# Patient Record
Sex: FEMALE | Race: WHITE | HISPANIC OR LATINO | Employment: OTHER | URBAN - METROPOLITAN AREA
[De-identification: names, ages, dates, MRNs, and addresses within clinical notes are randomized per-mention and may not be internally consistent; named-entity substitution may affect disease eponyms.]

---

## 2017-01-11 ENCOUNTER — DOCUMENTATION ONLY (OUTPATIENT)
Dept: ONCOLOGY | Facility: CLINIC | Age: 67
End: 2017-01-11

## 2017-01-11 NOTE — PROGRESS NOTES
January 11, 2017    Meme did not appear for her Cancer Risk Management Program genetic counseling appointment at the [facility] for her results. I will have the schedulers contact her to reschedule the appointment.    Millie Mendez MS, [facility] [place]  Certified Genetic Counselor  P. [phone]  F. [phone]

## 2017-01-25 ENCOUNTER — OFFICE VISIT (OUTPATIENT)
Dept: ONCOLOGY | Facility: CLINIC | Age: 67
End: 2017-01-25
Attending: GENETIC COUNSELOR, MS
Payer: COMMERCIAL

## 2017-01-25 DIAGNOSIS — Z80.41 FAMILY HISTORY OF MALIGNANT NEOPLASM OF OVARY: ICD-10-CM

## 2017-01-25 DIAGNOSIS — Z80.3 FAMILY HISTORY OF MALIGNANT NEOPLASM OF BREAST: Primary | ICD-10-CM

## 2017-01-25 DIAGNOSIS — Z84.81 FAMILY HISTORY OF BRCA1 GENE POSITIVE: ICD-10-CM

## 2017-01-25 PROCEDURE — 96040 ZZH GENETIC COUNSELING, EACH 30 MINUTES: CPT | Mod: ZF | Performed by: GENETIC COUNSELOR, MS

## 2017-01-25 NOTE — Clinical Note
"1/25/2017       RE: Meme Hameed  [address]  [facility] [address]     Dear Colleague,    Thank you for referring your patient, eMme Hameed, to the [facility]. Please see a copy of my visit note below.    1/25/2017    Referring Provider: Kaelyn Dunlap MD     Presenting Information:  Meme returned to the Cancer Risk Management Program at the [facility] to discuss her genetic testing results. Her blood was drawn on 11/25/2016.  GynPlus testing  was ordered from [email or web address]. This testing was done because of her family history of breast and ovarian cancer.    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  No other mutations were found in the RAD51C gene.      Meme also tested negative for mutations in the following genes by sequencing and deletion/duplication analysis: BRCA1, BRCA2, BRIP1, EPCAM, MLH1, MSH2, MSH6, PALB2, PMS2, PTEN, RAD51D, and TP53.  Meme cannot pass on a mutation in any of these genes to her children based on this test result.  Mutations in these genes do not skip generations.     Of note, Meme's paternal cousin had a reported BRCA1 mutation. Meme has been unable to obtain this test report. 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  Meme did not have any deletions of the BRCA1 gene found on this test.  She will try to get me her cousin's test report.     Interpretation:  We discussed several different interpretations of this inconclusive test result.  It is not clear if this variant in the RAD51C gene is associated with increased cancer risk.  1. This variant may be a benign change that does not increase cancer risk.  2. This variant may be a harmful mutation that " causes increased risk for ovarian and breast cancer.    Harmful mutations in RAD51C have been shown to increase the risk of ovarian cancer as well as female breast cancer.  The risk for ovarian cancer for women who have a RAD51C mutation is estimated to be about 5-9%.  The risk for breast cancer is believed to be increased for women who have a RAD51C mutation, however these risk numbers are not well defined.  Cancer risks in men are not currently available.  The National Comprehensive Cancer Network (NCCN) recommends consideration of prophylactic salpingo-oophorectomy (surgical removal of the ovaries and fallopian tubes) for women with a pathogenic variant in RAD51C. However, this surgery is not currently reccommended for Meme, given that the clinical significance of this variant is unknown.     The lab is working to determine if this variant is harmful or benign, and they will contact me if it is reclassified.  If this variant is determined to be a benign change, there may be a different gene or combination of genes and environment that are associated with the cancers in Meme and/or her relatives.  It is important to consider that her mother may have had a mutation in one of the genes tested and she did not inherit it.      Inheritance:   Meme has a 50% chance to pass this variant to each of her children.   Likewise, each of her siblings also has a 50% risk of having the same variant.  Because it is unclear what, if any, risk is associated with this variant, clinical genetic testing is not recommended for relatives.    Screening:  Based on this inconclusive test result, it is important for Meme and her relatives to refer back to the family history for appropriate cancer screening.      Based on her personal and family history, Meme has a 10.5-35.4% lifetime risk of developing breast cancer based on the JOSE A and Philip models.  As such, Meme meets current National Comprehensive Cancer Network (NCCN)  guidelines for high risk breast screening.  This includes annual breast MRI in addition to annual mammogram.  In addition, Meme should be receiving clinical breast exams by her physician. We discussed that her other close female relatives may also qualify for high risk screening, based on family history, and they should speak to their physicians or a genetic counselor about this.    Other population cancer screening, such as recommendations by the American Cancer Society, are appropriate for Meme and her family.  These screening recommendations may change if there are changes to Meme's personal and/or family history.  Final screening recommendations should be made by each individual's managing physician.    Additional Testing:  It is possible Meme does carry a gene or combination of genes and environment that increase her risk for breast cancer. We again reviewed the option of larger gene panels covering more moderate risk genes associated with breast cancer, namely the CASSIE and CHEK2 genes. We discussed that such testing is available, but it is not clear if insurance would cover additional testing.  We discussed that her breast screening would not change if she tested positive for mutations in these genes, but that it could potentially provide information to her daughters, if she did have a mutation. Meme felt comfortable declining further genetic testing at this time.    Although Meme's genetic testing result was negative, other relatives may still carry a gene mutation associated with cancer. Genetic counseling is recommended for her siblings (due to maternal and paternal family history) and maternal relatives (due to maternal family history) to discuss genetic testing options.  Paternal relatives are also encouraged to seek genetic counseling for the known BRCA1 mutation.    Summary:  We do not have an explanation for her maternal family history of cancer.  Because of that, it is important that she  continue with cancer screening based on her personal and family history as discussed above.  Genetic testing is rapidly advancing, and new cancer susceptibility genes will most likely be identified in the future.  Therefore, I encouraged Meme to contact me annually or if there are changes in her personal or family history.  This may change how we assess her cancer risk, screening, and the testing we would offer.    Plan:  1. I provided Meme with a copy of her test results today.    2. She plans to follow-up with her physicians. I will place a referral for high-risk breast screening.  3. She should contact me annually, or sooner if family history changes, as well as to check in on the status of this VUS.  4. I will contact Meme if the lab informs me that this VUS has been reclassified.  This may change screening and testing recommendations for relatives.    If Meme has any further questions, I encouraged her to contact me at [phone].    Time spent face to face: 25 minutes.    Millie Mendez MS, [facility] [place]  Certified Genetic Counselor  P. [phone]  F. [phone]

## 2017-01-25 NOTE — Clinical Note
Cancer Risk Management  Program Locations    [facility]  [facility]  [facility]  Mailing Address  Cancer Risk Management Program  [facility]  [address]  [place] [address]    New patient appointments  [phone]  January 31, 2017    Meme Hameed  [address]  [facility] [address]      Dear Meme,    It was a pleasure meeting with you at the [facility] again last week on 1/25/17.  Here is a copy of the progress note from your recent genetic counseling visit to the Cancer Risk Management Program.  If you have any additional questions, please feel free to call.    Referring Provider: Kaelyn Dunlap MD     Presenting Information:  Meme returned to the Cancer Risk Management Program at the [facility] to discuss her genetic testing results. Her blood was drawn on 11/25/2016.  GynPlus testing  was ordered from [facility]. This testing was done because of her family history of breast and ovarian cancer.    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  No other mutations were found in the RAD51C gene.      Meme also tested negative for mutations in the following genes by sequencing and deletion/duplication analysis: BRCA1, BRCA2, BRIP1, EPCAM, MLH1, MSH2, MSH6, PALB2, PMS2, PTEN, RAD51D, and TP53.  Meme cannot pass on a mutation in any of these genes to her children based on this test result.  Mutations in these genes do not skip generations.     Of note, Meme's paternal cousin had a reported BRCA1 mutation. Meme has been unable to obtain this test report. 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  Meme did not have any deletions of the BRCA1 gene found on this test.  She will try to get me her cousin's test report.     Interpretation:  We discussed several different interpretations of this inconclusive test result.  It is not clear if this variant in the RAD51C gene is associated with increased cancer risk.  1. This variant may be a benign change that does not increase cancer risk.  2. This variant may be a harmful mutation that causes increased risk for ovarian and breast cancer.    Harmful mutations in RAD51C have been shown to increase the risk of ovarian cancer as well as female breast cancer.  The risk for ovarian cancer for women who have a RAD51C mutation is estimated to be about 5-9%.  The risk for breast cancer is believed to be increased for women who have a RAD51C mutation, however these risk numbers are not well defined.  Cancer risks in men are not currently available.  The National Comprehensive Cancer Network (NCCN) recommends consideration of prophylactic salpingo-oophorectomy (surgical removal of the ovaries and fallopian tubes) for women with a pathogenic variant in RAD51C. However, this surgery is not currently reccommended for Meme, given that the clinical significance of this variant is unknown.     The lab is working to determine if this variant is harmful or benign, and they will contact me if it is reclassified.  If this variant is determined to be a benign change, there may be a different gene or combination of genes and environment that are associated with the cancers in Meme and/or her relatives.  It is important to consider that her mother may have had a mutation in one of the genes tested and she did not inherit it.      Inheritance:   Meme has a 50% chance to pass this variant to each of her children.   Likewise, each of her siblings also has a 50% risk of having the same variant.  " Because it is unclear what, if any, risk is associated with this variant, clinical genetic testing is not recommended for relatives.    Screening:  Based on this inconclusive test result, it is important for Meme and her relatives to refer back to the family history for appropriate cancer screening.      Based on her personal and family history, Meme has a 10.5-35.4% lifetime risk of developing breast cancer based on the JOSE A and Philip models.  As such, Meme meets current National Comprehensive Cancer Network (NCCN) guidelines for high risk breast screening.  This includes annual breast MRI in addition to annual mammogram.  In addition, Meme should be receiving clinical breast exams by her physician. We discussed that her other close female relatives may also qualify for high risk screening, based on family history, and they should speak to their physicians or a genetic counselor about this.    Other population cancer screening, such as recommendations by the American Cancer Society, are appropriate for Meme and her family.  These screening recommendations may change if there are changes to Meme's personal and/or family history.  Final screening recommendations should be made by each individual's managing physician.    Additional Testing:  It is possible Meme does carry a gene or combination of genes and environment that increase her risk for breast cancer. We again reviewed the option of larger gene panels covering more moderate risk genes associated with breast cancer, namely the CASSIE and CHEK2 genes. We discussed that such testing is available, but it is not clear if insurance would cover additional testing.  We discussed that her breast screening would not change if she tested positive for mutations in these genes, but that it could potentially provide information to her daughters, if she did have a mutation. Meme felt comfortable declining further genetic testing at this time.    Although  Meme's genetic testing result was negative, other relatives may still carry a gene mutation associated with cancer. Genetic counseling is recommended for her siblings (due to maternal and paternal family history) and maternal relatives (due to maternal family history) to discuss genetic testing options.  Paternal relatives are also encouraged to seek genetic counseling for the known BRCA1 mutation.    Summary:  We do not have an explanation for her maternal family history of cancer.  Because of that, it is important that she continue with cancer screening based on her personal and family history as discussed above.  Genetic testing is rapidly advancing, and new cancer susceptibility genes will most likely be identified in the future.  Therefore, I encouraged Meme to contact me annually or if there are changes in her personal or family history.  This may change how we assess her cancer risk, screening, and the testing we would offer.    Plan:  1. I provided Meme with a copy of her test results today.    2. She plans to follow-up with her physicians. I will place a referral for high-risk breast screening.  3. She should contact me annually, or sooner if family history changes, as well as to check in on the status of this VUS.  4. I will contact Meme if the lab informs me that this VUS has been reclassified.  This may change screening and testing recommendations for relatives.    If Meme has any further questions, I encouraged her to contact me at [phone].    Time spent face to face: 25 minutes.    Millie Mendez MS, [facility] [place]  Certified Genetic Counselor  P. [phone]  F. [phone]

## 2017-01-26 NOTE — PROGRESS NOTES
"1/25/2017    Referring Provider: Kaelyn Dunlap MD     Presenting Information:  Meme returned to the Cancer Risk Management Program at the [facility] to discuss her genetic testing results. Her blood was drawn on 11/25/2016.  GynPlus testing  was ordered from [facility]. This testing was done because of her family history of breast and ovarian cancer.    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  No other mutations were found in the RAD51C gene.      Meme also tested negative for mutations in the following genes by sequencing and deletion/duplication analysis: BRCA1, BRCA2, BRIP1, EPCAM, MLH1, MSH2, MSH6, PALB2, PMS2, PTEN, RAD51D, and TP53.  Meme cannot pass on a mutation in any of these genes to her children based on this test result.  Mutations in these genes do not skip generations.     Of note, Meme's paternal cousin had a reported BRCA1 mutation. Meme has been unable to obtain this test report. 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  Meme did not have any deletions of the BRCA1 gene found on this test.  She will try to get me her cousin's test report.     Interpretation:  We discussed several different interpretations of this inconclusive test result.  It is not clear if this variant in the RAD51C gene is associated with increased cancer risk.  1. This variant may be a benign change that does not increase cancer risk.  2. This variant may be a harmful mutation that causes increased risk for ovarian and breast cancer.    Harmful mutations in RAD51C have been shown to increase the risk of ovarian cancer as well as female breast cancer.  The risk for ovarian cancer for women who have a RAD51C mutation is " estimated to be about 5-9%.  The risk for breast cancer is believed to be increased for women who have a RAD51C mutation, however these risk numbers are not well defined.  Cancer risks in men are not currently available.  The National Comprehensive Cancer Network (NCCN) recommends consideration of prophylactic salpingo-oophorectomy (surgical removal of the ovaries and fallopian tubes) for women with a pathogenic variant in RAD51C. However, this surgery is not currently reccommended for Meme, given that the clinical significance of this variant is unknown.     The lab is working to determine if this variant is harmful or benign, and they will contact me if it is reclassified.  If this variant is determined to be a benign change, there may be a different gene or combination of genes and environment that are associated with the cancers in Meme and/or her relatives.  It is important to consider that her mother may have had a mutation in one of the genes tested and she did not inherit it.      Inheritance:   Meme has a 50% chance to pass this variant to each of her children.   Likewise, each of her siblings also has a 50% risk of having the same variant.  Because it is unclear what, if any, risk is associated with this variant, clinical genetic testing is not recommended for relatives.    Screening:  Based on this inconclusive test result, it is important for Meme and her relatives to refer back to the family history for appropriate cancer screening.      Based on her personal and family history, Meme has a 10.5-35.4% lifetime risk of developing breast cancer based on the JOSE A and Philip models.  As such, Meme meets current National Comprehensive Cancer Network (NCCN) guidelines for high risk breast screening.  This includes annual breast MRI in addition to annual mammogram.  In addition, Meme should be receiving clinical breast exams by her physician. We discussed that her other close female relatives may  also qualify for high risk screening, based on family history, and they should speak to their physicians or a genetic counselor about this.    Other population cancer screening, such as recommendations by the American Cancer Society, are appropriate for Meme and her family.  These screening recommendations may change if there are changes to Meme's personal and/or family history.  Final screening recommendations should be made by each individual's managing physician.    Additional Testing:  It is possible Meme does carry a gene or combination of genes and environment that increase her risk for breast cancer. We again reviewed the option of larger gene panels covering more moderate risk genes associated with breast cancer, namely the CASSIE and CHEK2 genes. We discussed that such testing is available, but it is not clear if insurance would cover additional testing.  We discussed that her breast screening would not change if she tested positive for mutations in these genes, but that it could potentially provide information to her daughters, if she did have a mutation. Meme felt comfortable declining further genetic testing at this time.    Although Meme's genetic testing result was negative, other relatives may still carry a gene mutation associated with cancer. Genetic counseling is recommended for her siblings (due to maternal and paternal family history) and maternal relatives (due to maternal family history) to discuss genetic testing options.  Paternal relatives are also encouraged to seek genetic counseling for the known BRCA1 mutation.    Summary:  We do not have an explanation for her maternal family history of cancer.  Because of that, it is important that she continue with cancer screening based on her personal and family history as discussed above.  Genetic testing is rapidly advancing, and new cancer susceptibility genes will most likely be identified in the future.  Therefore, I encouraged Meme to  contact me annually or if there are changes in her personal or family history.  This may change how we assess her cancer risk, screening, and the testing we would offer.    Plan:  1. I provided Meme with a copy of her test results today.    2. She plans to follow-up with her physicians. I will place a referral for high-risk breast screening.  3. She should contact me annually, or sooner if family history changes, as well as to check in on the status of this VUS.  4. I will contact Meme if the lab informs me that this VUS has been reclassified.  This may change screening and testing recommendations for relatives.    If Meme has any further questions, I encouraged her to contact me at [phone].    Time spent face to face: 25 minutes.    Millie Mendez MS, [facility] [place]  Certified Genetic Counselor  P. [phone]  F. [phone]

## 2017-02-16 DIAGNOSIS — Z80.3 FAMILY HISTORY OF MALIGNANT NEOPLASM OF BREAST: ICD-10-CM

## 2017-02-16 DIAGNOSIS — Z12.39 BREAST CANCER SCREENING, HIGH RISK PATIENT: Primary | ICD-10-CM

## 2017-02-16 PROBLEM — Z91.89 AT HIGH RISK FOR BREAST CANCER: Status: ACTIVE | Noted: 2017-02-16

## 2017-02-17 ENCOUNTER — TELEPHONE (OUTPATIENT)
Dept: ONCOLOGY | Facility: CLINIC | Age: 67
End: 2017-02-17

## 2017-02-17 ENCOUNTER — ONCOLOGY VISIT (OUTPATIENT)
Dept: ONCOLOGY | Facility: CLINIC | Age: 67
End: 2017-02-17
Attending: CLINICAL NURSE SPECIALIST
Payer: COMMERCIAL

## 2017-02-17 VITALS
SYSTOLIC BLOOD PRESSURE: 110 MMHG | OXYGEN SATURATION: 100 % | HEART RATE: 97 BPM | WEIGHT: 169.9 LBS | HEIGHT: 62 IN | DIASTOLIC BLOOD PRESSURE: 65 MMHG | TEMPERATURE: 97 F | RESPIRATION RATE: 12 BRPM | BODY MASS INDEX: 31.27 KG/M2

## 2017-02-17 DIAGNOSIS — Z80.3 FAMILY HISTORY OF MALIGNANT NEOPLASM OF BREAST: ICD-10-CM

## 2017-02-17 DIAGNOSIS — E66.09 OBESITY DUE TO EXCESS CALORIES, UNSPECIFIED OBESITY SEVERITY: ICD-10-CM

## 2017-02-17 DIAGNOSIS — Z91.89 AT HIGH RISK FOR BREAST CANCER: Primary | ICD-10-CM

## 2017-02-17 DIAGNOSIS — Z12.31 ENCOUNTER FOR SCREENING MAMMOGRAM FOR HIGH-RISK PATIENT: ICD-10-CM

## 2017-02-17 PROCEDURE — 99212 OFFICE O/P EST SF 10 MIN: CPT | Mod: ZF

## 2017-02-17 PROCEDURE — 99215 OFFICE O/P EST HI 40 MIN: CPT | Mod: ZP | Performed by: CLINICAL NURSE SPECIALIST

## 2017-02-17 RX ORDER — ONDANSETRON 4 MG/1
4 TABLET, ORALLY DISINTEGRATING ORAL
COMMUNITY
Start: 2012-07-26 | End: 2017-02-17

## 2017-02-17 RX ORDER — LEVETIRACETAM 750 MG/1
TABLET ORAL
COMMUNITY
Start: 2016-12-05 | End: 2020-09-16

## 2017-02-17 RX ORDER — INHALER, ASSIST DEVICES
SPACER (EA) MISCELLANEOUS
COMMUNITY
Start: 2012-07-26 | End: 2017-02-17

## 2017-02-17 RX ORDER — ALBUTEROL SULFATE 90 UG/1
2 AEROSOL, METERED RESPIRATORY (INHALATION)
COMMUNITY
Start: 2012-07-26 | End: 2017-03-20

## 2017-02-17 ASSESSMENT — PAIN SCALES - GENERAL: PAINLEVEL: NO PAIN (0)

## 2017-02-17 NOTE — PROGRESS NOTES
Oncology Risk Management Consultation:  Date on this visit: 2/17/2017    Meme Hameed  is referred by Millie Mendez,Certified Genetic Counselor,  for an oncology risk management consultation. She requires evaluation for her risk of cancer secondary to having a family history of breast cancer in her mother, sister, paternal aunt and 3 paternal cousins (see below for history). She is considered to at high risk for breast cancer and has a 35.4% lifetime risk for breast cancer by the Philip model.  Her 5 year breast cancer risk is 2.4% by the Melly model and 3.7% by the JOSE A model.     Primary Physician: Kaelyn Dunlap MD    History Of Present Illness:  Ms. Hameed is a very pleasant, healthy 67 year old female who presents with family history of breast cancer.    Pertinent history:  Menarche: [age] years.  Age at first child: 20  Age at menopause: 51  Hx of breastfeeding first child x6 weeks.  No history of OCPs.  No history of HRT.  Uterus removed in 1995 d/t cervical dysplasia.  Ovaries intact.   Hx of 1 negative breast biopsies (2010, see below)  Genetic testing negative for 12 genes on GynPlus Panel through [facility], 11/25/2016. Negative for mutations in BRCA1, BRCA2, BRIP1, EPCAM, MLH1, MSH2, MSH6, PALB2, PMS2, PTEN, RAD51D and TP53. 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    Pertinent screening history:  8/4/2010 - L Stereotactic core needle biopsy w/L clip placement - fibroadenoma and non proliferative fibrocystic changes.  7/14/2014 - Screening mammogram, BiRads1.  11/9/2016 - Diagnostic tomosynthesis mammogram and R ultrasound for R clear nipple discharge x2 months, BiRads1.    At this visit,  she reports she practices breast self exams and denies fatigue, pain, nipple discharge, nipple changes, asymmetry, and changes in shape, color and skin of her breasts.    Past Medical/Surgical History:  Past Medical History   Diagnosis Date     Adenomatous colon polyp 2011     tubular adenoma  "    At high risk for breast cancer 02/16/2017     35.4% lifetime risk by Philip model     Epilepsy (H)      Fracture of metatarsal bone of left foot 7/14     Fracture, radius 1990     and ulnar styloid fracture     GIB (gastrointestinal bleeding) 4/1/2011     Kidney stones 1970, 1975     during pregnancy     Myxolipoma      finger, removed 2011     Osteoporosis      Seizures (H)      Past Surgical History   Procedure Laterality Date     Colonoscopy  3/31/2011     Procedure:COLONOSCOPY; Surgeon:PACO DIETRICH; Location: GI     Colonoscopy  3/31/2011     Procedure:COMBINED COLONOSCOPY, REMOVE TUMOR/POLYP/LESION BY SNARE; Surgeon:PACO DIETRICH; Location: GI     Esophagoscopy, gastroscopy, duodenoscopy (egd), combined  3/31/2011     Procedure:COMBINED ESOPHAGOSCOPY, GASTROSCOPY, DUODENOSCOPY (EGD); Surgeon:PACO DIETRICH; Location: GI     Orthopedic surgery       L wrist     Hysterectomy  1994 or 1995     Partial hysterectomy 1995, urethral tube \"widened\" 1994     Hc breath hydrogen test  6/2/2011     Procedure:HYDROGEN BREATH TEST; Surgeon:MANDIE BRADY; Location: GI       Allergies:  Allergies as of 02/17/2017     (No Known Allergies)       Current Medications:  Current Outpatient Prescriptions   Medication Sig Dispense Refill     Multiple Vitamins-Minerals (CENTRUM SILVER) per tablet Take 1 tablet by mouth daily       cholecalciferol (VITAMIN D) 1000 UNIT tablet Take 1 tablet (1,000 Units) by mouth daily 90 tablet 3     CALCIUM 500 +D 500-400 MG-UNIT TABS Take 1 tablet by mouth 2 times daily 180 tablet 3     phenytoin (DILANTIN) 100 MG ER capsule Take  by mouth 3 times daily.       phenobarbital (LUMINAL) 30 MG tablet Take 32.4 mg by mouth 2 times daily           Family History:  Family History   Problem Relation Age of Onset     Breast Cancer Mother 30     lumpectomy and chemo; also \"mass in ovary\"     OSTEOPOROSIS Sister      GASTROINTESTINAL DISEASE Son      intestinal transplant     Breast Cancer " Sister 55     Leukemia Sister 52     DIABETES Maternal Aunt      multiple mat aunts     Bone Cancer Niece 19     Colon Polyps Father      Colon Polyps Brother      Ovarian Cancer Maternal Aunt 50      of ovarian cancer in 50s or 60s, unknown     Prostate Cancer Cousin 65     Breast Cancer Paternal Aunt 36      of breast cancer recurrence in 60s     Breast Cancer Cousin 30     paternal cousin, BRCA1+ by report     Breast Cancer Cousin 40     paternal cousin, BRCA1+ by report     Breast Cancer Cousin 35     paternal cousin, BRCA1+ by report     Genetic Disorder Cousin      paternal male cousin, BRCA1+ by report     Ovarian Cancer Cousin      paternal cousin, BRCA1+ by report       Social History:  Social History     Social History     Marital status:      Spouse name: N/A     Number of children: N/A     Years of education: N/A     Occupational History     Not on file.     Social History Main Topics     Smoking status: Former Smoker     Smokeless tobacco: Not on file     Alcohol use Yes      Comment: Rarely     Drug use: No     Sexual activity: Not on file     Other Topics Concern     Not on file     Social History Narrative       Physical Exam:  There were no vitals taken for this visit.    GENERAL APPEARANCE: healthy, alert and no distress     NECK: no adenopathy, no asymmetry or masses     LYMPHATICS: No cervical, supraclavicular, axillary or inguinal lymphadenopathy     RESP: lungs clear to auscultation - no rales, rhonchi or wheezes     BREAST: A multi positional, bilateral breast exam was performed.  Very symmetrical pendulous breasts. Right breast: no palpable dominant masses, thickened ridge of probable fibrocystic changes in inferior 2 quadrants. No nipple discharge, no skin changes.  Right axilla: no palpable adenopathy. Left breast: no palpable dominant masses, thickened ridge of probable fibrocystic changes in inferior 2 quadrants.no nipple discharge, no skin changes. Left axilla: no palpable  adenopathy.  CARDIOVASCULAR: regular rate and rhythm, normal S1 S2, no S3 or S4 and no murmur.        SKIN: no suspicious lesions or rashes on upper torso.    Laboratory/Imaging Studies  Results for orders placed or performed in visit on 12/01/16   Dexa vertebral fracture analysis    Narrative    ATTENTION:  Easy to read DXA/VFA reports (formatted and presented as   tables)   can be found in EPIC under Chart review >  Imaging tab >  DXA    [facility]   [address] [place] [id number]  Phone: [phone]   Fax: [phone]    Vertebral Fracture Assessment (VFA) Report:      DEIDRE MURPHY 2MARKOWITZ:    Your patient, BAYRON KUNZ ([id number] ), completed a VFA exam ([id number]   ) on a Jobulous on   . The following is a summary of   the results.   _________________________________________________________________________    Patient biographical information and history:    Current measured height: 62.0 in.  Current measured weight: 166.0 lbs.    Vertebral Fracture Assessment (VFA) was performed in the lateral decubitus   position on this 65.8 year old  Female, whose history as reported   by the patient is noted for  postmenopausal status      and the following   current treatments:   calcium, vitamin D,    In addition, the patient   reported having been previously treated with:   hormone replacement,   corticosteroids,    The patient meets the following indications for a VFA:      (2007 ISCD Position Statements at www.iscd.org)  _________________________________________________________________________    Technical quality:    Satisfactory.  VFA software and contrast adjustment allow for better   visualization of the thoracic vertebral bodies.  VFA scan was   interpretable from T4 - L4.  _________________________________________________________________________    Densitometry results:    Comparison:  None provided.   "  _________________________________________________________________________    Conclusions:    VFA scan was interpretable from T4 - L4.  Using the semi-quantitative   analysis of Genant (see reference #1),   no fractures were identified.  Six point morphometry confirmed the   presence and severity of these deformities.         _________________________________________________________________________    Feel free to contact DXA services if you have any questions or comments.    Thank you for the opportunity to be of service to you and your patient.      _________________________________________________________________________    References:    1.  NOF Physician's Guideline Website address:  www.nof.org    2.  American College of Rheumatology Recommendations for the Prevention   and Treatment of Glucocorticoid-induced Osteoporosis:  2001 update in   \"Arthritis and Rheumatism\"  July 2001 edition (vol 44, issue 7) pp 1497 -   1500.    3.  ISCD position statements:  www.iscd.org  (includes the report of the   2005 VFA Position Development Conference)    According to the ISCD position statements, lateral spine is not to be used   for diagnosis, but may have a role in monitoring.    According to the ISCD position statements, the diagnosis of osteoporosis   in pre-menopausal women and in men younger than age 50 should not be made   on the basis of densitometric criteria alone.  In addition Z-scores rather   than T-scores should be used.    4.  Implementation of suggestions is at the discretion of the ordering   provider.  Clinical correlation is recommended.    5.  WHO categories:    normal = T-score of - 1.0 or more positive, low bone density/ osteopenia =   T-score of - 1.0 to - 2.5, osteoporosis = T-score of -2.5 or more negative      Template revised 7.5.2016         ASSESSMENT  We discussed signs and symptoms to be watchful for and she practiced palpating with the clinic's breast simulation model. We reviewed her plan " (see below) and her prior imaging. She verbalized understanding of signs and symptoms to address with in between visits including lumps, thickening, swelling, tenderness, nipple discharge or changes in skin of breasts.    We also discussed following  a healthy lifestyle plan recommended by both NCCN and the American Cancer Society that can reduce the risk of breast cancer. She agreed to the following recommendations:  1 Limit alcohol consumption to less than 1 drink per day (1 drink=5 oz.wine, 12 oz. Beer or 1.5 oz. 80-proof liquor). She rarely drinks, only around the holidays.  2. Exercise per American Cancer Society guidelines of at least 150 minutes of moderate-intensity activity or 75 minutes of vigorous activity each week. (Or a combination of both.) Exercise should be spread  out over the week. She is exercising 5 days per week on the recumbent bicycle, keeping a food diary and eating about 1500 calories per day. She has tried water grace and water aerobics in the past. She has issues with weight bearing exercise because she has broken her foot in the past and has a history of osteoporosis.   3. Maintain a healthy weight with a Body Mass Index between 19-24.9. Her BMI is 30. I am referring to Katelin Orellana RD, at the [facility] for assessment and plan to help her lose weight.  4. Do not use tobacco products and limit exposure to passive smoke. She stopped smoking 10 years ago.    At this point, the plan will be to continue to reevaluate her risk routinely and reassess at her next visit. She may be a candidate for raloxifene in the future, as she has osteoporosis as well.  I will contact her PCP regarding whether she is considering osteoporosis treatment for her.    INDIVIDUALIZED CANCER RISK MANAGEMENT PLAN:  Individualized Surveillance Plan for women  With 20% or greater lifetime risk of breast cancer   Per NCCN Guidelines Version 1.2016   Recommended screening Test or procedure Last done  Next Scheduled    Clinical encounter Ongoing risk assessment, risk reduction counseling and clinical breast exam, every 6-12 months. 2/17/2017 August 2017   However, some family histories with breast cancers at a very young age, may warrant screening starting earlier.    *May begin at age 40 if breast cancers in the family occur at later ages.    Annual mammogram beginning 10 years younger than the earliest breast cancer in the family but not prior to age 30.    Recommend annual breast MRI to begin 10 years younger than the earliest breast cancer in the family but not prior to age 25.    Breast MRIs are preferably done on day 7-15 of the menstrual cycle in premenopausal women. 11/9/2016 - Diagnostic tomosynthesis mammogram and R Ultrasound for R breast clear nipple discharge, both BiRads1, negative.    scattered fibroglandular densities NEXT: Breast MRI ordered soon.    NEXT exam:   August with tomosynthesis mammogram after visit.   Breast screening for patients at high risk due to thoracic radiation between the ages of 10-30     Begin 8-10 years post radiation treatment or age 25.   Annual mammogram   and  Annual breast MRI, preferably on day 7-15 of menstrual cycle for premenopausal women.    Annual clinical breast exams with ongoing risk assessment and risk reduction counseling until age 25, then every 6-12 months.     NA     NA       I will be happy to work with her to manage her cancer risk, will follow up on her screenings and see her in the Cancer Risk Management clinic at the [facility] in six months.    I spent 40 minutes with the patient with greater that 50% of it in counseling and coordinating care as documented above.    Yelena Ruffin, GWYN-CNS, OCN, ANG-BC  Clinical Nurse Specialist  Cancer Risk Management Program  [address]  [place] [address]    phone:  [phone]  Pager: [phone]  fax: [phone]    Cc: Kaelyn  Gale Dunlap MD

## 2017-02-17 NOTE — PATIENT INSTRUCTIONS
Individualized Surveillance Plan for women  With 20% or greater lifetime risk of breast cancer   Per NCCN Guidelines Version 1.2016   Recommended screening Test or procedure Last done Next Scheduled    Clinical encounter Ongoing risk assessment, risk reduction counseling and clinical breast exam, every 6-12 months. 2/17/2017 August 2017   However, some family histories with breast cancers at a very young age, may warrant screening starting earlier.    *May begin at age 40 if breast cancers in the family occur at later ages.    Annual mammogram beginning 10 years younger than the earliest breast cancer in the family but not prior to age 30.    Recommend annual breast MRI to begin 10 years younger than the earliest breast cancer in the family but not prior to age 25.    Breast MRIs are preferably done on day 7-15 of the menstrual cycle in premenopausal women. 11/9/2016 - Diagnostic tomosynthesis mammogra and R Ultrasound for R breast clear nipple discharge, both BiRads1, negative.    scattered fibroglandular densities NEXT: Breast MRI ordered soon.    NEXT exam:   August with tomosynthesis mammogram after visit.   Breast screening for patients at high risk due to thoracic radiation between the ages of 10-30     Begin 8-10 years post radiation treatment or age 25.   Annual mammogram   and  Annual breast MRI, preferably on day 7-15 of menstrual cycle for premenopausal women.    Annual clinical breast exams with ongoing risk assessment and risk reduction counseling until age 25, then every 6-12 months.     NA     NA       Magnetic Resonance Imaging (MRI) of the Breast  Magnetic resonance imaging (MRI) of the breast is an imaging test that uses strong magnets and radio waves to form pictures of the inside of the breast. It also creates images of the tissues that surround the breast. Breast MRI is used to check for problems, such as a leaking breast implant or a suspicious lump or mass. It can also be used to help  determine if breast cancer is present and aid in diagnosis and management. Most MRI tests take 30 to 60 minutes. Depending on the type of MRI you are having, the test may take longer. Give yourself extra time to check in.      During a breast MRI, you lie face down on a platform that slides into a tubelike machine called a scanner.   Before your test    You may need to stop eating or drinking before the test. Each health care facility has its own guidelines on this. It also depends on the type of exam you are having. Ask your health care provider if you should stop eating or drinking before the test.    Ask your provider if you should stop taking any medicine before the test.    Follow your normal daily routine unless your provider tells you otherwise.    You ll be asked to remove your watch, hearing aids, credit cards, pens, eyeglasses, and other metal objects.    You may be asked to remove your makeup. Makeup may contain some metal.  MRI uses strong magnets. Metal is affected by magnets and can distort the image. The magnet used in MRI can cause metal objects in your body to move. If you have a metal implant, you may not be able to have an MRI unless the implant is certified as MRI safe. People with these implants should not have an MRI:    Ear (cochlear) implants    Certain clips used for brain aneurysms    Certain metal coils put in blood vessels    Most defibrillators    Most pacemakers  The radiologist or technologist may ask you if you:    Have had stereotactic breast biopsy    Have a pacemaker    Have an artificial body part (prosthesis)    Have metal rods, screws, plates, or splinters in your body    Wear a medicated adhesive patch    Have tattoos or body piercings. Some tattoo inks contain metal.    Have implanted nerve stimulators or drug-infusion ports    Work with metal    Have braces. You must remove any dental work.    Have a bullet or other metal in your body  Tell the radiologist or technologist if  you:    Are allergic to X-ray dye (contrast medium), iodine, shellfish, or any medicines    Are pregnant or think you may be pregnant    Are afraid of small, enclosed spaces (claustrophobic)    Have any serious health problems. This includes kidney disease or a liver transplant. You may not be able to have the contrast material used for MRI.    Have had previous surgeries    Are breastfeeding   During your test    You may be asked to wear a hospital gown.    You may be given earplugs to wear if you desire.    You may be injected with contrast. This is a special dye that makes the MRI image sharp that may be needed depending on the reason for your exam.    You ll lie on a platform that slides into a tubelike machine called a scanner. You ll be on your stomach with your breasts placed through openings in the platform.    Remain as still as you can while the camera takes the pictures. This will ensure the best images.  After your test    You can get back to normal activities right away.    If you were given contrast, it will pass naturally through your body within a day.    Drink lots of water so that the dye passes quickly out of your body.  Getting your results  Your health care provider will discuss the test results with you during a follow-up appointment or over the phone.    1073-5378 [employer] [address] [place] [address], [place] [address]. All rights reserved. This information is not intended as a substitute for professional medical care. Always follow your healthcare professional's instructions.

## 2017-02-17 NOTE — Clinical Note
Dr. Dunlap, I am referring Meme to Katelin Orellana RD, to help her lose weight as we focus on reducing her risk for cancer.  She is negative for genetic mutations for 12 breast cancer genes (1 variant of unknown signifcance in RAD51C but not actionable). I am going to consider raloxifene as a chemopreventative agent for her, which could also help her osteoporosis. Will  at next visit. Are you considering any medication for osteoporosis? Thanks. Yelena Ruffin, GWYN-CNS, OCN, ANG-BC Clinical Nurse Specialist Cancer Risk Management Program [address] [place] [address]  phone:  [phone] Pager: [phone] fax: [phone]

## 2017-02-17 NOTE — NURSING NOTE
"Meme Hameed is a 67 year old female who presents for:  Chief Complaint   Patient presents with     Oncology Clinic Visit     ump return        Initial Vitals:  /65 (BP Location: Right arm, Patient Position: Fowlers, Cuff Size: Adult Regular)  Pulse 97  Temp 97  F (36.1  C) (Oral)  Resp 12  Ht 1.586 m (5' 2.44\")  Wt 77.1 kg (169 lb 14.4 oz)  SpO2 100%  BMI 30.64 kg/m2 Estimated body mass index is 30.64 kg/(m^2) as calculated from the following:    Height as of this encounter: 1.586 m (5' 2.44\").    Weight as of this encounter: 77.1 kg (169 lb 14.4 oz).. Body surface area is 1.84 meters squared. BP completed using cuff size: regular  No Pain (0) No LMP recorded. Patient has had a hysterectomy. Allergies and medications reviewed.     Medications: Medication refills not needed today.  Pharmacy name entered into [employer]:    [facility] - [place], [place] - [address] [place] [address]/[facility]Y [id number] [place], [place] - [address] [facility]    Comments: pt denies pain    7 minutes for nursing intake (face to face time)   Elva oRsenberg CMA        "

## 2017-02-17 NOTE — LETTER
Cancer Risk Management  Program Locations    [facility]  [facility]  [facility]  [facility]  Mailing Address  Cancer Risk Management Program  [facility]  [address] [facility] [address]  [place] [address]    New patient appointments  [phone]  February 17, 2017    Meme Hameed  [address]  [facility] [address]      Dear Meme,    It was a pleasure meeting with you today.  Below is a copy of my note from our visit, outlining your surveillance plan.      I look forward to seeing you in the future to coordinate your care and reduce your health risks. Please feel free to contact me if you have any questions or concerns.      Oncology Risk Management Consultation:  Date on this visit: 2/17/2017    Meme Hameed  is referred by Millie Mendez,Certified Genetic Counselor,  for an oncology risk management consultation. She requires evaluation for her risk of cancer secondary to having a family history of breast cancer in her mother, sister, paternal aunt and 3 paternal cousins (see below for history). She is considered to at high risk for breast cancer and has a 35.4% lifetime risk for breast cancer by the Philip model.  Her 5 year breast cancer risk is 2.4% by the Melly model and 3.7% by the JOSE A model.     Primary Physician: Kaelyn Dunlap MD    History Of Present Illness:  Ms. Hameed is a very pleasant, healthy 67 year old female who presents with family history of breast cancer.    Pertinent history:  Menarche: [age] years.  Age at first child: 20  Age at menopause: 51  Hx of breastfeeding first child x6 weeks.  No history of OCPs.  No history of HRT.  Uterus removed in 1995 d/t cervical dysplasia.  Ovaries intact.   Hx of 1 negative breast biopsies (2010, see below)  Genetic testing negative for 12 genes on GynPlus Panel through [facility] [facility] ([facility]  "Genetics, 11/25/2016. Negative for mutations in BRCA1, BRCA2, BRIP1, EPCAM, MLH1, MSH2, MSH6, PALB2, PMS2, PTEN, RAD51D and TP53. 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    Pertinent screening history:  8/4/2010 - L Stereotactic core needle biopsy w/L clip placement - fibroadenoma and non proliferative fibrocystic changes.  7/14/2014 - Screening mammogram, BiRads1.  11/9/2016 - Diagnostic tomosynthesis mammogram and R ultrasound for R clear nipple discharge x2 months, BiRads1.    At this visit,  she reports she practices breast self exams and denies fatigue, pain, nipple discharge, nipple changes, asymmetry, and changes in shape, color and skin of her breasts.    Past Medical/Surgical History:  Past Medical History   Diagnosis Date     Adenomatous colon polyp 2011     tubular adenoma     At high risk for breast cancer 02/16/2017     35.4% lifetime risk by Philip model     Epilepsy (H)      Fracture of metatarsal bone of left foot 7/14     Fracture, radius 1990     and ulnar styloid fracture     GIB (gastrointestinal bleeding) 4/1/2011     Kidney stones 1970, 1975     during pregnancy     Myxolipoma      finger, removed 2011     Osteoporosis      Seizures (H)      Past Surgical History   Procedure Laterality Date     Colonoscopy  3/31/2011     Procedure:COLONOSCOPY; Surgeon:PACO DIETRICH; Location:[facility]     Colonoscopy  3/31/2011     Procedure:COMBINED COLONOSCOPY, REMOVE TUMOR/POLYP/LESION BY SNARE; Surgeon:PACO DIETRICH; Location: GI     Esophagoscopy, gastroscopy, duodenoscopy (egd), combined  3/31/2011     Procedure:COMBINED ESOPHAGOSCOPY, GASTROSCOPY, DUODENOSCOPY (EGD); Surgeon:PACO DIETRICH; Location: GI     Orthopedic surgery       L wrist     Hysterectomy  1994 or 1995     Partial hysterectomy 1995, urethral tube \"widened\" 1994     Hc breath hydrogen test  6/2/2011     Procedure:HYDROGEN BREATH TEST; Surgeon:MANDIE BRADY; Location: GI       Allergies:  Allergies as of " "2017     (No Known Allergies)       Current Medications:  Current Outpatient Prescriptions   Medication Sig Dispense Refill     Multiple Vitamins-Minerals (CENTRUM SILVER) per tablet Take 1 tablet by mouth daily       cholecalciferol (VITAMIN D) 1000 UNIT tablet Take 1 tablet (1,000 Units) by mouth daily 90 tablet 3     CALCIUM 500 +D 500-400 MG-UNIT TABS Take 1 tablet by mouth 2 times daily 180 tablet 3     phenytoin (DILANTIN) 100 MG ER capsule Take  by mouth 3 times daily.       phenobarbital (LUMINAL) 30 MG tablet Take 32.4 mg by mouth 2 times daily           Family History:  Family History   Problem Relation Age of Onset     Breast Cancer Mother 30     lumpectomy and chemo; also \"mass in ovary\"     OSTEOPOROSIS Sister      GASTROINTESTINAL DISEASE Son      intestinal transplant     Breast Cancer Sister 55     Leukemia Sister 52     DIABETES Maternal Aunt      multiple mat aunts     Bone Cancer Niece 19     Colon Polyps Father      Colon Polyps Brother      Ovarian Cancer Maternal Aunt 50      of ovarian cancer in 50s or 60s, unknown     Prostate Cancer Cousin 65     Breast Cancer Paternal Aunt 36      of breast cancer recurrence in 60s     Breast Cancer Cousin 30     paternal cousin, BRCA1+ by report     Breast Cancer Cousin 40     paternal cousin, BRCA1+ by report     Breast Cancer Cousin 35     paternal cousin, BRCA1+ by report     Genetic Disorder Cousin      paternal male cousin, BRCA1+ by report     Ovarian Cancer Cousin      paternal cousin, BRCA1+ by report       Social History:  Social History     Social History     Marital status:      Spouse name: N/A     Number of children: N/A     Years of education: N/A     Occupational History     Not on file.     Social History Main Topics     Smoking status: Former Smoker     Smokeless tobacco: Not on file     Alcohol use Yes      Comment: Rarely     Drug use: No     Sexual activity: Not on file     Other Topics Concern     Not on file "     Social History Narrative       Physical Exam:  There were no vitals taken for this visit.    GENERAL APPEARANCE: healthy, alert and no distress     NECK: no adenopathy, no asymmetry or masses     LYMPHATICS: No cervical, supraclavicular, axillary or inguinal lymphadenopathy     RESP: lungs clear to auscultation - no rales, rhonchi or wheezes     BREAST: A multi positional, bilateral breast exam was performed.  Very symmetrical pendulous breasts. Right breast: no palpable dominant masses, thickened ridge of probable fibrocystic changes in inferior 2 quadrants. No nipple discharge, no skin changes.  Right axilla: no palpable adenopathy. Left breast: no palpable dominant masses, thickened ridge of probable fibrocystic changes in inferior 2 quadrants.no nipple discharge, no skin changes. Left axilla: no palpable adenopathy.  CARDIOVASCULAR: regular rate and rhythm, normal S1 S2, no S3 or S4 and no murmur.        SKIN: no suspicious lesions or rashes on upper torso.    Laboratory/Imaging Studies  Results for orders placed or performed in visit on 12/01/16   Dexa vertebral fracture analysis    Narrative    ATTENTION:  Easy to read DXA/VFA reports (formatted and presented as   tables)   can be found in EPIC under Chart review >  Imaging tab >  DXA    [facility]   [address] [place] [address]  Phone: [phone]   Fax: [phone]    Vertebral Fracture Assessment (VFA) Report:      DEIDRE MURPHY 2MARKOWITZ:    Your patient, BAYRON KUNZ ([id number] ), completed a VFA exam ([id number]   ) on a Mist[employer]io on   . The following is a summary of   the results.   _________________________________________________________________________    Patient biographical information and history:    Current measured height: 62.0 in.  Current measured weight: 166.0 lbs.    Vertebral Fracture Assessment (VFA) was performed in the lateral decubitus  "  position on this 65.8 year old  Female, whose history as reported   by the patient is noted for  postmenopausal status      and the following   current treatments:   calcium, vitamin D,    In addition, the patient   reported having been previously treated with:   hormone replacement,   corticosteroids,    The patient meets the following indications for a VFA:      (2007 ISCD Position Statements at www.iscd.org)  _________________________________________________________________________    Technical quality:    Satisfactory.  VFA software and contrast adjustment allow for better   visualization of the thoracic vertebral bodies.  VFA scan was   interpretable from T4 - L4.  _________________________________________________________________________    Densitometry results:    Comparison:  None provided.    _________________________________________________________________________    Conclusions:    VFA scan was interpretable from T4 - L4.  Using the semi-quantitative   analysis of Genant (see reference #1),   no fractures were identified.  Six point morphometry confirmed the   presence and severity of these deformities.         _________________________________________________________________________    Feel free to contact DXA services if you have any questions or comments.    Thank you for the opportunity to be of service to you and your patient.      _________________________________________________________________________    References:    1.  NO Physician's Guideline Website address:  www.nof.org    2.  American College of Rheumatology Recommendations for the Prevention   and Treatment of Glucocorticoid-induced Osteoporosis:  2001 update in   \"Arthritis and Rheumatism\"  July 2001 edition (vol 44, issue 7) pp 1490 -   150.    3.  ISCD position statements:  www.iscd.org  (includes the report of the   2005 VFA Position Development Conference)    According to the ISCD position statements, lateral spine is not " to be used   for diagnosis, but may have a role in monitoring.    According to the ISCD position statements, the diagnosis of osteoporosis   in pre-menopausal women and in men younger than age 50 should not be made   on the basis of densitometric criteria alone.  In addition Z-scores rather   than T-scores should be used.    4.  Implementation of suggestions is at the discretion of the ordering   provider.  Clinical correlation is recommended.    5.  WHO categories:    normal = T-score of - 1.0 or more positive, low bone density/ osteopenia =   T-score of - 1.0 to - 2.5, osteoporosis = T-score of -2.5 or more negative      Template revised 7.5.2016         ASSESSMENT  We discussed signs and symptoms to be watchful for and she practiced palpating with the clinic's breast simulation model. We reviewed her plan (see below) and her prior imaging. She verbalized understanding of signs and symptoms to address with in between visits including lumps, thickening, swelling, tenderness, nipple discharge or changes in skin of breasts.    We also discussed following  a healthy lifestyle plan recommended by both NCCN and the American Cancer Society that can reduce the risk of breast cancer. She agreed to the following recommendations:  1 Limit alcohol consumption to less than 1 drink per day (1 drink=5 oz.wine, 12 oz. Beer or 1.5 oz. 80-proof liquor). She rarely drinks, only around the holidays.  2. Exercise per American Cancer Society guidelines of at least 150 minutes of moderate-intensity activity or 75 minutes of vigorous activity each week. (Or a combination of both.) Exercise should be spread  out over the week. She is exercising 5 days per week on the recumbent bicycle, keeping a food diary and eating about 1500 calories per day. She has tried water grace and water aerobics in the past. She has issues with weight bearing exercise because she has broken her foot in the past and has a history of osteoporosis.   3. Maintain a  healthy weight with a Body Mass Index between 19-24.9. Her BMI is 30. I am referring to Katelin Orellana RD, at the [facility] for assessment and plan to help her lose weight.  4. Do not use tobacco products and limit exposure to passive smoke. She stopped smoking 10 years ago.    At this point, the plan will be to continue to reevaluate her risk routinely and reassess at her next visit. She may be a candidate for raloxifene in the future, as she has osteoporosis as well.  I will contact her PCP regarding whether she is considering osteoporosis treatment for her.    INDIVIDUALIZED CANCER RISK MANAGEMENT PLAN:  Individualized Surveillance Plan for women  With 20% or greater lifetime risk of breast cancer   Per NCCN Guidelines Version 1.2016   Recommended screening Test or procedure Last done Next Scheduled    Clinical encounter Ongoing risk assessment, risk reduction counseling and clinical breast exam, every 6-12 months. 2/17/2017 August 2017   However, some family histories with breast cancers at a very young age, may warrant screening starting earlier.    *May begin at age 40 if breast cancers in the family occur at later ages.    Annual mammogram beginning 10 years younger than the earliest breast cancer in the family but not prior to age 30.    Recommend annual breast MRI to begin 10 years younger than the earliest breast cancer in the family but not prior to age 25.    Breast MRIs are preferably done on day 7-15 of the menstrual cycle in premenopausal women. 11/9/2016 - Diagnostic tomosynthesis mammogram and R Ultrasound for R breast clear nipple discharge, both BiRads1, negative.    scattered fibroglandular densities NEXT: Breast MRI ordered soon.    NEXT exam:   August with tomosynthesis mammogram after visit.   Breast screening for patients at high risk due to thoracic radiation between the ages of 10-30     Begin 8-10 years post radiation treatment or age 25.   Annual mammogram    and  Annual breast MRI, preferably on day 7-15 of menstrual cycle for premenopausal women.    Annual clinical breast exams with ongoing risk assessment and risk reduction counseling until age 25, then every 6-12 months.     NA     NA       I will be happy to work with her to manage her cancer risk, will follow up on her screenings and see her in the Cancer Risk Management clinic at the [facility] in six months.    I spent 40 minutes with the patient with greater that 50% of it in counseling and coordinating care as documented above.    Yelena Ruffin, GWYN-CNS, OCN, ANG-BC  Clinical Nurse Specialist  Cancer Risk Management Program  [address]  [place] [address]    phone:  [phone]  Pager: [phone]  fax: [phone]    Cc: Kaelyn Dunlap MD

## 2017-02-17 NOTE — MR AVS SNAPSHOT
After Visit Summary   2/17/2017    Meme Hameed    MRN: [id number]           Patient Information     Date Of Birth          1950        Visit Information        Provider Department      2/17/2017 10:00 AM Yelena Ruffin APRN [facility]        Today's Diagnoses     At high risk for breast cancer    -  1    Encounter for screening mammogram for high-risk patient        Family history of malignant neoplasm of breast        Obesity due to excess calories, unspecified obesity severity          Care Instructions    Individualized Surveillance Plan for women  With 20% or greater lifetime risk of breast cancer   Per NCCN Guidelines Version 1.2016   Recommended screening Test or procedure Last done Next Scheduled    Clinical encounter Ongoing risk assessment, risk reduction counseling and clinical breast exam, every 6-12 months. 2/17/2017 August 2017   However, some family histories with breast cancers at a very young age, may warrant screening starting earlier.    *May begin at age 40 if breast cancers in the family occur at later ages.    Annual mammogram beginning 10 years younger than the earliest breast cancer in the family but not prior to age 30.    Recommend annual breast MRI to begin 10 years younger than the earliest breast cancer in the family but not prior to age 25.    Breast MRIs are preferably done on day 7-15 of the menstrual cycle in premenopausal women. 11/9/2016 - Diagnostic tomosynthesis mammogra and R Ultrasound for R breast clear nipple discharge, both BiRads1, negative.    scattered fibroglandular densities NEXT: Breast MRI ordered soon.    NEXT exam:   August with tomosynthesis mammogram after visit.   Breast screening for patients at high risk due to thoracic radiation between the ages of 10-30     Begin 8-10 years post radiation treatment or age 25.   Annual mammogram   and  Annual breast MRI, preferably on day 7-15 of menstrual cycle for  premenopausal women.    Annual clinical breast exams with ongoing risk assessment and risk reduction counseling until age 25, then every 6-12 months.     NA     NA       Magnetic Resonance Imaging (MRI) of the Breast  Magnetic resonance imaging (MRI) of the breast is an imaging test that uses strong magnets and radio waves to form pictures of the inside of the breast. It also creates images of the tissues that surround the breast. Breast MRI is used to check for problems, such as a leaking breast implant or a suspicious lump or mass. It can also be used to help determine if breast cancer is present and aid in diagnosis and management. Most MRI tests take 30 to 60 minutes. Depending on the type of MRI you are having, the test may take longer. Give yourself extra time to check in.      During a breast MRI, you lie face down on a platform that slides into a tubelike machine called a scanner.   Before your test    You may need to stop eating or drinking before the test. Each health care facility has its own guidelines on this. It also depends on the type of exam you are having. Ask your health care provider if you should stop eating or drinking before the test.    Ask your provider if you should stop taking any medicine before the test.    Follow your normal daily routine unless your provider tells you otherwise.    You ll be asked to remove your watch, hearing aids, credit cards, pens, eyeglasses, and other metal objects.    You may be asked to remove your makeup. Makeup may contain some metal.  MRI uses strong magnets. Metal is affected by magnets and can distort the image. The magnet used in MRI can cause metal objects in your body to move. If you have a metal implant, you may not be able to have an MRI unless the implant is certified as MRI safe. People with these implants should not have an MRI:    Ear (cochlear) implants    Certain clips used for brain aneurysms    Certain metal coils put in blood vessels    Most  defibrillators    Most pacemakers  The radiologist or technologist may ask you if you:    Have had stereotactic breast biopsy    Have a pacemaker    Have an artificial body part (prosthesis)    Have metal rods, screws, plates, or splinters in your body    Wear a medicated adhesive patch    Have tattoos or body piercings. Some tattoo inks contain metal.    Have implanted nerve stimulators or drug-infusion ports    Work with metal    Have braces. You must remove any dental work.    Have a bullet or other metal in your body  Tell the radiologist or technologist if you:    Are allergic to X-ray dye (contrast medium), iodine, shellfish, or any medicines    Are pregnant or think you may be pregnant    Are afraid of small, enclosed spaces (claustrophobic)    Have any serious health problems. This includes kidney disease or a liver transplant. You may not be able to have the contrast material used for MRI.    Have had previous surgeries    Are breastfeeding   During your test    You may be asked to wear a hospital gown.    You may be given earplugs to wear if you desire.    You may be injected with contrast. This is a special dye that makes the MRI image sharp that may be needed depending on the reason for your exam.    You ll lie on a platform that slides into a tubelike machine called a scanner. You ll be on your stomach with your breasts placed through openings in the platform.    Remain as still as you can while the camera takes the pictures. This will ensure the best images.  After your test    You can get back to normal activities right away.    If you were given contrast, it will pass naturally through your body within a day.    Drink lots of water so that the dye passes quickly out of your body.  Getting your results  Your health care provider will discuss the test results with you during a follow-up appointment or over the phone.    [id number] [employer]. [address] [place] [address], [place] [address]. All  rights reserved. This information is not intended as a substitute for professional medical care. Always follow your healthcare professional's instructions.              Follow-ups after your visit        Additional Services     NUTRITION REFERRAL       Please set her up with Katelin Orellana RD at [facility]                  Follow-up notes from your care team     Return in about 6 months (around 8/17/2017) for Physical Exam.      Future tests that were ordered for you today     Open Future Orders        Priority Expected Expires Ordered    MR Breast Bilateral w/o & w Contrast Routine  2/17/2018 2/16/2017    MA Screen Bilateral w/Paul Routine 8/1/2017 2/17/2018 2/16/2017            Who to contact     If you have questions or need follow up information about today's clinic visit or your schedule please contact [facility] directly at [phone].  Normal or non-critical lab and imaging results will be communicated to you by [employer], letter or phone within 4 business days after the clinic has received the results. If you do not hear from us within 7 days, please contact the clinic through [employer] or phone. If you have a critical or abnormal lab result, we will notify you by phone as soon as possible.  Submit refill requests through [email or web address] or call your pharmacy and they will forward the refill request to us. Please allow 3 business days for your refill to be completed.          Additional Information About Your Visit        MyChart Information     [email or web address] gives you secure access to your electronic health record. If you see a primary care provider, you can also send messages to your care team and make appointments. If you have questions, please call your primary care clinic.  If you do not have a primary care provider, please call [phone] and they will assist you.        Care EveryWhere ID     This is your Care EveryWhere ID. This could be used by other organizations to access  "your [place] medical records  [id number]        Your Vitals Were     Pulse Temperature Respirations Height Pulse Oximetry BMI (Body Mass Index)    97 97  F (36.1  C) (Oral) 12 1.586 m (5' 2.44\") 100% 30.64 kg/m2       Blood Pressure from Last 3 Encounters:   02/17/17 110/65   08/06/15 105/69   05/19/15 128/87    Weight from Last 3 Encounters:   02/17/17 77.1 kg (169 lb 14.4 oz)   05/19/15 75.6 kg (166 lb 9.6 oz)   10/31/14 75.8 kg (167 lb 3.2 oz)              We Performed the Following     NUTRITION REFERRAL        Primary Care Provider Office Phone # Fax #    Kaelyn Dunlap [phone]       [address]  [facility] [address]        Thank you!     Thank you for choosing [facility]  for your care. Our goal is always to provide you with excellent care. Hearing back from our patients is one way we can continue to improve our services. Please take a few minutes to complete the written survey that you may receive in the mail after your visit with us. Thank you!             Your Updated Medication List - Protect others around you: Learn how to safely use, store and throw away your medicines at www.disposemymeds.org.          This list is accurate as of: 2/17/17 11:30 AM.  Always use your most recent med list.                   Brand Name Dispense Instructions for use    albuterol 108 (90 BASE) MCG/ACT Inhaler   Generic drug:  albuterol      Inhale 2 puffs into the lungs Reported on 2/17/2017       calcium 500 +D 500-400 MG-UNIT Tabs   Generic drug:  Calcium Carb-Cholecalciferol     180 tablet    Take 1 tablet by mouth 2 times daily       CENTRUM SILVER per tablet      Take 1 tablet by mouth daily       cholecalciferol 1000 UNIT tablet    vitamin D    90 tablet    Take 1 tablet (1,000 Units) by mouth daily       levETIRAcetam 750 MG tablet    KEPPRA     TAKE 2 TABLETS BY MOUTH TWICE DAILY       VITAMIN B 12 PO      Take 100 mcg by mouth         "

## 2017-02-27 ENCOUNTER — HOSPITAL ENCOUNTER (OUTPATIENT)
Dept: NUTRITION | Facility: CLINIC | Age: 67
Discharge: HOME | End: 2017-02-27
Attending: CLINICAL NURSE SPECIALIST | Admitting: CLINICAL NURSE SPECIALIST
Payer: COMMERCIAL

## 2017-02-27 PROCEDURE — 97802 MEDICAL NUTRITION INDIV IN: CPT | Performed by: DIETITIAN, REGISTERED

## 2017-02-27 NOTE — PROGRESS NOTES
"OUTPATIENT NUTRITION ASSESSMENT  REASON FOR ASSESSMENT  Meme Hameed referred by Dr. Yelena Ruffin for MNT related to wt loss.      ASSESSMENT   Nutrition History:  - Diet history reported by pt. Pt following a regular diet and reports limiting calorie intake to 1,500 per day. Pt was able to provide diet recall for the week via phone jannie, showed calorie intake varied from 800 kcal to 1,700 kcal per day. Pt reports lactose intolerance and limits foods such as milk and ice cream. Other foods pt reports to cause nausea are doughnuts, pasta, bread and sugary foods.  Pt reports little to no wt loss over the last 3 years after attempts to change diet and lifestyle.     Diet Recall:  Breakfast: coffee, banana, water and milk  Lunch: 1:00-2:00pm ham sandwich  Dinner: 6:00-7:00pm ham, bread, marinated vegetables  Snack: Snack: after dinner sherbet, other snacks peanuts and soup  Beverages: coffee with milk, water, pop with sugar and super greens   Dining out: 1 time /wk  ([employer]- salad or walleye fingers; 1/2 of lunch or 1/2 of sandwich, Breakfast senior meal- 1 egg, fruit sausage and sometimes 1 pancake      Other foods pt consumes: chicken, fish/salmon (3 times/wk), eggs (1-2 times/wk), cheese (1-2oz/ 2 times a wk), super greens (1-2 times/day), grapes. licorice    Exercise: Patient exercises 5 times per week (biking, spinning and/or walking).    PHYSICAL FINDINGS  Observed:  No nutrition-related physical findings observed  Obtained from Chart/Interdisciplinary Team:  No nutrition-related physical findings observed    LABS  Labs reviewed    MEDICATIONS  Medications reviewed    ANTHROPOMETRICS   Height: 5'2\"  Weight: 77.1 kg (169 lbs)  BMI (kg/m2): 30.64 kg/m2  Weight Status:  Obesity Grade I BMI 30-34.9  %IBW: 150%  Weight History: Patient states her usual body weight for the past 5 years has been approximately 160-165 lbs.  Wt Readings from Last 3 Encounters:   02/17/17 77.1 kg (169 lb 14.4 oz)   05/19/15 75.6 kg (166 " lb 9.6 oz)   10/31/14 75.8 kg (167 lb 3.2 oz)     ASSESSED NUTRITION NEEDS  Estimated Energy Needs: 6539-8763 kcals/day (14-16 Kcal/Kg)  Justification:  Weight loss  Estimated Protein Needs: 50-60 grams protein/day (1-1.2 g pro/Kg)  Justification:  Maintenance   Estimated Fluid Needs: 7831-2133 mL/day (25-30 mL/kg)    ASSESSED MALNUTRITION STATUS  % Weight Loss:  None noted  % Intake:  No decreased intake noted  Subcutaneous Fat Loss:  None observed  Loss of Muscle Mass:  None observed  Fluid Retention:  None noted    Malnutrition Diagnosis:  Patient does not meet two of the above criteria necessary for diagnosing malnutrition    DIAGNOSIS   Nutrition Diagnosis:  Obese, class I related excessive energy intake and self-monitoring deficit as evidenced by BMI of 30.6 kg/m2    INTERVENTIONS   Nutrition Prescription:  Recommend 1200 kcal diet for wt loss and blood glucose control to include 2 carbohydrate units (30 grams carbohydrate), 1 oz protein and 1 fat at breakfast; 23 carbohydrate units (30-45 grams carbohydrate), 2-3 oz protein and 1 fat at lunch; 2-3 carbohydrate units (30-45 grams carbohydrate), 3 oz protein and 2 fat at dinner; 1 carbohydrate unit (15 grams carbohydrate)    IMPLEMENTATION   Assessed learning needs and learning preference.   Diet Education:  Provided education on carbohydrate counting and wt loss diet  Nutrition Education (Content):   a)  Discussed label reading, carbohydrate counting, importance of protein at every meal and spacing of carbohydrate throughout the day to help maintain blood glucose levels.  If pt unable to lose wt, discussed MedGem testing for metabolic rate. Encouraged pt to make gradual changes for long term wt loss success. Supported pt in her struggle with wt loss.    b)  Provided Academy of Nutrition and Dietetics Count Your Carbs: Getting Started   Nutrition Education (Application):   a)  Discussed current eating plans and recommended alternative food choices for breakfast  by including protein source    b)  Patient verbalizes understanding of diet by repeating carbohydrate serving size of 15g, recalling label reading for serving sizes.  Anticipate fair-good compliance     GOALS (patient determined)  Start measuring portion sizes    FOLLOW UP/MONITORING   Progress towards goals will be monitored and evaluated per protocol and Practice Guidelines  Pt to call if desires for further appointment   RD provided contact information     Time Spent with Patient  55 minutes    Sheila Alanis, RD, LD  [facility] Outpatient Dietitian  [phone] (office phone)

## 2017-03-20 ENCOUNTER — TELEPHONE (OUTPATIENT)
Dept: INTERNAL MEDICINE | Facility: CLINIC | Age: 67
End: 2017-03-20

## 2017-03-20 ENCOUNTER — OFFICE VISIT (OUTPATIENT)
Dept: INTERNAL MEDICINE | Facility: CLINIC | Age: 67
End: 2017-03-20

## 2017-03-20 VITALS
BODY MASS INDEX: 30.11 KG/M2 | SYSTOLIC BLOOD PRESSURE: 111 MMHG | TEMPERATURE: 97.9 F | RESPIRATION RATE: 20 BRPM | HEART RATE: 101 BPM | WEIGHT: 167 LBS | DIASTOLIC BLOOD PRESSURE: 78 MMHG

## 2017-03-20 DIAGNOSIS — J32.2 ETHMOID SINUSITIS, UNSPECIFIED CHRONICITY: Primary | ICD-10-CM

## 2017-03-20 RX ORDER — AMOXICILLIN AND CLAVULANATE POTASSIUM 500; 125 MG/1; MG/1
1 TABLET, FILM COATED ORAL 3 TIMES DAILY
Qty: 30 TABLET | Refills: 0 | Status: SHIPPED | OUTPATIENT
Start: 2017-03-20 | End: 2017-08-18

## 2017-03-20 ASSESSMENT — PAIN SCALES - GENERAL: PAINLEVEL: NO PAIN (0)

## 2017-03-20 NOTE — TELEPHONE ENCOUNTER
Patient reporting that last night she coughed up a clump of blood. She has had sinus issues for the past couple of weeks, but it has been improving, and a cough hasn't been part of her symptoms. Since coughing up the blood, she has continued to note blood-tinged sputum. Recommended appointment in [facility] to evaluate. Scheduled her to see Dr. Leatha Dewitt today at 1:40.

## 2017-03-20 NOTE — NURSING NOTE
"Chief Complaint   Patient presents with     Sinus Problem     Has been not feeling well for about 6 weeks, headache, sinus congestion, last night coughed up \" a blood clot\"   Jessica Peterson LPN 1:54 PM on 3/20/2017  "

## 2017-03-20 NOTE — MR AVS SNAPSHOT
After Visit Summary   3/20/2017    Meme Hameed    MRN: [id number]           Patient Information     Date Of Birth          1950        Visit Information        Provider Department      3/20/2017 1:40 PM Leatha Dewitt MD [facility]        Today's Diagnoses     Ethmoid sinusitis, unspecified chronicity    -  1      Care Instructions    Primary Care Center Medication Refill Request Information:  * Please contact your pharmacy regarding ANY request for medication refills.  ** [facility] Prescription Fax = [phone]  * Please allow 3 business days for routine medication refills.  * Please allow 5 business days for controlled substance medication refills.     Primary Care Center Test Result notification information:  *You will be notified with in 7-10 days of your appointment day regarding the results of your test.  If you are on MyChart you will be notified as soon as the provider has reviewed the results and signed off on them.        Follow-ups after your visit        Your next 10 appointments already scheduled     Aug 18, 2017 10:00 AM CDT   (Arrive by 9:45 AM)   Return Visit with GWYN De La Garza [facility] ([facility] [place])    [address] [place] [id number] [phone]            Aug 18, 2017 11:30 AM CDT   (Arrive by 11:15 AM)   MA SCREENING BILATERAL W/ PATRICK with [id number]   [facility] ([facility] [place])    [address] [place] [address] [phone]           Do not use any powder, lotion or deodorant under your arms or on your breast. If you do, we will ask you to remove it before your exam.  Wear comfortable, two-piece clothing.  If you have any allergies, tell your care team.  Bring any previous mammograms from other facilities or have them mailed to the breast center.              Who to contact     Please call  your clinic at [phone] to:    Ask questions about your health    Make or cancel appointments    Discuss your medicines    Learn about your test results    Speak to your doctor   If you have compliments or concerns about an experience at your clinic, or if you wish to file a complaint, please contact [facility] Patient Relations at [phone] or email us at [email or web address]         Additional Information About Your Visit        [employer] Information     [employer] gives you secure access to your electronic health record. If you see a primary care provider, you can also send messages to your care team and make appointments. If you have questions, please call your primary care clinic.  If you do not have a primary care provider, please call [phone] and they will assist you.      Point is an electronic gateway that provides easy, online access to your medical records. With Point, you can request a clinic appointment, read your test results, renew a prescription or communicate with your care team.     To access your existing account, please contact your [facility] or call [phone] for assistance.        Care EveryWhere ID     This is your Care EveryWhere ID. This could be used by other organizations to access your [place] medical records  [id number]        Your Vitals Were     Pulse Temperature Respirations BMI (Body Mass Index)          101 97.9  F (36.6  C) (Oral) 20 30.11 kg/m2         Blood Pressure from Last 3 Encounters:   03/20/17 111/78   02/17/17 110/65   08/06/15 105/69    Weight from Last 3 Encounters:   03/20/17 75.8 kg (167 lb)   02/17/17 77.1 kg (169 lb 14.4 oz)   05/19/15 75.6 kg (166 lb 9.6 oz)              Today, you had the following     No orders found for display         Today's Medication Changes          These changes are accurate as of: 3/20/17  3:39 PM.  If you have any questions, ask your nurse or doctor.                Start taking these medicines.        Dose/Directions    amoxicillin-clavulanate 500-125 MG per tablet   Commonly known as:  AUGMENTIN   Used for:  Ethmoid sinusitis, unspecified chronicity   Started by:  Leatha Dewitt MD        Dose:  1 tablet   Take 1 tablet by mouth 3 times daily   Quantity:  30 tablet   Refills:  0            Where to get your medicines      These medications were sent to [facility] [address] [place] [address] [address] AT [address] [place] [address] & [facility]  [address] [place] [address]    Hours:  24-hours Phone:  [phone]     amoxicillin-clavulanate 500-125 MG per tablet                Primary Care Provider Office Phone # Fax #    Kaelynmarquise Dunlap [phone]       [address] [place] [address]        Thank you!     Thank you for choosing [facility]  for your care. Our goal is always to provide you with excellent care. Hearing back from our patients is one way we can continue to improve our services. Please take a few minutes to complete the written survey that you may receive in the mail after your visit with us. Thank you!             Your Updated Medication List - Protect others around you: Learn how to safely use, store and throw away your medicines at www.disposemymeds.org.          This list is accurate as of: 3/20/17  3:39 PM.  Always use your most recent med list.                   Brand Name Dispense Instructions for use    amoxicillin-clavulanate 500-125 MG per tablet    AUGMENTIN    30 tablet    Take 1 tablet by mouth 3 times daily       calcium 500 +D 500-400 MG-UNIT Tabs   Generic drug:  Calcium Carb-Cholecalciferol     180 tablet    Take 1 tablet by mouth 2 times daily       CENTRUM SILVER per tablet      Take 1 tablet by mouth daily       cholecalciferol 1000 UNIT tablet    vitamin D    90 tablet    Take 1 tablet (1,000 Units) by mouth daily       levETIRAcetam 750 MG tablet     KEPPRA     TAKE 2 TABLETS BY MOUTH TWICE DAILY       VITAMIN B 12 PO      Take 100 mcg by mouth

## 2017-03-20 NOTE — PROGRESS NOTES
Cc:  Hemoptysis  HPI:  This healthy 67-year-old woman presents with a 6 week history of intense congestion in her nose and sinuses. It began with a fever and sinus infection where she was blowing out green nasal discharge. Then it became plugged and she has had a sense of facial tightness and fullness. She's been using saline spray Nasonex (which caused irritation) but not a 90 pots. She's been using Mucinex and that helps a little bit. She's not had further fevers chills or night sweats.  She had a nosebleed 2 weeks ago. This morning she coughed up a small blood clot.  She has had no pleurisy or exertional dyspnea.   She has no cough, wheeze or sore throat.  She is not short of breath.  She has history of allergies and does sneeze a lot but she's not been wheezing.   She does not smoke cigarettes. She has never had hemoptysis.    She is physically active:   Walks 3 miles per day.recumbent cycle for  8-9 miles.    Past Medical History   Diagnosis Date     Adenomatous colon polyp 2011     tubular adenoma     At high risk for breast cancer 02/16/2017     35.4% lifetime risk by Philip model     Epilepsy (H)      Fracture of metatarsal bone of left foot 7/14     Fracture, radius 1990     and ulnar styloid fracture     GIB (gastrointestinal bleeding) 4/1/2011     Kidney stones 1970, 1975     during pregnancy     Myxolipoma      finger, removed 2011     Osteoporosis      Seizures (H)      Past Surgical History   Procedure Laterality Date     Colonoscopy  3/31/2011     Procedure:COLONOSCOPY; Surgeon:PACO DIETRICH; Location: [facility]     Colonoscopy  3/31/2011     Procedure:COMBINED COLONOSCOPY, REMOVE TUMOR/POLYP/LESION BY SNARE; Surgeon:PACO DIETRICH; Location: GI     Esophagoscopy, gastroscopy, duodenoscopy (egd), combined  3/31/2011     Procedure:COMBINED ESOPHAGOSCOPY, GASTROSCOPY, DUODENOSCOPY (EGD); Surgeon:PACO DIETRICH; Location: GI     Orthopedic surgery       L wrist     Hysterectomy  1994 or 1995     Partial  "hysterectomy , urethral tube \"widened\"      Hc breath hydrogen test  2011     Procedure:HYDROGEN BREATH TEST; Surgeon:MANDIE BRADY; [facility]      ROS: 10 point ROS neg other than the symptoms noted above in the HPI.    Family History   Problem Relation Age of Onset     Breast Cancer Mother 30     lumpectomy and chemo; also \"mass in ovary\"     OSTEOPOROSIS Sister      GASTROINTESTINAL DISEASE Son      intestinal transplant     Breast Cancer Sister 55     Leukemia Sister 52     DIABETES Maternal Aunt      multiple mat aunts     Bone Cancer Niece 19     Colon Polyps Father      Colon Polyps Brother      Ovarian Cancer Maternal Aunt 50      of ovarian cancer in 50s or 60s, unknown     Prostate Cancer Cousin 65     Breast Cancer Paternal Aunt 36      of breast cancer recurrence in 60s     Breast Cancer Cousin 30     paternal cousin, BRCA1+ by report     Breast Cancer Cousin 40     paternal cousin, BRCA1+ by report     Breast Cancer Cousin 35     paternal cousin, BRCA1+ by report     Genetic Disorder Cousin      paternal male cousin, BRCA1+ by report     Ovarian Cancer Cousin      paternal cousin, BRCA1+ by report     Social History     Social History     Marital status:      Spouse name: N/A     Number of children: N/A     Years of education: N/A     Occupational History     Not on file.     Social History Main Topics     Smoking status: Former Smoker     Smokeless tobacco: Not on file     Alcohol use Yes      Comment: Rarely     Drug use: No     Sexual activity: Not on file     Other Topics Concern     Not on file     Social History Narrative     /78 (BP Location: Right arm, Patient Position: Chair, Cuff Size: Adult Regular)  Pulse 101  Temp 97.9  F (36.6  C) (Oral)  Resp 20  Wt 75.8 kg (167 lb)  BMI 30.11 kg/m2  Exam:  Constitutional: healthy, alert and no distress  Head: Normocephalic. No masses, lesions, tenderness or abnormalities  Neck: Neck supple. No adenopathy. " Thyroid symmetric, normal size,, Carotids without bruits.  ENT: Nasal mucosa is not thickened or red but there is irritation on the right medial nasal septum with an mucosal ulcer with bright red blood present on it. There is no sinus tenderness at the frontal or maxillary sinuses but there is right sphenoid sinus tenderness.  There are no neck nodes.    Respiratory: negative, Percussion normal. Good diaphragmatic excursion. Lungs clear     ASSESSMENT  6 week history of sinus congestion beginning with fevers chills and green sputum production and now a pattern consistent more with nasal plugging, possible sphenoid sinus plugging, and a single episode of hemoptysis this morning.  The most likely etiology is drainage from the bleeding nasal mucosa which I can see. I talk with her about my concern about a deeper source in the lungs of that hemoptysis and we agreed to start by treating it is almost certainly an alcohol seen I sinusitis. If she has recurrent hemoptysis with this symptoms do not improve after antibiotic treatment we will do CT scanning of the sinuses and lungs.

## 2017-03-20 NOTE — PATIENT INSTRUCTIONS
[facility] Medication Refill Request Information:  * Please contact your pharmacy regarding ANY request for medication refills.  ** [facility] Prescription Fax = [phone]  * Please allow 3 business days for routine medication refills.  * Please allow 5 business days for controlled substance medication refills.     [facility] Test Result notification information:  *You will be notified with in 7-10 days of your appointment day regarding the results of your test.  If you are on MyChart you will be notified as soon as the provider has reviewed the results and signed off on them.

## 2017-08-18 ENCOUNTER — TELEPHONE (OUTPATIENT)
Dept: GASTROENTEROLOGY | Facility: OUTPATIENT CENTER | Age: 67
End: 2017-08-18

## 2017-08-18 ENCOUNTER — ONCOLOGY VISIT (OUTPATIENT)
Dept: ONCOLOGY | Facility: CLINIC | Age: 67
End: 2017-08-18
Attending: INTERNAL MEDICINE
Payer: COMMERCIAL

## 2017-08-18 VITALS
WEIGHT: 167.8 LBS | SYSTOLIC BLOOD PRESSURE: 112 MMHG | HEART RATE: 83 BPM | RESPIRATION RATE: 18 BRPM | TEMPERATURE: 97 F | BODY MASS INDEX: 30.88 KG/M2 | DIASTOLIC BLOOD PRESSURE: 75 MMHG | HEIGHT: 62 IN | OXYGEN SATURATION: 97 %

## 2017-08-18 DIAGNOSIS — Z12.11 ENCOUNTER FOR SCREENING COLONOSCOPY: Primary | ICD-10-CM

## 2017-08-18 DIAGNOSIS — Z91.89 AT HIGH RISK FOR BREAST CANCER: Primary | ICD-10-CM

## 2017-08-18 DIAGNOSIS — M81.8 OTHER OSTEOPOROSIS WITHOUT CURRENT PATHOLOGICAL FRACTURE: ICD-10-CM

## 2017-08-18 PROCEDURE — 99213 OFFICE O/P EST LOW 20 MIN: CPT | Mod: ZF

## 2017-08-18 PROCEDURE — 99213 OFFICE O/P EST LOW 20 MIN: CPT | Mod: ZP | Performed by: CLINICAL NURSE SPECIALIST

## 2017-08-18 RX ORDER — RALOXIFENE HYDROCHLORIDE 60 MG/1
60 TABLET, FILM COATED ORAL DAILY
Qty: 90 TABLET | Refills: 3 | Status: SHIPPED | OUTPATIENT
Start: 2017-08-18 | End: 2018-08-17

## 2017-08-18 ASSESSMENT — PAIN SCALES - GENERAL: PAINLEVEL: NO PAIN (0)

## 2017-08-18 NOTE — TELEPHONE ENCOUNTER
Patient taking any blood thinners ? no    Heart disease ? denies    Lung disease ? denies      Sleep apnea ? denies    Diabetic ? denies    Kidney disease ? denies    Dialysis ? n/a    Electronic implanted medical devices ? denies    Are you taking any narcotic pain medication ? no  What is your daily dosage ?    PTSD ? n/a    Prep instructions reviewed with patient ? Instructions,  policy, MAC sedation plan reviewed. Advised patient to have someone stay with her post exam. Instructions e-mailed, [email or web address]    [facility] : [facility]    Indication for procedure :  Associated Diagnoses      Special screening for malignant neoplasms, colon [Z12.11            Referring provider :       Authorizing Provider Encounter Provider     Kaelyn Dunlap MD Markowitz, Rebecca Lynn, MD

## 2017-08-18 NOTE — PROGRESS NOTES
Oncology Risk Management Consultation:  Date on this visit: 2017    Meme Hameed  returns to the clinic today for a six month follow up.  She requires heightened screening and surveillance for her higher risk of breast cancer, a family history of breast cancer in her mother, sister, paternal aunt and 3 paternal cousins (see below for history).     She is considered to at high risk for breast cancer and has a 35.4% lifetime risk for breast cancer by the Philip model.  Her 5 year breast cancer risk is 2.4% by the Melly model and 3.7% by the JOSE A model.     Primary Physician: Kaelyn Dunlap MD    History Of Present Illness:  Ms. Hameed is a very pleasant  67 year old female who presents with family history of breast and ovarian cancer.     Genetic testin2016 - NEGATIVE  for known pathogenic mutations in 12 genes on GynPlus Panel through [facility]. Negative for mutations in BRCA1, BRCA2, BRIP1, EPCAM, MLH1, MSH2, MSH6, PALB2, PMS2, PTEN, RAD51D and TP53.     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    Pertinent history:  Menarche: [age] years.  Age at first child: 20  Age at menopause: 51  Hx of breastfeeding first child x6 weeks.  No history of OCPs.  No history of HRT.  Uterus removed in  d/t cervical dysplasia.  Ovaries intact.   Hx of 1 negative breast biopsies (, see below)    Pertinent screening history:  2010 - L Stereotactic core needle biopsy w/L clip placement - fibroadenoma and non proliferative fibrocystic changes.  2014 - Screening mammogram, BiRads1.  2016 - Diagnostic tomosynthesis mammogram and R ultrasound for R clear nipple discharge x2 months, BiRads1.    At this visit, she denies asymmetry, lumps, masses, thickening, pain, nipple discharge and skin changes.  She denies bloating, early satiety, abdominal pain, fatigue and constipation.    Past Medical/Surgical History:  Past Medical History:   Diagnosis Date     Adenomatous  "colon polyp 2011    tubular adenoma     At high risk for breast cancer 02/16/2017    35.4% lifetime risk by Philip model     Epilepsy (H)      Fracture of metatarsal bone of left foot 7/14     Fracture, radius 1990    and ulnar styloid fracture     GIB (gastrointestinal bleeding) 4/1/2011     Kidney stones 1970, 1975    during pregnancy     Myxolipoma     finger, removed 2011     Osteoporosis      Seizures (H)      Past Surgical History:   Procedure Laterality Date     COLONOSCOPY  3/31/2011    Procedure:COLONOSCOPY; Surgeon:PACO DIETRICH; [facility]     COLONOSCOPY  3/31/2011    Procedure:COMBINED COLONOSCOPY, REMOVE TUMOR/POLYP/LESION BY SNARE; Surgeon:PACO DIETRICH; [facility]     ESOPHAGOSCOPY, GASTROSCOPY, DUODENOSCOPY (EGD), COMBINED  3/31/2011    Procedure:COMBINED ESOPHAGOSCOPY, GASTROSCOPY, DUODENOSCOPY (EGD); Surgeon:PACO DIETRICH; [facility]     HC BREATH HYDROGEN TEST  6/2/2011    Procedure:HYDROGEN BREATH TEST; Surgeon:MANDIE BRADY; [facility]     HYSTERECTOMY  1994 or 1995    Partial hysterectomy 1995, urethral tube \"widened\" 1994     ORTHOPEDIC SURGERY      L wrist       Allergies:  Allergies as of 08/18/2017     (No Known Allergies)       Current Medications:  Current Outpatient Prescriptions   Medication Sig Dispense Refill     amoxicillin-clavulanate (AUGMENTIN) 500-125 MG per tablet Take 1 tablet by mouth 3 times daily 30 tablet 0     levETIRAcetam (KEPPRA) 750 MG tablet TAKE 2 TABLETS BY MOUTH TWICE DAILY       Cyanocobalamin (VITAMIN B 12 PO) Take 100 mcg by mouth       Multiple Vitamins-Minerals (CENTRUM SILVER) per tablet Take 1 tablet by mouth daily       cholecalciferol (VITAMIN D) 1000 UNIT tablet Take 1 tablet (1,000 Units) by mouth daily 90 tablet 3     CALCIUM 500 +D 500-400 MG-UNIT TABS Take 1 tablet by mouth 2 times daily 180 tablet 3        Family History:  Family History   Problem Relation Age of Onset     Breast Cancer Mother 30     lumpectomy and chemo; also " "\"mass in ovary\"     OSTEOPOROSIS Sister      GASTROINTESTINAL DISEASE Son      intestinal transplant     Breast Cancer Sister 55     Leukemia Sister 52     DIABETES Maternal Aunt      multiple mat aunts     Bone Cancer Niece 19     Colon Polyps Father      Colon Polyps Brother      Ovarian Cancer Maternal Aunt 50      of ovarian cancer in 50s or 60s, unknown     Prostate Cancer Cousin 65     Breast Cancer Paternal Aunt 36      of breast cancer recurrence in 60s     Breast Cancer Cousin 30     paternal cousin, BRCA1+ by report     Breast Cancer Cousin 40     paternal cousin, BRCA1+ by report     Breast Cancer Cousin 35     paternal cousin, BRCA1+ by report     Genetic Disorder Cousin      paternal male cousin, BRCA1+ by report     Ovarian Cancer Cousin      paternal cousin, BRCA1+ by report       Social History:  Social History     Social History     Marital status:      Spouse name: N/A     Number of children: N/A     Years of education: N/A     Occupational History     Not on file.     Social History Main Topics     Smoking status: Former Smoker     Smokeless tobacco: Not on file     Alcohol use Yes      Comment: Rarely     Drug use: No     Sexual activity: Not on file     Other Topics Concern     Not on file     Social History Narrative       Physical Exam:  There were no vitals taken for this visit.       GENERAL APPEARANCE: healthy, alert and no apparent distress.    NECK: no adenopathy, no asymmetry or masses     LYMPHATICS: No cervical, supraclavicular, axillary or inguinal lymphadenopathy     RESP: lungs clear to auscultation - no rales, rhonchi or wheezes     CARDIOVASCULAR: regular rate and rhythm, normal S1 S2, no S3 or S4 and no murmur.   BREASTS: Examined in a sitting and lying position. Symmetrical without visible distortion, swelling or rashes. No nipple inversion, nipple discharge, breast dimpling or puckering. Breast tissue is heterogenous dense to palpation. Bilaterally fibrocystic " tissue throughout, including prominent ropy, fibroglandular tissue.  Axillary area without masses or lymphadenopathy.       SKIN: no suspicious lesions or rashes on upper torso and extremities.    Laboratory/Imaging Studies  Results for orders placed or performed in visit on 12/01/16   Dexa vertebral fracture analysis    Narrative    ATTENTION:  Easy to read DXA/VFA reports (formatted and presented as   tables)   can be found in EPIC under Chart review >  Imaging tab >  DXA    [facility]   [address] [place] [address]  Phone: [phone]   Fax: [phone]    Vertebral Fracture Assessment (VFA) Report:      DEIDRE MURPHY 2MARKOWITZ:    Your patient, BAYRON KUNZ ([id number] ), completed a VFA exam ([id number]   ) on a Trovix on   . The following is a summary of   the results.   _________________________________________________________________________    Patient biographical information and history:    Current measured height: 62.0 in.  Current measured weight: 166.0 lbs.    Vertebral Fracture Assessment (VFA) was performed in the lateral decubitus   position on this 66.8 year old  Female, whose history as reported   by the patient is noted for  postmenopausal status      and the following   current treatments:   calcium, vitamin D,    In addition, the patient   reported having been previously treated with:   hormone replacement,   corticosteroids,    The patient meets the following indications for a VFA:      (2007 ISCD Position Statements at www.iscd.org)  _________________________________________________________________________    Technical quality:    Satisfactory.  VFA software and contrast adjustment allow for better   visualization of the thoracic vertebral bodies.  VFA scan was   interpretable from T4 - L4.  _________________________________________________________________________    Densitometry  "results:    Comparison:  None provided.    _________________________________________________________________________    Conclusions:    VFA scan was interpretable from T4 - L4.  Using the semi-quantitative   analysis of Genant (see reference #1),   no fractures were identified.  Six point morphometry confirmed the   presence and severity of these deformities.         _________________________________________________________________________    Feel free to contact DXA services if you have any questions or comments.    Thank you for the opportunity to be of service to you and your patient.      _________________________________________________________________________    References:    1.  NOF Physician's Guideline Website address:  www.nof.org    2.  American College of Rheumatology Recommendations for the Prevention   and Treatment of Glucocorticoid-induced Osteoporosis:  2001 update in   \"Arthritis and Rheumatism\"  July 2001 edition (vol 44, issue 7) pp 1497 -   1502.    3.  ISCD position statements:  www.iscd.org  (includes the report of the   2005 VFA Position Development Conference)    According to the ISCD position statements, lateral spine is not to be used   for diagnosis, but may have a role in monitoring.    According to the ISCD position statements, the diagnosis of osteoporosis   in pre-menopausal women and in men younger than age 50 should not be made   on the basis of densitometric criteria alone.  In addition Z-scores rather   than T-scores should be used.    4.  Implementation of suggestions is at the discretion of the ordering   provider.  Clinical correlation is recommended.    5.  WHO categories:    normal = T-score of - 1.0 or more positive, low bone density/ osteopenia =   T-score of - 1.0 to - 2.5, osteoporosis = T-score of -2.5 or more negative      Template revised 7.5.2016         ASSESSMENT  We discussed her last screening tests and reviewed her interval history. She is concerned about her " level of risk for breast cancer and wants to have high risk screening.   We also discussed that she is going through a divorce right now and her insurance is going to change.  She is not sure that she can afford a breast MRI or what type of insurance she will have in the future so the following plan may be modified.      We discussed that Dr. Dunlap and I consulted and agreed that we would recommend raloxifene for her in order to treat her osteoporosis and have the side benefit of reducing her cancer risk (by up to 35-38% in the STAR trial.)  She is amenable to this and I have sent a prescription to her pharmacy.      INDIVIDUALIZED CANCER RISK MANAGEMENT PLAN:  Individualized Surveillance Plan for women  With 20% or greater lifetime risk of breast cancer   Per NCCN Guidelines Version 2.2016   Recommended screening Test or procedure Last done Next Scheduled    Clinical encounter Ongoing risk assessment, risk reduction counseling and clinical breast exam, every 6-12 months.   8/18/2017 February 2018   However, some family histories with breast cancers at a very young age, may warrant screening starting earlier.    *May begin at age 40 if breast cancers in the family occur at later ages.    Annual mammogram beginning 10 years younger than the earliest breast cancer in the family but not prior to age 30.    Recommend annual breast MRI to begin 10 years younger than the earliest breast cancer in the family but not prior to age 25.    Breast MRIs are preferably done on day 7-15 of the menstrual cycle in premenopausal women. 11/9/2016 - Diagnostic tomosynthesis mammogram R ultrasound for clear nipple discharge - BiRads2    No breast MRI to date Next: Tomosynthesis mammogram 11:30 8/18/2017    Next exam: Feb. 2018 with breast MRI afterward   Breast screening for patients at high risk due to thoracic radiation between the ages of 10-30     Begin 8-10 years post radiation treatment or age 25.   Annual mammogram    and  Annual breast MRI, preferably on day 7-15 of menstrual cycle for premenopausal women.    Annual clinical breast exams with ongoing risk assessment and risk reduction counseling until age 25, then every 6-12 months.     DIOGO LIND         We also reviewed the current healthy lifestyle recommendations by  NCCN and the American Cancer Society that can reduce the risk of breast cancer including  the following recommendations:    1 Limit alcohol consumption to less than 1 drink per day (1 drink=5 oz.wine, 12 oz. Beer or 1.5 oz. 80-proof liquor).  2. Exercise per American Cancer Society guidelines of at least 150 minutes of moderate-intensity activity or 75 minutes of vigorous activity each week. (Or a combination of both.) Exercise should be spread  out over the week.  3. Maintain a healthy weight with a Body Mass Index between 19-24.9.  4. Do not use tobacco products and limit exposure to passive smoke.    I will follow up on her screenings and see her in the Cancer Risk Management clinic in six months.    I spent 20 minutes with the patient with greater that 50% of it in counseling and coordinating care as documented above.    Yelena Ruffin, GWYN-CNS, OCN, ANG-BC  Clinical Nurse Specialist  Cancer Risk Management Program  [address]  [place] [address]    phone:  [phone]  Pager: [phone]  fax: [phone]

## 2017-08-18 NOTE — NURSING NOTE
"Oncology Rooming Note    August 18, 2017 9:40 AM   Meme Hameed is a 67 year old female who presents for:    Chief Complaint   Patient presents with     Oncology Clinic Visit     Return visit related to 6 month follow up     Initial Vitals: /75 (BP Location: Left arm, Patient Position: Sitting, Cuff Size: Adult Regular)  Pulse 83  Temp 97  F (36.1  C)  Resp 18  Ht 1.586 m (5' 2.44\")  Wt 76.1 kg (167 lb 12.8 oz)  SpO2 97%  BMI 30.26 kg/m2 Estimated body mass index is 30.26 kg/(m^2) as calculated from the following:    Height as of this encounter: 1.586 m (5' 2.44\").    Weight as of this encounter: 76.1 kg (167 lb 12.8 oz). Body surface area is 1.83 meters squared.  No Pain (0) Comment: Data Unavailable   No LMP recorded. Patient has had a hysterectomy.  Allergies reviewed: Yes  Medications reviewed: Yes    Medications: Medication refills not needed today.  Pharmacy name entered into [employer]:    [facility] - [place], [place] - [address] [facility] [id number] [place], [place] - [address] [facility] [address] [place], [place] - [address] AT [address] [place] [address]    Clinical concerns: No new concerns. Provider was NOT notified.    10 minutes for nursing intake (face to face time)     Devorah Hurley LPN            "

## 2017-08-18 NOTE — MR AVS SNAPSHOT
After Visit Summary   8/18/2017    Meme Hameed    MRN: [id number]           Patient Information     Date Of Birth          1950        Visit Information        Provider Department      8/18/2017 10:00 AM Yelena Ruffin APRN [facility]        Today's Diagnoses     At high risk for breast cancer    -  1    Other osteoporosis without current pathological fracture          Care Instructions    Individualized Surveillance Plan for women  With 20% or greater lifetime risk of breast cancer   Per NCCN Guidelines Version 2.2016   Recommended screening Test or procedure Last done Next Scheduled    Clinical encounter Ongoing risk assessment, risk reduction counseling and clinical breast exam, every 6-12 months.   8/18/2017 February 2018   However, some family histories with breast cancers at a very young age, may warrant screening starting earlier.    *May begin at age 40 if breast cancers in the family occur at later ages.    Annual mammogram beginning 10 years younger than the earliest breast cancer in the family but not prior to age 30.    Recommend annual breast MRI to begin 10 years younger than the earliest breast cancer in the family but not prior to age 25.    Breast MRIs are preferably done on day 7-15 of the menstrual cycle in premenopausal women. 11/9/2016 - Diagnostic tomosynthesis mammogram R ultrasound for clear nipple discharge - BiRads2    No breast MRI to date Next: Tomosynthesis mammogram 11:30 8/18/2017    Next exam: Feb. 2018 with breast MRI afterward   Breast screening for patients at high risk due to thoracic radiation between the ages of 10-30     Begin 8-10 years post radiation treatment or age 25.   Annual mammogram   and  Annual breast MRI, preferably on day 7-15 of menstrual cycle for premenopausal women.    Annual clinical breast exams with ongoing risk assessment and risk reduction counseling until age 25, then every 6-12 months.     NA  "    NA               Follow-ups after your visit        Follow-up notes from your care team     Return in about 6 months (around 2/18/2018) for Physical Exam.      Your next 10 appointments already scheduled     Aug 18, 2017 11:30 AM CDT   (Arrive by 11:15 AM)   MA SCREENING BILATERAL W/ PATRICK with [email or web address]   [facility] ([facility])    [address]  2nd [facility] [id number] [phone]           Do not use any powder, lotion or deodorant under your arms or on your breast. If you do, we will ask you to remove it before your exam.  Wear comfortable, two-piece clothing.  If you have any allergies, tell your care team.  Bring any previous mammograms from other facilities or have them mailed to the breast center.  Three-dimensional (3D) mammograms are available at [place] locations in [facility], and [place]. [facility] locations include [place] and [facility] & [facility] Center in [place].   Benefits of 3D mammograms include: - Improved rate of cancer detection - Decreases your chance of having to go back for more tests, which means fewer: - \"False-positive\" results (This means that there is an abnormal area but it isn't cancer.) - Invasive testing procedures, such as a biopsy or surgery - Can provide clearer images of the breast if you have dense breast tissue. 3D mammography is an optional exam that anyone can have with a 2D mammogram. It doesn't replace or take the place of a 2D mammogram. 2D mammograms remain an effective screening test for all women.  Not all insurance companies cover the cost of a 3D mammogram. Check with your insurance.            Aug 21, 2017  2:45 PM CDT   Colonoscopy with Altaf Chaparro MD   [facility] ([facility])    [address]  [facility] [address]   [phone]            Feb 16, 2018 10:00 AM CST   (Arrive by 9:45 AM) " "  Return Visit with GWYN De La Garza   [facility] ([facility] [place])    [address]  2nd Floor  [facility] [address] [phone]              Who to contact     If you have questions or need follow up information about today's clinic visit or your schedule please contact [facility] directly at [phone].  Normal or non-critical lab and imaging results will be communicated to you by [employer], letter or phone within 4 business days after the clinic has received the results. If you do not hear from us within 7 days, please contact the clinic through [employer] or phone. If you have a critical or abnormal lab result, we will notify you by phone as soon as possible.  Submit refill requests through Gluster or call your pharmacy and they will forward the refill request to us. Please allow 3 business days for your refill to be completed.          Additional Information About Your Visit        [email or web address] Information     Gluster gives you secure access to your electronic health record. If you see a primary care provider, you can also send messages to your care team and make appointments. If you have questions, please call your primary care clinic.  If you do not have a primary care provider, please call [phone] and they will assist you.        Care EveryWhere ID     This is your Care EveryWhere ID. This could be used by other organizations to access your [place] medical records  [id number]        Your Vitals Were     Pulse Temperature Respirations Height Pulse Oximetry BMI (Body Mass Index)    83 97  F (36.1  C) 18 1.586 m (5' 2.44\") 97% 30.26 kg/m2       Blood Pressure from Last 3 Encounters:   08/18/17 112/75   03/20/17 111/78   02/17/17 110/65    Weight from Last 3 Encounters:   08/18/17 76.1 kg (167 lb 12.8 oz)   03/20/17 75.8 kg (167 lb)   02/17/17 77.1 kg (169 lb 14.4 oz)              Today, you had the following     No " orders found for display         Today's Medication Changes          These changes are accurate as of: 8/18/17 10:28 AM.  If you have any questions, ask your nurse or doctor.               Start taking these medicines.        Dose/Directions    raloxifene 60 MG tablet   Commonly known as:  Evista   Used for:  At high risk for breast cancer, Other osteoporosis without current pathological fracture   Started by:  Yelena Ruffin APRN CNS        Dose:  60 mg   Take 1 tablet (60 mg) by mouth daily   Quantity:  90 tablet   Refills:  3            Where to get your medicines      These medications were sent to [facility] [id number] [place] [address] [address] AT [address] [place] [address] & [facility]  [address] [place] [address] [employer] [facility] [address]    Hours:  24-hours Phone:  [phone]     raloxifene 60 MG tablet                Primary Care Provider Office Phone # Fax #    Kaelyn Gale Dunlap [phone]       [address] [place] [address] [address]  [facility] [address]        Equal Access to Services     [facility]: Hadii aad ku hadasho Soomaali, waaxda luqadaha, qaybta kaalmada adeegyada, waxay idiin hayjaimen eryn lopez . So [facility] [phone].    ATENCIÓN: Si habla español, tiene a perez disposición servicios gratuitos de asistencia lingüística. [facility] [phone].    We comply with applicable federal civil rights laws and [place] laws. We do not discriminate on the basis of race, color, national origin, age, disability sex, sexual orientation or gender identity.            Thank you!     Thank you for choosing [facility]  for your care. Our goal is always to provide you with excellent care. Hearing back from our patients is one way we can continue to improve our services. Please take a few minutes to complete the written survey that you may receive in the mail after your visit with us. Thank you!             Your Updated Medication List - Protect others around you: Learn how to  safely use, store and throw away your medicines at www.disposemymeds.org.          This list is accurate as of: 8/18/17 10:28 AM.  Always use your most recent med list.                   Brand Name Dispense Instructions for use Diagnosis    calcium 500 +D 500-400 MG-UNIT Tabs   Generic drug:  Calcium Carb-Cholecalciferol     180 tablet    Take 1 tablet by mouth 2 times daily        CENTRUM SILVER per tablet      Take 1 tablet by mouth daily    Abnormal results of liver function studies, Osteoporosis, Fatigue, Post-menopausal, Obesity, Family history of diabetes mellitus       cholecalciferol 1000 UNIT tablet    vitamin D    90 tablet    Take 1 tablet (1,000 Units) by mouth daily        levETIRAcetam 750 MG tablet    KEPPRA     TAKE 2 TABLETS BY MOUTH TWICE DAILY        raloxifene 60 MG tablet    Evista    90 tablet    Take 1 tablet (60 mg) by mouth daily    At high risk for breast cancer, Other osteoporosis without current pathological fracture       VITAMIN B 12 PO      Take 100 mcg by mouth

## 2017-08-18 NOTE — LETTER
Cancer Risk Management  Program Locations    [facility]  [facility]  [facility]  [facility]  Mailing Address  Cancer Risk Management Program  [facility]  [address]  [facility] [address]  [place] [address]    New patient appointments  [phone]  2017    Meme Hameed  [address]  [facility] [address]      Dear Meme,    It was a pleasure seeing you again today.  Below is a copy of my note from our visit, outlining your surveillance plan.      Please let me know if you have any issues with the raloxifene but I think you are going to be fine with it.   Please feel free to contact me if you have any questions or concerns.      Oncology Risk Management Consultation:  Date on this visit: 2017    Meme Hameed  returns to the clinic today for a six month follow up.  She requires heightened screening and surveillance for her higher risk of breast cancer, a family history of breast cancer in her mother, sister, paternal aunt and 3 paternal cousins (see below for history).     She is considered to at high risk for breast cancer and has a 35.4% lifetime risk for breast cancer by the Philip model.  Her 5 year breast cancer risk is 2.4% by the Melly model and 3.7% by the JOSE A model.     Primary Physician: Kaelyn Dunlap MD    History Of Present Illness:  Ms. Hameed is a very pleasant  67 year old female who presents with family history of breast and ovarian cancer.     Genetic testin2016 - NEGATIVE  for known pathogenic mutations in 12 genes on GynPlus Panel through [employer]. Negative for mutations in BRCA1, BRCA2, BRIP1, EPCAM, MLH1, MSH2, MSH6, PALB2, PMS2, PTEN, RAD51D and TP53.     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    Pertinent history:  Menarche: [age] years.  Age at first child:  "20  Age at menopause: 51  Hx of breastfeeding first child x6 weeks.  No history of OCPs.  No history of HRT.  Uterus removed in 1995 d/t cervical dysplasia.  Ovaries intact.   Hx of 1 negative breast biopsies (2010, see below)    Pertinent screening history:  8/4/2010 - L Stereotactic core needle biopsy w/L clip placement - fibroadenoma and non proliferative fibrocystic changes.  7/14/2014 - Screening mammogram, BiRads1.  11/9/2016 - Diagnostic tomosynthesis mammogram and R ultrasound for R clear nipple discharge x2 months, BiRads1.    At this visit, she denies asymmetry, lumps, masses, thickening, pain, nipple discharge and skin changes.  She denies bloating, early satiety, abdominal pain, fatigue and constipation.    Past Medical/Surgical History:  Past Medical History:   Diagnosis Date     Adenomatous colon polyp 2011    tubular adenoma     At high risk for breast cancer 02/16/2017    35.4% lifetime risk by Philip model     Epilepsy (H)      Fracture of metatarsal bone of left foot 7/14     Fracture, radius 1990    and ulnar styloid fracture     GIB (gastrointestinal bleeding) 4/1/2011     Kidney stones 1970, 1975    during pregnancy     Myxolipoma     finger, removed 2011     Osteoporosis      Seizures (H)      Past Surgical History:   Procedure Laterality Date     COLONOSCOPY  3/31/2011    Procedure:COLONOSCOPY; Surgeon:PACO DIETRICH; Location: GI     COLONOSCOPY  3/31/2011    Procedure:COMBINED COLONOSCOPY, REMOVE TUMOR/POLYP/LESION BY SNARE; Surgeon:PACO DIETRICH; Location: GI     ESOPHAGOSCOPY, GASTROSCOPY, DUODENOSCOPY (EGD), COMBINED  3/31/2011    Procedure:COMBINED ESOPHAGOSCOPY, GASTROSCOPY, DUODENOSCOPY (EGD); Surgeon:PACO DIETRICH; Location: GI     HC BREATH HYDROGEN TEST  6/2/2011    Procedure:HYDROGEN BREATH TEST; Surgeon:MANDIE BRADY; Location: GI     HYSTERECTOMY  1994 or 1995    Partial hysterectomy 1995, urethral tube \"widened\" 1994     ORTHOPEDIC SURGERY      L wrist " "      Allergies:  Allergies as of 2017     (No Known Allergies)       Current Medications:  Current Outpatient Prescriptions   Medication Sig Dispense Refill     amoxicillin-clavulanate (AUGMENTIN) 500-125 MG per tablet Take 1 tablet by mouth 3 times daily 30 tablet 0     levETIRAcetam (KEPPRA) 750 MG tablet TAKE 2 TABLETS BY MOUTH TWICE DAILY       Cyanocobalamin (VITAMIN B 12 PO) Take 100 mcg by mouth       Multiple Vitamins-Minerals (CENTRUM SILVER) per tablet Take 1 tablet by mouth daily       cholecalciferol (VITAMIN D) 1000 UNIT tablet Take 1 tablet (1,000 Units) by mouth daily 90 tablet 3     CALCIUM 500 +D 500-400 MG-UNIT TABS Take 1 tablet by mouth 2 times daily 180 tablet 3        Family History:  Family History   Problem Relation Age of Onset     Breast Cancer Mother 30     lumpectomy and chemo; also \"mass in ovary\"     OSTEOPOROSIS Sister      GASTROINTESTINAL DISEASE Son      intestinal transplant     Breast Cancer Sister 55     Leukemia Sister 52     DIABETES Maternal Aunt      multiple mat aunts     Bone Cancer Niece 19     Colon Polyps Father      Colon Polyps Brother      Ovarian Cancer Maternal Aunt 50      of ovarian cancer in 50s or 60s, unknown     Prostate Cancer Cousin 65     Breast Cancer Paternal Aunt 36      of breast cancer recurrence in 60s     Breast Cancer Cousin 30     paternal cousin, BRCA1+ by report     Breast Cancer Cousin 40     paternal cousin, BRCA1+ by report     Breast Cancer Cousin 35     paternal cousin, BRCA1+ by report     Genetic Disorder Cousin      paternal male cousin, BRCA1+ by report     Ovarian Cancer Cousin      paternal cousin, BRCA1+ by report       Social History:  Social History     Social History     Marital status:      Spouse name: N/A     Number of children: N/A     Years of education: N/A     Occupational History     Not on file.     Social History Main Topics     Smoking status: Former Smoker     Smokeless tobacco: Not on file     " Alcohol use Yes      Comment: Rarely     Drug use: No     Sexual activity: Not on file     Other Topics Concern     Not on file     Social History Narrative       Physical Exam:  There were no vitals taken for this visit.       GENERAL APPEARANCE: healthy, alert and no apparent distress.    NECK: no adenopathy, no asymmetry or masses     LYMPHATICS: No cervical, supraclavicular, axillary or inguinal lymphadenopathy     RESP: lungs clear to auscultation - no rales, rhonchi or wheezes     CARDIOVASCULAR: regular rate and rhythm, normal S1 S2, no S3 or S4 and no murmur.   BREASTS: Examined in a sitting and lying position. Symmetrical without visible distortion, swelling or rashes. No nipple inversion, nipple discharge, breast dimpling or puckering. Breast tissue is heterogenous dense to palpation. Bilaterally fibrocystic tissue throughout, including prominent ropy, fibroglandular tissue.  Axillary area without masses or lymphadenopathy.       SKIN: no suspicious lesions or rashes on upper torso and extremities.    Laboratory/Imaging Studies  Results for orders placed or performed in visit on 12/01/16   Dexa vertebral fracture analysis    Narrative    ATTENTION:  Easy to read DXA/VFA reports (formatted and presented as   tables)   can be found in EPIC under Chart review >  Imaging tab >  DXA    [facility]   [address] [place] [address]  Phone: [phone]   Fax: [phone]    Vertebral Fracture Assessment (VFA) Report:      DEIDRE MURPHY 2MARKOWITZ:    Your patient, BAYRON KUNZ ([id number] ), completed a VFA exam ([id number]   ) on a [place] on   . The following is a summary of   the results.   _________________________________________________________________________    Patient biographical information and history:    Current measured height: 62.0 in.  Current measured weight: 166.0 lbs.    Vertebral Fracture Assessment (VFA) was  "performed in the lateral decubitus   position on this 65.8 year old  Female, whose history as reported   by the patient is noted for  postmenopausal status      and the following   current treatments:   calcium, vitamin D,    In addition, the patient   reported having been previously treated with:   hormone replacement,   corticosteroids,    The patient meets the following indications for a VFA:      (2007 ISCD Position Statements at www.iscd.org)  _________________________________________________________________________    Technical quality:    Satisfactory.  VFA software and contrast adjustment allow for better   visualization of the thoracic vertebral bodies.  VFA scan was   interpretable from T4 - L4.  _________________________________________________________________________    Densitometry results:    Comparison:  None provided.    _________________________________________________________________________    Conclusions:    VFA scan was interpretable from T4 - L4.  Using the semi-quantitative   analysis of Genant (see reference #1),   no fractures were identified.  Six point morphometry confirmed the   presence and severity of these deformities.         _________________________________________________________________________    Feel free to contact DXA services if you have any questions or comments.    Thank you for the opportunity to be of service to you and your patient.      _________________________________________________________________________    References:    1.  HARSHAL Physician's Guideline Website address:  www.nof.org    2.  American College of Rheumatology Recommendations for the Prevention   and Treatment of Glucocorticoid-induced Osteoporosis:  2001 update in   \"Arthritis and Rheumatism\"  July 2001 edition (vol 44, issue 7) pp 149   1500.    3.  ISCD position statements:  www.iscd.org  (includes the report of the   2005 VFA Position Development Conference)    According to the ISCD " position statements, lateral spine is not to be used   for diagnosis, but may have a role in monitoring.    According to the ISCD position statements, the diagnosis of osteoporosis   in pre-menopausal women and in men younger than age 50 should not be made   on the basis of densitometric criteria alone.  In addition Z-scores rather   than T-scores should be used.    4.  Implementation of suggestions is at the discretion of the ordering   provider.  Clinical correlation is recommended.    5.  WHO categories:    normal = T-score of - 1.0 or more positive, low bone density/ osteopenia =   T-score of - 1.0 to - 2.5, osteoporosis = T-score of -2.5 or more negative      Template revised 7.5.2016         ASSESSMENT  We discussed her last screening tests and reviewed her interval history. She is concerned about her level of risk for breast cancer and wants to have high risk screening.   We also discussed that she is going through a divorce right now and her insurance is going to change.  She is not sure that she can afford a breast MRI or what type of insurance she will have in the future so the following plan may be modified.      We discussed that Dr. Dunlap and I consulted and agreed that we would recommend raloxifene for her in order to treat her osteoporosis and have the side benefit of reducing her cancer risk (by up to 35-38% in the STAR trial.)  She is amenable to this and I have sent a prescription to her pharmacy.      INDIVIDUALIZED CANCER RISK MANAGEMENT PLAN:  Individualized Surveillance Plan for women  With 20% or greater lifetime risk of breast cancer   Per NCCN Guidelines Version 2.2016   Recommended screening Test or procedure Last done Next Scheduled    Clinical encounter Ongoing risk assessment, risk reduction counseling and clinical breast exam, every 6-12 months.   8/18/2017 February 2018   However, some family histories with breast cancers at a very young age, may warrant screening starting  earlier.    *May begin at age 40 if breast cancers in the family occur at later ages.    Annual mammogram beginning 10 years younger than the earliest breast cancer in the family but not prior to age 30.    Recommend annual breast MRI to begin 10 years younger than the earliest breast cancer in the family but not prior to age 25.    Breast MRIs are preferably done on day 7-15 of the menstrual cycle in premenopausal women. 11/9/2016 - Diagnostic tomosynthesis mammogram R ultrasound for clear nipple discharge - BiRads2    No breast MRI to date Next: Tomosynthesis mammogram 11:30 8/18/2017    Next exam: Feb. 2018 with breast MRI afterward   Breast screening for patients at high risk due to thoracic radiation between the ages of 10-30     Begin 8-10 years post radiation treatment or age 25.   Annual mammogram   and  Annual breast MRI, preferably on day 7-15 of menstrual cycle for premenopausal women.    Annual clinical breast exams with ongoing risk assessment and risk reduction counseling until age 25, then every 6-12 months.     NA     NA         We also reviewed the current healthy lifestyle recommendations by  NCCN and the American Cancer Society that can reduce the risk of breast cancer including  the following recommendations:    1 Limit alcohol consumption to less than 1 drink per day (1 drink=5 oz.wine, 12 oz. Beer or 1.5 oz. 80-proof liquor).  2. Exercise per American Cancer Society guidelines of at least 150 minutes of moderate-intensity activity or 75 minutes of vigorous activity each week. (Or a combination of both.) Exercise should be spread  out over the week.  3. Maintain a healthy weight with a Body Mass Index between 19-24.9.  4. Do not use tobacco products and limit exposure to passive smoke.    I will follow up on her screenings and see her in the Cancer Risk Management clinic in six months.    I spent 20 minutes with the patient with greater that 50% of it in counseling and coordinating care as  documented above.    Yelena Ruffin, GWYN-CNS, OCN, ANG-BC  Clinical Nurse Specialist  Cancer Risk Management Program  [address]  [place] [address]    phone:  [phone]  Pager: [phone]  fax: [phone]

## 2017-08-21 ENCOUNTER — DOCUMENTATION ONLY (OUTPATIENT)
Dept: GASTROENTEROLOGY | Facility: OUTPATIENT CENTER | Age: 67
End: 2017-08-21

## 2017-08-21 ENCOUNTER — TELEPHONE (OUTPATIENT)
Dept: ONCOLOGY | Facility: CLINIC | Age: 67
End: 2017-08-21

## 2017-08-21 ENCOUNTER — TRANSFERRED RECORDS (OUTPATIENT)
Dept: HEALTH INFORMATION MANAGEMENT | Facility: CLINIC | Age: 67
End: 2017-08-21

## 2017-08-21 NOTE — TELEPHONE ENCOUNTER
"Social Work received referral from oncology distress screening to connect with patient regarding concerns about resources. Per chart review, patient does not have confirmed cancer diagnosis.  SW spoke with patient over the phone to provide support. Patient reported she is primarily concerned about her need for new insurance coverage.  Patient indicated she is currently covered under [employer] through her  but is getting . She indicated knowing she \"needs Medicare\" but stated confusion about the different plans as well as supplemental insurance coverage.  Per patient's request, SW emailed patient contact information and education about Senior Linkage Line to assist patient in explaining different Medicare plans and getting signed up through Social Security Administration. SW had also recommended patient talk to an  about supplemental plans. No other needs expressed at this time. SW provided contact information to patient for any other questions, needs and concerns.    Soo Yeon Han, [facility]  [phone]    "

## 2017-08-23 LAB — COPATH REPORT: NORMAL

## 2018-02-12 ENCOUNTER — OFFICE VISIT (OUTPATIENT)
Dept: INTERNAL MEDICINE | Facility: CLINIC | Age: 68
End: 2018-02-12
Payer: COMMERCIAL

## 2018-02-12 VITALS
SYSTOLIC BLOOD PRESSURE: 101 MMHG | HEIGHT: 62 IN | OXYGEN SATURATION: 97 % | RESPIRATION RATE: 20 BRPM | BODY MASS INDEX: 32.07 KG/M2 | DIASTOLIC BLOOD PRESSURE: 70 MMHG | WEIGHT: 174.3 LBS | HEART RATE: 85 BPM

## 2018-02-12 DIAGNOSIS — Z00.00 HEALTH CARE MAINTENANCE: ICD-10-CM

## 2018-02-12 DIAGNOSIS — R73.03 PREDIABETES: ICD-10-CM

## 2018-02-12 DIAGNOSIS — Z23 NEED FOR PROPHYLACTIC VACCINATION WITH TETANUS-DIPHTHERIA (TD): ICD-10-CM

## 2018-02-12 DIAGNOSIS — Z91.81 AT RISK FOR FALLING: ICD-10-CM

## 2018-02-12 DIAGNOSIS — Z12.31 ENCOUNTER FOR SCREENING MAMMOGRAM FOR BREAST CANCER: Primary | ICD-10-CM

## 2018-02-12 LAB
ALBUMIN SERPL-MCNC: 3.5 G/DL (ref 3.4–5)
ALP SERPL-CCNC: 98 U/L (ref 40–150)
ALT SERPL W P-5'-P-CCNC: 21 U/L (ref 0–50)
AST SERPL W P-5'-P-CCNC: 19 U/L (ref 0–45)
BILIRUB DIRECT SERPL-MCNC: 0.1 MG/DL (ref 0–0.2)
BILIRUB SERPL-MCNC: 0.3 MG/DL (ref 0.2–1.3)
HBA1C MFR BLD: 6 % (ref 4.3–6)
PROT SERPL-MCNC: 8.2 G/DL (ref 6.8–8.8)
TSH SERPL DL<=0.005 MIU/L-ACNC: 0.83 MU/L (ref 0.4–4)

## 2018-02-12 ASSESSMENT — ENCOUNTER SYMPTOMS
PALPITATIONS: 1
STIFFNESS: 1
MYALGIAS: 1

## 2018-02-12 ASSESSMENT — ACTIVITIES OF DAILY LIVING (ADL)
IN_THE_PAST_7_DAYS,_DID_YOU_NEED_HELP_FROM_OTHERS_TO_PERFORM_EVERYDAY_ACTIVITIES_SUCH_AS_EATING,_GETTING_DRESSED,_GROOMING,_BATHING,_WALKING,_OR_USING_THE_TOILET: N
IN_THE_PAST_7_DAYS,_DID_YOU_NEED_HELP_FROM_OTHERS_TO_TAKE_CARE_OF_THINGS_SUCH_AS_LAUNDRY_AND_HOUSEKEEPING,_BANKING,_SHOPPING,_USING_THE_TELEPHONE,_FOOD_PREPARATION,_TRANSPORTATION,_OR_TAKING_YOUR_OWN_MEDICATIONS?: N

## 2018-02-12 ASSESSMENT — PAIN SCALES - GENERAL: PAINLEVEL: NO PAIN (0)

## 2018-02-12 NOTE — PROGRESS NOTES
[facility]  Primary Care Center   Leatha Dewitt MD  02/12/2018      Chief Complaint:   Physical (yearly physical)       History of Present Illness:   Meme Hameed is a 68 year old female with osteopetrosis and a high risk of breast cancer who presents for a physical. She reports having weight gain despite going to the gym. She walks on a treadmill and swims. She has gained 5 or 6 pounds. She used to keep a food diary, and reports only eating one meal a day, as well as a newfound craving for sweets. She reports shortness of breath when doing household activities, but it passes quickly. She     The patient note foot pain, due to multiple breaks.    She also has a rash on her lateral chest near her braline  She also expresses concern over a mole on her back.   She gets short of breath she moves around doing      Other concerns discussed:  ***     Review of Systems:   Pertinent items are noted in HPI.  All other systems are negative.  Answers for HPI/ROS submitted by the patient on 2/12/2018   General Symptoms: No  Skin Symptoms: Yes  HENT Symptoms: No  EYE SYMPTOMS: Yes  HEART SYMPTOMS: Yes  LUNG SYMPTOMS: No  INTESTINAL SYMPTOMS: No  URINARY SYMPTOMS: No  GYNECOLOGIC SYMPTOMS: No  BREAST SYMPTOMS: No  SKELETAL SYMPTOMS: Yes  BLOOD SYMPTOMS: No  NERVOUS SYSTEM SYMPTOMS: No  MENTAL HEALTH SYMPTOMS: No  Rashes: Yes  Dry eyes: Yes  Fast or irregular heartbeat: Yes  Muscle aches: Yes  Joint stiffness: Yes      Active Medications:     Current Outpatient Prescriptions:      raloxifene (EVISTA) 60 MG tablet, Take 1 tablet (60 mg) by mouth daily, Disp: 90 tablet, Rfl: 3     levETIRAcetam (KEPPRA) 750 MG tablet, TAKE 2 TABLETS BY MOUTH TWICE DAILY, Disp: , Rfl:      Cyanocobalamin (VITAMIN B 12 PO), Take 100 mcg by mouth, Disp: , Rfl:      Multiple Vitamins-Minerals (CENTRUM SILVER) per tablet, Take 1 tablet by mouth daily, Disp: , Rfl:      cholecalciferol (VITAMIN D) 1000 UNIT tablet, Take 1 tablet (1,000 Units) by  "mouth daily, Disp: 90 tablet, Rfl: 3     CALCIUM 500 +D 500-400 MG-UNIT TABS, Take 1 tablet by mouth 2 times daily, Disp: 180 tablet, Rfl: 3      Allergies:   Review of patient's allergies indicates no known allergies.      Past Medical History:  Tubular adenoma, colon   Osteoporosis   History of epilepsy   History of kidney stones   High risk for breast cancer, 35.4% lifetime risk by Philip model (last calculated in 17)     Past Surgical History:  Hysterectomy   Orthopedic surgery on left wrist, unspecified     Family History:   Significant family history of breast cancer in her mother (mets to ovaries), sister, paternal aunt ( after recurrence in 60s), 4 paternal cousins, including one male, all with BRCA1+ gene.  Sister also with significant history of leukemia and osteoporosis. Brother with colon polyps.   Son with gastrointestinal disease requiring intestinal transplant.   Extended family also significant for diabetes mellitus, bone cancer, ovarian cancer - onset/death in 50s, prostate cancer.      Social History:      Former smoker.   Rare alcohol use.  Starting new job in retail tomorrow      Physical Exam:   /70 (BP Location: Right arm, Patient Position: Sitting, Cuff Size: Adult Regular)  Pulse 85  Resp 20  Ht 1.575 m (5' 2\")  SpO2 97%   ***      Assessment and Plan:  At risk for falling  ***    Need for prophylactic vaccination with tetanus-diphtheria (TD)  ***  - TDAP ( BOOSTRIX AGES 10-64)      referral to dietician  Follow-up: Data Unavailable         Scribe Disclosure:   We, Maryann Perales and Miko Morlaes, are serving as scribes to document services personally performed by Leatha Dewitt MD at this visit, based upon the provider's statements to me. All documentation has been reviewed by the aforementioned provider prior to being entered into the official medical record.     Portions of this medical record were completed by a scribe. UPON MY REVIEW AND AUTHENTICATION " BY ELECTRONIC SIGNATURE, this confirms (a) I performed the applicable clinical services, and (b) the record is accurate.

## 2018-02-12 NOTE — NURSING NOTE
Chief Complaint   Patient presents with     Physical     yearly physical   Jessica Peterson LPN 10:44 AM on 2/12/2018.    Rooming Note  Health Maintenance   Health Maintenance Due   Topic Date Due     TETANUS IMMUNIZATION (SYSTEM ASSIGNED)  01/18/1969     ADVANCE DIRECTIVE PLANNING Q5 YRS  01/18/2006     FALL RISK ASSESSMENT  01/18/2016     PNEUMOCOCCAL (2 of 2 - PPSV23) 10/13/2017    All health maintenance items discussed and pended.  Jessica Peterson LPN 10:47 AM on 2/12/2018

## 2018-02-12 NOTE — MR AVS SNAPSHOT
After Visit Summary   2/12/2018    Meme Hameed    MRN: [id number]           Patient Information     Date Of Birth          1950        Visit Information        Provider Department      2/12/2018 10:40 AM Leatha Dewitt MD [facility]        Today's Diagnoses     Encounter for screening mammogram for breast cancer    -  1    At risk for falling        Need for prophylactic vaccination with tetanus-diphtheria (TD)        Health care maintenance        Prediabetes          Care Instructions    Primary Care Center: [phone]     Primary Care Center Medication Refill Request Information:  * Please contact your pharmacy regarding ANY request for medication refills.  ** [facility] Prescription Fax = [phone]  * Please allow 3 business days for routine medication refills.  * Please allow 5 business days for controlled substance medication refills.     Primary Care Center Test Result notification information:  *You will be notified with in 7-10 days of your appointment day regarding the results of your test.  If you are on MyChart you will be notified as soon as the provider has reviewed the results and signed off on them.    Meme was seen today for physical.    Diagnoses and all orders for this visit:    Encounter for screening mammogram for breast cancer  -     Mammogram, routine screening; Future    At risk for falling    Need for prophylactic vaccination with tetanus-diphtheria (TD)  -     Pneumococcal vaccine 23 valent PPSV23  (Pneumovax) [40785]  -     TDAP ( BOOSTRIX AGES 10-64)    Health care maintenance  -     TSH with free T4 reflex; Future  -     Hepatic panel; Future    Prediabetes  -     Hemoglobin A1c; Future  -     WEIGHT MANAGEMENT/ UMP LIFESTYLE PROGRAM REFERRAL      Use 1% Hydrocortisone cream for the skin rash  Get some METATARSAL PADS (shoe inserts) for your sore feet.          Follow-ups after your visit        Additional Services     WEIGHT MANAGEMENT/ UMP  LIFESTYLE PROGRAM REFERRAL       To schedule an appointment, please call the [facility] at  [phone] or [facility] at [phone].                  Your next 10 appointments already scheduled     Feb 12, 2018 12:15 PM CST   LAB with  LAB   [facility] ([facility])    [address]  1st Floor  [facility] [id number] [phone]           Please do not eat 10-12 hours before your appointment if you are coming in fasting for labs on lipids, cholesterol, or glucose (sugar). This does not apply to pregnant women. Water, hot tea and black coffee (with nothing added) are okay. Do not drink other fluids, diet soda or chew gum.            Feb 16, 2018 10:00 AM CST   (Arrive by 9:45 AM)   Return Visit with GWYN De La Garza [facility] ([facility])    [address] [place] [address]  [address]  [facility] [id number] [phone]            Mar 09, 2018 10:30 AM CST   (Arrive by 10:15 AM)   MA SCREENING DIGITAL BILATERAL with [id number]   [facility] ([facility])    [address]  2nd Floor  [facility] [id number] [phone]           Do not use any powder, lotion or deodorant under your arms or on your breast. If you do, we will ask you to remove it before your exam.  Wear comfortable, two-piece clothing.  If you have any allergies, tell your care team.  Bring any previous mammograms from other facilities or have them mailed to the breast center. Three-dimensional (3D) mammograms are available at [place] locations in [facility], [facility], [facility], [place], [place], and [place]. [facility] locations include [place] and [facility] in [place]. Benefits of 3D mammograms include: - Improved rate of cancer detection - Decreases your chance of having to go back for more tests, which means fewer: -  "\"False-positive\" results (This means that there is an abnormal area but it isn't cancer.) - Invasive testing procedures, such as a biopsy or surgery - Can provide clearer images of the breast if you have dense breast tissue. 3D mammography is an optional exam that anyone can have with a 2D mammogram. It doesn't replace or take the place of a 2D mammogram. 2D mammograms remain an effective screening test for all women.  Not all insurance companies cover the cost of a 3D mammogram. Check with your insurance.              Future tests that were ordered for you today     Open Future Orders        Priority Expected Expires Ordered    TSH with free T4 reflex Routine 2/12/2018 2/26/2018 2/12/2018    Hepatic panel Routine 2/12/2018 2/26/2018 2/12/2018    Hemoglobin A1c Routine 2/12/2018 2/26/2018 2/12/2018    Mammogram, routine screening Routine  2/12/2019 2/12/2018            Who to contact     Please call your clinic at [phone] to:    Ask questions about your health    Make or cancel appointments    Discuss your medicines    Learn about your test results    Speak to your doctor            Additional Information About Your Visit        Freeze Tag Information     Freeze Tag gives you secure access to your electronic health record. If you see a primary care provider, you can also send messages to your care team and make appointments. If you have questions, please call your primary care clinic.  If you do not have a primary care provider, please call [phone] and they will assist you.      Freeze Tag is an electronic gateway that provides easy, online access to your medical records. With Freeze Tag, you can request a clinic appointment, read your test results, renew a prescription or communicate with your care team.     To access your existing account, please contact your [facility] or call [phone] for assistance.        Care EveryWhere ID     This is your Care EveryWhere ID. This could be " "used by other organizations to access your [facility] medical records  [id number]        Your Vitals Were     Pulse Respirations Height Pulse Oximetry          85 20 1.575 m (5' 2\") 97%         Blood Pressure from Last 3 Encounters:   02/12/18 101/70   08/18/17 112/75   03/20/17 111/78    Weight from Last 3 Encounters:   08/18/17 76.1 kg (167 lb 12.8 oz)   03/20/17 75.8 kg (167 lb)   02/17/17 77.1 kg (169 lb 14.4 oz)              We Performed the Following     Pneumococcal vaccine 23 valent PPSV23  (Pneumovax) [71954]     TDAP ( BOOSTRIX AGES 10-64)     WEIGHT MANAGEMENT/ UMP LIFESTYLE PROGRAM REFERRAL        Primary Care Provider Office Phone # Fax #    Kaelyn Gale Dunlap [phone]       [address] [place] [address]  [facility] [address]        Equal Access to Services     GLENN LUCAS : Hadii aad ku hadasho Soomaali, waaxda luqadaha, qaybta kaalmada adeegyada, waxay mannyin haymamadou lopez . So [facility] [phone].    ATENCIÓN: Si habla español, tiene a perez disposición servicios gratuitos de asistencia lingüística. Llame al [phone].    We comply with applicable federal civil rights laws and [place] laws. We do not discriminate on the basis of race, color, national origin, age, disability, sex, sexual orientation, or gender identity.            Thank you!     Thank you for choosing [facility]  for your care. Our goal is always to provide you with excellent care. Hearing back from our patients is one way we can continue to improve our services. Please take a few minutes to complete the written survey that you may receive in the mail after your visit with us. Thank you!             Your Updated Medication List - Protect others around you: Learn how to safely use, store and throw away your medicines at www.disposemymeds.org.          This list is accurate as of 2/12/18 12:04 PM.  Always use your most recent med list.                   Brand Name Dispense " Instructions for use Diagnosis    calcium 500 +D 500-400 MG-UNIT Tabs   Generic drug:  Calcium Carb-Cholecalciferol     180 tablet    Take 1 tablet by mouth 2 times daily        CENTRUM SILVER per tablet      Take 1 tablet by mouth daily    Abnormal results of liver function studies, Osteoporosis, Fatigue, Post-menopausal, Obesity, Family history of diabetes mellitus       cholecalciferol 1000 UNIT tablet    vitamin D3    90 tablet    Take 1 tablet (1,000 Units) by mouth daily        levETIRAcetam 750 MG tablet    KEPPRA     TAKE 2 TABLETS BY MOUTH TWICE DAILY        raloxifene 60 MG tablet    Evista    90 tablet    Take 1 tablet (60 mg) by mouth daily    At high risk for breast cancer, Other osteoporosis without current pathological fracture       VITAMIN B 12 PO      Take 100 mcg by mouth

## 2018-02-12 NOTE — NURSING NOTE
TDAP and Pneumovax 23 shots given without problems, patient tolerated procedures well.Jessica Peterson LPN 12:09 PM on 2/12/2018

## 2018-02-12 NOTE — PATIENT INSTRUCTIONS
[facility]: [phone]     [facility] Medication Refill Request Information:  * Please contact your pharmacy regarding ANY request for medication refills.  ** [facility] Prescription Fax = [phone]  * Please allow 3 business days for routine medication refills.  * Please allow 5 business days for controlled substance medication refills.     Primary Care Center Test Result notification information:  *You will be notified with in 7-10 days of your appointment day regarding the results of your test.  If you are on MyChart you will be notified as soon as the provider has reviewed the results and signed off on them.    Meme was seen today for physical.    Diagnoses and all orders for this visit:    Encounter for screening mammogram for breast cancer  -     Mammogram, routine screening; Future    At risk for falling    Need for prophylactic vaccination with tetanus-diphtheria (TD)  -     Pneumococcal vaccine 23 valent PPSV23  (Pneumovax) [06961]  -     TDAP ( BOOSTRIX AGES 10-64)    Health care maintenance  -     TSH with free T4 reflex; Future  -     Hepatic panel; Future    Prediabetes  -     Hemoglobin A1c; Future  -     WEIGHT MANAGEMENT/ UMP LIFESTYLE PROGRAM REFERRAL      Use 1% Hydrocortisone cream for the skin rash  Get some METATARSAL PADS (shoe inserts) for your sore feet.

## 2018-02-12 NOTE — PROGRESS NOTES
[facility]  Primary Care Center   Leatha Dewitt MD  02/12/2018      Chief Complaint:   Physical (yearly physical)       History of Present Illness:   Meme Hameed is a 68 year old female with osteoporosis and a high risk of breast cancer who presents for a physical. As she is new to me we reviewed her past medical history. She has a history of seizures  She reports having weight gain despite going to the gym. She walks on a treadmill and swims. She has gained 5 or 6 pounds in 4 months. She used to keep a food diary, and reports only eating one meal a day, as well as a newfound craving for sweets. She reports shortness of breath when doing household activities, but it passes quickly. The patient is prediabetic and her Hb-A1c has not been checked recently.    The patient note foot pain, due to having multiple fractures in both feet.  Worsened by walking on the treadmill.      She also has a rash on her lateral chest near her bra-line she also expresses concern over a mole on her back that itches.      Health Care Maintenance/Other:  1. No seizures in last year  2. Dryness/irritation in eye  3. Mammogram due  4. Headache across forehead     Review of Systems:   Pertinent items are noted in HPI.  All other systems are negative.  Answers for HPI/ROS submitted by the patient on 2/12/2018   General Symptoms: No  Skin Symptoms: Yes  HENT Symptoms: No  EYE SYMPTOMS: Yes  HEART SYMPTOMS: Yes  LUNG SYMPTOMS: No  INTESTINAL SYMPTOMS: No  URINARY SYMPTOMS: No  GYNECOLOGIC SYMPTOMS: No  BREAST SYMPTOMS: No  SKELETAL SYMPTOMS: Yes  BLOOD SYMPTOMS: No  NERVOUS SYSTEM SYMPTOMS: No  MENTAL HEALTH SYMPTOMS: No  Rashes: Yes  Dry eyes: Yes  Fast or irregular heartbeat: Yes  Muscle aches: Yes  Joint stiffness: Yes      Active Medications:     Current Outpatient Prescriptions:      raloxifene (EVISTA) 60 MG tablet, Take 1 tablet (60 mg) by mouth daily, Disp: 90 tablet, Rfl: 3     levETIRAcetam (KEPPRA) 750 MG tablet, TAKE 2 TABLETS  "BY MOUTH TWICE DAILY, Disp: , Rfl:      Cyanocobalamin (VITAMIN B 12 PO), Take 100 mcg by mouth, Disp: , Rfl:      Multiple Vitamins-Minerals (CENTRUM SILVER) per tablet, Take 1 tablet by mouth daily, Disp: , Rfl:      cholecalciferol (VITAMIN D) 1000 UNIT tablet, Take 1 tablet (1,000 Units) by mouth daily, Disp: 90 tablet, Rfl: 3     CALCIUM 500 +D 500-400 MG-UNIT TABS, Take 1 tablet by mouth 2 times daily, Disp: 180 tablet, Rfl: 3      Allergies:   Review of patient's allergies indicates no known allergies.      Past Medical History:  Tubular adenoma, colon   Osteoporosis   History of epilepsy   History of kidney stones   High risk for breast cancer, 35.4% lifetime risk by Philip model (last calculated in 17)     Past Surgical History:  Hysterectomy   Orthopedic surgery on left wrist, unspecified     Family History:   Significant family history of breast cancer in her mother (mets to ovaries), sister, paternal aunt ( after recurrence in 60s), 4 paternal cousins, including one male, all with BRCA1+ gene.  Sister also with significant history of leukemia and osteoporosis. Brother with colon polyps.   Son with gastrointestinal disease requiring intestinal transplant.   Extended family also significant for diabetes mellitus, bone cancer, ovarian cancer - onset/death in 50s, prostate cancer.      Social History:      Former smoker.   Rare alcohol use.  Starting new job in retail tomorrow      Physical Exam:   /70 (BP Location: Right arm, Patient Position: Sitting, Cuff Size: Adult Regular)  Pulse 85  Resp 20  Ht 1.575 m (5' 2\")  SpO2 97%   Constitutional: Healthy, alert, no distress and cooperative  Head: Normocephalic. No masses, lesions, tenderness or abnormalities  Eyes: PERRL, no conjunctival injection  ENT: Bilateral TM normal without fluid or infection, throat normal without erythema or exudate, no cervical lymphadenopathy  Cardiovascular: JVP 7 cm. RRR, S1,S2 no murmurs, clicks, rubs, " or gallops. No pretibial edema.  Respiratory: Clear to auscultation and percussion bilaterally. No wheezes or crackles.  Gastrointestinal: BS NA. Soft, nontender, no hepatosplenomegaly or mass.  Musculoskeletal:  Ankle laxity of talofibular ligaments bilaterally; mild tenderness at 4th MTP joints on plantar aspect bilaterally.    Extremities: Normal- no gross deformities noted, gait normal and normal muscle tone  Skin: Left mid scapula with 2 mm x 3 mm dry, tan-brown, ovular macule consistent with a seborrheic keratosis. Left inframammary breast with dry, red macule with linear scratch marks. No scaling.   Neurologic: Gait normal. Reflexes normal and symmetric. Sensation grossly WNL.  Psychiatric: Mentation appears normal and affect normal  Hematologic/Lymphatic/Immunologic: Normal cervical, anterior, posterior, submandibular, submental, and supraclavicular  lymph nodes     Assessment and Plan:    1.  Prediabetes.  Plan to check A1c today last glucose was in 2016.   2.  Weight gain.  She would like some structured help with this. I've emphasized to her the whole important it is to continue keeping a food diary, to go the gym on a regular basis which she is doing, and to look at the content of her foods. She will do that and she would like to have a referral to the weight loss program as well.  3.  Health maintenance:    Need for prophylactic vaccination with tetanus-diphtheria (TD)    - Pneumococcal vaccine 23 valent PPSV23  (Pneumovax) [06397]  - TDAP ( BOOSTRIX AGES 10-64)    Encounter for screening mammogram for breast cancer  High risk for breast cancer, last screening was in 2016.  - Mammogram, routine screening     4.  Rash under left breast: This appears to be localized irritation.  Use hydrocotisone to treat rash on side.    5.  Bilateral foot pain. She has metatarsalgia and a history of bilateral ankle sprains.  I recommended metatarsal pads and range of motion exercises for her ankles.    6.  Increased  risk for breast cancer. She is seeing her oncologist tomorrow in follow-up. I scheduled a mammogram and believe that she should have annual mammography. This is only not done because she lost her insurance.    Follow-up: Data Unavailable       Meme was seen today for physical.    Diagnoses and all orders for this visit:    Encounter for screening mammogram for breast cancer  -     Mammogram, routine screening; Future    At risk for falling    Need for prophylactic vaccination with tetanus-diphtheria (TD)  -     Pneumococcal vaccine 23 valent PPSV23  (Pneumovax) [42127]  -     TDAP ( BOOSTRIX AGES 10-64)    Health care maintenance  -     TSH with free T4 reflex; Future  -     Hepatic panel; Future    Prediabetes  -     Hemoglobin A1c; Future  -     WEIGHT MANAGEMENT/ UMP LIFESTYLE PROGRAM REFERRAL        Scribe Disclosure:   We, Maryann Perales and Miko Morales, are serving as scribes to document services personally performed by Leatha Dewitt MD at this visit, based upon the provider's statements to me. All documentation has been reviewed by the aforementioned provider prior to being entered into the official medical record.     Portions of this medical record were completed by a scribe. UPON MY REVIEW AND AUTHENTICATION BY ELECTRONIC SIGNATURE, this confirms (a) I performed the applicable clinical services, and (b) the record is accurate.       Leatha Dewitt MD

## 2018-03-09 ENCOUNTER — RADIANT APPOINTMENT (OUTPATIENT)
Dept: MAMMOGRAPHY | Facility: CLINIC | Age: 68
End: 2018-03-09
Attending: INTERNAL MEDICINE
Payer: COMMERCIAL

## 2018-03-09 DIAGNOSIS — Z12.31 ENCOUNTER FOR SCREENING MAMMOGRAM FOR BREAST CANCER: ICD-10-CM

## 2018-08-03 ENCOUNTER — TELEPHONE (OUTPATIENT)
Dept: ONCOLOGY | Facility: CLINIC | Age: 68
End: 2018-08-03

## 2018-08-03 DIAGNOSIS — M81.8 OTHER OSTEOPOROSIS WITHOUT CURRENT PATHOLOGICAL FRACTURE: ICD-10-CM

## 2018-08-03 DIAGNOSIS — Z91.89 AT HIGH RISK FOR BREAST CANCER: ICD-10-CM

## 2018-08-03 RX ORDER — RALOXIFENE HYDROCHLORIDE 60 MG/1
60 TABLET, FILM COATED ORAL DAILY
Qty: 90 TABLET | Refills: 3 | OUTPATIENT
Start: 2018-08-03

## 2018-08-03 NOTE — TELEPHONE ENCOUNTER
Please call pt and let her know that I can't refill her medication unless I see her, as I need to see her at least once per year.

## 2018-08-17 ENCOUNTER — ONCOLOGY VISIT (OUTPATIENT)
Dept: ONCOLOGY | Facility: CLINIC | Age: 68
End: 2018-08-17
Attending: CLINICAL NURSE SPECIALIST
Payer: COMMERCIAL

## 2018-08-17 VITALS
SYSTOLIC BLOOD PRESSURE: 112 MMHG | TEMPERATURE: 98.2 F | DIASTOLIC BLOOD PRESSURE: 71 MMHG | HEART RATE: 93 BPM | RESPIRATION RATE: 16 BRPM | WEIGHT: 177.03 LBS | OXYGEN SATURATION: 95 % | BODY MASS INDEX: 32.38 KG/M2

## 2018-08-17 DIAGNOSIS — M81.8 OTHER OSTEOPOROSIS WITHOUT CURRENT PATHOLOGICAL FRACTURE: ICD-10-CM

## 2018-08-17 DIAGNOSIS — Z91.89 AT HIGH RISK FOR BREAST CANCER: ICD-10-CM

## 2018-08-17 PROCEDURE — 99213 OFFICE O/P EST LOW 20 MIN: CPT | Mod: ZP | Performed by: CLINICAL NURSE SPECIALIST

## 2018-08-17 PROCEDURE — G0463 HOSPITAL OUTPT CLINIC VISIT: HCPCS | Mod: ZF

## 2018-08-17 RX ORDER — RALOXIFENE HYDROCHLORIDE 60 MG/1
60 TABLET, FILM COATED ORAL DAILY
Qty: 90 TABLET | Refills: 3 | Status: SHIPPED | OUTPATIENT
Start: 2018-08-17 | End: 2019-04-30

## 2018-08-17 ASSESSMENT — PAIN SCALES - GENERAL: PAINLEVEL: NO PAIN (0)

## 2018-08-17 NOTE — Clinical Note
Dr. Dewitt, Saw Meme in clinic today - she is at high risk for breast cancer but does not have insurance coverage for breast MRI, so will decline high risk screening with me.  Plans to have annual screening with you. I did renew her raloxifene today. She declines any side effects or symptoms from this and wants to continue. Per NCCN risk reduction guidelines she can continue this for a total of 5 years (through August 2022).  Would you be amenable to refilling it for her in the future. She's got enough for one year.  Could possibly help her osteoporosis too. Thanks. Yelena Ruffin, APRN-CNS, OCN, AGN-BC Clinical Nurse Specialist Cancer Risk Management Program [address] [place] [address]  phone:  [phone] Pager: [phone] fax: [phone]

## 2018-08-17 NOTE — PROGRESS NOTES
Oncology Risk Management Consultation:  Date on this visit: 8/17/2018    Meme Butts  returns to the clinic today for a six month follow up.  She requires evaluation for her risk of cancer secondary to having a family history of breast cancer in her mother, sister, paternal aunt and 3 paternal cousins (see below for history).     She is considered to at high risk for breast cancer and has a 35.4% lifetime risk for breast cancer by the Philip model.  Her 5 year breast cancer risk is 2.4% by the Melly model and 3.7% by the JOSE A model.     Also present was Dr. Millie Saravia, medical resident.    Primary Physician: Leatha Dewitt MD    History Of Present Illness:  Ms. Butts is a very pleasant, healthy 68 year old female who presents with family history of breast and ovarian cancer.     Pertinent history:  Menarche: [age] years.  Age at first child: 20  Age at menopause: 51  Hx of breastfeeding first child x6 weeks.  No history of OCPs.  No history of HRT.  Uterus removed in 1995 d/t cervical dysplasia.  Ovaries intact.   Hx of 1 negative breast biopsies (2010, see below)  On raloxifene therapy for breast cancer risk reduction since August 2017. Should continue through August 2022).     Genetic Testing:  Genetic testing negative for 12 genes on [facility] Plus Panel through [facility], 11/25/2016. Negative for mutations in BRCA1, BRCA2, BRIP1, EPCAM, MLH1, MSH2, MSH6, PALB2, PMS2, PTEN, RAD51D and TP53. 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    Pertinent screening history:  8/4/2010 - L Stereotactic core needle biopsy w/L clip placement - fibroadenoma and non proliferative fibrocystic changes.  7/14/2014 - Screening mammogram, BiRads1.  11/9/2016 - Diagnostic tomosynthesis mammogram and R ultrasound for R clear nipple discharge x2 months, BiRads1.  3/9/2018 - Screening mammogram, BiRads2.    At this visit, she denies asymmetry, lumps, masses, thickening, pain, nipple discharge and skin changes in  "her breasts. She denies hot flashes, insomnia, joint pain, abdominal pain and swelling.    Past Medical/Surgical History:  Past Medical History:   Diagnosis Date     Adenomatous colon polyp 2011    tubular adenoma     At high risk for breast cancer 02/16/2017    35.4% lifetime risk by Philip model     Epilepsy (H)      Fracture of metatarsal bone of left foot 7/14     Fracture, radius 1990    and ulnar styloid fracture     GIB (gastrointestinal bleeding) 4/1/2011     Kidney stones 1970, 1975    during pregnancy     Myxolipoma     finger, removed 2011     Osteoporosis      Seizures (H)      Past Surgical History:   Procedure Laterality Date     COLONOSCOPY  3/31/2011    Procedure:COLONOSCOPY; Surgeon:PACO DIETRICH; [facility]     COLONOSCOPY  3/31/2011    Procedure:COMBINED COLONOSCOPY, REMOVE TUMOR/POLYP/LESION BY SNARE; Surgeon:PACO DIETRICH; Location[facility]     ESOPHAGOSCOPY, GASTROSCOPY, DUODENOSCOPY (EGD), COMBINED  3/31/2011    Procedure:COMBINED ESOPHAGOSCOPY, GASTROSCOPY, DUODENOSCOPY (EGD); Surgeon:PACO DIETRICH; Location[facility]     HC BREATH HYDROGEN TEST  6/2/2011    Procedure:HYDROGEN BREATH TEST; Surgeon:MANDIE BRDAY; Location:[facility]     HYSTERECTOMY  1994 or 1995    Partial hysterectomy 1995, urethral tube \"widened\" 1994     ORTHOPEDIC SURGERY      L wrist       Allergies:  Allergies as of 08/17/2018     (No Known Allergies)       Current Medications:  Current Outpatient Prescriptions   Medication Sig Dispense Refill     CALCIUM 500 +D 500-400 MG-UNIT TABS Take 1 tablet by mouth 2 times daily 180 tablet 3     cholecalciferol (VITAMIN D) 1000 UNIT tablet Take 1 tablet (1,000 Units) by mouth daily 90 tablet 3     Cyanocobalamin (VITAMIN B 12 PO) Take 100 mcg by mouth       levETIRAcetam (KEPPRA) 750 MG tablet TAKE 2 TABLETS BY MOUTH TWICE DAILY       Multiple Vitamins-Minerals (CENTRUM SILVER) per tablet Take 1 tablet by mouth daily       raloxifene (EVISTA) 60 MG tablet Take 1 tablet (60 mg) by " "mouth daily 90 tablet 3        Family History:  Family History   Problem Relation Age of Onset     Breast Cancer Mother 30     lumpectomy and chemo; also \"mass in ovary\"     Osteoperosis Sister      GASTROINTESTINAL DISEASE Son      intestinal transplant     Breast Cancer Sister 55     Leukemia Sister 52     Diabetes Maternal Aunt      multiple mat aunts     Bone Cancer Niece 19     Colon Polyps Father      Colon Polyps Brother      Ovarian Cancer Maternal Aunt 50      of ovarian cancer in 50s or 60s, unknown     Prostate Cancer Cousin 65     Breast Cancer Paternal Aunt 36      of breast cancer recurrence in 60s     Breast Cancer Cousin 30     paternal cousin, BRCA1+ by report     Breast Cancer Cousin 40     paternal cousin, BRCA1+ by report     Breast Cancer Cousin 35     paternal cousin, BRCA1+ by report     Genetic Disorder Cousin      paternal male cousin, BRCA1+ by report     Ovarian Cancer Cousin      paternal cousin, BRCA1+ by report       Social History:  Social History     Social History     Marital status:      Spouse name: Quentin     Number of children: 3     Years of education: N/A     Occupational History      Retired     Social History Main Topics     Smoking status: Former Smoker     Smokeless tobacco: Never Used     Alcohol use Yes      Comment: Rarely     Drug use: No     Sexual activity: Not on file     Other Topics Concern     Not on file     Social History Narrative       Physical Exam:  There were no vitals taken for this visit.  Physical exam deferred.    Laboratory/Imaging Studies  Results for orders placed or performed in visit on 18   Mammogram, routine screening    Narrative    SCREENING MAMMOGRAM, BILATERAL, DIGITAL, with CAD    HISTORY/FAMILY HISTORY: Annual screening mammogram. History of benign  breast biopsy on the left. Two first degree relatives with history of  breast cancer, youngest age at diagnosis of 50 years old. First and  second-degree relatives with " "history of ovarian cancer, both diagnosed  at less than 50 years of age.    COMPARISON: Diagnostic mammogram 11/9/2016. Screening mammogram  7/14/2014.    BREAST DENSITY: Scattered fibroglandular densities.    There has been no significant change. Scattered benign-appearing  calcifications in both breasts. Left breast biopsy marker. Bilateral  axillary lymph nodes with normal morphology.      Impression    IMPRESSION: BI-RADS CATEGORY: 2 - Benign Finding(s).    RECOMMENDED FOLLOW-UP: Annual Mammography    Results to be sent to patient.    Based on the patient history questionnaire completed prior to the  mammogram, the  NCI risk calculator and the NCCN indications for genetic screening,  the patient may be at an increased risk for breast cancer and/or  have an indication for genetic screening.  She has been sent a letter  informing her of this and a phone number to schedule an appointment in  the [facility] if she so desires.    I have personally reviewed the examination and initial interpretation  and I agree with the findings.    LACEY MONROE MD       ASSESSMENT  We discussed her interval history; we deferred her physical exam as she had a breast exam earlier this year with Dr. Dewitt. She denies side effects and symptoms from raloxifene.     I have renewed her raloxifene prescription today; this is being prescribed to help reduce her risk for breast cancer, per NCCN risk reduction guidelines. (Meme meets the 1.7% threshold for a 5 year risk of developing breast cancer, by the Melly model.) Raloxifene may also help her history of osteoporosis.      Meme is not planning to have a breast MRI, as she is now on Medicare and it will not cover it; she plans to proceed with an annual mammogram.  As her breast density is \"scattered fibroglandular\" she is not considered to have a high density level.      She plans to follow with Dr. Dewitt for her annual mammograms. I will let Dr. Dewitt know about the " raloxifene. It will need to be renewed next August 2019 and should be continued for a total of 5 years, through August 2022.    Meme also reported exercise induced shortness of breath and leg swelling at her visit today; she plans to follow up with Dr. Dewitt for these symptoms.    I spent 20 minutes with the patient with greater that 50% of it in counseling and coordinating care as documented above.    Yelena Ruffin, APRN-CNS, OCN, AGN-BC  Clinical Nurse Specialist  Cancer Risk Management Program  [address]  [place] [address]    phone:  [phone]  Pager: [phone]  fax: [phone]    Ml: Leatha Dewtit MD

## 2018-08-17 NOTE — NURSING NOTE
"Oncology Rooming Note    August 17, 2018 10:24 AM   Meme Butts is a 68 year old female who presents for:    Chief Complaint   Patient presents with     Oncology Clinic Visit     return for H Risk BC, Refill      Initial Vitals: /71  Pulse 93  Temp 98.2  F (36.8  C) (Oral)  Resp 16  Wt 80.3 kg (177 lb 0.5 oz)  SpO2 95%  BMI 32.38 kg/m2 Estimated body mass index is 32.38 kg/(m^2) as calculated from the following:    Height as of 2/12/18: 1.575 m (5' 2\").    Weight as of this encounter: 80.3 kg (177 lb 0.5 oz). Body surface area is 1.87 meters squared.  No Pain (0) Comment: Data Unavailable   No LMP recorded. Patient has had a hysterectomy.  Allergies reviewed: Yes  Medications reviewed: Yes    Medications: MEDICATION REFILLS NEEDED TODAY. Provider was notified.  Pharmacy name entered into [facility]:    [facility] - [place], [place] - [address] [facility]/PHARMACY #[id number] [place], [place] - [address] [facility] [address] [place], [place] - [address]    Clinical concerns: Refills Tori Ruffin  was notified.    9  minutes for nursing intake (face to face time)     Marlena Geronimo MA              "

## 2018-08-17 NOTE — MR AVS SNAPSHOT
After Visit Summary   8/17/2018    Meme Butts    MRN: [id number]           Patient Information     Date Of Birth          1950        Visit Information        Provider Department      8/17/2018 10:30 AM Yelena Ruffin APRN [facility]        Today's Diagnoses     At high risk for breast cancer        Other osteoporosis without current pathological fracture           Follow-ups after your visit        Who to contact     If you have questions or need follow up information about today's clinic visit or your schedule please contact [facility] directly at [phone].  Normal or non-critical lab and imaging results will be communicated to you by MyHealthTeamshart, letter or phone within 4 business days after the clinic has received the results. If you do not hear from us within 7 days, please contact the clinic through DJZt or phone. If you have a critical or abnormal lab result, we will notify you by phone as soon as possible.  Submit refill requests through Webcom or call your pharmacy and they will forward the refill request to us. Please allow 3 business days for your refill to be completed.          Additional Information About Your Visit        MyChart Information     Webcom gives you secure access to your electronic health record. If you see a primary care provider, you can also send messages to your care team and make appointments. If you have questions, please call your primary care clinic.  If you do not have a primary care provider, please call [phone] and they will assist you.        Care EveryWhere ID     This is your Care EveryWhere ID. This could be used by other organizations to access your [facility] medical records  [id number]        Your Vitals Were     Pulse Temperature Respirations Pulse Oximetry BMI (Body Mass Index)       93 98.2  F (36.8  C) (Oral) 16 95% 32.38 kg/m2        Blood Pressure from Last 3 Encounters:    08/17/18 112/71   02/12/18 101/70   08/18/17 112/75    Weight from Last 3 Encounters:   08/17/18 80.3 kg (177 lb 0.5 oz)   02/12/18 79.1 kg (174 lb 4.8 oz)   08/18/17 76.1 kg (167 lb 12.8 oz)              Today, you had the following     No orders found for display         Where to get your medicines      These medications were sent to [facility]/pharmacy #[id number] - [place], [place] - [address] [address] [facility]  [address] [facility] [address]     Phone:  [phone]     raloxifene 60 MG tablet          Primary Care Provider Office Phone # Fax #    Leatha Dewitt -[phone]       [address] [address]        Equal Access to Services     [facility] CenterVANESSA : So [place], waaxdebra kumar, qaybta kaalmadebra patino, kamari lopez . So [facility] [phone].    ATENCIÓN: Si habla español, tiene a perez disposición servicios gratuitos de asistencia lingüística. [facility] [phone].    We comply with applicable federal civil rights laws and [place] laws. We do not discriminate on the basis of race, color, national origin, age, disability, sex, sexual orientation, or gender identity.            Thank you!     Thank you for choosing [facility]  for your care. Our goal is always to provide you with excellent care. Hearing back from our patients is one way we can continue to improve our services. Please take a few minutes to complete the written survey that you may receive in the mail after your visit with us. Thank you!             Your Updated Medication List - Protect others around you: Learn how to safely use, store and throw away your medicines at www.disposemymeds.org.          This list is accurate as of 8/17/18 11:29 AM.  Always use your most recent med list.                   Brand Name Dispense Instructions for use Diagnosis    calcium 500 +D 500-400 MG-UNIT Tabs   Generic drug:  Calcium Carb-Cholecalciferol     180 tablet    Take 1 tablet  by mouth 2 times daily        CENTRUM SILVER per tablet      Take 1 tablet by mouth daily    Abnormal results of liver function studies, Osteoporosis, Fatigue, Post-menopausal, Obesity, Family history of diabetes mellitus       cholecalciferol 1000 UNIT tablet    vitamin D3    90 tablet    Take 1 tablet (1,000 Units) by mouth daily        levETIRAcetam 750 MG tablet    KEPPRA     TAKE 2 TABLETS BY MOUTH TWICE DAILY        raloxifene 60 MG tablet    Evista    90 tablet    Take 1 tablet (60 mg) by mouth daily    At high risk for breast cancer, Other osteoporosis without current pathological fracture       VITAMIN B 12 PO      Take 100 mcg by mouth

## 2018-08-17 NOTE — LETTER
Cancer Risk Management  Program Locations    [facility]  [facility]  [facility]  [facility]  Mailing Address  Cancer Risk Management Program  [facility]  [address]  [place] [address]    New patient appointments  [phone]  August 17, 2018    Meme Butts  [address]  [facility] [address]      Dear Meme,    It was a pleasure meeting with you today.  Below is a copy of my note from our visit, outlining your surveillance plan.      I look forward to seeing you in the future to coordinate your care and reduce your health risks. Please feel free to contact me if you have any questions or concerns.      Oncology Risk Management Consultation:  Date on this visit: 8/17/2018    Meme Butts  returns to the clinic today for a six month follow up.  She requires evaluation for her risk of cancer secondary to having a family history of breast cancer in her mother, sister, paternal aunt and 3 paternal cousins (see below for history).     She is considered to at high risk for breast cancer and has a 35.4% lifetime risk for breast cancer by the Philip model.  Her 5 year breast cancer risk is 2.4% by the Melly model and 3.7% by the JOSE A model.     Also present was Dr. Millie Saravia, medical resident.    Primary Physician: Leatha Dewitt MD    History Of Present Illness:  Ms. Butts is a very pleasant, healthy 68 year old female who presents with family history of breast and ovarian cancer.     Pertinent history:  Menarche: [age] years.  Age at first child: 20  Age at menopause: 51  Hx of breastfeeding first child x6 weeks.  No history of OCPs.  No history of HRT.  Uterus removed in 1995 d/t cervical dysplasia.  Ovaries intact.   Hx of 1 negative breast biopsies (2010, see below)  On raloxifene therapy for breast cancer risk  reduction since August 2017. Should continue through August 2022).     Genetic Testing:  Genetic testing negative for 12 genes on Gyn Plus Panel through [facility], 11/25/2016. Negative for mutations in BRCA1, BRCA2, BRIP1, EPCAM, MLH1, MSH2, MSH6, PALB2, PMS2, PTEN, RAD51D and TP53. 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    Pertinent screening history:  8/4/2010 - L Stereotactic core needle biopsy w/L clip placement - fibroadenoma and non proliferative fibrocystic changes.  7/14/2014 - Screening mammogram, BiRads1.  11/9/2016 - Diagnostic tomosynthesis mammogram and R ultrasound for R clear nipple discharge x2 months, BiRads1.  3/9/2018 - Screening mammogram, BiRads2.    At this visit, she denies asymmetry, lumps, masses, thickening, pain, nipple discharge and skin changes in her breasts. She denies hot flashes, insomnia, joint pain, abdominal pain and swelling.    Past Medical/Surgical History:  Past Medical History:   Diagnosis Date     Adenomatous colon polyp 2011    tubular adenoma     At high risk for breast cancer 02/16/2017    35.4% lifetime risk by Philip model     Epilepsy (H)      Fracture of metatarsal bone of left foot 7/14     Fracture, radius 1990    and ulnar styloid fracture     GIB (gastrointestinal bleeding) 4/1/2011     Kidney stones 1970, 1975    during pregnancy     Myxolipoma     finger, removed 2011     Osteoporosis      Seizures (H)      Past Surgical History:   Procedure Laterality Date     COLONOSCOPY  3/31/2011    Procedure:COLONOSCOPY; Surgeon:PACO DIETRICH; Location:[facility]     COLONOSCOPY  3/31/2011    Procedure:COMBINED COLONOSCOPY, REMOVE TUMOR/POLYP/LESION BY SNARE; Surgeon:PACO DIETRICH; Location: [facility]     ESOPHAGOSCOPY, GASTROSCOPY, DUODENOSCOPY (EGD), COMBINED  3/31/2011    Procedure:COMBINED ESOPHAGOSCOPY, GASTROSCOPY, DUODENOSCOPY (EGD); Surgeon:PACO DIETRICH; Location: GI     HC BREATH HYDROGEN TEST  6/2/2011    Procedure:HYDROGEN BREATH TEST;  "Surgeon:MANDIE BRADY; Location: GI     HYSTERECTOMY   or     Partial hysterectomy , urethral tube \"widened\"      ORTHOPEDIC SURGERY      L wrist       Allergies:  Allergies as of 2018     (No Known Allergies)       Current Medications:  Current Outpatient Prescriptions   Medication Sig Dispense Refill     CALCIUM 500 +D 500-400 MG-UNIT TABS Take 1 tablet by mouth 2 times daily 180 tablet 3     cholecalciferol (VITAMIN D) 1000 UNIT tablet Take 1 tablet (1,000 Units) by mouth daily 90 tablet 3     Cyanocobalamin (VITAMIN B 12 PO) Take 100 mcg by mouth       levETIRAcetam (KEPPRA) 750 MG tablet TAKE 2 TABLETS BY MOUTH TWICE DAILY       Multiple Vitamins-Minerals (CENTRUM SILVER) per tablet Take 1 tablet by mouth daily       raloxifene (EVISTA) 60 MG tablet Take 1 tablet (60 mg) by mouth daily 90 tablet 3        Family History:  Family History   Problem Relation Age of Onset     Breast Cancer Mother 30     lumpectomy and chemo; also \"mass in ovary\"     Osteoperosis Sister      GASTROINTESTINAL DISEASE Son      intestinal transplant     Breast Cancer Sister 55     Leukemia Sister 52     Diabetes Maternal Aunt      multiple mat aunts     Bone Cancer Niece 19     Colon Polyps Father      Colon Polyps Brother      Ovarian Cancer Maternal Aunt 50      of ovarian cancer in 50s or 60s, unknown     Prostate Cancer Cousin 65     Breast Cancer Paternal Aunt 36      of breast cancer recurrence in 60s     Breast Cancer Cousin 30     paternal cousin, BRCA1+ by report     Breast Cancer Cousin 40     paternal cousin, BRCA1+ by report     Breast Cancer Cousin 35     paternal cousin, BRCA1+ by report     Genetic Disorder Cousin      paternal male cousin, BRCA1+ by report     Ovarian Cancer Cousin      paternal cousin, BRCA1+ by report       Social History:  Social History     Social History     Marital status:      Spouse name: Quentin     Number of children: 3     Years of education: N/A "     Occupational History      Retired     Social History Main Topics     Smoking status: Former Smoker     Smokeless tobacco: Never Used     Alcohol use Yes      Comment: Rarely     Drug use: No     Sexual activity: Not on file     Other Topics Concern     Not on file     Social History Narrative       Physical Exam:  There were no vitals taken for this visit.  Physical exam deferred.    Laboratory/Imaging Studies  Results for orders placed or performed in visit on 03/09/18   Mammogram, routine screening    Narrative    SCREENING MAMMOGRAM, BILATERAL, DIGITAL, with CAD    HISTORY/FAMILY HISTORY: Annual screening mammogram. History of benign  breast biopsy on the left. Two first degree relatives with history of  breast cancer, youngest age at diagnosis of 50 years old. First and  second-degree relatives with history of ovarian cancer, both diagnosed  at less than 50 years of age.    COMPARISON: Diagnostic mammogram 11/9/2016. Screening mammogram  7/14/2014.    BREAST DENSITY: Scattered fibroglandular densities.    There has been no significant change. Scattered benign-appearing  calcifications in both breasts. Left breast biopsy marker. Bilateral  axillary lymph nodes with normal morphology.      Impression    IMPRESSION: BI-RADS CATEGORY: 2 - Benign Finding(s).    RECOMMENDED FOLLOW-UP: Annual Mammography    Results to be sent to patient.    Based on the patient history questionnaire completed prior to the  mammogram, the  NCI risk calculator and the NCCN indications for genetic screening,  the patient may be at an increased risk for breast cancer and/or  have an indication for genetic screening.  She has been sent a letter  informing her of this and a phone number to schedule an appointment in  the [facility] if she so desires.    I have personally reviewed the examination and initial interpretation  and I agree with the findings.    LACEY MONROE MD       ASSESSMENT  We discussed her interval  "history; we deferred her physical exam as she had a breast exam earlier this year with Dr. Dewitt. She denies side effects and symptoms from raloxifene.     I have renewed her raloxifene prescription today; this is being prescribed to help reduce her risk for breast cancer, per NCCN risk reduction guidelines. (Meme meets the 1.7% threshold for a 5 year risk of developing breast cancer, by the Melly model.) Raloxifene may also help her history of osteoporosis.      Meme is not planning to have a breast MRI, as she is now on Medicare and it will not cover it; she plans to proceed with an annual mammogram.  As her breast density is \"scattered fibroglandular\" she is not considered to have a high density level.      She plans to follow with Dr. Dewitt for her annual mammograms. I will let Dr. Dewitt know about the raloxifene. It will need to be renewed next August 2019 and should be continued for a total of 5 years, through August 2022.    Meme also reported exercise induced shortness of breath and leg swelling at her visit today; she plans to follow up with Dr. Dewitt for these symptoms.    I spent 20 minutes with the patient with greater that 50% of it in counseling and coordinating care as documented above.    Yelena Ruffin, GWYN-CNS, OCN, AGN-BC  Clinical Nurse Specialist  Cancer Risk Management Program  [address]  [place] [address]    phone:  [phone]  Pager: [phone]  fax: [phone]    Ml: Leatha Dewitt MD                                              "

## 2019-04-03 ENCOUNTER — TELEPHONE (OUTPATIENT)
Dept: INTERNAL MEDICINE | Facility: CLINIC | Age: 69
End: 2019-04-03

## 2019-04-03 DIAGNOSIS — N64.59 ABNORMAL BREAST TISSUE: Primary | ICD-10-CM

## 2019-04-03 NOTE — TELEPHONE ENCOUNTER
[facility] Call Center    Phone Message    May a detailed message be left on voicemail: yes    Reason for Call: Symptoms or Concerns     Current symptom or concern: Meme called into schedule her annual mammogram.  She indicated that there has been on change on her right breast.  There is a spot that she describes as a mole, she is not sure what it is, but indicates that the skin is thicker.  Please call to discuss if she continue with Routine Mammo scheduling or Diagnositic.    Symptoms have been present for: unknown    Has patient previously been seen for this? No    By : na    Date: na    Are there any new or worsening symptoms? No      Action Taken: Message routed to:  [facility] ([facility] marry primary

## 2019-04-04 DIAGNOSIS — Z12.31 ENCOUNTER FOR SCREENING MAMMOGRAM FOR BREAST CANCER: ICD-10-CM

## 2019-04-04 DIAGNOSIS — N64.59 ABNORMAL BREAST TISSUE: ICD-10-CM

## 2019-04-04 DIAGNOSIS — Z91.89 AT HIGH RISK FOR BREAST CANCER: Primary | ICD-10-CM

## 2019-04-04 NOTE — TELEPHONE ENCOUNTER
Spoke to patient who states that it is more of a mole that is appearing, no pain, discharge or tenderness. Patient states that she has an appointment with PCP on 4/15/19 and will discuss it at that appointment. Leelee Velazquez LPN 4/4/2019 11:32 AM

## 2019-04-12 NOTE — PROGRESS NOTES
"[facility]  Primary Care Center   Leatha Dewitt MD  04/15/2019      Chief Complaint:   Physical; Pain; Breast Problem; and Referral       History of Present Illness:   Meme Butts is a 69 year old female with a history of epilepsy, seizures and osteoporosis who presents for physical and breast pain evaluation.     Breast spot: On 04/03, she called to schedule her annual mammogram as she was having changes to her right breast. She described a thicker spot of skin on her right breast that was possibly a mole, but she was unsure.     Back seborrheic ketatosis: She reports that she has a couple moles on her back.      Right ankle pain: For years, she has experienced right ankle pain across her tendon when she steps down. She tried to elevate her ankle, however, the pain increased.     Upper left abdomen pain: She had a history of chronic constipation that would cause her soreness and to have hard stool every 3-4 days. However, In March, her soreness went away and it felt like \"something moved through her digestive tract\". Then she began to experience intermittent, urgent, frequent stool about every other day. Someday's, this will last all day and on others it will be less frequent. However, she believes that her frequency increases after consuming more food or liquid. She describes her stool as beginning formed/soft and transitioning into mushy and watery. She reports that her diarrhea is associated with upper left abdominal pain and lower abdominal cramps. Her last bowel movement was this morning around 3 a.m. Notably, she reports that she had a colonoscopy completed in 2017 which was normal.   In 2011, she was treated for Ischemia colitis. She denies that she has noticed any blood in the stool.     Shortness of breath: 10-15 years ago she had a stress test performed at [facility] because she thought she was having a heart attack. She was told that everything was normal and it was most likely due to stress. " Lately, she has noticed a pain in the left upper abdomen. In association with her upper abdominal pain, she has noticed shortness of breath when she climbs stairs. However, she does not have shortness of breath when she is walking normally and reports that she can walk 5 miles without issues. She reports that she has not experienced chest pain. Notably, her brother had a myocardial infarction and was diagnosed with early stage CAD. She would like to have another stress test performed.     Low bone density: She had a Dexa scan performed on 11/08/2016 which showed that she had developed osteoporosis. She was prescribed Raloxifene 60 mg and has been taking it for 2 years.     Mammogram (females age 40 - 75): yearly or every 2 years? - Recommended  Colon Cancer screening (age 50 - 75): yearly FIT or colonoscopy every 5 - 10 years - Up to Date 08/21/2017  Dexa (females age ? 65, males age ? 70): every 2 years - Recommended  Hepatitis C (adults born 1945 - 1965): once per lifetime - Up to Date 10/25/2016  Immunizations (Tetanus every 10 years, Influenza yearly, Pneumonia PPV-23 and PCV-13 age ? 65 or sooner if risk factors) - Up to Date   She had the flu vaccination this year.   Immunization History   Administered Date(s) Administered     Influenza (High Dose) 3 valent vaccine 10/13/2016     Pneumo Conj 13-V (2010&after) 10/13/2016     Pneumococcal 23 valent 02/12/2018     TDAP Vaccine (Boostrix) 02/12/2018      The following health maintenance issues were also reviewed and addressed as needed:  Blood pressure (>18 years annually)  Depression - PHQ-2 Score:   PHQ-2 ( 1999 Pfizer) 4/15/2019 2/17/2017   Q1: Little interest or pleasure in doing things 0 0   Q2: Feeling down, depressed or hopeless 0 0   PHQ-2 Score 0 0    0/6  Lifestyle habits (including exercise, diet, weight)  Health risk behaviors (sexual history, alcohol, tobacco, drug use, partner violence)  Routine eye, dental, hearing, and skin exams  Advanced  directives     Review of Systems:   Pertinent items are noted in HPI, remainder of complete ROS is negative.      Active Medications:      CALCIUM 500 +D 500-400 MG-UNIT TABS, Take 1 tablet by mouth 2 times daily, Disp: 180 tablet, Rfl: 3     cholecalciferol (VITAMIN D) 1000 UNIT tablet, Take 1 tablet (1,000 Units) by mouth daily, Disp: 90 tablet, Rfl: 3     Cyanocobalamin (VITAMIN B 12 PO), Take 100 mcg by mouth, Disp: , Rfl:      levETIRAcetam (KEPPRA) 750 MG tablet, TAKE 2 TABLETS BY MOUTH TWICE DAILY, Disp: , Rfl:      Multiple Vitamins-Minerals (CENTRUM SILVER) per tablet, Take 1 tablet by mouth daily, Disp: , Rfl:      raloxifene (EVISTA) 60 MG tablet, Take 1 tablet (60 mg) by mouth daily, Disp: 90 tablet, Rfl: 3      Allergies:   Patient has no known allergies.      Past Medical History:  Epilepsy   Seizures   Osteoporosis   Myxolipoma   Ischemic colitis   GI bleed   Kidney stones   Achilles bursitis   Plantar fascial fibromatosis   Left metatarsal fracture   Ulnar styloid fracture   Colon polyp      Past Surgical History:  Hydrogen breath test   Partial hysterectomy   Left wrist surgery     Family History:   Mother: breast cancer   Father: colon polyps  Brother: colon polyps, myocardial infarction, early CAD  Sister: osteoporosis, breast cancer, leukemia   Son: intestinal transplant   Maternal aunt: diabetes, ovarian cancer   Paternal aunt: breast cancer   Niece: bone cancer   Cousin: breast cancer, prostate cancer, ovarian cancer        Social History:   The patient was alone  Smoking Status: former; 1 pack per day for 15 years   Smokeless Tobacco: never    Alcohol Use: yes       Physical Exam:   /71 (BP Location: Right arm, Patient Position: Sitting, Cuff Size: Adult Regular)   Pulse 110   Resp 20   Wt 77.7 kg (171 lb 3.2 oz)   SpO2 98%   BMI 31.31 kg/m     Constitutional: Healthy, alert, no distress and cooperative  Head: Normocephalic. No masses, lesions, tenderness or abnormalities  Eyes:  PERRL, no conjunctival injection  ENT: Bilateral TM normal without fluid or infection, throat normal without erythema or exudate, no cervical lymphadenopathy  Cardiovascular: JVP 8 cm. RRR, S1,S2 no murmurs, clicks, rubs, or gallops. No pretibial edema.  No carotid bruits.  Respiratory: Clear to auscultation and percussion bilaterally.   Gastrointestinal: BS NA. Soft, nontender, no hepatosplenomegaly or mass. Liver is normal size at the midclavicular line. There is no CVA or flank tenderness bilaterally.  Foot: tender when moving side to side. No presence of strain or sprain. No edema, erythema, or joint line fullness.   Skin: Left upper back there is a 3 by 4 mm seborrheic keratosis with 2 additional SKs located directly below. Medial right breast there is a 2 by 4 mm oval tan maculopapular lesion with regular borders and coloration.  The left breast there is hyperpigmentation but no erythema, rash or warmth.  Psychiatric: Mentation appears normal and affect normal  Hematologic/Lymphatic/Immunologic: Normal cervical, anterior, posterior, submandibular lymph nodes    Assessment and Plan:  Health care maintenance  She is due for a mammogram. A referral was written last appointment. She was unsure the reason her mammogram was not able to be completed. We discussed that I would look into her mammogram further to make sure that she has it completed soon.   - CBC with platelets differential  - Lipid panel reflex to direct LDL Fasting    Prediabetes  Her last hemoglobin A1c on 02/12/2018 was 6%.   - Hemoglobin A1c    Abdominal pain, left upper quadrant  In March, her bowel movements changed from constipation every 3-4 days to intermittent formed stool followed by liquid every other day without any bleeding.  Initially stool change was associated with upper left quadrant abdominal pain and lower abdominal cramps. In 2011, she was treated for ischemia colitis.  Exam today is unremarkable.  Check that test and labs  -  Hepatic panel  - Lipase  - UA with Micro reflex to Culture    Screening for colon cancer  Her last colonoscopy in 2017 was normal. However, as she is beginning to have changes in bowel movement, we discussed that she should have another one completed.   - Fecal cancer screen FIT    Age-related osteoporosis   Her last Dexa scan was performed in 11/2018 which showed osteoporosis. She was prescribed 60 mg Raloxifene and takes Calcium daily. She is due for a repeat Dexa scan. Additionally, she will have labs today to check her calcium and vitamin D levels.   - Dexa hip/pelvis/spine*  - Vitamin D Deficiency    Pain in joint, ankle and foot, right  For years, she has experienced right ankle pain across her tendon upon pressure. No strain or sprain were seen with exam. We discussed that it was most likely tendonitis. She will visit podiatry for further evaluation.    - PODIATRY/FOOT & ANKLE SURGERY REFERRAL    Multiple seborrheic keratosis  She had some concerns of some raised bumps on her back. Upon examination, they were determined to be benign. We treated with liquid Nitrogen X 3 freeze thaw cycles today.     Breast nevus  Today, she was concerned about an oval tan spot that was located on her breast. Upon examination, the spot was determined to be benign.    At high risk for breast cancer  Will go ahead with mammogram and US    Follow-up: No follow-ups on file.         Scribe Disclosure:  IAixa, am serving as a scribe to document services personally performed by Leatha Dewitt MD at this visit, based upon the provider's statements to me. All documentation has been reviewed by the aforementioned provider prior to being entered into the official medical record.    Scribe Preparation Attestation:  Aixa MCCARTHY, served as a scribe preparing the chart for the clinic encounter through chart review for the provider team.      Portions of this medical record were completed by a scribe. UPON MY  REVIEW AND AUTHENTICATION BY ELECTRONIC SIGNATURE, this confirms (a) I performed the applicable clinical services, and (b) the record is accurate.     Leatah Dewitt

## 2019-04-15 ENCOUNTER — OFFICE VISIT (OUTPATIENT)
Dept: INTERNAL MEDICINE | Facility: CLINIC | Age: 69
End: 2019-04-15
Payer: COMMERCIAL

## 2019-04-15 VITALS
HEART RATE: 110 BPM | RESPIRATION RATE: 20 BRPM | WEIGHT: 171.2 LBS | DIASTOLIC BLOOD PRESSURE: 71 MMHG | OXYGEN SATURATION: 98 % | BODY MASS INDEX: 31.31 KG/M2 | SYSTOLIC BLOOD PRESSURE: 108 MMHG

## 2019-04-15 DIAGNOSIS — Z00.00 HEALTH CARE MAINTENANCE: ICD-10-CM

## 2019-04-15 DIAGNOSIS — E55.9 VITAMIN D DEFICIENCY: ICD-10-CM

## 2019-04-15 DIAGNOSIS — Z00.00 HEALTH CARE MAINTENANCE: Primary | ICD-10-CM

## 2019-04-15 DIAGNOSIS — M81.0 AGE-RELATED OSTEOPOROSIS WITHOUT CURRENT PATHOLOGICAL FRACTURE: ICD-10-CM

## 2019-04-15 DIAGNOSIS — R10.12 ABDOMINAL PAIN, LEFT UPPER QUADRANT: ICD-10-CM

## 2019-04-15 DIAGNOSIS — Z12.11 SCREENING FOR COLON CANCER: ICD-10-CM

## 2019-04-15 DIAGNOSIS — M25.571 PAIN IN JOINT, ANKLE AND FOOT, RIGHT: ICD-10-CM

## 2019-04-15 DIAGNOSIS — R73.03 PREDIABETES: ICD-10-CM

## 2019-04-15 LAB
ALBUMIN SERPL-MCNC: 3.4 G/DL (ref 3.4–5)
ALBUMIN UR-MCNC: NEGATIVE MG/DL
ALP SERPL-CCNC: 92 U/L (ref 40–150)
ALT SERPL W P-5'-P-CCNC: 22 U/L (ref 0–50)
ANION GAP SERPL CALCULATED.3IONS-SCNC: 7 MMOL/L (ref 3–14)
APPEARANCE UR: NORMAL
AST SERPL W P-5'-P-CCNC: 20 U/L (ref 0–45)
BASOPHILS # BLD AUTO: 0 10E9/L (ref 0–0.2)
BASOPHILS NFR BLD AUTO: 0.6 %
BILIRUB DIRECT SERPL-MCNC: <0.1 MG/DL (ref 0–0.2)
BILIRUB SERPL-MCNC: 0.3 MG/DL (ref 0.2–1.3)
BILIRUB UR QL STRIP: NEGATIVE
BUN SERPL-MCNC: 14 MG/DL (ref 7–30)
CALCIUM SERPL-MCNC: 9.5 MG/DL (ref 8.5–10.1)
CHLORIDE SERPL-SCNC: 106 MMOL/L (ref 94–109)
CHOLEST SERPL-MCNC: 219 MG/DL
CO2 SERPL-SCNC: 24 MMOL/L (ref 20–32)
COLOR UR AUTO: YELLOW
CREAT SERPL-MCNC: 0.68 MG/DL (ref 0.52–1.04)
DEPRECATED CALCIDIOL+CALCIFEROL SERPL-MC: 34 UG/L (ref 20–75)
DIFFERENTIAL METHOD BLD: NORMAL
EOSINOPHIL # BLD AUTO: 0.1 10E9/L (ref 0–0.7)
EOSINOPHIL NFR BLD AUTO: 1.3 %
ERYTHROCYTE [DISTWIDTH] IN BLOOD BY AUTOMATED COUNT: 12.1 % (ref 10–15)
GFR SERPL CREATININE-BSD FRML MDRD: 90 ML/MIN/{1.73_M2}
GLUCOSE SERPL-MCNC: 118 MG/DL (ref 70–99)
GLUCOSE UR STRIP-MCNC: NEGATIVE MG/DL
HBA1C MFR BLD: 6.6 % (ref 0–5.6)
HCT VFR BLD AUTO: 39.9 % (ref 35–47)
HDLC SERPL-MCNC: 55 MG/DL
HGB BLD-MCNC: 13.1 G/DL (ref 11.7–15.7)
HGB UR QL STRIP: NEGATIVE
IMM GRANULOCYTES # BLD: 0 10E9/L (ref 0–0.4)
IMM GRANULOCYTES NFR BLD: 0.4 %
KETONES UR STRIP-MCNC: NEGATIVE MG/DL
LDLC SERPL CALC-MCNC: 104 MG/DL
LEUKOCYTE ESTERASE UR QL STRIP: NEGATIVE
LIPASE SERPL-CCNC: 127 U/L (ref 73–393)
LYMPHOCYTES # BLD AUTO: 2.2 10E9/L (ref 0.8–5.3)
LYMPHOCYTES NFR BLD AUTO: 30.9 %
MCH RBC QN AUTO: 30.4 PG (ref 26.5–33)
MCHC RBC AUTO-ENTMCNC: 32.8 G/DL (ref 31.5–36.5)
MCV RBC AUTO: 93 FL (ref 78–100)
MONOCYTES # BLD AUTO: 0.5 10E9/L (ref 0–1.3)
MONOCYTES NFR BLD AUTO: 7 %
NEUTROPHILS # BLD AUTO: 4.2 10E9/L (ref 1.6–8.3)
NEUTROPHILS NFR BLD AUTO: 59.8 %
NITRATE UR QL: NEGATIVE
NONHDLC SERPL-MCNC: 164 MG/DL
NRBC # BLD AUTO: 0 10*3/UL
NRBC BLD AUTO-RTO: 0 /100
PH UR STRIP: 5 PH (ref 5–7)
PLATELET # BLD AUTO: 257 10E9/L (ref 150–450)
POTASSIUM SERPL-SCNC: 3.6 MMOL/L (ref 3.4–5.3)
PROT SERPL-MCNC: 7.8 G/DL (ref 6.8–8.8)
RBC # BLD AUTO: 4.31 10E12/L (ref 3.8–5.2)
RBC #/AREA URNS AUTO: 2 /HPF (ref 0–2)
SODIUM SERPL-SCNC: 137 MMOL/L (ref 133–144)
SOURCE: NORMAL
SP GR UR STRIP: 1.02 (ref 1–1.03)
SQUAMOUS #/AREA URNS AUTO: 1 /HPF (ref 0–1)
TRIGL SERPL-MCNC: 300 MG/DL
UROBILINOGEN UR STRIP-MCNC: 0 MG/DL (ref 0–2)
WBC # BLD AUTO: 7 10E9/L (ref 4–11)
WBC #/AREA URNS AUTO: 1 /HPF (ref 0–5)

## 2019-04-15 ASSESSMENT — PAIN SCALES - GENERAL: PAINLEVEL: NO PAIN (0)

## 2019-04-15 NOTE — NURSING NOTE
Chief Complaint   Patient presents with     Physical     annual physical     Pain     pain in area of right ankle     Breast Problem     has area of  outer aspect of right breast     Referral     would like a referral for stress test, bone density test   Jessica Peterson LPN 11:40 AM on 4/15/2019

## 2019-04-15 NOTE — TELEPHONE ENCOUNTER
RECORDS RECEIVED FROM: Pain in joint, ankle and foot, right, was previously seen @ [facility]/ no surgery or imaging to right ankle - scheduled per pt   DATE RECEIVED: 4/18/19   NOTES STATUS DETAILS   OFFICE NOTE from referring provider Internal 4/15/19 Dr. Dewitt   OFFICE NOTE from other specialist In process [facility]   DISCHARGE SUMMARY from hospital N/A    DISCHARGE REPORT from the ER N/A    OPERATIVE REPORT Received    MEDICATION LIST Internal    IMPLANT RECORD/STICKER N/A    LABS     CBC/DIFF N/A    CULTURES Received    INJECTIONS DONE IN RADIOLOGY N/A    MRI N/A    CT SCAN N/A    XRAYS (IMAGES & REPORTS) N/A    TUMOR     PATHOLOGY  Slides & report N/A      Records requested from [facility] 4/15/19 @230p

## 2019-04-15 NOTE — PATIENT INSTRUCTIONS
Primary Care Center Phone Number: [phone]   Primary Care Center Medication Refill Request Information:  * Please contact your pharmacy regarding ANY request for medication refills.  ** [facility] Prescription Fax = [phone]  * Please allow 3 business days for routine medication refills.  * Please allow 5 business days for controlled substance medication refills.     Primary [facility] Test Result notification information:  *You will be notified with in 7-10 days of your appointment day regarding the results of your test.  If you are on MyChart you will be notified as soon as the provider has reviewed the results and signed off on them.

## 2019-04-17 ENCOUNTER — ANCILLARY PROCEDURE (OUTPATIENT)
Dept: MAMMOGRAPHY | Facility: CLINIC | Age: 69
End: 2019-04-17
Payer: COMMERCIAL

## 2019-04-17 ENCOUNTER — ANCILLARY PROCEDURE (OUTPATIENT)
Dept: BONE DENSITY | Facility: CLINIC | Age: 69
End: 2019-04-17
Attending: INTERNAL MEDICINE
Payer: COMMERCIAL

## 2019-04-17 DIAGNOSIS — N64.59 ABNORMAL BREAST TISSUE: ICD-10-CM

## 2019-04-17 DIAGNOSIS — M81.0 AGE-RELATED OSTEOPOROSIS WITHOUT CURRENT PATHOLOGICAL FRACTURE: ICD-10-CM

## 2019-04-18 ENCOUNTER — OFFICE VISIT (OUTPATIENT)
Dept: ORTHOPEDICS | Facility: CLINIC | Age: 69
End: 2019-04-18
Payer: COMMERCIAL

## 2019-04-18 ENCOUNTER — ANCILLARY PROCEDURE (OUTPATIENT)
Dept: GENERAL RADIOLOGY | Facility: CLINIC | Age: 69
End: 2019-04-18
Attending: NURSE PRACTITIONER
Payer: COMMERCIAL

## 2019-04-18 ENCOUNTER — PRE VISIT (OUTPATIENT)
Dept: ORTHOPEDICS | Facility: CLINIC | Age: 69
End: 2019-04-18

## 2019-04-18 ENCOUNTER — ANCILLARY PROCEDURE (OUTPATIENT)
Dept: MRI IMAGING | Facility: CLINIC | Age: 69
End: 2019-04-18
Attending: NURSE PRACTITIONER
Payer: COMMERCIAL

## 2019-04-18 VITALS — HEIGHT: 62 IN | BODY MASS INDEX: 31.52 KG/M2 | WEIGHT: 171.3 LBS

## 2019-04-18 DIAGNOSIS — M25.571 CHRONIC PAIN OF RIGHT ANKLE: Primary | ICD-10-CM

## 2019-04-18 DIAGNOSIS — G89.29 CHRONIC PAIN OF RIGHT ANKLE: Primary | ICD-10-CM

## 2019-04-18 DIAGNOSIS — M25.571 CHRONIC PAIN OF RIGHT ANKLE: ICD-10-CM

## 2019-04-18 DIAGNOSIS — M25.571 ACUTE RIGHT ANKLE PAIN: Primary | ICD-10-CM

## 2019-04-18 DIAGNOSIS — M25.571 ACUTE RIGHT ANKLE PAIN: ICD-10-CM

## 2019-04-18 DIAGNOSIS — G89.29 CHRONIC PAIN OF RIGHT ANKLE: ICD-10-CM

## 2019-04-18 ASSESSMENT — MIFFLIN-ST. JEOR: SCORE: 1260.25

## 2019-04-18 NOTE — LETTER
"4/18/2019       RE: Meme Butts  [address]  [facility] [address]     Dear Colleague,    Thank you for referring your patient, Meme Butts, to the [facility]. Please see a copy of my visit note below.    CC : B/L ankle pain R>L    HPI:  Meme is seen today as a new patient with bilateral ankle pain right greater than left. She states she she had a \"twisting episode\" of her right ankle in 2015 and was treated at [facility] with \"microfracture in her ankle\". She was treated in a CAM boot for 6-8 weeks then did formal physical therapy. She states she had \"complete relief of her ankle\" for years after. Unfortunately, she has noticed over the last few months \"worsening stiff and painful ankles\". She localizes her ankle pain as \"deep and radiates on the inside\". She states her first few steps are \"always the worst\" and she feels like throughout the day it \"loosens up\" but never goes away. She states \"intense achiness\" at night to the point that she has to put \"her feet up\". She takes ibuprophen \"that really helps take the edge off\".  Her left ankle has similar symptoms just \"not as intense\". Rates her pain on her right ankle \"6 or 7\". She does state that at times \"it feels like her right ankle is going to give out\".     PMH: Reviewed in patient chart today 4/19/2019    ROS: Reviewed from patient tablet today 4/19/2019 with all systems negative except for those listed below.    PE:  Pleasant and cooperative female alert and oriented x3. Arises from chair unguarded and walks with a near normal gait pattern with minimal valgus malalignment from posterior exam.     Her right ankle has normal range of motion in dorsiflexion and plantarflexion. She has tenderness to palpate the medial/anterior gutter with tenderness to medial deltoid ligament. Negative anterior drawer with strength 5/5 to DF/PF. Inversion/eversion is 4-/5. Nontender to palpate " metatarsals and no midfoot pain with palpation. Pain is elicited with movement of hindfoot. Mild joint effusion today. Skin intact. + CMS. She is nontender to palpate plantar fascia. Nontender to insertion site of Achilles. No pain with 1st MTP motion and nontender to metatarsals.     Xrays taken show what appears to be good preservation of the tibiotalar joint space to both ankles without malalignment or acute fractures. Slight irregularity noted in the subtalar joint right ankle but no obvious OCD or lesions noted.    Dx:  1. Bilateral ankle pain/instability right worse than left    Plan:  1. We will obtain an MRI to evaluate and rule out an osteochondral defect to talus of her right ankle. She will do a follow up visit wehre we can discuss the MRI together to determine next treatment plan.      Total time spent was 30 minutes with greater than 50% spent in face to face consultation and collaboration of care.    Again, thank you for allowing me to participate in the care of your patient.      Sincerely,    GWYN Taveras CNP

## 2019-04-18 NOTE — PROGRESS NOTES
"CC : B/L ankle pain R>L    HPI:  Meme is seen today as a new patient with bilateral ankle pain right greater than left. She states she she had a \"twisting episode\" of her right ankle in 2015 and was treated at [facility] with \"microfracture in her ankle\". She was treated in a CAM boot for 6-8 weeks then did formal physical therapy. She states she had \"complete relief of her ankle\" for years after. Unfortunately, she has noticed over the last few months \"worsening stiff and painful ankles\". She localizes her ankle pain as \"deep and radiates on the inside\". She states her first few steps are \"always the worst\" and she feels like throughout the day it \"loosens up\" but never goes away. She states \"intense achiness\" at night to the point that she has to put \"her feet up\". She takes ibuprophen \"that really helps take the edge off\".  Her left ankle has similar symptoms just \"not as intense\". Rates her pain on her right ankle \"6 or 7\". She does state that at times \"it feels like her right ankle is going to give out\".     PMH: Reviewed in patient chart today 4/19/2019    ROS: Reviewed from patient tablet today 4/19/2019 with all systems negative except for those listed below.    PE:  Pleasant and cooperative female alert and oriented x3. Arises from chair unguarded and walks with a near normal gait pattern with minimal valgus malalignment from posterior exam.     Her right ankle has normal range of motion in dorsiflexion and plantarflexion. She has tenderness to palpate the medial/anterior gutter with tenderness to medial deltoid ligament. Negative anterior drawer with strength 5/5 to DF/PF. Inversion/eversion is 4-/5. Nontender to palpate metatarsals and no midfoot pain with palpation. Pain is elicited with movement of hindfoot. Mild joint effusion today. Skin intact. + CMS. She is nontender to palpate plantar fascia. Nontender to insertion site of Achilles. No pain with 1st MTP motion and nontender to metatarsals.     Xrays " taken show what appears to be good preservation of the tibiotalar joint space to both ankles without malalignment or acute fractures. Slight irregularity noted in the subtalar joint right ankle but no obvious OCD or lesions noted.    Dx:  1. Bilateral ankle pain/instability right worse than left    Plan:  1. We will obtain an MRI to evaluate and rule out an osteochondral defect to talus of her right ankle. She will do a follow up visit wehre we can discuss the MRI together to determine next treatment plan.      Total time spent was 30 minutes with greater than 50% spent in face to face consultation and collaboration of care.

## 2019-04-18 NOTE — NURSING NOTE
"Reason For Visit:   Chief Complaint   Patient presents with     Consult     Bilateral ankle pain.  Right is worse.  Broke Left in 2014 and Right in 2015       Ht 1.575 m (5' 2\")   Wt 77.7 kg (171 lb 4.8 oz)   BMI 31.33 kg/m      Pain Assessment  Patient Currently in Pain: Denies  Pain with putting on shoes and walking    Broke both ankles in the past.  Left 2014 and Right 2015.    Warehouse  worker.     Adriana Ivy, ATC    "

## 2019-04-23 ENCOUNTER — ALLIED HEALTH/NURSE VISIT (OUTPATIENT)
Dept: EDUCATION SERVICES | Facility: CLINIC | Age: 69
End: 2019-04-23
Payer: COMMERCIAL

## 2019-04-23 DIAGNOSIS — E11.9 TYPE 2 DIABETES MELLITUS (H): Primary | ICD-10-CM

## 2019-04-23 NOTE — PATIENT INSTRUCTIONS
1. Return on 5/3 at 8:30am to meet with Brooke Florian RN CDE. She will get you set up with a glucose meter.    2. Aim for activity (walking or the gym) five days per week for thirty minutes. This will help you to burn more calories during the day and can help with glucose management.     3. Restart tracking your diet and activity on My Fitness Pal (other options - Lose It or Noom). We'll look over that next time.     Estimated nutritional needs:  Calories for weight loss of 1-2 lbs biweekly - 1500kcal  Carbohydrate - 45 grams per meal and 15-30 grams at snacks (this is flexible so we can discuss again at follow up)  Use the plate method to plan meals    Calorie Zac (free jannie for iphone) - you can use that to look up restaurant nutrition facts and foods when you don't have labels. The other apps above would also work for that.

## 2019-04-24 NOTE — PROGRESS NOTES
"Diabetes Self-Management Education & Support    Diabetes Education Self Management & Training    SUBJECTIVE/OBJECTIVE:  Presents for: Initial Assessment for new diagnosis  Accompanied by: Self  Diabetes education in the past 24mo: No  Focus of Visit: Healthy Eating, Diabetes Pathophysiology, Being Active  Diabetes type: Type 2  Other concerns:: None  Cultural Influences/Ethnic Background:  American    Diabetes Symptoms & Complications  Blurred vision: No  Fatigue: No  Neuropathy: No  Foot ulcerations: No  Polydipsia: Yes  Polyphagia: No  Polyuria: No  Visual change: No  Weakness: No  Weight loss: No  Slow healing wounds: No  Recent Infection(s): No  Autonomic neuropathy: No  CVA: No  Heart disease: No  Nephropathy: No  Peripheral neuropathy: No  Peripheral Vascular Disease: No  Retinopathy: No  Sexual dysfunction: No    Patient Problem List and Family Medical History reviewed for relevant medical history, current medical status, and diabetes risk factors.    Vitals:  There were no vitals taken for this visit.  Estimated body mass index is 31.33 kg/m  as calculated from the following:    Height as of 4/18/19: 1.575 m (5' 2\").    Weight as of 4/18/19: 77.7 kg (171 lb 4.8 oz).   Last 3 BP:   BP Readings from Last 3 Encounters:   04/15/19 108/71   08/17/18 112/71   02/12/18 101/70       History   Smoking Status     Former Smoker     Packs/day: 1.00     Years: 15.00     Types: Cigarettes     Start date: 8/17/1994     Quit date: 8/17/2009   Smokeless Tobacco     Never Used       Labs:  Lab Results   Component Value Date    A1C 6.6 04/15/2019     Lab Results   Component Value Date     04/15/2019     Lab Results   Component Value Date     04/15/2019     HDL Cholesterol   Date Value Ref Range Status   04/15/2019 55 >49 mg/dL Final   ]  GFR Estimate   Date Value Ref Range Status   04/15/2019 90 >60 mL/min/[1.73_m2] Final     Comment:     Non  GFR Calc  Starting 12/18/2018, serum creatinine based " estimated GFR (eGFR) will be   calculated using the Chronic Kidney Disease Epidemiology Collaboration   (CKD-EPI) equation.       GFR Estimate If Black   Date Value Ref Range Status   04/15/2019 >90 >60 mL/min/[1.73_m2] Final     Comment:      GFR Calc  Starting 12/18/2018, serum creatinine based estimated GFR (eGFR) will be   calculated using the Chronic Kidney Disease Epidemiology Collaboration   (CKD-EPI) equation.       Lab Results   Component Value Date    CR 0.68 04/15/2019     No results found for: MICROALBUMIN    Healthy Eating  Healthy Eating Assessed Today: Yes  Cultural/Buddhist diet restrictions?: No  Patient on a regular basis: Eats 3 meals a day, Snacks frequently throughout the day (grazing)   Meal planning: Avoiding sweets, Heart healthy, Low salt, Smaller portions, Plate planning method  Meals include: Breakfast, Lunch, Dinner, Snacks  Breakfast: Coffee with 1 slice toast with little butter or 1/2 croisant and fruit (1/2 cup mixed berries)  Lunch: home-made stuffed mushrooms and cucumber, tomato, onion, vinegar, salt/pepper salad  Dinner: home-made parmesan chicken with salad and 1/2 sweet potato or roasted chicken with brocolli and a potato  Snacks: Latte, pastry, one apple per day, ounce of chocolate  Other: eats out once per month, 2 glasses of wine per month  Beverages: Water, Coffee  Enjoys cooking  Lives with daughter and 26 year old granddaughter     Being Active  Being Active Assessed Today: Yes  Exercise:: Currently not exercising  Had previously been exercising more from sept-nov and jan-march but not recently  Was walking on treadmill or outside near [facility]  Considering going to the gym and using bike, pool    Monitoring  Monitoring Assessed Today: No  Did patient bring glucose meter to appointment? : No  Not yet testing. Will meet with RN RONDAE on 5/3 to learn how to test. We did not have time to do that today.     Taking Medications    Current  Treatments: Diet    Problem Solving  Problem Solving Assessed Today: No    Reducing Risks  Reducing Risks Assessed Today: No    Healthy Coping  Healthy Coping Assessed Today: Yes  Emotional response to diabetes: Ready to learn, Concern for health and well-being, Acceptance  Informal Support system:: Children, Family, Friends  Stage of change: ACTION (Actively working towards change)  Patient Activation Measure Survey Score:  No flowsheet data found.    ASSESSMENT:  Patient with newly diagnosed type 2 diabetes, needs full education. Patient has started to make lifestyle changes. She wants to lose weight and manage glucose. She wants to know how many calories and carbs she should be eating.     Patient's most recent   Lab Results   Component Value Date    A1C 6.6 04/15/2019    is meeting goal of <7.0    INTERVENTION:   Diabetes knowledge and skills assessment:     Patient needs further education on the following diabetes management concepts: Healthy Eating, Being Active, Monitoring, Problem Solving, Reducing Risks and Healthy Coping    Based on learning assessment above, most appropriate setting for further diabetes education would be: Individual setting.    Education provided today on:  AADE Self-Care Behaviors:  Diabetes Pathophysiology  Healthy Eating: consistency in amount, composition, and timing of food intake, weight reduction, heart healthy diet, portion control, plate planning method and label reading  Being Active: relationship to blood glucose and describe appropriate activity program  Monitoring: purpose and individual blood glucose targets  Reducing Risks: A1C - goals, relating to blood glucose levels, how often to check  Healthy Coping: benefits of making appropriate lifestyle changes    Opportunities for ongoing education and support in diabetes-self management were discussed.    Pt verbalized understanding of concepts discussed and recommendations provided today.       Education Materials  Provided:  Obdulia Taking Charge of Your Diabetes Book and Carbohydrate Counting    PLAN:  See Patient Instructions for co-developed, patient-stated behavior change goals.  AVS printed and provided to patient today. See Follow-Up section for recommended follow-up.    Time Spent: 60 minutes  Encounter Type: Individual    Any diabetes medication dose changes were made via the CDE Protocol and Collaborative Practice Agreement with the patient's referring provider. A copy of this encounter was shared with the provider.

## 2019-04-25 ENCOUNTER — OFFICE VISIT (OUTPATIENT)
Dept: ORTHOPEDICS | Facility: CLINIC | Age: 69
End: 2019-04-25
Payer: COMMERCIAL

## 2019-04-25 DIAGNOSIS — M19.071 ARTHRITIS OF ANKLE OR FOOT, DEGENERATIVE, RIGHT: Primary | ICD-10-CM

## 2019-04-25 NOTE — PROGRESS NOTES
"CC : right ankle    HPI:  Meme is seen in follow up from our initial clinic visit on 4/18/2019 to review MRI scan of her right ankle. She states no changes in her history or symptoms of her right ankle. She has been outside walking and \"working in the yard\" with good inserts and appropriate shoes. She states her first weight bearing in the morning is \"usually the worst pain and then \"she seems to walk it off\". She localizes the pain as \"deep in the front\". She denies plantar fascia pain. Denies swelling and instability.    PMH: Reviewed today 4/25/2019    ROS: Reviewed from patient tablet today 4/25/2019 with all systems negative except for those listed below.    PE: Unchanged from 4/18/2019 as below    Pleasant and cooperative female alert and oriented x3. Arises from chair unguarded and walks with a near normal gait pattern with minimal valgus malalignment from posterior exam.      Her right ankle has normal range of motion in dorsiflexion and plantarflexion. She has tenderness to palpate the medial/anterior gutter with tenderness to medial deltoid ligament. Negative anterior drawer with strength 5/5 to DF/PF. Inversion/eversion is 4-/5. Nontender to palpate metatarsals and no midfoot pain with palpation. Pain is elicited with movement of hindfoot. Mild joint effusion today. Skin intact. + CMS. She is nontender to palpate plantar fascia. Nontender to insertion site of Achilles. No pain with 1st MTP motion and nontender to metatarsals.     Xrays reviewed from 4/18/2019 as radiologist read below:    1. No acute osseous abnormality.  2. No radiographic evidence of inflammatory arthritis or substantial  Osteoarthrosis.    MRI: reviewed from 4/18/2019 with radiologist below:    1. Relatively corresponding to marker, degenerative changes at  naviculo-medial cuneiform joint with subchondral cysts.  2. Chronic tear of anterior talofibular and calcaneofibular ligaments.  3. Small medial talar dome osteochondral " "lesion.  4. Subchondral cystic change/edema at the third metatarsal base,  likely reflecting underlying degenerative change.    Dx:  1. 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 Small medial talar dome OCD lesion    Plan:  1. I discussed with her the symptoms and MRI findings of her right ankle today. We will schedule a right ankle cortisone injection under fluoroscopy to the naviculo-medial cuneiform joint and she will keep a pain diary for 6 hours after. If her symptoms are not relieved during the marcaine phase after the injection, she will follow up with Dr. Wright to address the OCD lesion.  2. I prescribed voltaren gel that she can put on her ankle in the morning and up to QID as needed for ankle pain.  3. I offered PT to which she declined stating \"she has done that in the past without improvement\".  4. She will follow up with me prn.      Total time spent was 20 minutes with greater than 50% spent in face to face consultation and collaboration of care.  "

## 2019-04-25 NOTE — LETTER
"4/25/2019       RE: Meme Butts  [address]  [facility] [address]     Dear Colleague,    Thank you for referring your patient, Meem Butts, to the [facility] at [facility]. Please see a copy of my visit note below.    CC : right ankle    HPI:  Meme is seen in follow up from our initial clinic visit on 4/18/2019 to review MRI scan of her right ankle. She states no changes in her history or symptoms of her right ankle. She has been outside walking and \"working in the yard\" with good inserts and appropriate shoes. She states her first weight bearing in the morning is \"usually the worst pain and then \"she seems to walk it off\". She localizes the pain as \"deep in the front\". She denies plantar fascia pain. Denies swelling and instability.    PMH: Reviewed today 4/25/2019    ROS: Reviewed from patient tablet today 4/25/2019 with all systems negative except for those listed below.    PE: Unchanged from 4/18/2019 as below    Pleasant and cooperative female alert and oriented x3. Arises from chair unguarded and walks with a near normal gait pattern with minimal valgus malalignment from posterior exam.      Her right ankle has normal range of motion in dorsiflexion and plantarflexion. She has tenderness to palpate the medial/anterior gutter with tenderness to medial deltoid ligament. Negative anterior drawer with strength 5/5 to DF/PF. Inversion/eversion is 4-/5. Nontender to palpate metatarsals and no midfoot pain with palpation. Pain is elicited with movement of hindfoot. Mild joint effusion today. Skin intact. + CMS. She is nontender to palpate plantar fascia. Nontender to insertion site of Achilles. No pain with 1st MTP motion and nontender to metatarsals.     Xrays reviewed from 4/18/2019 as radiologist read below:    1. No acute osseous abnormality.  2. No radiographic evidence of inflammatory arthritis or substantial  Osteoarthrosis.    MRI: " "reviewed from 4/18/2019 with radiologist below:    1. Relatively corresponding to marker, degenerative changes at  naviculo-medial cuneiform joint with subchondral cysts.  2. Chronic tear of anterior talofibular and calcaneofibular ligaments.  3. Small medial talar dome osteochondral lesion.  4. Subchondral cystic change/edema at the third metatarsal base,  likely reflecting underlying degenerative change.    Dx:  1. 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 Small medial talar dome OCD lesion    Plan:  1. I discussed with her the symptoms and MRI findings of her right ankle today. We will schedule a right ankle cortisone injection under fluoroscopy to the naviculo-medial cuneiform joint and she will keep a pain diary for 6 hours after. If her symptoms are not relieved during the marcaine phase after the injection, she will follow up with Dr. Wright to address the OCD lesion.  2. I prescribed voltaren gel that she can put on her ankle in the morning and up to QID as needed for ankle pain.  3. I offered PT to which she declined stating \"she has done that in the past without improvement\".  4. She will follow up with me prn.    Total time spent was 20 minutes with greater than 50% spent in face to face consultation and collaboration of care.    Again, thank you for allowing me to participate in the care of your patient.      Sincerely,    Syl Inman, APRN CNP      "

## 2019-04-30 ENCOUNTER — TELEPHONE (OUTPATIENT)
Dept: INTERNAL MEDICINE | Facility: CLINIC | Age: 69
End: 2019-04-30

## 2019-04-30 ENCOUNTER — HOSPITAL ENCOUNTER (OUTPATIENT)
Dept: GENERAL RADIOLOGY | Facility: CLINIC | Age: 69
Discharge: HOME | End: 2019-04-30
Attending: NURSE PRACTITIONER | Admitting: NURSE PRACTITIONER
Payer: COMMERCIAL

## 2019-04-30 VITALS — HEART RATE: 93 BPM | SYSTOLIC BLOOD PRESSURE: 119 MMHG | DIASTOLIC BLOOD PRESSURE: 77 MMHG | OXYGEN SATURATION: 97 %

## 2019-04-30 DIAGNOSIS — M81.0 AGE-RELATED OSTEOPOROSIS WITHOUT CURRENT PATHOLOGICAL FRACTURE: Primary | ICD-10-CM

## 2019-04-30 DIAGNOSIS — M19.071 ARTHRITIS OF ANKLE OR FOOT, DEGENERATIVE, RIGHT: ICD-10-CM

## 2019-04-30 PROCEDURE — 25000125 ZZHC RX 250: Performed by: PHYSICIAN ASSISTANT

## 2019-04-30 PROCEDURE — 25500064 ZZH RX 255 OP 636: Performed by: PHYSICIAN ASSISTANT

## 2019-04-30 PROCEDURE — 25000128 H RX IP 250 OP 636: Performed by: PHYSICIAN ASSISTANT

## 2019-04-30 PROCEDURE — 20605 DRAIN/INJ JOINT/BURSA W/O US: CPT | Mod: RT

## 2019-04-30 RX ORDER — ALENDRONATE SODIUM 70 MG/1
70 TABLET ORAL
Qty: 12 TABLET | Refills: 3 | Status: SHIPPED | OUTPATIENT
Start: 2019-04-30 | End: 2020-04-29

## 2019-04-30 RX ORDER — DEXTROSE MONOHYDRATE 25 G/50ML
25-50 INJECTION, SOLUTION INTRAVENOUS
Status: CANCELLED | OUTPATIENT
Start: 2019-04-30

## 2019-04-30 RX ORDER — ETHYL CHLORIDE 100 %
1 AEROSOL, SPRAY (ML) TOPICAL ONCE
Status: COMPLETED | OUTPATIENT
Start: 2019-04-30 | End: 2019-04-30

## 2019-04-30 RX ORDER — NICOTINE POLACRILEX 4 MG
15-30 LOZENGE BUCCAL
Status: CANCELLED | OUTPATIENT
Start: 2019-04-30

## 2019-04-30 RX ORDER — TRIAMCINOLONE ACETONIDE 40 MG/ML
40 INJECTION, SUSPENSION INTRA-ARTICULAR; INTRAMUSCULAR ONCE
Status: COMPLETED | OUTPATIENT
Start: 2019-04-30 | End: 2019-04-30

## 2019-04-30 RX ORDER — BUPIVACAINE HYDROCHLORIDE 5 MG/ML
1 INJECTION, SOLUTION PERINEURAL ONCE
Status: DISCONTINUED | OUTPATIENT
Start: 2019-04-30 | End: 2019-04-30 | Stop reason: CLARIF

## 2019-04-30 RX ORDER — IOPAMIDOL 408 MG/ML
10 INJECTION, SOLUTION INTRATHECAL ONCE
Status: COMPLETED | OUTPATIENT
Start: 2019-04-30 | End: 2019-04-30

## 2019-04-30 RX ADMIN — IOPAMIDOL 0.5 ML: 408 INJECTION, SOLUTION INTRATHECAL at 15:41

## 2019-04-30 RX ADMIN — LIDOCAINE HYDROCHLORIDE 2 ML: 10 INJECTION, SOLUTION EPIDURAL; INFILTRATION; INTRACAUDAL; PERINEURAL at 15:44

## 2019-04-30 RX ADMIN — TRIAMCINOLONE ACETONIDE 40 MG: 40 INJECTION, SUSPENSION INTRA-ARTICULAR; INTRAMUSCULAR at 15:44

## 2019-04-30 RX ADMIN — Medication 1 APPLICATION.: at 15:39

## 2019-04-30 NOTE — IP AVS SNAPSHOT
[facility]  [address] [address]  Phone:  [phone]                                    After Visit Summary   4/30/2019    Meme Butts    MRN: [id number]           After Visit Summary Signature Page    I have received my discharge instructions, and my questions have been answered. I have discussed any challenges I see with this plan with the nurse or doctor.    ..........................................................................................................................................  Patient/Patient Representative Signature      ..........................................................................................................................................  Patient Representative Print Name and Relationship to Patient    ..................................................               ................................................  Date                                   Time    ..........................................................................................................................................  Reviewed by Signature/Title    ...................................................              ..............................................  Date                                               Time          22EPIC Rev 08/18

## 2019-04-30 NOTE — PROGRESS NOTES
RADIOLOGY PROCEDURE NOTE  Patient name: Meme Butts  MRN: [id number]  : 1950    Pre-procedure diagnosis: right foot pain  Post-procedure diagnosis: Same    Procedure Date/Time: 2019  3:48 PM  Procedure: right foot navicular-medial cuneiform steroid injection  Estimated blood loss: None  Specimen(s) collected with description: none  The patient tolerated the procedure well with no immediate complications.  Significant findings:none    See imaging dictation for procedural details.    Provider name: July Jamison  Assistant(s):None

## 2019-04-30 NOTE — TELEPHONE ENCOUNTER
DEXA scan shows definite osteoporosis with a T score of -3.2.  She has been on raloxifene for 3 years.    Recommend DC loxapine and start alendronate 70 mg p.o. weekly.  Recheck DEXA scan in 1 year.  Side effects reviewed.

## 2019-04-30 NOTE — DISCHARGE INSTRUCTIONS
Orthopedic Discharge Instructions:   After Your Injection or Aspiration  ________________________________________    Patient Name:  Meme Butts  Today's Date:  April 30, 2019  The provider who performed your injection was Sebastian Casas PA-C/ July Jamison PA-C in the RADIOLOGY Department  Care of needle site    If you have new numbness down your leg, this may last up to 6 hours, but it should go away. You may need help with walking until your leg feels normal.     Over the next 24 to 48 hours, pain at the needle site may increase before it gets better.     For the next 48 hours, use ice packs for 15 minutes, three to four times a day for pain.    If you have a bandage, you may remove it the next morning.     No tub baths, hot tubs or swimming for 48 hours. You may shower the next day.   Activity    Do not drive until morning.     Limit your activity based on your pain level. Follow your doctor s orders for activity.     You may eat a normal diet.     If you had sedation,   - You may feel sleepy, forgetful or unsteady.   - Do not drink alcohol for 24 hours.  Medicines    If you take aspirin or platelet inhibitors, you can restart them tomorrow.     Restart all other medicines today at your regular dose, including Coumadin (warfarin).    If you are restarting Coumadin, talk to your doctor about having your INR checked.   If you had a steroid shot     The medicine should help reduce swelling and pain. This may take from 7 to 10 days.     Side effects from the shot will be mild and go away in 2 to 3 days. Common side effects may include:  -  Insomnia (trouble sleeping).  -  Heartburn.  -  Flushed face.  -  Water retention (bloating or fluid build-up).  -  Increased appetite (feeling more hungry than usual).  -  Increased blood sugar.  If you have diabetes, watch your blood sugar closely. If needed, call your doctor to help you control your blood sugar.  Some patients will get lasting relief from a single shot.  Others may require up to three shots to get results. If you have more than one steroid shot, they should be given two weeks apart.  Some patients do not have relief of symptoms.    Follow-up:   NO FOLLOW-UP NEEDED     Call your doctor or go to the Emergency Room if you have severe pain, fever or problems with bowel or bladder control.

## 2019-04-30 NOTE — IP AVS SNAPSHOT
MRN:[id number]                      After Visit Summary   4/30/2019    Meme Butts    MRN: [id number]           Visit Information        Provider Department      4/30/2019  3:00 PM [facility] RAD; [facility] Radiology           Review of your medicines      UNREVIEWED medicines. Ask your doctor about these medicines       Dose / Directions   alendronate 70 MG tablet  Commonly known as:  FOSAMAX  Used for:  Age-related osteoporosis without current pathological fracture      Dose:  70 mg  Take 1 tablet (70 mg) by mouth every 7 days  Quantity:  12 tablet  Refills:  3     calcium 500 +D 500-400 MG-UNIT Tabs  Generic drug:  Calcium Carb-Cholecalciferol      Dose:  1 tablet  Take 1 tablet by mouth 2 times daily  Quantity:  180 tablet  Refills:  3     diclofenac 1 % topical gel  Commonly known as:  VOLTAREN  Used for:  Arthritis of ankle or foot, degenerative, right      Dose:  4 g  Place 4 g onto the skin 4 times daily  Quantity:  1 Tube  Refills:  3     levETIRAcetam 750 MG tablet  Commonly known as:  KEPPRA      TAKE 2 TABLETS BY MOUTH TWICE DAILY  Refills:  0     multivitamin tablet  Used for:  increased alkaline phosphatase (bone specific), Osteoporosis, Fatigue, Post-menopausal, Obesity, Family history of diabetes mellitus      Dose:  1 tablet  Take 1 tablet by mouth daily  Refills:  0     VITAMIN B 12 PO      Dose:  100 mcg  Take 100 mcg by mouth  Refills:  0     vitamin D3 1000 units (25 mcg) tablet  Commonly known as:  CHOLECALCIFEROL      Dose:  1000 Units  Take 1 tablet (1,000 Units) by mouth daily  Quantity:  90 tablet  Refills:  3              Protect others around you: Learn how to safely use, store and throw away your medicines at www.disposemymeds.org.       Follow-ups after your visit       Your next 10 appointments already scheduled    May 03, 2019  8:30 AM CDT  (Arrive by 8:15 AM)  Diabetes Education with Brooke Florian RN  [facility]  [place]) [address] [place] [address]  [address] [facility] [id number] [phone]      Jul 15, 2019  8:30 AM CDT  LAB with  LAB  [facility] ([facility]) [address] [place] [id number] [phone]   Please do not eat 10-12 hours before your appointment if you are coming in fasting for labs on lipids, cholesterol, or glucose (sugar). Does not apply to pregnant women. Water, tea and black coffee (with nothing added) is okay. Do not drink other fluids, diet soda or gum. If you have concerns about taking your medications, please send a message by clicking on Secure Messaging, Message Your Care Team.     Jul 15, 2019  9:25 AM CDT  (Arrive by 9:10 AM)  Return Visit with Leatha JENSEN MD  [facility] ([facility]) [address] [place] [address]  [address] [facility] [id number] [phone]   If you are coming in for evaluation of pain: Please note that you may not be prescribed a controlled substance such as pain medication on your first visit.  Your provider will ask for previous medical records, and determine if medications are appropriate.        Care Instructions       Further instructions from your care team         Orthopedic Discharge Instructions:   After Your Injection or Aspiration  ________________________________________    Patient Name:  Meme Butts  Today's Date:  April 30, 2019  The provider who performed your injection was Sebastian Casas PA-C/ July Jamison PA-C in the RADIOLOGY Department  Care of needle site    If you have new numbness down your leg, this may last up to 6 hours, but it should go away. You may need help with walking until your leg feels normal.     Over the next 24 to 48 hours, pain at the needle site may increase before it gets better.     For the next 48 hours, use ice packs for 15 minutes, three to four times a day for pain.    If you have a bandage, you may remove it the next morning.      No tub baths, hot tubs or swimming for 48 hours. You may shower the next day.   Activity    Do not drive until morning.     Limit your activity based on your pain level. Follow your doctor s orders for activity.     You may eat a normal diet.     If you had sedation,   - You may feel sleepy, forgetful or unsteady.   - Do not drink alcohol for 24 hours.  Medicines    If you take aspirin or platelet inhibitors, you can restart them tomorrow.     Restart all other medicines today at your regular dose, including Coumadin (warfarin).    If you are restarting Coumadin, talk to your doctor about having your INR checked.   If you had a steroid shot     The medicine should help reduce swelling and pain. This may take from 7 to 10 days.     Side effects from the shot will be mild and go away in 2 to 3 days. Common side effects may include:  -  Insomnia (trouble sleeping).  -  Heartburn.  -  Flushed face.  -  Water retention (bloating or fluid build-up).  -  Increased appetite (feeling more hungry than usual).  -  Increased blood sugar.  If you have diabetes, watch your blood sugar closely. If needed, call your doctor to help you control your blood sugar.  Some patients will get lasting relief from a single shot. Others may require up to three shots to get results. If you have more than one steroid shot, they should be given two weeks apart.  Some patients do not have relief of symptoms.    Follow-up:   NO FOLLOW-UP NEEDED     Call your doctor or go to the Emergency Room if you have severe pain, fever or problems with bowel or bladder control.     Additional Information About Your Visit       [employer] Information    [employer] gives you secure access to your electronic health record. If you see a primary care provider, you can also send messages to your care team and make appointments. If you have questions, please call your primary care clinic.  If you do not have a primary care provider, please call [phone] and they  will assist you.       Care EveryWhere ID    This is your Care EveryWhere ID. This could be used by other organizations to access your [facility] medical records  [id number]        Primary Care Provider Office Phone # Fax #    Leatha JENSEN [phone]      Equal Access to Services    GLENN RODRIGUEZVANESSA : Hadii aad ku hadasho Soomaali, waaxda luqadaha, qaybta kaalmada adeketurahyada, kamari geller cliffordphoenix covarrubiasmaycollorraine smith. So [facility] [phone].    ATENCIÓN: Si habla español, tiene a perez disposición servicios gratuitos de asistencia lingüística. [facility] [phone].    We comply with applicable federal civil rights laws and [place] laws. We do not discriminate on the basis of race, color, national origin, age, disability, sex, sexual orientation, or gender identity.           Thank you!    Thank you for choosing [facility] for your care. Our goal is always to provide you with excellent care. Hearing back from our patients is one way we can continue to improve our services. Please take a few minutes to complete the written survey that you may receive in the mail after you visit with us. Thank you!            Medication List      ASK your doctor about these medications          Morning Afternoon Evening Bedtime As Needed    alendronate 70 MG tablet  Also known as:  FOSAMAX  INSTRUCTIONS:  Take 1 tablet (70 mg) by mouth every 7 days                     calcium 500 +D 500-400 MG-UNIT Tabs  INSTRUCTIONS:  Take 1 tablet by mouth 2 times daily  Generic drug:  Calcium Carb-Cholecalciferol                     diclofenac 1 % topical gel  Also known as:  VOLTAREN  INSTRUCTIONS:  Place 4 g onto the skin 4 times daily                     levETIRAcetam 750 MG tablet  Also known as:  KEPPRA  INSTRUCTIONS:  TAKE 2 TABLETS BY MOUTH TWICE DAILY                     multivitamin tablet  INSTRUCTIONS:  Take 1 tablet by mouth daily                     VITAMIN B 12 PO  INSTRUCTIONS:  Take 100 mcg by mouth                      vitamin D3 1000 units (25 mcg) tablet  Also known as:  CHOLECALCIFEROL  INSTRUCTIONS:  Take 1 tablet (1,000 Units) by mouth daily

## 2019-05-03 ENCOUNTER — TELEPHONE (OUTPATIENT)
Dept: EDUCATION SERVICES | Facility: CLINIC | Age: 69
End: 2019-05-03

## 2019-05-03 ENCOUNTER — ALLIED HEALTH/NURSE VISIT (OUTPATIENT)
Dept: EDUCATION SERVICES | Facility: CLINIC | Age: 69
End: 2019-05-03
Payer: COMMERCIAL

## 2019-05-03 DIAGNOSIS — E11.9 TYPE 2 DIABETES MELLITUS (H): Primary | ICD-10-CM

## 2019-05-03 NOTE — TELEPHONE ENCOUNTER
[facility] Call Center    Phone Message    May a detailed message be left on voicemail: yes    Reason for Call: Other: [facility] and Medicare Part B does not cover Lancets or Test Strips for Contour Next One Glucose Monitor.  Please call Meme to discuss options.     Action Taken: Message routed to:  [facility] ([facility]): marry lomas

## 2019-05-03 NOTE — PROGRESS NOTES
"Diabetes Self-Management Education & Support    Diabetes Education Self Management & Training    SUBJECTIVE/OBJECTIVE:  Presents for: Follow-up  Accompanied by: Self  Diabetes education in the past 24mo: Yes  Focus of Visit: Diabetes Pathophysiology  Diabetes type: Type 2  Diabetes management related comments/concerns: \"I don't want to take insulin\"  Cultural Influences/Ethnic Background:  American      Diabetes Symptoms & Complications   none    Patient Problem List and Family Medical History reviewed for relevant medical history, current medical status, and diabetes risk factors.    Vitals:    Estimated body mass index is 31.33 kg/m  as calculated from the following:    Height as of 4/18/19: 1.575 m (5' 2\").    Weight as of 4/18/19: 77.7 kg (171 lb 4.8 oz).   Last 3 BP:   BP Readings from Last 3 Encounters:   04/30/19 119/77   04/15/19 108/71   08/17/18 112/71       History   Smoking Status     Former Smoker     Packs/day: 1.00     Years: 15.00     Types: Cigarettes     Start date: 8/17/1994     Quit date: 8/17/2009   Smokeless Tobacco     Never Used       Labs:  Lab Results   Component Value Date    A1C 6.6 04/15/2019     Lab Results   Component Value Date     04/15/2019     Lab Results   Component Value Date     04/15/2019     HDL Cholesterol   Date Value Ref Range Status   04/15/2019 55 >49 mg/dL Final   ]  GFR Estimate   Date Value Ref Range Status   04/15/2019 90 >60 mL/min/[1.73_m2] Final     Comment:     Non  GFR Calc  Starting 12/18/2018, serum creatinine based estimated GFR (eGFR) will be   calculated using the Chronic Kidney Disease Epidemiology Collaboration   (CKD-EPI) equation.       GFR Estimate If Black   Date Value Ref Range Status   04/15/2019 >90 >60 mL/min/[1.73_m2] Final     Comment:      GFR Calc  Starting 12/18/2018, serum creatinine based estimated GFR (eGFR) will be   calculated using the Chronic Kidney Disease Epidemiology Collaboration "   (CKD-EPI) equation.       Lab Results   Component Value Date    CR 0.68 04/15/2019     No results found for: MICROALBUMIN    Healthy Eating  Meal planning: Avoiding sweets, Smaller portions  Breakfast: Honey Bunches of Oats with ff half and half, bread and salted butter, coffee  Lunch: Turkey Bean Soup, creamed beef with crackers and mozarella cheese, smoked salmon with cream cheese and crackers  Dinner: lettuce wraps with avocado  Other: ice cream sandwich, Bubly water, banana and Bubly, dry roasted nuts  Beverages: Water, Coffee, Other, Alcohol  Has patient met with a dietitian in the past?: Yes    Being Active  Being Active Assessed Today: Yes  Exercise:: Yes  Days per week of moderate to strenuous exercise (like a brisk walk): 4  On average, minutes per day of exercise at this level: 60  How intense was your typical exercise? : Light (like stretching or slow walking)  Exercise Minutes per Week: 240  Barrier to exercise: Physical limitation    Monitoring   BG meter taught today    Taking Medications  none, is considering Metformin depending on the blood sugar data      Healthy Coping     Patient Activation Measure Survey Score:  No flowsheet data found.    ASSESSMENT:  New diagnosis type 2 diabetes, working on lifestyle changes    Patient's most recent   Lab Results   Component Value Date    A1C 6.6 04/15/2019    is meeting goal of <7.0    INTERVENTION:       Diabetes knowledge and skills assessment:     Patient is knowledgeable in diabetes management concepts related to: Healthy Eating and Being Active    Patient needs further education on the following diabetes management concepts: Monitoring, Taking Medication, Problem Solving, Reducing Risks and Healthy Coping    Based on learning assessment above, most appropriate setting for further diabetes education would be: Individual setting.    Education provided today on:  Topics that were covered in today's visit:    Basic pathophysiology of T2DM, relationship  between carbohydrate intake, release of glucose from the liver, exercise and weight management on glucose control.    Self blood glucose monitoring (SBGM) using the Contour Next meter.    Target blood glucose for pre-meal and 2 hour post-prandial glucose    Basic meal planning using the plate method, emphasizing portion control, healthy food choices and eliminating or minimizing sugared beverages.    Need for consistent physical activity, 30-45 minutes duration, preferably 4-6 days per week.     Opportunities for ongoing education and support in diabetes-self management were discussed.    Pt verbalized understanding of concepts discussed and recommendations provided today.       Education Materials Provided:  [place] Understanding Diabetes Booklet, Safe Disposal Options for Needles & Syringes and BG Log Sheet    PLAN:  See Patient Instructions for co-developed, patient-stated behavior change goals.  AVS printed and provided to patient today. See Follow-Up section for recommended follow-up.    Time Spent: 60 minutes  Encounter Type: Individual    Any diabetes medication dose changes were made via the CDE Protocol and Collaborative Practice Agreement with the patient's referring provider. A copy of this encounter was shared with the provider.

## 2019-05-03 NOTE — PATIENT INSTRUCTIONS
1.  Test your blood sugar daily.  Your choices of testing times are : Before a meal: (breakfast, lunch, dinner) or 2 hours after a meal: (breakfast, lunch, dinner) or bedtime.  Your blood sugar goals are:  Before meals:  mg/dl  2 hours after a meal: <180    2.  Keep sticking with your dietary changes.      3.  Gradually increase your activity as your foot gets better.    4.  Return 6/26 at 1:30pm with your meter    5.  CBRITE is an jannie which tracks blood sugar    Brooke [place] [address]  Phone: [phone]  [email or web address]

## 2019-05-14 DIAGNOSIS — E11.9 DIABETES MELLITUS, TYPE 2 (H): Primary | ICD-10-CM

## 2019-05-14 RX ORDER — LANCETS
EACH MISCELLANEOUS
Qty: 100 EACH | Refills: 3 | Status: SHIPPED | OUTPATIENT
Start: 2019-05-14 | End: 2023-02-13

## 2019-05-14 RX ORDER — BLOOD-GLUCOSE METER
EACH MISCELLANEOUS
Qty: 1 KIT | Refills: 1 | Status: SHIPPED | OUTPATIENT
Start: 2019-05-14 | End: 2023-02-13

## 2019-05-17 ENCOUNTER — DOCUMENTATION ONLY (OUTPATIENT)
Dept: CARE COORDINATION | Facility: CLINIC | Age: 69
End: 2019-05-17

## 2019-07-10 ENCOUNTER — PRE VISIT (OUTPATIENT)
Dept: INTERNAL MEDICINE | Facility: CLINIC | Age: 69
End: 2019-07-10

## 2019-07-10 DIAGNOSIS — E11.9 DIABETES MELLITUS, TYPE 2 (H): Primary | ICD-10-CM

## 2019-07-10 NOTE — TELEPHONE ENCOUNTER
[facility]  Dept: [phone]     Patient: Meme Butts   : 1950  MRN: [id number]  Encounter: 7/10/19       Pre Visit Assessment    Health Maintenance Due   Topic Date Due     MICROALBUMIN  1950     DIABETIC FOOT EXAM  1950     ADVANCE CARE PLANNING  1950     EYE EXAM  1950     ZOSTER IMMUNIZATION (1 of 2) 2001     MEDICARE ANNUAL WELLNESS VISIT  2019     FALL RISK ASSESSMENT  2019     Chart Reviewed for Labs needed/Health Maintenance: Yes   If labs needed, orders placed:  Yes A1c previously ordered, Microalbumin added.   Lab appointment scheduled:  Yes, previously scheduled    July Gore at 1:17 PM on 7/10/2019

## 2019-07-15 DIAGNOSIS — E11.9 DIABETES MELLITUS, TYPE 2 (H): ICD-10-CM

## 2019-07-15 DIAGNOSIS — E11.9 TYPE 2 DIABETES MELLITUS WITHOUT COMPLICATION, WITHOUT LONG-TERM CURRENT USE OF INSULIN (H): ICD-10-CM

## 2019-07-15 LAB
CREAT UR-MCNC: 74 MG/DL
HBA1C MFR BLD: 6.2 % (ref 0–5.6)
MICROALBUMIN UR-MCNC: <5 MG/L
MICROALBUMIN/CREAT UR: NORMAL MG/G CR (ref 0–25)

## 2019-07-16 ENCOUNTER — OFFICE VISIT (OUTPATIENT)
Dept: ORTHOPEDICS | Facility: CLINIC | Age: 69
End: 2019-07-16
Attending: INTERNAL MEDICINE
Payer: COMMERCIAL

## 2019-07-16 ENCOUNTER — ANCILLARY PROCEDURE (OUTPATIENT)
Dept: GENERAL RADIOLOGY | Facility: CLINIC | Age: 69
End: 2019-07-16
Attending: PODIATRIST
Payer: COMMERCIAL

## 2019-07-16 ENCOUNTER — PRE VISIT (OUTPATIENT)
Dept: ORTHOPEDICS | Facility: CLINIC | Age: 69
End: 2019-07-16

## 2019-07-16 ENCOUNTER — OFFICE VISIT (OUTPATIENT)
Dept: INTERNAL MEDICINE | Facility: CLINIC | Age: 69
End: 2019-07-16
Payer: COMMERCIAL

## 2019-07-16 VITALS
SYSTOLIC BLOOD PRESSURE: 112 MMHG | BODY MASS INDEX: 30.14 KG/M2 | HEART RATE: 91 BPM | WEIGHT: 164.8 LBS | DIASTOLIC BLOOD PRESSURE: 63 MMHG

## 2019-07-16 DIAGNOSIS — E11.9 TYPE 2 DIABETES MELLITUS WITHOUT COMPLICATION, WITHOUT LONG-TERM CURRENT USE OF INSULIN (H): Primary | ICD-10-CM

## 2019-07-16 DIAGNOSIS — M79.672 LEFT FOOT PAIN: Primary | ICD-10-CM

## 2019-07-16 DIAGNOSIS — G89.29 CHRONIC PAIN OF RIGHT ANKLE: ICD-10-CM

## 2019-07-16 DIAGNOSIS — M25.572 PAIN IN JOINT INVOLVING ANKLE AND FOOT, LEFT: ICD-10-CM

## 2019-07-16 DIAGNOSIS — M25.571 CHRONIC PAIN OF RIGHT ANKLE: ICD-10-CM

## 2019-07-16 DIAGNOSIS — M19.072 ARTHRITIS OF MIDTARSAL JOINT OF LEFT FOOT: ICD-10-CM

## 2019-07-16 DIAGNOSIS — M94.9 OSTEOCHONDRAL LESION OF TALAR DOME: ICD-10-CM

## 2019-07-16 DIAGNOSIS — M89.9 OSTEOCHONDRAL LESION OF TALAR DOME: ICD-10-CM

## 2019-07-16 DIAGNOSIS — M79.672 LEFT FOOT PAIN: ICD-10-CM

## 2019-07-16 ASSESSMENT — PAIN SCALES - GENERAL: PAINLEVEL: NO PAIN (0)

## 2019-07-16 NOTE — NURSING NOTE
Chief Complaint   Patient presents with     Diabetes     Results       Ananya Soni LPN at 8:34 AM on July 16, 2019

## 2019-07-16 NOTE — NURSING NOTE
Reason For Visit:   Chief Complaint   Patient presents with     Consult     Pain, right ankle, tendon, toes. Patient stated that she had an injection in May that is wearing off. Patient stated that she was referred by Primary care for inserts. Ingrown aned thick toes.         Pain Assessment  Patient Currently in Pain: Yes  Primary Pain Location: Foot(Bilateral )  Pain Descriptors: Sore(Stiffness. )        No Known Allergies        Mirela Rocha LPN

## 2019-07-16 NOTE — PROGRESS NOTES
[facility]       SUBJECTIVE:  Meme Butts is a 69 year old female with a PMHx of seizures, diabetes and osteoporosis who comes in for diabetes management follow-up and bilateral ankle pain.  A1c to be 6.6 in April, she has been checking her blood sugar once daily attempting lifestyle modifications including reducing carbohydrate intake and increasing aerobic exercise.  From a dietary standpoint she is cut out all sugary drinks, having just an occasional Coke.  She is also trying to cut down on fast food and eating less bread, she is now no longer adding sugar to her coffee.  She is also trying to walk, she enjoys hiking and can walk several miles once.  She was for a walk over the weekend in the park, and noted she was having more ankle pain the next day.  She has pain in both ankles although her right bothers her more.  She says the pain is sharp, on the inside of her ankle.  It is worse with ambulation.  No specific movements that she has noticed make it better or worse.  No new injuries or trauma that she knows.  She says shortness of the breath in the past although this is improved over the past year.  She has lost about 6 to 7 pounds intentionally.  She is checking her blood sugar once a day, sometimes preprandially and sometimes postprandially.  The highest blood sugar she recalls is 150 which she took after eating some fast food.  She denies thirst, poor appetite, or new bowel or bladder symptoms.  She saw an eye doctor within the past year, gets annual exams.     Medications and allergies reviewed by me today.     ROS:   14 pint ROS negative except as otherwise noted.  Patient Active Problem List   Diagnosis     Seizures (H)     increased alkaline phosphatase (bone specific)     History of ischemic colitis     Osteoporosis     Prediabetes     At high risk for breast cancer     Achilles bursitis or tendinitis     Plantar fascial fibromatosis     Flat foot     Juvenile  "myoclonic epilepsy (H)     Cerebral seizure     Encounter for screening mammogram for high-risk patient     Family history of malignant neoplasm of breast     Past Surgical History:   Procedure Laterality Date     COLONOSCOPY  3/31/2011    Procedure:COLONOSCOPY; Surgeon:PACO DIETRICH; [facility]     COLONOSCOPY  3/31/2011    Procedure:COMBINED COLONOSCOPY, REMOVE TUMOR/POLYP/LESION BY SNARE; Surgeon:PACO DIETRICH; [facility]     ESOPHAGOSCOPY, GASTROSCOPY, DUODENOSCOPY (EGD), COMBINED  3/31/2011    Procedure:COMBINED ESOPHAGOSCOPY, GASTROSCOPY, DUODENOSCOPY (EGD); Surgeon:PACO DIETRICH; Location[facility]     HC BREATH HYDROGEN TEST  2011    Procedure:HYDROGEN BREATH TEST; Surgeon:MANDIE BRADY; Location:[facility]     HYSTERECTOMY   or     Partial hysterectomy , urethral tube \"widened\"      ORTHOPEDIC SURGERY      L wrist     Family History   Problem Relation Age of Onset     Breast Cancer Mother 30        lumpectomy and chemo; also \"mass in ovary\"     Osteoporosis Sister      Gastrointestinal Disease Son         intestinal transplant     Breast Cancer Sister 55     Leukemia Sister 52     Diabetes Maternal Aunt         multiple mat aunts     Bone Cancer Niece 19     Colon Polyps Father      Colon Polyps Brother      Coronary Artery Disease Early Onset Brother 50     Ovarian Cancer Maternal Aunt 50         of ovarian cancer in 50s or 60s, unknown     Prostate Cancer Cousin 65     Breast Cancer Paternal Aunt 36         of breast cancer recurrence in 60s     Breast Cancer Cousin 30        paternal cousin, BRCA1+ by report     Breast Cancer Cousin 40        paternal cousin, BRCA1+ by report     Breast Cancer Cousin 35        paternal cousin, BRCA1+ by report     Genetic Disorder Cousin         paternal male cousin, BRCA1+ by report     Ovarian Cancer Cousin         paternal cousin, BRCA1+ by report     Social History     Socioeconomic History     Marital status:      Spouse " name: Quentin     Number of children: 3     Years of education: Not on file     Highest education level: Not on file   Occupational History     Employer: RETIRED   Social Needs     Financial resource strain: Not on file     Food insecurity:     Worry: Not on file     Inability: Not on file     Transportation needs:     Medical: Not on file     Non-medical: Not on file   Tobacco Use     Smoking status: Former Smoker     Packs/day: 1.00     Years: 15.00     Pack years: 15.00     Types: Cigarettes     Start date: 1994     Last attempt to quit: 2009     Years since quittin.9     Smokeless tobacco: Never Used   Substance and Sexual Activity     Alcohol use: Yes     Comment: Rarely     Drug use: No     Sexual activity: Not on file   Lifestyle     Physical activity:     Days per week: Not on file     Minutes per session: Not on file     Stress: Not on file   Relationships     Social connections:     Talks on phone: Not on file     Gets together: Not on file     Attends [employer]ism service: Not on file     Active member of club or organization: Not on file     Attends meetings of clubs or organizations: Not on file     Relationship status: Not on file     Intimate partner violence:     Fear of current or ex partner: Not on file     Emotionally abused: Not on file     Physically abused: Not on file     Forced sexual activity: Not on file   Other Topics Concern     Parent/sibling w/ CABG, MI or angioplasty before 65F 55M? Not Asked   Social History Narrative     Not on file     Current Outpatient Medications   Medication Sig Dispense Refill     alendronate (FOSAMAX) 70 MG tablet Take 1 tablet (70 mg) by mouth every 7 days 12 tablet 3     blood glucose (CONTOUR NEXT TEST) test strip Use to test blood sugar 1 times daily or as directed.  Ok to substitute alternative if insurance prefers. 100 strip 3     blood glucose (NO BRAND SPECIFIED) test strip Use to test blood sugar 1 times daily or as directed. 100 each 3      blood glucose monitoring (ACCU-CHEK TALIA PLUS) meter device kit Use to test blood sugar 1 time daily or as directed. 1 kit 1     blood glucose monitoring (JADE MICROLET) lancets Use to test blood sugar 1 times daily or as directed.  Ok to substitute alternative if insurance prefers. 100 each 3     blood glucose monitoring (SOFTCLIX) lancets Use to test blood sugar 1 time daily or as directed. 100 each 3     CALCIUM 500 +D 500-400 MG-UNIT TABS Take 1 tablet by mouth 2 times daily 180 tablet 3     cholecalciferol (VITAMIN D) 1000 UNIT tablet Take 1 tablet (1,000 Units) by mouth daily 90 tablet 3     levETIRAcetam (KEPPRA) 750 MG tablet TAKE 2 TABLETS BY MOUTH TWICE DAILY       Multiple Vitamins-Minerals (CENTRUM SILVER) per tablet Take 1 tablet by mouth daily       Cyanocobalamin (VITAMIN B 12 PO) Take 100 mcg by mouth       diclofenac (VOLTAREN) 1 % topical gel Place 4 g onto the skin 4 times daily (Patient not taking: Reported on 7/16/2019) 1 Tube 3     No Known Allergies    OBJECTIVE:    /63   Pulse 91   Wt 74.8 kg (164 lb 12.8 oz)   BMI 30.14 kg/m     Wt Readings from Last 1 Encounters:   07/16/19 74.8 kg (164 lb 12.8 oz)       GENERAL APPEARANCE: healthy, alert and no distress     EYES: PEERL, no scleral icterus      HENT: ear canals and TM's normal. Mild pain to palpation at the base of the left ear although no mass, fluctuance or skin changes noted.  Oropharynx without erythema or exudate.      NECK: no adenopathy, no asymmetry, masses     RESP: lungs clear to auscultation - no rales, rhonchi or wheezes     CV: regular rates and rhythm, normal S1 S2, no murmur, click or rub     ABDOMEN:  soft, nontender, no HSM or masses and bowel sounds normal     MS: Right ankle, pain along the superior aspect of the medial malleolus. Full ROM. Compressible, soft tissue adjacent to the lateral malleolus bilaterally, chronic appearing in nature. Foot without ulcers sin breakdown.      SKIN: no suspicious lesions or  rashes     NEURO: Mentation intact and speech normal     PSYCH: mentation appears normal. and affect normal/bright     LYMPHATICS: No cervical adenopathy     ASSESSMENT/PLAN:  Meme was seen today for diabetes and results.    Diagnoses and all orders for this visit:    Type 2 diabetes mellitus without complication, without long-term current use of insulin (H)  Meme is doing great in terms of her diabetic management, she has lost about 7 pounds with dietary changes including cutting out sugary drinks, fast food and bread.  She is also significantly increased her amount of aerobic exercise.  I encouraged her to keep up the good work.  We had a long discussion today about indications for statin, given her cholesterol profile and history of diabetes moderate to high intensity statin would be indicated.  She is not interested in trying statin, apparently has had relatives with side effects and would like to minimize her reliance on medications.  Asked her to think about it and stated that I recommend trialing a statin will discuss at her next appointment.  Plan for repeat hemoglobin A1c in 6 months.  She is up-to-date on her annual eye exam.  No concerning foot findings.   -     Hemoglobin A1c; Future  -     Creatinine; Future    Pain in joint involving ankle and foot, left  Long history of ankle pain, had multiple injuries throughout the years related to being involved in gymnastics.  Also fractured both of her ankles within the last 5 years.  She had an MRI of her right ankle in April, which shows chronic ligamentous tears although this is not the distribution of her pain.  She has a medial osteochondral defect of the talar dome which is likely is contributing to her pain.  She did benefit from a joint injection and she is considering reaching out to her orthopedic provider regarding this appears surgical management is an option as well.  She is not interested in physical therapy at this time.  I did provide a  podiatry referral she has tried many over-the-counter shoe inserts and has not found a good fit for her.  She would likely benefit from better foot and ankle support.   -     PODIATRY/FOOT & ANKLE SURGERY REFERRAL     Pt should return to clinic for f/u with me in 6 months.     Lalit Navarrete MD  Jul 16, 2019    Pt was seen and plan of care discussed with Dr. yKle who agrees with the above stated assessment and plan.     Teaching Physician Note:  I was present during the visit and the patient was seen and examined by me.   I discussed the case with the resident and agree with the note as documented by the resident with the following exceptions:  None.    Sarah Kyle M.D.  Internal Medicine   pager [phone]

## 2019-07-16 NOTE — PROGRESS NOTES
Date of Service: 7/16/2019    Chief Complaint   Patient presents with     Consult     Pain, right ankle, tendon, toes. Patient stated that she had an injection in May that is wearing off. Patient stated that she was referred by Primary care for inserts. Ingrown aned thick toes.            HPI: Meme is a 69 year old female who presents today for further evaluation of right ankle pain.  Nature: dull/aching    Location: medial ankle    Duration: years    Onset: gradual. No inciting trauma.    Course: waxes and wanes.     Aggravating/alleviating factors: walking and weight bearing aggravate. Rest alleviates.     Previous Treatments: Injection. This was not helpful. She has been seeing Syl Inman. She recommended seeing Dr. Arredondo in the event that she did not get significant relief from the injection.  Left foot  Nature: sharp/dull/aching    Location: top of the left foot.     Duration: years    Onset: gradual. No inciting trauma.    Course: waxes and wanes.     Aggravating/alleviating factors: walking and weight bearing aggravate.     Previous Treatments: None.          Review of Systems: no n/v/d/f/c/ns/sob/cp    PMH:   Past Medical History:   Diagnosis Date     Adenomatous colon polyp 2011    tubular adenoma     At high risk for breast cancer 02/16/2017    35.4% lifetime risk by Philip model     Epilepsy (H)      Fracture of metatarsal bone of left foot 7/14     Fracture, radius 1990    and ulnar styloid fracture     GIB (gastrointestinal bleeding) 4/1/2011     Kidney stones 1970, 1975    during pregnancy     Myxolipoma     finger, removed 2011     Osteoporosis 2016    T score -4.1     Prediabetes      Seizures (H)        PSxH:   Past Surgical History:   Procedure Laterality Date     COLONOSCOPY  3/31/2011    Procedure:COLONOSCOPY; Surgeon:PACO DIETRICH; [facility]     COLONOSCOPY  3/31/2011    Procedure:COMBINED COLONOSCOPY, REMOVE TUMOR/POLYP/LESION BY SNARE; Surgeon:PACO DIETRICH; Location: [facility]      "ESOPHAGOSCOPY, GASTROSCOPY, DUODENOSCOPY (EGD), COMBINED  3/31/2011    Procedure:COMBINED ESOPHAGOSCOPY, GASTROSCOPY, DUODENOSCOPY (EGD); Surgeon:PACO DIETRICH; Location:[facility]     HC BREATH HYDROGEN TEST  2011    Procedure:HYDROGEN BREATH TEST; Surgeon:MANDIE BRADY; Location:[facility]     HYSTERECTOMY   or     Partial hysterectomy , urethral tube \"widened\"      ORTHOPEDIC SURGERY      L wrist       Allergies: Patient has no known allergies.    SH:   Social History     Socioeconomic History     Marital status:      Spouse name: Quentin     Number of children: 3     Years of education: Not on file     Highest education level: Not on file   Occupational History     Employer: RETIRED   Social Needs     Financial resource strain: Not on file     Food insecurity:     Worry: Not on file     Inability: Not on file     Transportation needs:     Medical: Not on file     Non-medical: Not on file   Tobacco Use     Smoking status: Former Smoker     Packs/day: 1.00     Years: 15.00     Pack years: 15.00     Types: Cigarettes     Start date: 1994     Last attempt to quit: 2009     Years since quittin.9     Smokeless tobacco: Never Used   Substance and Sexual Activity     Alcohol use: Yes     Comment: Rarely     Drug use: No     Sexual activity: Not on file   Lifestyle     Physical activity:     Days per week: Not on file     Minutes per session: Not on file     Stress: Not on file   Relationships     Social connections:     Talks on phone: Not on file     Gets together: Not on file     Attends [employer]ology service: Not on file     Active member of club or organization: Not on file     Attends meetings of clubs or organizations: Not on file     Relationship status: Not on file     Intimate partner violence:     Fear of current or ex partner: Not on file     Emotionally abused: Not on file     Physically abused: Not on file     Forced sexual activity: Not on file   Other Topics Concern     " "Parent/sibling w/ CABG, MI or angioplasty before 65F 55M? Not Asked   Social History Narrative     Not on file       FH:   Family History   Problem Relation Age of Onset     Breast Cancer Mother 30        lumpectomy and chemo; also \"mass in ovary\"     Osteoporosis Sister      Gastrointestinal Disease Son         intestinal transplant     Breast Cancer Sister 55     Leukemia Sister 52     Diabetes Maternal Aunt         multiple mat aunts     Bone Cancer Niece 19     Colon Polyps Father      Colon Polyps Brother      Coronary Artery Disease Early Onset Brother 50     Ovarian Cancer Maternal Aunt 50         of ovarian cancer in 50s or 60s, unknown     Prostate Cancer Cousin 65     Breast Cancer Paternal Aunt 36         of breast cancer recurrence in 60s     Breast Cancer Cousin 30        paternal cousin, BRCA1+ by report     Breast Cancer Cousin 40        paternal cousin, BRCA1+ by report     Breast Cancer Cousin 35        paternal cousin, BRCA1+ by report     Genetic Disorder Cousin         paternal male cousin, BRCA1+ by report     Ovarian Cancer Cousin         paternal cousin, BRCA1+ by report       Objective:  Data Unavailable Data Unavailable Data Unavailable Data Unavailable Data Unavailable 0 lbs 0 oz    PT and DP pulses are 2/4 bilaterally. CRT is 3 seconds. Positive pedal hair.   Gross sensation is intact bilaterally.   Equinus is noted bilaterally. Pain noted on the right anteriomedial ankle, at about the same position of the OCD. No pain, clicking, or crepitus noted with active or passive ROM to the ankle. Pain noted along the shafts to the bases of the left 2nd through 4th metatarsals. No pain with active or passive ROM of the corresponding MTPJs. Ne edema or ecchymosis. No pain along the BL courses of the PT, peroneal, or Achilles tendons BL.    Nails normal bilaterally. No open lesions are noted.     left foot xrays indicated in 3 weightbearing views.    degenerative changes noted to the 3rd " met-lateral cuneiform joint with near joint obliteration. No acute processes noted.   Right ankle MRI reviewed.    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 Injection therapy was not useful. I agree with Syl's opinion to send her to Dr. Arredondo for consideration for an ankle scope.   Left foot arthritis - most significant in the 3rd met-lateral cuneiform.     Plan:  - Pt seen and evaluated.  - XRs taken and discussed with her. Previous MRI reviewed.  - Info for Dr. Arredondo given to her. Consideration of a right ankle scope and a left 3rd met-cuneiform arthrodesis.   - Looking at her XR, I do not think that orthotics will do much. Additionally, Medicare will not cover these. Recommend that she try a pair of OTC memory foam Powersteps, which will be more cost effective for her.   - See again PRN.

## 2019-07-16 NOTE — LETTER
7/16/2019       RE: Meme Butts  [address]  [facility] [address]     Dear Colleague,    Thank you for referring your patient, Meme Butts, to the [facility]. Please see a copy of my visit note below.    Date of Service: 7/16/2019    Chief Complaint   Patient presents with     Consult     Pain, right ankle, tendon, toes. Patient stated that she had an injection in May that is wearing off. Patient stated that she was referred by Primary care for inserts. Ingrown aned thick toes.            HPI: Meme is a 69 year old female who presents today for further evaluation of right ankle pain.  Nature: dull/aching    Location: medial ankle    Duration: years    Onset: gradual. No inciting trauma.    Course: waxes and wanes.     Aggravating/alleviating factors: walking and weight bearing aggravate. Rest alleviates.     Previous Treatments: Injection. This was not helpful. She has been seeing Syl Inman. She recommended seeing Dr. Arredondo in the event that she did not get significant relief from the injection.  Left foot  Nature: sharp/dull/aching    Location: top of the left foot.     Duration: years    Onset: gradual. No inciting trauma.    Course: waxes and wanes.     Aggravating/alleviating factors: walking and weight bearing aggravate.     Previous Treatments: None.          Review of Systems: no n/v/d/f/c/ns/sob/cp    PMH:   Past Medical History:   Diagnosis Date     Adenomatous colon polyp 2011    tubular adenoma     At high risk for breast cancer 02/16/2017    35.4% lifetime risk by Philip model     Epilepsy (H)      Fracture of metatarsal bone of left foot 7/14     Fracture, radius 1990    and ulnar styloid fracture     GIB (gastrointestinal bleeding) 4/1/2011     Kidney stones 1970, 1975    during pregnancy     Myxolipoma     finger, removed 2011     Osteoporosis 2016    T score -4.1     Prediabetes      Seizures (H)   "      PSxH:   Past Surgical History:   Procedure Laterality Date     COLONOSCOPY  3/31/2011    Procedure:COLONOSCOPY; Surgeon:PACO DIETRICH; Location:[facility]     COLONOSCOPY  3/31/2011    Procedure:COMBINED COLONOSCOPY, REMOVE TUMOR/POLYP/LESION BY SNARE; Surgeon:PACO DIETRICH; Location:[facility]     ESOPHAGOSCOPY, GASTROSCOPY, DUODENOSCOPY (EGD), COMBINED  3/31/2011    Procedure:COMBINED ESOPHAGOSCOPY, GASTROSCOPY, DUODENOSCOPY (EGD); Surgeon:PACO DIETRICH; Location:[facility]     HC BREATH HYDROGEN TEST  2011    Procedure:HYDROGEN BREATH TEST; Surgeon:MANDIE BRADY; Location:[facility]     HYSTERECTOMY   or     Partial hysterectomy , urethral tube \"widened\"      ORTHOPEDIC SURGERY      L wrist       Allergies: Patient has no known allergies.    SH:   Social History     Socioeconomic History     Marital status:      Spouse name: Quentin     Number of children: 3     Years of education: Not on file     Highest education level: Not on file   Occupational History     Employer: RETIRED   Social Needs     Financial resource strain: Not on file     Food insecurity:     Worry: Not on file     Inability: Not on file     Transportation needs:     Medical: Not on file     Non-medical: Not on file   Tobacco Use     Smoking status: Former Smoker     Packs/day: 1.00     Years: 15.00     Pack years: 15.00     Types: Cigarettes     Start date: 1994     Last attempt to quit: 2009     Years since quittin.9     Smokeless tobacco: Never Used   Substance and Sexual Activity     Alcohol use: Yes     Comment: Rarely     Drug use: No     Sexual activity: Not on file   Lifestyle     Physical activity:     Days per week: Not on file     Minutes per session: Not on file     Stress: Not on file   Relationships     Social connections:     Talks on phone: Not on file     Gets together: Not on file     Attends Latter-day service: Not on file     Active member of club or organization: Not on file     Attends meetings " "of clubs or organizations: Not on file     Relationship status: Not on file     Intimate partner violence:     Fear of current or ex partner: Not on file     Emotionally abused: Not on file     Physically abused: Not on file     Forced sexual activity: Not on file   Other Topics Concern     Parent/sibling w/ CABG, MI or angioplasty before 65F 55M? Not Asked   Social History Narrative     Not on file       FH:   Family History   Problem Relation Age of Onset     Breast Cancer Mother 30        lumpectomy and chemo; also \"mass in ovary\"     Osteoporosis Sister      Gastrointestinal Disease Son         intestinal transplant     Breast Cancer Sister 55     Leukemia Sister 52     Diabetes Maternal Aunt         multiple mat aunts     Bone Cancer Niece 19     Colon Polyps Father      Colon Polyps Brother      Coronary Artery Disease Early Onset Brother 50     Ovarian Cancer Maternal Aunt 50         of ovarian cancer in 50s or 60s, unknown     Prostate Cancer Cousin 65     Breast Cancer Paternal Aunt 36         of breast cancer recurrence in 60s     Breast Cancer Cousin 30        paternal cousin, BRCA1+ by report     Breast Cancer Cousin 40        paternal cousin, BRCA1+ by report     Breast Cancer Cousin 35        paternal cousin, BRCA1+ by report     Genetic Disorder Cousin         paternal male cousin, BRCA1+ by report     Ovarian Cancer Cousin         paternal cousin, BRCA1+ by report       Objective:  Data Unavailable Data Unavailable Data Unavailable Data Unavailable Data Unavailable 0 lbs 0 oz    PT and DP pulses are 2/4 bilaterally. CRT is 3 seconds. Positive pedal hair.   Gross sensation is intact bilaterally.   Equinus is noted bilaterally. Pain noted on the right anteriomedial ankle, at about the same position of the OCD. No pain, clicking, or crepitus noted with active or passive ROM to the ankle. Pain noted along the shafts to the bases of the left 2nd through 4th metatarsals. No pain with active or " passive ROM of the corresponding MTPJs. Ne edema or ecchymosis. No pain along the BL courses of the PT, peroneal, or Achilles tendons BL.    Nails normal bilaterally. No open lesions are noted.     left foot xrays indicated in 3 weightbearing views.    degenerative changes noted to the 3rd met-lateral cuneiform joint with near joint obliteration. No acute processes noted.   Right ankle MRI reviewed.    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 Injection therapy was not useful. I agree with Syl's opinion to send her to Dr. Arredondo for consideration for an ankle scope.   Left foot arthritis - most significant in the 3rd met-lateral cuneiform.     Plan:  - Pt seen and evaluated.  - XRs taken and discussed with her. Previous MRI reviewed.  - Info for Dr. Arredondo given to her. Consideration of a right ankle scope and a left 3rd met-cuneiform arthrodesis.   - Looking at her XR, I do not think that orthotics will do much. Additionally, Medicare will not cover these. Recommend that she try a pair of OTC memory foam Powersteps, which will be more cost effective for her.   - See again PRN.              Again, thank you for allowing me to participate in the care of your patient.      Sincerely,    Henry Akhtar DPM

## 2019-07-16 NOTE — PATIENT INSTRUCTIONS
[facility]         Internal Medicine Resident                   Continuity Clinic    Who We Are    Resident Continuity Clinic is a part of the [facility] Primary Care Clinic.  Resident physicians see patients independently and establish a relationship with them over the course of their three-year residency program.  As with the Primary Care Clinic, our Resident Continuity Clinic models a group practice.  If your doctor is not available, you will be able to see another resident physician.  At the end of a resident s training, patients will be transitioned to a new resident physician for ongoing care.     We treat patients with a wide array of medical needs from routine physicals, to acute illnesses, to diabetes and blood pressure management, to complex medical illness.  What is a Resident Physician?    Resident physicians hold medical degrees and are doctors. They are training to become specialists in Internal Medicine. They work under the supervision of board-certified faculty physicians.  Expectations for Your Care    We strive to provide accessible, quality care at all times.    In order to provide this care, it is best to see your primary care resident doctor consistently rather switching between providers.  In the event you do see another physician, you should schedule a follow-up visit with your usual primary care doctor.    If you are transitioning your care from another clinic, it is helpful to have your records available for your doctor to review.    We do not prescribe controlled substances, such as ADD medications or narcotic pain medications, on your first visit.  We will review your health records and concerns prior to devising a treatment plan with you in order to provide the best care.      Clinic Services     Extended clinic hours; patient  to help navigate your visit;  parking; laboratory and imaging services with evening and weekend hours    Multiple medical and  surgical specialties in one building    Complementary services, including Nutrition, Integrative Medicine, Pharmacy consultations, Mental and Behavioral Health, Sports Medicine and Physical Therapy    Thank You    We would like to thank you for choosing the [facility] Internal Medicine Resident Continuity Clinic for your primary care. You are making a priceless contribution to the training of the next generation of health care practitioners.     Contact us at [phone] for appointments or questions.    Resident Clinic Hours are Tuesdays and Thursdays, 7:30am-5:00pm    Residents   Maciej Turner MD  (Male)   Chaitanya Huizar MD   (Male)   Ronda Coronado MD  (Female)  July Bartholomew MD   (Female)   Syl Frank MD   (Female)    Kalyn Mcpherson MD    (Female)   Aba Duncan MD  (Male)   Lalit Navarrete MD  (Male)    Yuliya Castañeda MD  (Female)   Melvina Huber MD  (Female)   Brian Du MD    (Female)   Willis Ramsey MD  (Male)   Josué Swain MD  (Male)   Nahun Kwok MD  (Male)   URIEL Gaffney MD   (Male)   Casper Valdez MD  (Male)    Cassie Medina MD (Female)   Ronal Beard MD  (Male)   George Blum MD  (Male)   Elza Burk MD  (Male)   Aysha Cm MD    (Female)   Paige Khan MD  (Female)     Supervising Physicians   MD Jose E Loredo MD Jill Bowman Peterson, MD Briar Duffy, MD James Langland, MD Mary Logeais, MD Tanya Melnik, MD Charles Moldow, MD Heather Thompson Buum, MD            Please call [phone] to schedule lab appointment.     Podiatry [phone] ([facility] [place] [facility], 4th Floor N)

## 2019-07-16 NOTE — TELEPHONE ENCOUNTER
RECORDS RECEIVED FROM: devin Toribio in Urology-Pain in joint involving ankle and foot, left    DATE RECEIVED: Jul 16, 2019    NOTES STATUS DETAILS   OFFICE NOTE from referring provider Internal 07/16/19 Dr. Navarrete   OFFICE NOTE from other specialist Internal 4/25/19 Syl Inman   DISCHARGE SUMMARY from hospital N/A    DISCHARGE REPORT from the ER N/A    OPERATIVE REPORT N/A    MEDICATION LIST Internal    IMPLANT RECORD/STICKER N/A    LABS     CBC/DIFF N/A    CULTURES N/A    INJECTIONS DONE IN RADIOLOGY Internal 4/30/19   MRI Internal 4/18/19   CT SCAN N/A    XRAYS (IMAGES & REPORTS) Internal 4/18/19   TUMOR     PATHOLOGY  Slides & report N/A

## 2019-08-07 ENCOUNTER — ALLIED HEALTH/NURSE VISIT (OUTPATIENT)
Dept: EDUCATION SERVICES | Facility: CLINIC | Age: 69
End: 2019-08-07
Payer: COMMERCIAL

## 2019-08-07 VITALS — BODY MASS INDEX: 30 KG/M2 | WEIGHT: 164 LBS

## 2019-08-07 DIAGNOSIS — E11.9 TYPE 2 DIABETES MELLITUS (H): Primary | ICD-10-CM

## 2019-08-07 NOTE — PROGRESS NOTES
"Diabetes Self-Management Education & Support    Diabetes Education Self Management & Training    SUBJECTIVE/OBJECTIVE:  Presents for: Follow-up  Accompanied by: Self  Diabetes education in the past 24mo: Yes  Focus of Visit: Reducing Risks, Healthy Coping  Diabetes type: Type 2  Date of diagnosis: 3/2019  Disease course: Improving  Diabetes management related comments/concerns: no concerns  Transportation concerns: No  Other concerns:: None, Glasses  Cultural Influences/Ethnic Background:  American      Diabetes Symptoms & Complications  none  Patient Problem List and Family Medical History reviewed for relevant medical history, current medical status, and diabetes risk factors.    Vitals:  Estimated body mass index is 30 kg/m  as calculated from the following:    Height as of 4/18/19: 1.575 m (5' 2\").    Weight as of an earlier encounter on 8/7/19: 74.4 kg (164 lb).   Last 3 BP:   BP Readings from Last 3 Encounters:   07/16/19 112/63   04/30/19 119/77   04/15/19 108/71       History   Smoking Status     Former Smoker     Packs/day: 1.00     Years: 15.00     Types: Cigarettes     Start date: 8/17/1994     Quit date: 8/17/2009   Smokeless Tobacco     Never Used       Labs:  Lab Results   Component Value Date    A1C 6.2 07/15/2019     Lab Results   Component Value Date     04/15/2019     Lab Results   Component Value Date     04/15/2019     HDL Cholesterol   Date Value Ref Range Status   04/15/2019 55 >49 mg/dL Final   ]  GFR Estimate   Date Value Ref Range Status   04/15/2019 90 >60 mL/min/[1.73_m2] Final     Comment:     Non  GFR Calc  Starting 12/18/2018, serum creatinine based estimated GFR (eGFR) will be   calculated using the Chronic Kidney Disease Epidemiology Collaboration   (CKD-EPI) equation.       GFR Estimate If Black   Date Value Ref Range Status   04/15/2019 >90 >60 mL/min/[1.73_m2] Final     Comment:      GFR Calc  Starting 12/18/2018, serum creatinine based " estimated GFR (eGFR) will be   calculated using the Chronic Kidney Disease Epidemiology Collaboration   (CKD-EPI) equation.       Lab Results   Component Value Date    CR 0.68 04/15/2019     No results found for: MICROALBUMIN    Healthy Eating  See assessment by Jesisca Tinsley RD,CDE from today's date    Being Active  Exercise:: Yes (walking, going to gym, gardening), logging on [email or web address]    Monitoring       Taking Medications  no diabetes meds and wants to keep it that way       Healthy Coping     Patient Activation Measure Survey Score:  No flowsheet data found.    ASSESSMENT:  Type 2 diabetes, doing well with behavior change      Patient's most recent   Lab Results   Component Value Date    A1C 6.2 07/15/2019    is meeting goal of <7.0    INTERVENTION:       Education provided today on:  AADE Self-Care Behaviors:  Reducing Risks: major complications of diabetes, prevention, early diagnostic measures and treatment of complications, foot care, appropriate dental care, annual eye exam, smoking cessation, aspirin therapy, A1C - goals, relating to blood glucose levels, how often to check, lipids levels and goals and blood pressure and goals    Opportunities for ongoing education and support in diabetes-self management were discussed.    Pt verbalized understanding of concepts discussed and recommendations provided today.       Education Materials Provided:  No new materials provided today    PLAN:  See Patient Instructions for co-developed, patient-stated behavior change goals.  AVS printed and provided to patient today. See Follow-Up section for recommended follow-up.  Time Spent: 60 minutes  Encounter Type: Individual    Any diabetes medication dose changes were made via the CDE Protocol and Collaborative Practice Agreement with the patient's referring provider. A copy of this encounter was shared with the provider.

## 2019-08-07 NOTE — PATIENT INSTRUCTIONS
1.  Test your blood sugar daily.  Your blood sugar goals:  mg/dl    2.  Keep making good food choices and logging on [employer].    3.  Keep moving and come up with a plan for exercise over the winter.    4.  See your doctor every 3-6 months.    Brooke Florian RN,CDE  [address] [address]  Phone: [phone]  [email or web address]

## 2019-08-07 NOTE — PROGRESS NOTES
"Diabetes Self-Management Education & Support    Diabetes Education Self Management & Training    SUBJECTIVE/OBJECTIVE:  Presents for: Follow-up  Accompanied by: Self  Diabetes education in the past 24mo: Yes  Focus of Visit: Healthy Eating  Diabetes type: Type 2  Disease course: Improving  Transportation concerns: No  Other concerns:: None  Cultural Influences/Ethnic Background:  American    Diabetes Symptoms & Complications  Weight trend: Decreasing steadily       Patient Problem List and Family Medical History reviewed for relevant medical history, current medical status, and diabetes risk factors.    Vitals:  Wt 74.4 kg (164 lb)   BMI 30.00 kg/m    Estimated body mass index is 30 kg/m  as calculated from the following:    Height as of 4/18/19: 1.575 m (5' 2\").    Weight as of this encounter: 74.4 kg (164 lb).   Last 3 BP:   BP Readings from Last 3 Encounters:   07/16/19 112/63   04/30/19 119/77   04/15/19 108/71       History   Smoking Status     Former Smoker     Packs/day: 1.00     Years: 15.00     Types: Cigarettes     Start date: 8/17/1994     Quit date: 8/17/2009   Smokeless Tobacco     Never Used       Labs:  Lab Results   Component Value Date    A1C 6.2 07/15/2019     Lab Results   Component Value Date     04/15/2019     Lab Results   Component Value Date     04/15/2019     HDL Cholesterol   Date Value Ref Range Status   04/15/2019 55 >49 mg/dL Final   ]  GFR Estimate   Date Value Ref Range Status   04/15/2019 90 >60 mL/min/[1.73_m2] Final     Comment:     Non  GFR Calc  Starting 12/18/2018, serum creatinine based estimated GFR (eGFR) will be   calculated using the Chronic Kidney Disease Epidemiology Collaboration   (CKD-EPI) equation.       GFR Estimate If Black   Date Value Ref Range Status   04/15/2019 >90 >60 mL/min/[1.73_m2] Final     Comment:      GFR Calc  Starting 12/18/2018, serum creatinine based estimated GFR (eGFR) will be   calculated using the " Chronic Kidney Disease Epidemiology Collaboration   (CKD-EPI) equation.       Lab Results   Component Value Date    CR 0.68 04/15/2019     No results found for: MICROALBUMIN    Healthy Eating  Healthy Eating Assessed Today: Yes  Patient on a regular basis: Eats 3 meals a day  Meal planning: Low salt, Heart healthy, Smaller portions, Avoiding sweets    Being Active  Being Active Assessed Today: Yes  Exercise:: Yes  Days per week of moderate to strenuous exercise (like a brisk walk): 5  On average, minutes per day of exercise at this level: 30  How intense was your typical exercise? : Moderate (like brisk walking)  Exercise Minutes per Week: 150    Monitoring  Monitoring Assessed Today: Yes  Home Glucose (Sugar) Monitorin-2 times per day  Low Glucose Range (mg/dL):   High Glucose Range (mg/dL): 130-140  Overall Range (mg/dL): 110-130    Taking Medications      Current Treatments: Diet    Problem Solving  Problem Solving Assessed Today: No              Reducing Risks  Reducing Risks Assessed Today: No    Healthy Coping  Emotional response to diabetes: Ready to learn, Acceptance, Confidence diabetes can be controlled  Stage of change: MAINTENANCE (Working to maintain change, with risk of relapse)  Support resources: [employer]  Patient Activation Measure Survey Score:  No flowsheet data found.    ASSESSMENT:  Patient with well controlled type 2 diabetes. A1C improved on recent lab test. Weight is down 7-8 lbs from diet changes. Patient is confident she can maintain changes.  She is tracking her diet daily. Monitoring calorie intake and carbs. Is planning to be more mindful to choose lower fat meats/reduce saturated fat intake.  Glucose today was 115 two hours post breakfast.   Patient's most recent   Lab Results   Component Value Date    A1C 6.2 07/15/2019    is meeting goal of <7.0    INTERVENTION:   Diabetes knowledge and skills assessment:     Based on learning assessment above, most appropriate setting for  further diabetes education would be: Individual setting.    Education provided today on:  AADE Self-Care Behaviors:  Healthy Eating: consistency in amount, composition, and timing of food intake and heart healthy diet    Opportunities for ongoing education and support in diabetes-self management were discussed.    Pt verbalized understanding of concepts discussed and recommendations provided today.       Education Materials Provided:  No new materials provided today    PLAN:  See Patient Instructions for co-developed, patient-stated behavior change goals.  AVS printed and provided to patient today. See Follow-Up section for recommended follow-up.  Continue to monitor glucose daily.  Continue current exercise regimen.  Choose lower fat protein sources.   Time Spent: 30 minutes  Encounter Type: Individual    Any diabetes medication dose changes were made via the CDE Protocol and Collaborative Practice Agreement with the patient's referring provider. A copy of this encounter was shared with the provider.

## 2019-10-02 ENCOUNTER — HEALTH MAINTENANCE LETTER (OUTPATIENT)
Age: 69
End: 2019-10-02

## 2019-12-16 ENCOUNTER — HEALTH MAINTENANCE LETTER (OUTPATIENT)
Age: 69
End: 2019-12-16

## 2020-03-22 ENCOUNTER — HEALTH MAINTENANCE LETTER (OUTPATIENT)
Age: 70
End: 2020-03-22

## 2020-04-28 DIAGNOSIS — M81.0 AGE-RELATED OSTEOPOROSIS WITHOUT CURRENT PATHOLOGICAL FRACTURE: ICD-10-CM

## 2020-04-29 RX ORDER — ALENDRONATE SODIUM 70 MG/1
70 TABLET ORAL
Qty: 12 TABLET | Refills: 0 | Status: SHIPPED | OUTPATIENT
Start: 2020-04-29 | End: 2020-09-16

## 2020-04-29 NOTE — TELEPHONE ENCOUNTER
Last Clinic Visit: 7/16/2019  [facility]  Overdue lab: Cr- ROBERTI to clinic CMA  Scheduling has been notified to contact the pt for appointment-- needs to establish care, Dr. Dewitt retired  RF 90 day

## 2020-05-14 ENCOUNTER — NURSE TRIAGE (OUTPATIENT)
Dept: NURSING | Facility: CLINIC | Age: 70
End: 2020-05-14

## 2020-05-14 NOTE — TELEPHONE ENCOUNTER
Meme states her sister has pneumonia and was told she has the virus pneumonia and Meme has been exposed.  Denies fever, cough and shortness of breath.  Meme is sneezing and has a headache.      COVID 19 Nurse Triage Plan/Patient Instructions    Please be aware that novel coronavirus (COVID-19) may be circulating in the community. If you develop symptoms such as fever, cough, or SOB or if you have concerns about the presence of another infection including coronavirus (COVID-19), please contact your health care provider or visit [email or web address].     Disposition/Instructions    Patient to have an OnCare Visit with a provider (Preferred option). Follow System Ambulatory Workflow for COVID 19.     To do this follow these instructions:    1. Go to the website [email or web address]  2. Create an account (you will need your insurance information)  3. Start a new visit  4. Choose your diagnosis (e.g. COVID19)  5. Fill out the information about your symptoms  6. A provider will reach out to you by text, phone call or video visit based on your request    Call Back If: Your symptoms worsen before you are able to complete your OnCare Visit with a provider.    Thank you for limiting contact with others, wearing a simple mask to cover your cough, practice good hand hygiene habits and accessing our virtual services where possible to limit the spread of this virus.    For more information about COVID19 and options for caring for yourself at home, please visit the CDC website at https://www.cdc.gov/coronavirus/2019-ncov/about/steps-when-sick.html  For more options for care at [facility], please visit our website at [email or web address]    For more information, please use the [place] Department of Health COVID-19 Website: https://www.health.state.[place]/diseases/coronavirus/index.html  [facility] ([facility]) COVID-19 Hotlines (Interpreters available):      Health questions: Phone  Number: [phone] or [phone] and Hours: 7 a.m. to 7 p.m.    Schools and  questions: Phone Number: [phone] or [phone] and Hours 7 a.m. to 7 p.m.                    Reason for Disposition    COVID-19 Testing, questions about    Additional Information    Negative: SEVERE difficulty breathing (e.g., struggling for each breath, speaks in single words)    Negative: Difficult to awaken or acting confused (e.g., disoriented, slurred speech)    Negative: Bluish (or gray) lips or face now    Negative: Shock suspected (e.g., cold/pale/clammy skin, too weak to stand, low BP, rapid pulse)    Negative: Sounds like a life-threatening emergency to the triager    Negative: SEVERE or constant chest pain (Exception: mild central chest pain, present only when coughing)    Negative: MODERATE difficulty breathing (e.g., speaks in phrases, SOB even at rest, pulse 100-120)    Negative: Patient sounds very sick or weak to the triager    Negative: MILD difficulty breathing (e.g., minimal/no SOB at rest, SOB with walking, pulse <100)    Negative: Chest pain    Negative: Fever > 103 F (39.4 C)    Negative: [1] Fever > 101 F (38.3 C) AND [2] age > 60    Negative: [1] Fever > 100.0 F (37.8 C) AND [2] bedridden (e.g., nursing home patient, CVA, chronic illness, recovering from surgery)    Negative: HIGH RISK patient (e.g., age > 64 years, diabetes, heart or lung disease, weak immune system)    Negative: Fever present > 3 days (72 hours)    Negative: [1] Fever returns after gone for over 24 hours AND [2] symptoms worse or not improved    Negative: [1] Continuous (nonstop) coughing interferes with work or school AND [2] no improvement using cough treatment per protocol    Negative: Cough present > 3 weeks    Negative: [1] COVID-19 infection diagnosed or suspected AND [2] mild symptoms (fever, cough) AND [3] no trouble breathing or other complications    Negative: COVID-19 Home Isolation, questions about    Negative:  COVID-19 Prevention and Healthy Living, questions about    Protocols used: CORONAVIRUS (COVID-19) DIAGNOSED OR [id number] 4.22.20

## 2020-07-22 DIAGNOSIS — M81.0 AGE-RELATED OSTEOPOROSIS WITHOUT CURRENT PATHOLOGICAL FRACTURE: ICD-10-CM

## 2020-07-23 NOTE — TELEPHONE ENCOUNTER
ALENDRONATE SODIUM 70 MG TAB       Last Written Prescription Date:  4-29-20  Last Fill Quantity: 12,   # refills: 0  Last Office Visit : 7-16-19  Future Office visit:  none    Routing refill request to provider for review/approval because:  - second refill request for medication with out or overdue labs. With last request pt was given 90 day sami and Audra notified.  Overdue lab Cr- FYI to [facility]    Scheduling has been notified to contact the pt for appointment. Needs to establish care, Dr. Dewitt retired. Last Dexa scan 04/17/2019.  Pt called and informed that she needs to call and make an appt with a PCP for additional medications refills. Clinic numbers given for questions or concerns.  Marisol Brock RN 2:28 PM on 7/23/2020.    Unsuccessful call.  Marisol Brock RN 2:04 PM on 8/5/2020.

## 2020-08-02 DIAGNOSIS — E11.9 DIABETES MELLITUS, TYPE 2 (H): ICD-10-CM

## 2020-08-05 RX ORDER — ALENDRONATE SODIUM 70 MG/1
TABLET ORAL
Qty: 4 TABLET | OUTPATIENT
Start: 2020-08-05

## 2020-08-06 RX ORDER — BLOOD SUGAR DIAGNOSTIC
STRIP MISCELLANEOUS
Qty: 100 STRIP | Refills: 0 | Status: SHIPPED | OUTPATIENT
Start: 2020-08-06 | End: 2022-08-17

## 2020-08-06 NOTE — TELEPHONE ENCOUNTER
Last Clinic Visit: 7/16/19, no upcoming appointments scheduled. 90 day refill provided per protocol, routed to clinic for follow up

## 2020-09-16 ENCOUNTER — OFFICE VISIT (OUTPATIENT)
Dept: FAMILY MEDICINE | Facility: CLINIC | Age: 70
End: 2020-09-16
Payer: COMMERCIAL

## 2020-09-16 VITALS
BODY MASS INDEX: 32.03 KG/M2 | DIASTOLIC BLOOD PRESSURE: 81 MMHG | OXYGEN SATURATION: 95 % | HEART RATE: 90 BPM | SYSTOLIC BLOOD PRESSURE: 111 MMHG | WEIGHT: 175.1 LBS

## 2020-09-16 DIAGNOSIS — Z86.0100 HISTORY OF COLONIC POLYPS: ICD-10-CM

## 2020-09-16 DIAGNOSIS — R73.03 PREDIABETES: ICD-10-CM

## 2020-09-16 DIAGNOSIS — E78.5 HYPERLIPIDEMIA LDL GOAL <100: ICD-10-CM

## 2020-09-16 DIAGNOSIS — Z00.00 ANNUAL PHYSICAL EXAM: Primary | ICD-10-CM

## 2020-09-16 DIAGNOSIS — Z12.31 ENCOUNTER FOR SCREENING MAMMOGRAM FOR BREAST CANCER: ICD-10-CM

## 2020-09-16 DIAGNOSIS — G40.909 NONINTRACTABLE EPILEPSY WITHOUT STATUS EPILEPTICUS, UNSPECIFIED EPILEPSY TYPE (H): ICD-10-CM

## 2020-09-16 DIAGNOSIS — M81.0 AGE-RELATED OSTEOPOROSIS WITHOUT CURRENT PATHOLOGICAL FRACTURE: ICD-10-CM

## 2020-09-16 RX ORDER — LEVETIRACETAM 750 MG/1
TABLET ORAL
Qty: 105 TABLET | Refills: 1 | Status: SHIPPED | OUTPATIENT
Start: 2020-09-16 | End: 2020-09-16

## 2020-09-16 RX ORDER — ALENDRONATE SODIUM 70 MG/1
70 TABLET ORAL
Qty: 12 TABLET | Refills: 0 | Status: SHIPPED | OUTPATIENT
Start: 2020-09-16 | End: 2020-12-20

## 2020-09-16 SDOH — HEALTH STABILITY: PHYSICAL HEALTH: ON AVERAGE, HOW MANY DAYS PER WEEK DO YOU ENGAGE IN MODERATE TO STRENUOUS EXERCISE (LIKE A BRISK WALK)?: 3 DAYS

## 2020-09-16 SDOH — HEALTH STABILITY: MENTAL HEALTH
STRESS IS WHEN SOMEONE FEELS TENSE, NERVOUS, ANXIOUS, OR CAN'T SLEEP AT NIGHT BECAUSE THEIR MIND IS TROUBLED. HOW STRESSED ARE YOU?: TO SOME EXTENT

## 2020-09-16 SDOH — HEALTH STABILITY: PHYSICAL HEALTH: ON AVERAGE, HOW MANY MINUTES DO YOU ENGAGE IN EXERCISE AT THIS LEVEL?: 80 MIN

## 2020-09-16 NOTE — PATIENT INSTRUCTIONS
Get mammogram  Get DEXA 4/2021  Schedule colonoscopy  Get blood work when fasting  Schedule a osteoporosis appt with me after the DXA   Get shingrex at pharmacy       DEXA Screening Tool    Dexa Scan  Is the patient taking any calcium supplements? yes, if yes patient must stop calcium supplements 48 hours prior to appointment.  Radiology: call to schedule DEXA  [phone] [facility] access hospital and [facility] ([facility]) [phone] (2nd Floor [facility])     .Please call to schedule lab: [phone]      Nutritious diet  Eat 1200 mg calcium total every day.  Many people achieve this by a diet containing calcium (milk, yogurt, cheese, and other dairy products, supplemented food) and 1 calcium pill. If you don't eat dairy, you should take a calcium supplement 2 times a day.  Eat 1000 IU of vitamin D on daily basis.    Eat a variety of fruits and vegetables and eat whole grains.  Try to choose foods with a high NuVal score, if your grocery store shows this number. High numbers, like 80 or 90, are nutritious foods, and low scores, like 10 are less nutritious foods.          Men should drink no more than 2 alcoholic beverages daily on average; women should drink no more than 1 alcoholic beverage daily on average.    Everyone should avoid all tobacco and nicotine-containing products.    Exercise: Aim for:  Moderate activity (where you can still talk while doing it, such as walking about 100 steps per minute, or 3,000 steps in 30 minutes) for 30 minutes at least 5 days a week  Or  Vigorous exercise (you are working so hard you are breathing hard and fast and your heart rate has gone up, such as playing basketball or soccer) at least 75 minutes weekly    If you have trouble doing 30 minutes of activity, all at once, try small bursts of activity. Go to www.abefitness.com for suggestions on how you can do this at home or work.     All adults should try to do muscle strengthening exercise  at least 2 days a week    Safety  Always wear bike/motorcycle helmet and seatbelt in a vehicle  Make sure your home has smoke and carbon monoxide detectors.  Wear broad spectrum sunscreen of at least SPF 15 on sun-exposed skin.    Preventing disease  See your dentist at least once a year  Have your eyes checked every 1-2 years.  Get a flu shot each year.

## 2020-09-16 NOTE — PROGRESS NOTES
REASON for VISIT establish care, physical  Did not want a medicare wellness visit       HPI   Meme Butts is a 70 year old female who is here for a preventive examination. She is PM [id number]  --she is s/p hysterectomy for cervical pathology in 1995.  No vaginal itching, discharge or burning.  She is not sexually active.    Mammogram  4/2019 - previous bx on right breast - benign - 10 or more yrs ago     Hx of colon polyps - FHx of polyps  - had tubular adenoma in 2011;  Last one was 2017  Told repeat in 3 yrs if adenomatous (no path report seen)  Or 5 yrs.      Hx of osteoporosis - on fosamax  3-4 yrs  - no stomach problems  DEXA 4/2019  - takes calcium pills and vit d     Hx prediabetes - A1C in 2019 was 6.2  Does bs checks periodically  - runs in low 100's     Genetic testing negative for 12 genes on [facility] Panel through [facility], 11/25/2016. Negative for mutations in BRCA1, BRCA2, BRIP1, EPCAM, MLH1, MSH2, MSH6, PALB2, PMS2, PTEN, RAD51D and TP53. One variant of unknown significance found in RAD51C gene  Past Medical History:   Diagnosis Date     Adenomatous colon polyp 2011    tubular adenoma     At high risk for breast cancer 02/16/2017    35.4% lifetime risk by Philip model     Epilepsy (H)      Fracture of metatarsal bone of left foot 7/14     Fracture, radius 1990    and ulnar styloid fracture     GIB (gastrointestinal bleeding) 4/1/2011     Kidney stones 1970, 1975    during pregnancy     Osteoporosis 2016    T score -4.1     Pneumonia      Prediabetes      Past Surgical History:   Procedure Laterality Date     COLONOSCOPY  3/31/2011    Procedure:COLONOSCOPY; Surgeon:PACO DIETRICH; Location:[facility]     COLONOSCOPY  3/31/2011    Procedure:COMBINED COLONOSCOPY, REMOVE TUMOR/POLYP/LESION BY SNARE; Surgeon:PACO DIETRICH; Location:[facility]     COLONOSCOPY  2017    had polyps - path not available - repeat 3-5 yrs      ESOPHAGOSCOPY, GASTROSCOPY, DUODENOSCOPY (EGD), COMBINED  3/31/2011    Procedure:COMBINED  "ESOPHAGOSCOPY, GASTROSCOPY, DUODENOSCOPY (EGD); Surgeon:PACO DIETRICH; Location: GI     HC BREATH HYDROGEN TEST  6/2/2011    Procedure:HYDROGEN BREATH TEST; Surgeon:MANDIE BRADY; Location: GI     HYSTERECTOMY  1994 or 1995    Partial hysterectomy 1995, urethral tube \"widened\" 1994     ORTHOPEDIC SURGERY      L wrist           Current Outpatient Medications   Medication Sig Dispense Refill     alendronate (FOSAMAX) 70 MG tablet Take 1 tablet (70 mg) by mouth every 7 days For  refills, please schedule a follow-up appointment at [phone]. 12 tablet 0     blood glucose (CONTOUR NEXT TEST) test strip Use to test blood sugar 1 times daily or as directed.  Ok to substitute alternative if insurance prefers. 100 strip 3     blood glucose (ONETOUCH ULTRA) test strip Use to test blood sugar 1 times daily or as directed. For additional refills, please schedule a follow-up appointment with primary care at [phone] 100 strip 0     blood glucose monitoring (ACCU-CHEK TALIA PLUS) meter device kit Use to test blood sugar 1 time daily or as directed. 1 kit 1     blood glucose monitoring (JADE MICROLET) lancets Use to test blood sugar 1 times daily or as directed.  Ok to substitute alternative if insurance prefers. 100 each 3     blood glucose monitoring (SOFTCLIX) lancets Use to test blood sugar 1 time daily or as directed. 100 each 3     CALCIUM 500 +D 500-400 MG-UNIT TABS Take 1 tablet by mouth 2 times daily 180 tablet 3     cholecalciferol (VITAMIN D) 1000 UNIT tablet Take 1 tablet (1,000 Units) by mouth daily 90 tablet 3     Cyanocobalamin (VITAMIN B 12 PO) Take 100 mcg by mouth       Multiple Vitamins-Minerals (CENTRUM SILVER) per tablet Take 1 tablet by mouth daily       diclofenac (VOLTAREN) 1 % topical gel Place 4 g onto the skin 4 times daily (Patient not taking: Reported on 9/16/2020) 1 Tube 3    No Known Allergies      Social History     Tobacco Use     Smoking status: Former Smoker     Packs/day: 1.00 "     Years: 15.00     Pack years: 15.00     Types: Cigarettes     Start date: 1994     Last attempt to quit: 2009     Years since quittin.0     Smokeless tobacco: Never Used   Substance Use Topics     Alcohol use: Yes     Comment: Rarely     Drug use: No     Social History     Socioeconomic History     Marital status:      Spouse name: Quentin     Number of children: 3     Years of education: Not on file     Highest education level: Not on file   Occupational History     Occupation:      Employer: RETIRED   Social Needs     Financial resource strain: Not on file     Food insecurity     Worry: Not on file     Inability: Not on file     Transportation needs     Medical: Not on file     Non-medical: Not on file   Tobacco Use     Smoking status: Former Smoker     Packs/day: 1.00     Years: 15.00     Pack years: 15.00     Types: Cigarettes     Start date: 1994     Last attempt to quit: 2009     Years since quittin.0     Smokeless tobacco: Never Used   Substance and Sexual Activity     Alcohol use: Yes     Comment: 1x/month     Drug use: No     Sexual activity: Not Currently     Partners: Male     Birth control/protection: Post-menopausal   Lifestyle     Physical activity     Days per week: 3 days     Minutes per session: 80 min     Stress: To some extent   Relationships     Social connections     Talks on phone: Not on file     Gets together: Not on file     Attends Islam service: Not on file     Active member of club or organization: Not on file     Attends meetings of clubs or organizations: Not on file     Relationship status: Not on file     Intimate partner violence     Fear of current or ex partner: Not on file     Emotionally abused: Not on file     Physically abused: Not on file     Forced sexual activity: Not on file   Other Topics Concern     Parent/sibling w/ CABG, MI or angioplasty before 65F 55M? Not Asked   Social History Narrative    Lives in a  "house, has  3 children     lost 1 child, retired          Family History   Problem Relation Age of Onset     Breast Cancer Mother 30        lumpectomy and chemo; also \"mass in ovary\"     Osteoporosis Sister      Gastrointestinal Disease Son         intestinal transplant     Breast Cancer Sister 55     Leukemia Sister 52     Diabetes Maternal Aunt         multiple mat aunts     Bone Cancer Niece 19     Colon Polyps Father      Colon Polyps Brother      Coronary Artery Disease Early Onset Brother 50     Prostate Cancer Brother      Ovarian Cancer Maternal Aunt 50         of ovarian cancer in 50s or 60s, unknown     Prostate Cancer Cousin 65     Breast Cancer Paternal Aunt 36         of breast cancer recurrence in 60s     Breast Cancer Cousin 30        paternal cousin, BRCA1+ by report     Breast Cancer Cousin 40        paternal cousin, BRCA1+ by report     Breast Cancer Cousin 35        paternal cousin, BRCA1+ by report     Genetic Disorder Cousin         paternal male cousin, BRCA1+ by report     Ovarian Cancer Cousin         paternal cousin, BRCA1+ by report     Most Recent Immunizations   Administered Date(s) Administered     Influenza (High Dose) 3 valent vaccine 2020     Pneumo Conj 13-V (2010&after) 10/13/2016     Pneumococcal 23 valent 2018     TDAP Vaccine (Boostrix) 2018            Review Of Systems  General:negative  Skin: negative  Eyes: negative  Ears/Nose/Throat: sinus trouble  Respiratory: No shortness of breath, dyspnea on exertion, cough, or hemoptysis  Cardiovascular: negative  Gastrointestinal: negative  Genitourinary: negative  Musculoskeletal: joint pain  Neurologic: seizures  Psychiatric: sleep disturbance  Hematologic/Lymphatic/Immunologic: allergies  Endocrine: prediabetes    OBJECTIVE:  /81 (BP Location: Right arm, Patient Position: Sitting, Cuff Size: Adult Large)   Pulse 90   Wt 79.4 kg (175 lb 1.6 oz)   SpO2 95%   Breastfeeding No   BMI 32.03 kg/m    Gen: "  Older  woman in NAD  Skin: flushing of cheeks  HEENT: PERRL fundi red reflex  Ears clear with good light reflex.  OP mask  Neck  Supple.  Thyroid not palpable  No carotid bruits.  No LAD  CVS exam: S1, S2 normal, no murmur, click, rub or gallop, regular rate and rhythm, chest is clear without rales or wheezing, no pedal edema, no JVD, no hepatosplenomegaly.  Abd Soft ND NT  BS active  No masses or HSM  Ext   Good pulses. No edema  Musculoskeletal: spine has thoracic kyphosis, extremities full ROM, no deformity  BREAST:  normal without suspicious masses, skin changes or axillary nodes, symmetric fibrous changes in both upper outer quadrants   Pelvic exam:deferred  Rectal exam: deferred  Neuro: Neurological exam reveals normal without focal findings, mental status, speech normal, alert and oriented x iii, CLAUDIO, cranial nerves 2-12 intact, muscle tone and strength normal and symmetric, reflexes normal and symmetric.      ASSESSMENT:This is a 71yo PM female with PMH of epilepsy, osteoporosis, prediabetes and colon polyps who comes in to establish care with a new doctor and wants a physical.    (Z00.00) Annual physical exam  (primary encounter diagnosis)  Comment: should get shingrex - other immunizations UTD,  Pap not indicated - other health maintence issue addressed below   Plan: Basic metabolic panel            (M81.0) Age-related osteoporosis without current pathological fracture  Comment: DXA is due in 4/2021 - has been on alendronate for about 4 yrs  Plan: alendronate (FOSAMAX) 70 MG tablet, Dexa         hip/pelvis/spine        See me after DXA    (G40.909) Nonintractable epilepsy without status epilepticus, unspecified epilepsy type (H)  Comment: no seizures in last 20 yrs - sees neurology - changed to Keppra a few yrs ago  Plan: DISCONTINUED: levETIRAcetam (KEPPRA) 750 MG         tablet            (Z12.31) Encounter for screening mammogram for breast cancer  Comment: hi risk for breast cancer  Plan: Mammogram,  routine screening        due    (R73.03) Prediabetes  Comment: last A1C was 6.2 - does watch diet and takes bs  Plan: Hemoglobin A1c        repeat    (E78.5) Hyperlipidemia LDL goal <100  Comment: last LDL was 104 (4/2019) not on a statin  Plan: Lipid Profile FASTING            (Z86.010) History of colonic polyps  Comment: hx of adenomatous polyps - last colonoscopy in 2017 and told to repeat in 3-5 yrs  Plan: GASTROENTEROLOGY ADULT REF PROCEDURE ONLY              Maria Esther Miranda MD, PhD

## 2020-09-16 NOTE — NURSING NOTE
Chief Complaint   Patient presents with     Establish Care     Pt comes in to establish care     Physical     Pt comes in for physical exam      THUAN Vargas at 3:32 PM sign on 9/16/2020

## 2020-09-18 DIAGNOSIS — E78.5 HYPERLIPIDEMIA LDL GOAL <100: ICD-10-CM

## 2020-09-18 DIAGNOSIS — Z00.00 ANNUAL PHYSICAL EXAM: ICD-10-CM

## 2020-09-18 DIAGNOSIS — R73.03 PREDIABETES: ICD-10-CM

## 2020-09-18 LAB
ANION GAP SERPL CALCULATED.3IONS-SCNC: 7 MMOL/L (ref 3–14)
BUN SERPL-MCNC: 14 MG/DL (ref 7–30)
CALCIUM SERPL-MCNC: 8.9 MG/DL (ref 8.5–10.1)
CHLORIDE SERPL-SCNC: 105 MMOL/L (ref 94–109)
CHOLEST SERPL-MCNC: 243 MG/DL
CO2 SERPL-SCNC: 28 MMOL/L (ref 20–32)
CREAT SERPL-MCNC: 0.75 MG/DL (ref 0.52–1.04)
GFR SERPL CREATININE-BSD FRML MDRD: 81 ML/MIN/{1.73_M2}
GLUCOSE SERPL-MCNC: 99 MG/DL (ref 70–99)
HBA1C MFR BLD: 6 % (ref 0–5.6)
HDLC SERPL-MCNC: 57 MG/DL
LDLC SERPL CALC-MCNC: 129 MG/DL
NONHDLC SERPL-MCNC: 186 MG/DL
POTASSIUM SERPL-SCNC: 4.1 MMOL/L (ref 3.4–5.3)
SODIUM SERPL-SCNC: 140 MMOL/L (ref 133–144)
TRIGL SERPL-MCNC: 282 MG/DL

## 2020-09-25 ENCOUNTER — ANCILLARY PROCEDURE (OUTPATIENT)
Dept: MAMMOGRAPHY | Facility: CLINIC | Age: 70
End: 2020-09-25
Attending: FAMILY MEDICINE
Payer: COMMERCIAL

## 2020-09-25 DIAGNOSIS — Z12.31 ENCOUNTER FOR SCREENING MAMMOGRAM FOR BREAST CANCER: ICD-10-CM

## 2020-10-01 ENCOUNTER — ANCILLARY PROCEDURE (OUTPATIENT)
Dept: MAMMOGRAPHY | Facility: CLINIC | Age: 70
End: 2020-10-01
Attending: FAMILY MEDICINE
Payer: COMMERCIAL

## 2020-10-01 DIAGNOSIS — Z12.31 ENCOUNTER FOR SCREENING MAMMOGRAM FOR BREAST CANCER: ICD-10-CM

## 2020-10-15 ENCOUNTER — TELEPHONE (OUTPATIENT)
Dept: FAMILY MEDICINE | Facility: CLINIC | Age: 70
End: 2020-10-15

## 2020-10-15 DIAGNOSIS — G40.909 NONINTRACTABLE EPILEPSY WITHOUT STATUS EPILEPTICUS, UNSPECIFIED EPILEPSY TYPE (H): ICD-10-CM

## 2020-10-19 NOTE — TELEPHONE ENCOUNTER
levETIRAcetam (KEPPRA) 750 MG tablet       Last Written Prescription Date:  9/16/20 - 9/19/20  Last Fill Quantity: 105,   # refills: 1  Last Office Visit : 9/16/20  Future Office visit:  none    Routing refill request to provider for review/approval because:  Drug not active on patient's medication list  Medication is reported/historical    Script filled  10-21-20  Maria Esther Miranda MD, PhD

## 2020-10-20 RX ORDER — LEVETIRACETAM 750 MG/1
TABLET ORAL
Qty: 105 TABLET | Refills: 3 | Status: SHIPPED | OUTPATIENT
Start: 2020-10-20

## 2020-10-30 ENCOUNTER — TELEPHONE (OUTPATIENT)
Dept: GASTROENTEROLOGY | Facility: CLINIC | Age: 70
End: 2020-10-30

## 2020-10-30 NOTE — TELEPHONE ENCOUNTER
2nd attempt to schedule the following procedure:    Procedure: Lower Endoscopy    Lower Endoscopy Type: Colonoscopy    Purpose of Colonoscopy Procedure: History of Polyps    Colonoscopy reason for referral: hx of tubular adenomatous polyps    Colonoscopy Sedation: Conscious/Moderate    Preferred Location: [facility]      LMTCB x2

## 2020-12-16 DIAGNOSIS — M81.0 AGE-RELATED OSTEOPOROSIS WITHOUT CURRENT PATHOLOGICAL FRACTURE: ICD-10-CM

## 2020-12-20 RX ORDER — ALENDRONATE SODIUM 70 MG/1
70 TABLET ORAL
Qty: 12 TABLET | Refills: 0 | Status: SHIPPED | OUTPATIENT
Start: 2020-12-20 | End: 2021-03-29

## 2020-12-20 NOTE — TELEPHONE ENCOUNTER
9/16/2020  [facility]   Maria Esther Miranda MD PhD  Family Medicine   NV: none     9/16/20  DXA is due in 4/2021     alendronate (FOSAMAX) 70 MG tablet

## 2021-01-12 ENCOUNTER — MYC MEDICAL ADVICE (OUTPATIENT)
Dept: FAMILY MEDICINE | Facility: CLINIC | Age: 71
End: 2021-01-12

## 2021-02-23 ENCOUNTER — TELEPHONE (OUTPATIENT)
Dept: GASTROENTEROLOGY | Facility: CLINIC | Age: 71
End: 2021-02-23

## 2021-02-23 NOTE — TELEPHONE ENCOUNTER
Patient is scheduled for colonosocpy with Dr. Chaparro    Spoke with: Meme Butts    Date of Procedure: 3/15/2021    Location: [facility]    Sedation Type MAC    Pre-op for Unit J MAC and OR not needed    (if yes advise patient they will need a pre-op prior to procedure)      Is patient on blood thinners? -no (If yes- inform patient to follow up with PCP or provider for follow up instructions)     Informed patient they will need an adult  yes    Informed Patient of COVID Test Requirement yes and scheduled    Preferred Pharmacy for Pre Prescription on chart    Confirmed Nurse will call to complete assessment yes    Additional comments: no

## 2021-02-25 DIAGNOSIS — Z11.59 ENCOUNTER FOR SCREENING FOR OTHER VIRAL DISEASES: ICD-10-CM

## 2021-03-08 ENCOUNTER — MYC MEDICAL ADVICE (OUTPATIENT)
Dept: FAMILY MEDICINE | Facility: CLINIC | Age: 71
End: 2021-03-08

## 2021-03-08 ENCOUNTER — TELEPHONE (OUTPATIENT)
Dept: GASTROENTEROLOGY | Facility: OUTPATIENT CENTER | Age: 71
End: 2021-03-08

## 2021-03-09 NOTE — TELEPHONE ENCOUNTER
3-9-21 she checked with her insurance - last colonoscopy was 3/2017 - has to be 5 yrs for next screen.  Maria Esther Miranda MD, PhD

## 2021-03-21 ENCOUNTER — HEALTH MAINTENANCE LETTER (OUTPATIENT)
Age: 71
End: 2021-03-21

## 2021-03-26 DIAGNOSIS — M81.0 AGE-RELATED OSTEOPOROSIS WITHOUT CURRENT PATHOLOGICAL FRACTURE: ICD-10-CM

## 2021-03-29 RX ORDER — ALENDRONATE SODIUM 70 MG/1
70 TABLET ORAL
Qty: 12 TABLET | Refills: 0 | Status: SHIPPED | OUTPATIENT
Start: 2021-03-29 | End: 2021-07-02

## 2021-03-29 NOTE — TELEPHONE ENCOUNTER
Last Clinic Visit: 9/16/2020 [facility]  Future DEXA: 4/20/2021

## 2021-04-20 ENCOUNTER — ANCILLARY PROCEDURE (OUTPATIENT)
Dept: BONE DENSITY | Facility: CLINIC | Age: 71
End: 2021-04-20
Attending: FAMILY MEDICINE
Payer: COMMERCIAL

## 2021-04-20 DIAGNOSIS — M81.0 AGE-RELATED OSTEOPOROSIS WITHOUT CURRENT PATHOLOGICAL FRACTURE: ICD-10-CM

## 2021-04-20 PROCEDURE — 77080 DXA BONE DENSITY AXIAL: CPT | Performed by: INTERNAL MEDICINE

## 2021-07-02 DIAGNOSIS — M81.0 AGE-RELATED OSTEOPOROSIS WITHOUT CURRENT PATHOLOGICAL FRACTURE: ICD-10-CM

## 2021-07-02 RX ORDER — ALENDRONATE SODIUM 70 MG/1
70 TABLET ORAL
Qty: 12 TABLET | Refills: 0 | Status: SHIPPED | OUTPATIENT
Start: 2021-07-02 | End: 2021-09-21

## 2021-08-18 ENCOUNTER — MYC MEDICAL ADVICE (OUTPATIENT)
Dept: FAMILY MEDICINE | Facility: CLINIC | Age: 71
End: 2021-08-18

## 2021-08-18 ENCOUNTER — OFFICE VISIT (OUTPATIENT)
Dept: FAMILY MEDICINE | Facility: CLINIC | Age: 71
End: 2021-08-18
Payer: COMMERCIAL

## 2021-08-18 ENCOUNTER — LAB (OUTPATIENT)
Dept: LAB | Facility: CLINIC | Age: 71
End: 2021-08-18

## 2021-08-18 VITALS
WEIGHT: 173 LBS | SYSTOLIC BLOOD PRESSURE: 118 MMHG | BODY MASS INDEX: 31.64 KG/M2 | OXYGEN SATURATION: 97 % | DIASTOLIC BLOOD PRESSURE: 71 MMHG | HEART RATE: 84 BPM

## 2021-08-18 DIAGNOSIS — N30.00 ACUTE CYSTITIS WITHOUT HEMATURIA: ICD-10-CM

## 2021-08-18 DIAGNOSIS — Z13.29 SCREENING FOR HYPOTHYROIDISM: ICD-10-CM

## 2021-08-18 DIAGNOSIS — M81.0 SENILE OSTEOPOROSIS: ICD-10-CM

## 2021-08-18 DIAGNOSIS — M81.0 SENILE OSTEOPOROSIS: Primary | ICD-10-CM

## 2021-08-18 DIAGNOSIS — R30.0 DYSURIA: ICD-10-CM

## 2021-08-18 LAB
ALBUMIN UR-MCNC: NEGATIVE MG/DL
ANION GAP SERPL CALCULATED.3IONS-SCNC: 7 MMOL/L (ref 3–14)
APPEARANCE UR: ABNORMAL
BACTERIA #/AREA URNS HPF: ABNORMAL /HPF
BILIRUB UR QL STRIP: NEGATIVE
BUN SERPL-MCNC: 15 MG/DL (ref 7–30)
CALCIUM SERPL-MCNC: 9.8 MG/DL (ref 8.5–10.1)
CHLORIDE BLD-SCNC: 107 MMOL/L (ref 94–109)
CO2 SERPL-SCNC: 26 MMOL/L (ref 20–32)
COLOR UR AUTO: YELLOW
CREAT SERPL-MCNC: 0.75 MG/DL (ref 0.52–1.04)
GFR SERPL CREATININE-BSD FRML MDRD: 81 ML/MIN/1.73M2
GLUCOSE BLD-MCNC: 93 MG/DL (ref 70–99)
GLUCOSE UR STRIP-MCNC: NEGATIVE MG/DL
HGB UR QL STRIP: NEGATIVE
KETONES UR STRIP-MCNC: NEGATIVE MG/DL
LEUKOCYTE ESTERASE UR QL STRIP: ABNORMAL
MAGNESIUM SERPL-MCNC: 2.3 MG/DL (ref 1.6–2.3)
NITRATE UR QL: POSITIVE
PH UR STRIP: 5 [PH] (ref 5–7)
PHOSPHATE SERPL-MCNC: 3.9 MG/DL (ref 2.5–4.5)
POTASSIUM BLD-SCNC: 4 MMOL/L (ref 3.4–5.3)
PTH-INTACT SERPL-MCNC: 33 PG/ML (ref 18–80)
RBC URINE: 2 /HPF
SODIUM SERPL-SCNC: 140 MMOL/L (ref 133–144)
SP GR UR STRIP: 1.01 (ref 1–1.03)
SQUAMOUS EPITHELIAL: 3 /HPF
TOTAL PROTEIN SERUM FOR ELP: 7.8 G/DL (ref 6.8–8.8)
TSH SERPL DL<=0.005 MIU/L-ACNC: 0.7 MU/L (ref 0.4–4)
UROBILINOGEN UR STRIP-MCNC: NORMAL MG/DL
WBC URINE: 14 /HPF

## 2021-08-18 PROCEDURE — 84155 ASSAY OF PROTEIN SERUM: CPT | Mod: 90 | Performed by: PATHOLOGY

## 2021-08-18 PROCEDURE — 83735 ASSAY OF MAGNESIUM: CPT | Performed by: PATHOLOGY

## 2021-08-18 PROCEDURE — 84443 ASSAY THYROID STIM HORMONE: CPT | Performed by: PATHOLOGY

## 2021-08-18 PROCEDURE — 36415 COLL VENOUS BLD VENIPUNCTURE: CPT | Performed by: PATHOLOGY

## 2021-08-18 PROCEDURE — 84100 ASSAY OF PHOSPHORUS: CPT | Performed by: PATHOLOGY

## 2021-08-18 PROCEDURE — 81001 URINALYSIS AUTO W/SCOPE: CPT | Performed by: PATHOLOGY

## 2021-08-18 PROCEDURE — 84080 ASSAY ALKALINE PHOSPHATASES: CPT | Mod: 90 | Performed by: PATHOLOGY

## 2021-08-18 PROCEDURE — 84165 PROTEIN E-PHORESIS SERUM: CPT | Mod: 26 | Performed by: STUDENT IN AN ORGANIZED HEALTH CARE EDUCATION/TRAINING PROGRAM

## 2021-08-18 PROCEDURE — 80048 BASIC METABOLIC PNL TOTAL CA: CPT | Performed by: PATHOLOGY

## 2021-08-18 PROCEDURE — 83970 ASSAY OF PARATHORMONE: CPT | Mod: 90 | Performed by: PATHOLOGY

## 2021-08-18 PROCEDURE — 87086 URINE CULTURE/COLONY COUNT: CPT | Mod: 90 | Performed by: PATHOLOGY

## 2021-08-18 PROCEDURE — 87186 SC STD MICRODIL/AGAR DIL: CPT | Mod: 90 | Performed by: PATHOLOGY

## 2021-08-18 PROCEDURE — 99215 OFFICE O/P EST HI 40 MIN: CPT | Performed by: FAMILY MEDICINE

## 2021-08-18 PROCEDURE — 82306 VITAMIN D 25 HYDROXY: CPT | Mod: 90 | Performed by: PATHOLOGY

## 2021-08-18 RX ORDER — CYCLOBENZAPRINE HCL 5 MG
TABLET ORAL PRN
COMMUNITY
Start: 2021-06-23 | End: 2024-02-21

## 2021-08-18 ASSESSMENT — PAIN SCALES - GENERAL: PAINLEVEL: NO PAIN (0)

## 2021-08-18 NOTE — PROGRESS NOTES
"Meme is a  71 year old female /post menopausal] [GR3, ] that presents today for osteoporosis consult. She is on alendronate for 4-5 yrs.  No heartburn.    Referring Physician: Referred Self    HPI     Have you ever had a bone density test? Yes  Where = [facility]  When = 2019, 4/2021  Spine Tscore = L1-4  -3.0  Z=  -2.2   Left neck Tscore = -2.6  Total left hip Tscore = -2.0  Right neck Tscore = -2.3  Total Right hip Tscore = -1.9  Have you received any x-ray dye or contrast in the last ten days? No  How many servings of dairy products do you consume per day? 1-2 Type: ice cream, milk, greens  Do you take a multi-vitamin daily? Yes  Do you take a vitamin D supplement? Yes 600IU  Do you take a calcium supplement daily? Calcium with D - 500mg  1/day   Do you take a supplement containing strontium? No  Are you exposed to natural sunlight at least 20 minutes three times a week? Yes  Have you broken any bones during your adult life? Yes. Details: both feet - bones in feet in last 10 years   How tall were you at age 26? 62\"  Have you gone through menopause? Yes  Did your menopause occur before age 46? No  Have you taken hormone therapy? No    Social History   reports that she quit smoking about 12 years ago. Her smoking use included cigarettes. She started smoking about 27 years ago. She has a 15.00 pack-year smoking history. She has never used smokeless tobacco. She reports current alcohol use. She reports that she does not use drugs.  Do you smoke cigarettes? Reformed smoker  Do you exercise? Yes. Details: walkling 2 miles - limited with heat and smoke   Do you drink alcohol? Yes. Details: wine every 2 wks     Medication History  Have you taken any of the following medications?   Blood thinner (Coumadin or Heparin): No   Chemotherapy (ex: Lupron, Arimidex): No   Depo Provera: No   Anti-Seizure (ex: Dilantin, Depakote): yes    Steroids (ex: Prednisone, Cortisone): No   Thyroid (ex: Synthroid): No  Have you used " any of the following medications?   Actonel (Risedronate): No   Aredia (Pamidronate): No   Boniva (Ibindronate): No   Didronil (Etidronate): No   Evista (Raloxifene): No   Fosamax (Alendronate): currently  For 4-5 yrs    Forteo (Parathyroid hormone) injections: No   HCTZ (Thiazide): No   Calcitonin nasal spray: No   Reclast or Zometa (Zolendronate): No   Prolia (Denosumab): No   HT: No  Current Outpatient Medications   Medication Sig Dispense Refill     alendronate (FOSAMAX) 70 MG tablet Take 1 tablet (70 mg) by mouth every 7 days 12 tablet 0     blood glucose (CONTOUR NEXT TEST) test strip Use to test blood sugar 1 times daily or as directed.  Ok to substitute alternative if insurance prefers. 100 strip 3     blood glucose (ONETOUCH ULTRA) test strip Use to test blood sugar 1 times daily or as directed. For additional refills, please schedule a follow-up appointment with primary care at [phone] 100 strip 0     blood glucose monitoring (ACCU-CHEK TALIA PLUS) meter device kit Use to test blood sugar 1 time daily or as directed. 1 kit 1     blood glucose monitoring (JADE MICROLET) lancets Use to test blood sugar 1 times daily or as directed.  Ok to substitute alternative if insurance prefers. 100 each 3     blood glucose monitoring (SOFTCLIX) lancets Use to test blood sugar 1 time daily or as directed. 100 each 3     CALCIUM 500 +D 500-400 MG-UNIT TABS Take 1 tablet by mouth 2 times daily 180 tablet 3     cholecalciferol (VITAMIN D) 1000 UNIT tablet Take 1 tablet (1,000 Units) by mouth daily 90 tablet 3     Cyanocobalamin (VITAMIN B 12 PO) Take 100 mcg by mouth       diclofenac (VOLTAREN) 1 % topical gel Place 4 g onto the skin 4 times daily (Patient not taking: Reported on 9/16/2020) 1 Tube 3     levETIRAcetam (KEPPRA) 750 MG tablet PT REPORTS TAPERING DOWN: TAKE 1 & 1/2 TABS IN THE MORNING, AND 2 TABS IN THE EVENING. 105 tablet 3     Multiple Vitamins-Minerals (CENTRUM SILVER) per tablet Take 1 tablet by mouth  "daily          No Known Allergies    Past Medical History  Do you have or have you had any of the following?   Celiac sprue (wheat intolerance):No   Chronic low back problems (ex: scohosis, arthritis): No   Diabetes mellitus: No   High blood calcium level: No   Hip or spine injury: No   History of an eating disorder: No   History of gastric bypass: No   Hyperparathyroidism: No   Inflammatory bowel disease (ex: Crohn's, ulcerative colitis): No   Kidney disease: No   Kidney stones: yes   Liver disease: No   Lupus: No   Overactive thyroid gland: No   Rhuematoid arthritis: No    Have you had any of the following?   Hysterectomy: Yes   Ovaries removed: No   Breast cancer: No   Family history of breast cancer: Yes. Details: mother sister MGM, maternal and paternal  aunts    Family History   Problem Relation Age of Onset     Breast Cancer Mother 30        lumpectomy and chemo; also \"mass in ovary\"     Osteoporosis Sister      Gastrointestinal Disease Son         intestinal transplant     Breast Cancer Sister 55     Leukemia Sister 52     Diabetes Maternal Aunt         multiple mat aunts     Bone Cancer Niece 19     Colon Polyps Father      Colon Polyps Brother      Coronary Artery Disease Early Onset Brother 50     Prostate Cancer Brother      Ovarian Cancer Maternal Aunt 50         of ovarian cancer in 50s or 60s, unknown     Prostate Cancer Cousin 65     Breast Cancer Paternal Aunt 36         of breast cancer recurrence in 60s     Breast Cancer Cousin 30        paternal cousin, BRCA1+ by report     Breast Cancer Cousin 40        paternal cousin, BRCA1+ by report     Breast Cancer Cousin 35        paternal cousin, BRCA1+ by report     Genetic Disorder Cousin         paternal male cousin, BRCA1+ by report     Ovarian Cancer Cousin         paternal cousin, BRCA1+ by report     Is there a family history of osteoporosis? Yes. Details: MGM PGM   Did either parent have a hip fracture? No    ROS:  General: " none  Head/Eyes: visual difficulty  Ears/Nose/Throat: stuffiness/congestion  Cardiovascular: none  Respiratory: difficulty breathing from smoke in the air   Gastrointestinal: none  Breast: none  Genitourinary: painful/difficult urination and frequency/urgency  Sexual Function: none  Musculoskeletal: joint pain  Skin: none  Neurological: none  Mental Health: none  Endocrine: none    Clinic Measurements  Vitals: /71 (BP Location: Right arm, Patient Position: Sitting, Cuff Size: Adult Regular)   Pulse 84   Wt 78.5 kg (173 lb)   SpO2 97%   BMI 31.64 kg/m    BMI= Body mass index is 31.64 kg/m .    Physical exam  Constitutional: Well appearing woman in no acute distress.   Psychological: appropriate mood.  Neck: No thyroidmegaly. no carotid bruits.  Cardiovascular: regular rate and rhythm, normal S1 and S2, no murmurs, rubs or gallops, peripheral pulses full and symmetric   Respiratory: clear to auscultation, no wheezes or crackles, normal breath sounds.  Spine: Curved, not tender, Flexion adequate, Extension adequate, Lateral movement adequate, Rotational movement adequate  Musculoskeletal: good range of motion, no edema and motor strength is equal in the upper and lower extremities    Skin: no concerning lesions, no jaundice.  Neurological: cranial nerves intact, normal strength, reflexes at patella and biceps 2/4 , normal gait, no tremor.     LAB  Vertebra; Fracture Assessment: NA  Dexa Scan: 4/2021  Vitamin D  =34 in 2019    FRAX Assessment Tool: [N/A, on alendronate      Risk Factors: PM, age, T scores, hx fracture, low vit D, kidney stones, +FHx    ASSESSMENT:  This is a 71-year-old postmenopausal female that has osteoporosis by T-scores.  She has been on alendronate for 4 to 5 years and her DEXA shows low T-scores however she has not had significant change compared to the previous exam in 2019.  She is not having GI symptoms.  However she has been on alendronate for almost 5 years and is due for a  holiday.  She does have a Z score that suggests a  secondary cause.  We will do blood test to evaluate this.  We will have her stop the alendronate.  We did talk about other options that we could use, however will wait till spring since the alendronate has a lasting effect.  We talked about Prolia and Evenity, she was not interested in daily injections.  We discussed calcium and vitamin D.  I will see her back in the spring and will discuss further treatment.    PLAN:  Blood work today  Finish current script of alendronate then stop  See me in May about osteoporosis  See me in fall for a medicare wellness visit   Urine test today  Patient should take 1200mg of calcium/day in divided doses - diet and all supplements combined -  and vitamin D3 1000IU/day.  You only absorb 5-600mg of calcium at a time   Dietary calcium is the best    Maria Esther Miranda MD, PhD    Time note (e5, 40'): The total time (on the date of service) for this service was >40 minutes, including discussion/face-to-face, chart review, interpretation not otherwise reported, documentation, and updating of the computerized record.  Maria Esther Miranda MD, PhD    8-19-21  Has +UA - will treat with septra sd bid for 7 days and check culture when available  Message sent to pt  Maria Esther Miranda MD, PhD

## 2021-08-18 NOTE — NURSING NOTE
Chief Complaint   Patient presents with     Results     pt here to follow up on dexa results     UTI     pt here to discuss possible uti, burning, pressure, started a few days ago       Lew Osorio CMA, EMT at 2:45 PM on 8/18/2021.

## 2021-08-18 NOTE — PATIENT INSTRUCTIONS
Blood work today  Finish current script of alendronate then stop  See me in May about osteoporosis  See me in fall for a medicare wellness visit   Urine test today  Patient should take 1200mg of calcium/day in divided doses - diet and all supplements combined -  and vitamin D3 1000IU/day.  You only absorb 5-600mg of calcium at a time   Dietary calcium is the best

## 2021-08-19 LAB
ALBUMIN SERPL ELPH-MCNC: 4.2 G/DL (ref 3.7–5.1)
ALP BONE SERPL-MCNC: 18.4 UG/L
ALPHA1 GLOB SERPL ELPH-MCNC: 0.3 G/DL (ref 0.2–0.4)
ALPHA2 GLOB SERPL ELPH-MCNC: 1 G/DL (ref 0.5–0.9)
B-GLOBULIN SERPL ELPH-MCNC: 1.1 G/DL (ref 0.6–1)
DEPRECATED CALCIDIOL+CALCIFEROL SERPL-MC: 40 UG/L (ref 20–75)
GAMMA GLOB SERPL ELPH-MCNC: 1.2 G/DL (ref 0.7–1.6)
M PROTEIN SERPL ELPH-MCNC: 0 G/DL
PROT PATTERN SERPL ELPH-IMP: ABNORMAL

## 2021-08-19 RX ORDER — SULFAMETHOXAZOLE/TRIMETHOPRIM 800-160 MG
1 TABLET ORAL 2 TIMES DAILY
Qty: 14 TABLET | Refills: 0 | Status: SHIPPED | OUTPATIENT
Start: 2021-08-19 | End: 2022-05-04

## 2021-08-20 LAB
BACTERIA UR CULT: ABNORMAL
BACTERIA UR CULT: ABNORMAL

## 2021-09-05 ENCOUNTER — HEALTH MAINTENANCE LETTER (OUTPATIENT)
Age: 71
End: 2021-09-05

## 2021-09-09 ENCOUNTER — MYC MEDICAL ADVICE (OUTPATIENT)
Dept: FAMILY MEDICINE | Facility: CLINIC | Age: 71
End: 2021-09-09

## 2021-09-19 DIAGNOSIS — M81.0 AGE-RELATED OSTEOPOROSIS WITHOUT CURRENT PATHOLOGICAL FRACTURE: Primary | ICD-10-CM

## 2021-09-21 RX ORDER — ALENDRONATE SODIUM 70 MG/1
70 TABLET ORAL
Qty: 12 TABLET | Refills: 3 | Status: SHIPPED | OUTPATIENT
Start: 2021-09-21 | End: 2022-08-17

## 2021-09-30 ENCOUNTER — MYC MEDICAL ADVICE (OUTPATIENT)
Dept: FAMILY MEDICINE | Facility: CLINIC | Age: 71
End: 2021-09-30

## 2021-10-31 ENCOUNTER — HEALTH MAINTENANCE LETTER (OUTPATIENT)
Age: 71
End: 2021-10-31

## 2022-05-04 ENCOUNTER — OFFICE VISIT (OUTPATIENT)
Dept: INTERNAL MEDICINE | Facility: CLINIC | Age: 72
End: 2022-05-04
Payer: COMMERCIAL

## 2022-05-04 VITALS
DIASTOLIC BLOOD PRESSURE: 83 MMHG | SYSTOLIC BLOOD PRESSURE: 114 MMHG | HEART RATE: 96 BPM | OXYGEN SATURATION: 95 % | BODY MASS INDEX: 32.56 KG/M2 | WEIGHT: 178 LBS

## 2022-05-04 DIAGNOSIS — L30.9 DERMATITIS: Primary | ICD-10-CM

## 2022-05-04 DIAGNOSIS — R60.0 PERIPHERAL EDEMA: ICD-10-CM

## 2022-05-04 DIAGNOSIS — J01.90 ACUTE SINUSITIS WITH SYMPTOMS > 10 DAYS: ICD-10-CM

## 2022-05-04 PROCEDURE — 99215 OFFICE O/P EST HI 40 MIN: CPT | Performed by: NURSE PRACTITIONER

## 2022-05-04 RX ORDER — TRIAMCINOLONE ACETONIDE 1 MG/G
CREAM TOPICAL 2 TIMES DAILY
Qty: 30 G | Refills: 0 | Status: SHIPPED | OUTPATIENT
Start: 2022-05-04 | End: 2023-02-13

## 2022-05-04 RX ORDER — HYDROXYZINE HYDROCHLORIDE 25 MG/1
25 TABLET, FILM COATED ORAL 3 TIMES DAILY PRN
Qty: 45 TABLET | Refills: 0 | Status: SHIPPED | OUTPATIENT
Start: 2022-05-04 | End: 2022-05-12

## 2022-05-04 NOTE — NURSING NOTE
Chief Complaint   Patient presents with     Leg Swelling     Bilaterally      Sinus Problem     Noticed in jan. First, had a rash appear that swelled up her face        Kelly Salmon EMT at 11:56 AM on 5/4/2022

## 2022-05-04 NOTE — PROGRESS NOTES
"  Assessment & Plan     Dermatitis  Apply triamcinolone cream to rash on nose BID x14 days, notify clinic if worsening or not improving. Can use hydroxyzine for itching, reviewed may cause drowsiness.  - triamcinolone (KENALOG) 0.1 % external cream; Apply topically 2 times daily To rash on face  - hydrOXYzine (ATARAX) 25 MG tablet; Take 1 tablet (25 mg) by mouth 3 times daily as needed for itching    Acute sinusitis with symptoms > 10 days  Frontal sinus headaches and increase in PND over past several weeks, will treat with Augmentin BID x10 days. Suspect nasal sprays would be too irritating for her, use steam/vaseline for comfort prn.   - amoxicillin-clavulanate (AUGMENTIN) 875-125 MG tablet; Take 1 tablet by mouth 2 times daily    Peripheral edema  Reviewed continue with compression stockings during the day, avoiding dietary sodium, and healthy dietary choices including including healthy fats to help avoid sugar cravings.       48 minutes spent on the date of the encounter doing chart review, history and exam, documentation and further activities per the note        Return in about 3 months (around 8/4/2022). with PCP or sooner prn for any changes or concerns    Margoth Feliciano, GWYN CNP  [facility]   Meme is a 72 year old who presents for the following health issues     HPI     Sinus issues--tested positive for COVID weekly for the last 6 weeks on home test.   Dry/cracked inside the nose, face swelling, rash on the face since Jan. Itchy on the face, feels like something \"crawling\" in the face, wants to tear her face off. Has tried \"everything\" to help the itching--applied Vicks cracked and bleeding in the nose. Tried a honey/antibacterial ointment on the face, worked a bit better, didn't improve the redness around the nose. Tried abreva ointment, didn't improve symptoms. Rash is dry and itchy. Daughter is an  but didn't want to treat this. " "Had awoken with a sinus headache the day the rash started. Hurt in the face when she tried to eat and vomited, then itchy rash developed around the nose.  Tries to avoid touching the face, avoids scratching.  No fevers she can recall, no aches/chills. Intermittent pain in the frontal sinus/headache, more PND than nasal drainage.Tries to avoid progressing into migraine. Has pain radiating into the ears. Reduced appetite.  Bleeding/cracking in the nasal pasages. Not using any nasal sprays. Has had sinus infections in the past. In the past did not have problems with sinuses and allergies, has been awful over the last 10 years. Takes Claritin but finds this can be over-drying.     Leg swelling--increases throughout the day.  Uses compression stockings during the day. Present in BLE, also notes fluid retention in the arms.  Gained about 8-10 lbs over the past year.  Denies SOB, mostly gets hot, face turns red. Able to do her activities. Feet hurting a couple years ago but changed her tennis shoes which helped, hx foot fractures. Walks a couple miles per day. Goes up and down the stairs, housework/cleans, shovels, no CP, can feel winded and recovers quickly with rest. Tolerates 45 min on recumbent bike at 6 mph, feels strong.  Drinks water, urinating. Doesn't know what to do with bloating/water retention. Doesn't want to eat. Thinks sodium is probably too high, but tries not to eat a lot of salt, but otherwise drawn to sugar. Not a \"big eater.\" Has been craving sweets but avoids sugar. Smoked salmon, lots of vegetables, pickled herring, green smoothies in the AM, nuts.     Review of Systems   Constitutional, HEENT, cardiovascular, pulmonary, gi and gu systems are negative, except as otherwise noted.      Objective    /83 (BP Location: Right arm, Patient Position: Sitting, Cuff Size: Adult Regular)   Pulse 96   Wt 80.7 kg (178 lb)   SpO2 95%   BMI 32.56 kg/m    Body mass index is 32.56 kg/m .  Physical Exam "   GENERAL: healthy, alert and no distress  EYES: Eyes grossly normal to inspection, and conjunctivae and sclerae normal  HENT: ear canals and TM's normal, mouth without ulcers or lesions, +mild nasal mucosa edema, no fissures or lesions noted, +frontal sinus tenderness  NECK: no adenopathy, no asymmetry, masses, or scars and thyroid normal to palpation  RESP: lungs clear to auscultation - no rales, rhonchi or wheezes  CV: regular rate and rhythm, normal S1 S2, no S3 or S4, no murmur, click or rub, 1+ peripheral edema, and peripheral pulses strong  ABDOMEN: soft, nontender, no hepatosplenomegaly, no masses and bowel sounds normal  MS: no gross musculoskeletal defects noted  SKIN: dry erythematous rash along lateral aspect of nose on left  PSYCH: mentation appears normal, affect normal/bright

## 2022-05-11 DIAGNOSIS — L30.9 DERMATITIS: ICD-10-CM

## 2022-05-12 NOTE — TELEPHONE ENCOUNTER
HYDROXYZINE HCL 25 MG TABLET      Last Written Prescription Date:  5/4/22  Last Fill Quantity: 45,   # refills: 0  Last Office Visit : 5/4/22  Future Office visit:  8/17/22    Routing refill request to provider for review/approval because:  Pharmacy requesting 90 day supply  Pharmacy showing the sig is different from last one  There is no record in EMR of previous prescription for hydroxyzine in active nor inactive meds

## 2022-05-13 RX ORDER — HYDROXYZINE HYDROCHLORIDE 25 MG/1
25 TABLET, FILM COATED ORAL EVERY 8 HOURS PRN
Qty: 90 TABLET | Refills: 1 | Status: SHIPPED | OUTPATIENT
Start: 2022-05-13 | End: 2023-02-13

## 2022-06-11 ENCOUNTER — HEALTH MAINTENANCE LETTER (OUTPATIENT)
Age: 72
End: 2022-06-11

## 2022-08-16 NOTE — PROGRESS NOTES
"SUBJECTIVE:   Meme Butts is a 72 year old female who presents for Preventive Visit.      Patient has been advised of split billing requirements and indicates understanding: Yes  Are you in the first 12 months of your Medicare Part B coverage?  No    Physical Health:    In general, how would you rate your overall physical health? good    Outside of work, how many days during the week do you exercise? none    Outside of work, approximately how many minutes a day do you exercise?less than 15 minutes    If you drink alcohol do you typically have >3 drinks per day or >7 drinks per week? No    Do you usually eat at least 4 servings of fruit and vegetables a day, include whole grains & fiber and avoid regularly eating high fat or \"junk\" foods? NO  At least 2     Do you have any problems taking medications regularly?  No    Do you have any side effects from medications? none    Needs assistance for the following daily activities: no assistance needed    Which of the following safety concerns are present in your home?  lack of grab bars in the bathroom     Hearing impairment: No    In the past 6 months, have you been bothered by leaking of urine? no    Mental Health:    In general, how would you rate your overall mental or emotional health? good  PHQ-2 Score:  0    Do you feel safe in your environment? Yes    Have you ever done Advance Care Planning? (For example, a Health Directive, POLST, or a discussion with a medical provider or your loved ones about your wishes): No, advance care planning information given to patient to review.  Patient plans to discuss their wishes with loved ones or provider.      Additional concerns to address?  YES Patient getting SOB with most activities with anything over 10 minutes. Sometimes gets dizzy with it. Gets palpitations with SOB. No pain or pressure,  Sometimes has to lie down or sit down 5-10 minutes and  - then will pass -happens few times/day -  this has gone on for years but " worse in the last year.  Gets hot and sweating. No hx of heart problems. Has prediabetes. No cholesterol medicine - was previous smoker - quite more than 15 yrs ago     Fall risk:  1 fall in last year  No ER visits      Cognitive Screenin) Repeat 3 items (Leader, Season, Table)    2) Clock draw: NORMAL  3) 3 item recall: Recalls 2 objects   Results: 4    Mini-CogTM Copyright S [employer]. Licensed by the author for use in [facility]; reprinted with permission (nicolette@[facility]). All rights reserved.      Do you have sleep apnea, excessive snoring or daytime drowsiness?: no        PROBLEMS TO ADD ON...  She is PM [id number]  --she is s/p hysterectomy for cervical pathology in .  No vaginal itching, discharge or burning.  She is not sexually active.     Mammogram  10/2020 - previous bx on right breast - benign - 10 or more yrs ago      Hx of colon polyps - FHx of polyps  - had tubular adenoma in ;  Last colonoscopy  was   Told repeat in 3 yrs if adenomatous (no path report seen)  Or 5 yrs.  due      Hx of osteoporosis - on fosamax  3-4 yrs  - then on a holiday since last spring.    DEXA 2021 - no calcium pills and vit d   Drinks milk, cottage cheese, greens  - takes multivit,  Vit D gummies   2000IU/day      Hx prediabetes - A1C in  was 6.0  does watch carbs      Genetic testing negative for 12 genes on [facility] Panel through [employer], 2016. Negative for mutations in BRCA1, BRCA2, BRIP1, EPCAM, MLH1, MSH2, MSH6, PALB2, PMS2, PTEN, RAD51D and TP53. One variant of unknown significance found in RAD51C gene    Reviewed and updated as needed this visit by clinical staff                    Reviewed and updated as needed this visit by Provider                   Social History     Tobacco Use     Smoking status: Former Smoker     Packs/day: 1.00     Years: 15.00     Pack years: 15.00     Types: Cigarettes     Start date: 1994     Quit date: 2009     Years since quittin.0  "    Smokeless tobacco: Never Used   Substance Use Topics     Alcohol use: Yes     Comment: 1x/month                           Current providers sharing in care for this patient include:   Patient Care Team:  Maria Esther Miranda MD PhD as PCP - General (Family Practice)  Henry Akhtar DPM as MD (Podiatrist Primary Podiatric Medicine)  Maria Esther Miranda MD PhD as Assigned PCP  Margoth Feliciano APRN CNP as Nurse Practitioner (Internal Medicine)    The following health maintenance items are reviewed in Epic and correct as of today:  Health Maintenance   Topic Date Due     DIABETIC FOOT EXAM  Never done     ADVANCE CARE PLANNING  Never done     EYE EXAM  Never done     ZOSTER IMMUNIZATION (1 of 2) Never done     LUNG CANCER SCREENING  Never done     MICROALBUMIN  07/15/2020     A1C  03/18/2021     MEDICARE ANNUAL WELLNESS VISIT  09/16/2021     FALL RISK ASSESSMENT  09/16/2021     LIPID  09/18/2021     PHQ-2 (once per calendar year)  01/01/2022     COVID-19 Vaccine (4 - Booster for Pfizer series) 01/30/2022     BMP  08/18/2022     INFLUENZA VACCINE (1) 09/01/2022     MAMMO SCREENING  10/01/2022     DEXA  04/20/2023     COLORECTAL CANCER SCREENING  08/21/2027     DTAP/TDAP/TD IMMUNIZATION (2 - Td or Tdap) 02/12/2028     HEPATITIS C SCREENING  Completed     Pneumococcal Vaccine: 65+ Years  Completed     IPV IMMUNIZATION  Aged Out     MENINGITIS IMMUNIZATION  Aged Out     Lab work is in process      ROS:  Constitutional, HEENT, cardiovascular, pulmonary, GI, , musculoskeletal, neuro, skin, endocrine and psych systems are negative, except as otherwise noted above       OBJECTIVE:   /84 (BP Location: Right arm, Patient Position: Sitting, Cuff Size: Adult Large)   Pulse 89   Resp 20   Wt 80 kg (176 lb 4.8 oz)   SpO2 96%   BMI 32.25 kg/m   Estimated body mass index is 32.56 kg/m  as calculated from the following:    Height as of 4/18/19: 1.575 m (5' 2\").    Weight as of 5/4/22: 80.7 kg (178 " lb).  EXAM:   GENERAL: healthy, alert and no distress  EYES: Eyes grossly normal to inspection, PERRL and conjunctivae and sclerae normal + catarracts  HENT: ear canals and TM's normal, nose and mouth mask  NECK: no adenopathy, no asymmetry, masses, or scars and thyroid normal to palpation  RESP: lungs clear to auscultation - no rales, rhonchi or wheezes  BREAST: normal without masses, tenderness or nipple discharge and no palpable axillary masses or adenopathy  CV: regular rate and rhythm, normal S1 S2, no S3 or S4, no murmur, click or rub, no peripheral edema and peripheral pulses 2/4  ABDOMEN: soft, nontender, no hepatosplenomegaly, no masses and bowel sounds normal   (female): atrophic  female external genitalia, normal urethral meatus, vaginal mucosa thin and no rugae and absent cervix and uterus,  No adnexal tenderness and no discharge  RECTAL: normal sphincter tone, no rectal masses  MS: no gross musculoskeletal defects noted, no edema  SKIN: red rash on bridge of nose and extends onto upper malar area on the right.   NEURO: Normal strength and tone, mentation intact and speech normal  PSYCH: mentation appears normal, affect normal/bright  LYMPH: no cervical, supraclavicular, axillary, or inguinal adenopathy      Labs reviewed in Epic    ASSESSMENT / PLAN:   (M81.0) Senile osteoporosis  (primary encounter diagnosis)  Comment: has been on drug holiday from alendronate  - will start prolia  Plan: denosumab (PROLIA) injection 60 mg        Order placed - will get calcium with blood draw tomorrow and get prolia next week    (Z12.31) Encounter for screening mammogram for breast cancer  Comment: due  Plan: Mammogram, screening w billie (3D)            (R73.03) Prediabetes  Comment: hx of mildly increased A1C -  Was 6.0 in 2020  Plan: Hemoglobin A1c, Basic metabolic panel        Watch carbs     (E78.5) Hyperlipidemia LDL goal <100  Comment: in 2020 LDL was 129 and TG up - no statin  Plan: repeat    (Z86.010) History  of colonic polyps  Comment: due for colonoscopy -   Plan: Adult GI  Referral - Procedure Only            (G40.359) Nonintractable epilepsy without status epilepticus, unspecified epilepsy type (H)  Comment: on Keppra - controlled   Plan: Hepatic panel            (Z00.00) Medicare annual wellness visit, subsequent  Comment: needs shingrix,   Plan: see orders    (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 O2 is good - less likely a PE.    Plan: CBC with Platelets Differential, N terminal pro        BNP, EKG Performed in Clinic w/ Provider         Reading Fee, Echocardiogram Dobutamine Stress,         XR Chest 2 Views            (R00.2) Palpitations  Comment: as above - occurs with dyspnea - -if this is angina could get assoc palpitations  Plan: as above    (R06.02) SOB (shortness of breath)  Comment: as above   Plan: CBC with Platelets Differential, N terminal pro        BNP, EKG Performed in Clinic w/ Provider         Reading Fee            (R21) Rash and nonspecific skin eruption  Comment: rash on face - was using triamcinolone - recommended desowen   Plan: desonide (DESOWEN) 0.05 % external cream            (Z92.29) Hx drug therapy  Comment: was on alendronate   Plan: denosumab (PROLIA) injection 60 mg            Patient has been advised of split billing requirements and indicates understanding: Yes    COUNSELING:  Reviewed preventive health counseling, as reflected in patient instructions       Regular exercise       Healthy diet/nutrition       Vision screening       Hearing screening       Dental care       Osteoporosis prevention/bone health       Colon cancer screening       Advanced Planning     Estimated body mass index is 32.56 kg/m  as  "calculated from the following:    Height as of 4/18/19: 1.575 m (5' 2\").    Weight as of 5/4/22: 80.7 kg (178 lb).    Weight management plan: Discussed healthy diet and exercise guidelines    She reports that she quit smoking about 13 years ago. Her smoking use included cigarettes. She started smoking about 28 years ago. She has a 15.00 pack-year smoking history. She has never used smokeless tobacco.    Appropriate preventive services were discussed with this patient, including applicable screening as appropriate for cardiovascular disease, diabetes, osteopenia/osteoporosis, and glaucoma.  As appropriate for age/gender, discussed screening for colorectal cancer, prostate cancer, breast cancer, and cervical cancer. Checklist reviewing preventive services available has been given to the patient.    Reviewed patients plan of care and provided an AVS. The Basic Care Plan (routine screening as documented in Health Maintenance) for Meme meets the Care Plan requirement. This Care Plan has been established and reviewed with the Patient.    Counseling Resources:  ATP IV Guidelines  Pooled Cohorts Equation Calculator  Breast Cancer Risk Calculator  BRCA-Related Cancer Risk Assessment: FHS-7 Tool  FRAX Risk Assessment  ICSI Preventive Guidelines  Dietary Guidelines for Americans, 2010  [employer]'s MyPlate  ASA Prophylaxis  Lung CA Screening    Maria Esther Miranda MD PhD  [facility] [place]    Time note (e3, 20'): The total time (on the date of service) for this service was 20 minutes, including discussion/face-to-face, chart review, interpretation not otherwise reported, documentation, and updating of the computerized record. This refers to evaluation of dyspnea and SOB - and includes EKG read by myself.    "

## 2022-08-17 ENCOUNTER — OFFICE VISIT (OUTPATIENT)
Dept: FAMILY MEDICINE | Facility: CLINIC | Age: 72
End: 2022-08-17
Payer: COMMERCIAL

## 2022-08-17 VITALS
WEIGHT: 176.3 LBS | HEART RATE: 89 BPM | BODY MASS INDEX: 32.25 KG/M2 | OXYGEN SATURATION: 96 % | RESPIRATION RATE: 20 BRPM | DIASTOLIC BLOOD PRESSURE: 84 MMHG | SYSTOLIC BLOOD PRESSURE: 120 MMHG

## 2022-08-17 DIAGNOSIS — R00.2 PALPITATIONS: ICD-10-CM

## 2022-08-17 DIAGNOSIS — Z12.31 ENCOUNTER FOR SCREENING MAMMOGRAM FOR BREAST CANCER: ICD-10-CM

## 2022-08-17 DIAGNOSIS — G40.909 NONINTRACTABLE EPILEPSY WITHOUT STATUS EPILEPTICUS, UNSPECIFIED EPILEPSY TYPE (H): ICD-10-CM

## 2022-08-17 DIAGNOSIS — R06.02 SOB (SHORTNESS OF BREATH): ICD-10-CM

## 2022-08-17 DIAGNOSIS — Z13.220 SCREENING FOR HYPERLIPIDEMIA: ICD-10-CM

## 2022-08-17 DIAGNOSIS — M81.0 SENILE OSTEOPOROSIS: ICD-10-CM

## 2022-08-17 DIAGNOSIS — F17.211 CIGARETTE NICOTINE DEPENDENCE IN REMISSION: ICD-10-CM

## 2022-08-17 DIAGNOSIS — Z00.00 MEDICARE ANNUAL WELLNESS VISIT, SUBSEQUENT: Primary | ICD-10-CM

## 2022-08-17 DIAGNOSIS — R21 RASH AND NONSPECIFIC SKIN ERUPTION: ICD-10-CM

## 2022-08-17 DIAGNOSIS — R06.09 DYSPNEA ON EXERTION: ICD-10-CM

## 2022-08-17 DIAGNOSIS — R73.03 PREDIABETES: ICD-10-CM

## 2022-08-17 DIAGNOSIS — Z92.29 HX DRUG THERAPY: ICD-10-CM

## 2022-08-17 DIAGNOSIS — E78.5 HYPERLIPIDEMIA LDL GOAL <100: ICD-10-CM

## 2022-08-17 DIAGNOSIS — Z86.0100 HISTORY OF COLONIC POLYPS: ICD-10-CM

## 2022-08-17 LAB
ATRIAL RATE - MUSE: 73 BPM
DIASTOLIC BLOOD PRESSURE - MUSE: NORMAL MMHG
INTERPRETATION ECG - MUSE: NORMAL
P AXIS - MUSE: 30 DEGREES
PR INTERVAL - MUSE: 122 MS
QRS DURATION - MUSE: 68 MS
QT - MUSE: 366 MS
QTC - MUSE: 403 MS
R AXIS - MUSE: 58 DEGREES
SYSTOLIC BLOOD PRESSURE - MUSE: NORMAL MMHG
T AXIS - MUSE: 30 DEGREES
VENTRICULAR RATE- MUSE: 73 BPM

## 2022-08-17 PROCEDURE — G0439 PPPS, SUBSEQ VISIT: HCPCS | Performed by: FAMILY MEDICINE

## 2022-08-17 PROCEDURE — 93000 ELECTROCARDIOGRAM COMPLETE: CPT | Performed by: FAMILY MEDICINE

## 2022-08-17 PROCEDURE — 99213 OFFICE O/P EST LOW 20 MIN: CPT | Mod: 25 | Performed by: FAMILY MEDICINE

## 2022-08-17 RX ORDER — DESONIDE 0.5 MG/G
CREAM TOPICAL 2 TIMES DAILY
Qty: 15 G | Refills: 1 | Status: SHIPPED | OUTPATIENT
Start: 2022-08-17 | End: 2023-02-13

## 2022-08-17 NOTE — NURSING NOTE
Chief Complaint   Patient presents with     Wellness Visit     Shortness of Breath     Patient is SOB when moving around, gets dizzy, needs to sit down       THUAN Pan at 12:06 PM on 8/17/2022.

## 2022-08-17 NOTE — PATIENT INSTRUCTIONS
CXR today  Blood work tomorrow when fasting  Call in 1 wk and see if prolia approved and then make nurse visit for the shot  Get blood work 2 wks AFTER the prolia shot  EKG today  Get a dolbutamine stress ECHO (To schedule your echocardiogram (ECHO), please call [phone].)  Schedule colonoscopy  See me in 4 wks  Mammogram (To schedule a Mammogram, please call radiology scheduling at [phone].)  Get shingrex at pharmacy         Nutritious diet  Eat 1200 mg calcium total every day.  Many people achieve this by a diet containing calcium (milk, yogurt, cheese, and other dairy products, supplemented food) and 1 calcium pill. If you don't eat dairy, you should take a calcium supplement 2 times a day.  2000 IU of vitamin D on daily basis.    Eat a variety of fruits and vegetables and eat whole grains.  Try to choose foods with a high NuVal score, if your grocery store shows this number. High numbers, like 80 or 90, are nutritious foods, and low scores, like 10 are less nutritious foods.          Men should drink no more than 2 alcoholic beverages daily on average; women should drink no more than 1 alcoholic beverage daily on average.    Everyone should avoid all tobacco and nicotine-containing products.    Exercise: Aim for:  Moderate activity (where you can still talk while doing it, such as walking about 100 steps per minute, or 3,000 steps in 30 minutes) for 30 minutes at least 5 days a week  Or  Vigorous exercise (you are working so hard you are breathing hard and fast and your heart rate has gone up, such as playing basketball or soccer) at least 75 minutes weekly    If you have trouble doing 30 minutes of activity, all at once, try small bursts of activity. Go to www.[employer] for suggestions on how you can do this at home or work.     All adults should try to do muscle strengthening exercise at least 2 days a week    Safety  Always wear bike/motorcycle helmet and seatbelt in a vehicle  Make sure  your home has smoke and carbon monoxide detectors.  Wear broad spectrum sunscreen of at least SPF 15 on sun-exposed skin.    Preventing disease  See your dentist at least once a year  Have your eyes checked every 1-2 years.  Get a flu shot each year.

## 2022-08-17 NOTE — PROGRESS NOTES
Medicare Annual Wellness Questionnaire:  This 72 year old year old female presents for a Medicare Wellness Exam.    Providers/clinics involved in care:  Patient Care Team       Relationship Specialty Notifications Start End    Maria Esther Miranda MD PhD PCP - General Family Practice  9/16/20     Phone: [phone] Fax: [phone]         [address] [facility] [address]    Henry Akhtar DPM MD Podiatrist Primary Podiatric Medicine  7/16/19     Phone: [phone] Fax: [phone]         [address] [facility] [address]    Maria Esther Miranda MD PhD Assigned PCP   9/26/21     Phone: [phone] Fax: [phone]         [address] [facility] [address]    Margoth Feliciano APRN CNP Nurse Practitioner Internal Medicine  5/3/22     Phone: [phone] Fax: [phone]         [address] [facility] [address]          Fall Risk Assessment:    Have you fallen 2 or more times in the last year?  Not answered    How many times were you injured due to a fall in the last year?  Not answered    PHQ-2:    Over the last 2 weeks, how often have you been bothered by feeling down, depressed, or hopeless?     Not at all (0)     Over the last 2 weeks, how often have you had little interest or pleasure in doing things?     Not at all (0)     Social History:    What is your marital status?  Not answered    Who lives in your household?  Not answered    Does your home have loose rugs in the hallway:     Not answered    Does your home have grab bars in the bathroom:    Not answered    Does your home have handrails on the stairs?  Not answered    Does your home have poorly lit areas?    Not answered    Do you feel threatened or controlled by a partner, ex-partner or anyone in your life?   Not answered    Has anyone hurt you physically, for example by pushing, hitting, slapping or kicking you   or forcing you to have sex?   Not answered    Do you need help with the phone, transportation, shopping,  preparing meals, housework, laundry, medications or managing money?   Not answered    Sexual Health:    Are you sexually active?    Not answered}    If yes, with men, women, or both?   Not answered    If yes, how many partners?  Not answered    If yes, are you using condoms?    Not answered    Have you had any sexually transmitted infections in the last year?   Not answered    Do you have any sexual concerns?    Not answered    Women Only:    Women: What year did you stop having periods (approximate age)?  Not answered    Women: Any vaginal bleeding in the last year?    Not answered    Women: Have you ever had an abnormal Pap smear?    Not answered    General Health Assessment:    Have you noticed any hearing difficulties?   Not answered    Do you wear hearing aids?   Not answered    Have you seen a hearing professional such as an audiologist in the last 1 year?   Not answered    Do you have vision difficulty?    Not answered    Do you wear glasses or contacts?   Not answered    Have you seen an eye doctor in the last 1 year?   Not answered    How many servings of fruits and vegetables do you eat a day?  Not answered    How often do you exercise in a week?  Not answered    How long and what kind of exercise do you do?  Not answered    Tobacco and Alcohol History:    Do you use tobacco/nicotine products?    Not answered    If yes, please list the method of use and average weekly consumption?  Not answered    Do you use any other drugs?   Not answered         Do you drink alcohol?   Not answered    If you drink alcohol, how many drinks per week?  Not answered      Advance Directive:    Have you completed an Advance Directives document?  Not answered    If yes, have you given a copy to the clinic?   Not answered    Do you need information on Advance Directives?   Not answered

## 2022-08-18 ENCOUNTER — MYC MEDICAL ADVICE (OUTPATIENT)
Dept: FAMILY MEDICINE | Facility: CLINIC | Age: 72
End: 2022-08-18

## 2022-08-18 ENCOUNTER — LAB (OUTPATIENT)
Dept: LAB | Facility: CLINIC | Age: 72
End: 2022-08-18
Payer: COMMERCIAL

## 2022-08-18 DIAGNOSIS — R06.09 DYSPNEA ON EXERTION: ICD-10-CM

## 2022-08-18 DIAGNOSIS — G40.909 NONINTRACTABLE EPILEPSY WITHOUT STATUS EPILEPTICUS, UNSPECIFIED EPILEPSY TYPE (H): ICD-10-CM

## 2022-08-18 DIAGNOSIS — M81.0 SENILE OSTEOPOROSIS: Primary | ICD-10-CM

## 2022-08-18 DIAGNOSIS — Z13.220 SCREENING FOR HYPERLIPIDEMIA: ICD-10-CM

## 2022-08-18 DIAGNOSIS — R73.03 PREDIABETES: ICD-10-CM

## 2022-08-18 DIAGNOSIS — R06.02 SOB (SHORTNESS OF BREATH): ICD-10-CM

## 2022-08-18 LAB
ALBUMIN SERPL-MCNC: 3.5 G/DL (ref 3.4–5)
ALP SERPL-CCNC: 118 U/L (ref 40–150)
ALT SERPL W P-5'-P-CCNC: 27 U/L (ref 0–50)
ANION GAP SERPL CALCULATED.3IONS-SCNC: 6 MMOL/L (ref 3–14)
AST SERPL W P-5'-P-CCNC: 22 U/L (ref 0–45)
BASOPHILS # BLD AUTO: 0 10E3/UL (ref 0–0.2)
BASOPHILS NFR BLD AUTO: 1 %
BILIRUB DIRECT SERPL-MCNC: <0.1 MG/DL (ref 0–0.2)
BILIRUB SERPL-MCNC: 0.4 MG/DL (ref 0.2–1.3)
BUN SERPL-MCNC: 16 MG/DL (ref 7–30)
CALCIUM SERPL-MCNC: 9 MG/DL (ref 8.5–10.1)
CHLORIDE BLD-SCNC: 108 MMOL/L (ref 94–109)
CHOLEST SERPL-MCNC: 216 MG/DL
CO2 SERPL-SCNC: 26 MMOL/L (ref 20–32)
CREAT SERPL-MCNC: 0.68 MG/DL (ref 0.52–1.04)
EOSINOPHIL # BLD AUTO: 0.1 10E3/UL (ref 0–0.7)
EOSINOPHIL NFR BLD AUTO: 2 %
ERYTHROCYTE [DISTWIDTH] IN BLOOD BY AUTOMATED COUNT: 12.5 % (ref 10–15)
FASTING STATUS PATIENT QL REPORTED: YES
GFR SERPL CREATININE-BSD FRML MDRD: >90 ML/MIN/1.73M2
GLUCOSE BLD-MCNC: 110 MG/DL (ref 70–99)
HBA1C MFR BLD: 6.1 % (ref 0–5.6)
HCT VFR BLD AUTO: 40.4 % (ref 35–47)
HDLC SERPL-MCNC: 45 MG/DL
HGB BLD-MCNC: 13.3 G/DL (ref 11.7–15.7)
IMM GRANULOCYTES # BLD: 0 10E3/UL
IMM GRANULOCYTES NFR BLD: 0 %
LDLC SERPL CALC-MCNC: 118 MG/DL
LYMPHOCYTES # BLD AUTO: 2 10E3/UL (ref 0.8–5.3)
LYMPHOCYTES NFR BLD AUTO: 39 %
MCH RBC QN AUTO: 29.5 PG (ref 26.5–33)
MCHC RBC AUTO-ENTMCNC: 32.9 G/DL (ref 31.5–36.5)
MCV RBC AUTO: 90 FL (ref 78–100)
MONOCYTES # BLD AUTO: 0.5 10E3/UL (ref 0–1.3)
MONOCYTES NFR BLD AUTO: 10 %
NEUTROPHILS # BLD AUTO: 2.5 10E3/UL (ref 1.6–8.3)
NEUTROPHILS NFR BLD AUTO: 48 %
NONHDLC SERPL-MCNC: 171 MG/DL
NRBC # BLD AUTO: 0 10E3/UL
NRBC BLD AUTO-RTO: 0 /100
NT-PROBNP SERPL-MCNC: 13 PG/ML (ref 0–900)
PLATELET # BLD AUTO: 284 10E3/UL (ref 150–450)
POTASSIUM BLD-SCNC: 4.3 MMOL/L (ref 3.4–5.3)
PROT SERPL-MCNC: 7.8 G/DL (ref 6.8–8.8)
RBC # BLD AUTO: 4.51 10E6/UL (ref 3.8–5.2)
SODIUM SERPL-SCNC: 140 MMOL/L (ref 133–144)
TRIGL SERPL-MCNC: 267 MG/DL
WBC # BLD AUTO: 5.2 10E3/UL (ref 4–11)

## 2022-08-18 PROCEDURE — 36415 COLL VENOUS BLD VENIPUNCTURE: CPT | Performed by: PATHOLOGY

## 2022-08-18 PROCEDURE — 83036 HEMOGLOBIN GLYCOSYLATED A1C: CPT | Performed by: PATHOLOGY

## 2022-08-18 PROCEDURE — 85025 COMPLETE CBC W/AUTO DIFF WBC: CPT | Performed by: PATHOLOGY

## 2022-08-18 PROCEDURE — 80053 COMPREHEN METABOLIC PANEL: CPT | Performed by: PATHOLOGY

## 2022-08-18 PROCEDURE — 82248 BILIRUBIN DIRECT: CPT | Performed by: PATHOLOGY

## 2022-08-18 PROCEDURE — 80061 LIPID PANEL: CPT | Performed by: PATHOLOGY

## 2022-08-18 PROCEDURE — 83880 ASSAY OF NATRIURETIC PEPTIDE: CPT | Performed by: PATHOLOGY

## 2022-08-24 ENCOUNTER — ALLIED HEALTH/NURSE VISIT (OUTPATIENT)
Dept: INTERNAL MEDICINE | Facility: CLINIC | Age: 72
End: 2022-08-24
Payer: COMMERCIAL

## 2022-08-24 DIAGNOSIS — M81.0 SENILE OSTEOPOROSIS: Primary | ICD-10-CM

## 2022-08-24 PROCEDURE — 99207 PR NO CHARGE NURSE ONLY: CPT

## 2022-08-24 PROCEDURE — 96372 THER/PROPH/DIAG INJ SC/IM: CPT

## 2022-08-24 NOTE — PROGRESS NOTES
Meme Butts received the Prolia injection today in clinic at the request of Dr. Maria Esther Miranda. The injection was given under the supervision of Dr. Sukhdeep Gaming if assistance was needed. The injection site was cleaned with an alcohol prep wipe. The injection was given without incident--see MAR for administration details. No swelling or redness was observed at the site of injection after the injection was given. The patient was advised to remain in [facility] lobby for 15 minutes after the injection in case of an averse reaction.     Labs in 2 weeks were already placed. The patient has a lab appointment on 9/7/22 at 10 am.     Srinivasa Hall, EMT at 9:39 AM on 8/24/2022.

## 2022-08-24 NOTE — NURSING NOTE
Chief Complaint   Patient presents with     Imm/Inj     Prolia injection       Srinivasa Hall, THUAN at 9:39 AM on 8/24/2022.

## 2022-09-06 NOTE — PROGRESS NOTES
"  Assessment & Plan     Palpitations  Describing a pounding heart beat - will get a monitor for 7 days  - Leadless EKG Monitor 3 to 7 Days    SOB (shortness of breath)  Still unclear etiology - the  Dobutamine test hasn't been done yet - could be COPD     Will help schedule these tests and then return to review     Return in about 3 weeks (around 9/28/2022) for Routine Visit.    Maria Esther Miranda MD PhD  [facility]    Time note (e3, 20'): The total time (on the date of service) for this service was 20 minutes, including discussion/face-to-face, chart review, interpretation not otherwise reported, documentation, and updating of the computerized record.      Steve Valentine is a 72 year old presenting for the following health issues:F/u of SOB       HPI 71 yo female had a wellness visit 8/17 and described  dyspnea and had cardiac w/u and comes back for f/up. She is  feeling more SOB and that her heart is pounding -  Mainly with exertion - then she takes a break and drinks water and feels better. No chest pressure but feels can't get her breath -  No syncope.  Does get palpitations - but no pressure.     At her last visit we did an EKG, CXR and blood work - all were normal.  She was also to get an ECHO and a Dobutamine stress test - but these have not been done yet.         Review of Systems endorses sinus problems, feet get sore, wheezing, diarrhea, ankle pain allergies and the rest of the complete ROS is negative except as mentioned above.         Objective    /80 (BP Location: Right arm, Patient Position: Sitting, Cuff Size: Adult Regular)   Pulse 79   Ht 1.575 m (5' 2\")   Wt 79.8 kg (176 lb)   SpO2 97%   BMI 32.19 kg/m    Body mass index is 32.19 kg/m .  Physical Exam   GENERAL: healthy, alert and no distress  NECK: no adenopathy, no asymmetry, masses, or scars and thyroid normal to palpation  RESP:poor airway movement, no wheezes or rales    CV: regular rate " and rhythm, normal S1 S2, no S3 or S4, no murmur, click or rub, trace peripheral edema  MS: no gross musculoskeletal defects noted, no edema  SKIN: no suspicious lesions or rashes  NEURO: Normal strength and tone, mentation intact and speech normal  PSYCH: mentation appears normal, affect normal/bright  LYMPH: no cervical, adenopathy  Maria Esther Miranda MD, PhD

## 2022-09-07 ENCOUNTER — LAB (OUTPATIENT)
Dept: LAB | Facility: CLINIC | Age: 72
End: 2022-09-07
Payer: COMMERCIAL

## 2022-09-07 ENCOUNTER — ANCILLARY PROCEDURE (OUTPATIENT)
Dept: MAMMOGRAPHY | Facility: CLINIC | Age: 72
End: 2022-09-07
Attending: FAMILY MEDICINE
Payer: COMMERCIAL

## 2022-09-07 ENCOUNTER — OFFICE VISIT (OUTPATIENT)
Dept: FAMILY MEDICINE | Facility: CLINIC | Age: 72
End: 2022-09-07
Payer: COMMERCIAL

## 2022-09-07 VITALS
HEIGHT: 62 IN | WEIGHT: 176 LBS | OXYGEN SATURATION: 97 % | HEART RATE: 79 BPM | BODY MASS INDEX: 32.39 KG/M2 | DIASTOLIC BLOOD PRESSURE: 80 MMHG | SYSTOLIC BLOOD PRESSURE: 127 MMHG

## 2022-09-07 DIAGNOSIS — M81.0 SENILE OSTEOPOROSIS: ICD-10-CM

## 2022-09-07 DIAGNOSIS — R06.02 SOB (SHORTNESS OF BREATH): Primary | ICD-10-CM

## 2022-09-07 DIAGNOSIS — R00.2 PALPITATIONS: ICD-10-CM

## 2022-09-07 DIAGNOSIS — Z12.31 ENCOUNTER FOR SCREENING MAMMOGRAM FOR BREAST CANCER: ICD-10-CM

## 2022-09-07 LAB
CALCIUM SERPL-MCNC: 9 MG/DL (ref 8.8–10.2)
MAGNESIUM SERPL-MCNC: 2.1 MG/DL (ref 1.7–2.3)
PHOSPHATE SERPL-MCNC: 2.8 MG/DL (ref 2.5–4.5)

## 2022-09-07 PROCEDURE — 77063 BREAST TOMOSYNTHESIS BI: CPT | Mod: GC | Performed by: RADIOLOGY

## 2022-09-07 PROCEDURE — 36415 COLL VENOUS BLD VENIPUNCTURE: CPT | Performed by: PATHOLOGY

## 2022-09-07 PROCEDURE — 84100 ASSAY OF PHOSPHORUS: CPT | Performed by: PATHOLOGY

## 2022-09-07 PROCEDURE — 82310 ASSAY OF CALCIUM: CPT | Performed by: PATHOLOGY

## 2022-09-07 PROCEDURE — 77067 SCR MAMMO BI INCL CAD: CPT | Mod: GC | Performed by: RADIOLOGY

## 2022-09-07 PROCEDURE — 83735 ASSAY OF MAGNESIUM: CPT | Performed by: PATHOLOGY

## 2022-09-07 PROCEDURE — 99213 OFFICE O/P EST LOW 20 MIN: CPT | Performed by: FAMILY MEDICINE

## 2022-09-07 NOTE — PATIENT INSTRUCTIONS
To schedule a fitting for your Holter monitor, please call [phone].  Get the dolbutamine stress test  See me after these 2 tests.   Try not to do exertional work - like cutting the grass till we figure this out

## 2022-09-07 NOTE — NURSING NOTE
Meme Butts is a 72 year old female patient that presents today in clinic for the following:    Chief Complaint   Patient presents with     RECHECK     Osteoporosis follow up     The patient's allergies and medications were reviewed as noted. A set of vitals were recorded as noted without incident. The patient does not have any other questions for the provider.    Neil Hansen, EMT at 4:54 PM on 9/7/2022

## 2022-09-12 ENCOUNTER — HOSPITAL ENCOUNTER (OUTPATIENT)
Dept: CARDIOLOGY | Facility: CLINIC | Age: 72
Discharge: HOME | End: 2022-09-12
Attending: FAMILY MEDICINE
Payer: COMMERCIAL

## 2022-09-12 DIAGNOSIS — R00.2 PALPITATIONS: ICD-10-CM

## 2022-09-12 DIAGNOSIS — R06.09 DYSPNEA ON EXERTION: ICD-10-CM

## 2022-09-12 PROCEDURE — 93018 CV STRESS TEST I&R ONLY: CPT | Performed by: INTERNAL MEDICINE

## 2022-09-12 PROCEDURE — 93325 DOPPLER ECHO COLOR FLOW MAPG: CPT | Mod: TC

## 2022-09-12 PROCEDURE — 93321 DOPPLER ECHO F-UP/LMTD STD: CPT | Mod: TC

## 2022-09-12 PROCEDURE — 93325 DOPPLER ECHO COLOR FLOW MAPG: CPT | Mod: 26 | Performed by: INTERNAL MEDICINE

## 2022-09-12 PROCEDURE — 258N000003 HC RX IP 258 OP 636: Performed by: INTERNAL MEDICINE

## 2022-09-12 PROCEDURE — 250N000011 HC RX IP 250 OP 636: Performed by: INTERNAL MEDICINE

## 2022-09-12 PROCEDURE — 93242 EXT ECG>48HR<7D RECORDING: CPT

## 2022-09-12 PROCEDURE — 255N000002 HC RX 255 OP 636: Performed by: INTERNAL MEDICINE

## 2022-09-12 PROCEDURE — 93016 CV STRESS TEST SUPVJ ONLY: CPT | Performed by: INTERNAL MEDICINE

## 2022-09-12 PROCEDURE — 93350 STRESS TTE ONLY: CPT | Mod: 26 | Performed by: INTERNAL MEDICINE

## 2022-09-12 PROCEDURE — 99207 PR STATISTIC IV PUSH SINGLE INITIAL SUBSTANCE: CPT | Performed by: INTERNAL MEDICINE

## 2022-09-12 PROCEDURE — 93244 EXT ECG>48HR<7D REV&INTERPJ: CPT | Performed by: INTERNAL MEDICINE

## 2022-09-12 PROCEDURE — 93321 DOPPLER ECHO F-UP/LMTD STD: CPT | Mod: 26 | Performed by: INTERNAL MEDICINE

## 2022-09-12 PROCEDURE — 250N000009 HC RX 250: Performed by: INTERNAL MEDICINE

## 2022-09-12 RX ORDER — ATROPINE SULFATE 0.4 MG/ML
.2-2 AMPUL (ML) INJECTION
Status: DISCONTINUED | OUTPATIENT
Start: 2022-09-12 | End: 2022-09-13 | Stop reason: HOSPADM

## 2022-09-12 RX ORDER — DOBUTAMINE HYDROCHLORIDE 200 MG/100ML
10-50 INJECTION INTRAVENOUS CONTINUOUS
Status: ACTIVE | OUTPATIENT
Start: 2022-09-12 | End: 2022-09-12

## 2022-09-12 RX ORDER — METOPROLOL TARTRATE 1 MG/ML
1-20 INJECTION, SOLUTION INTRAVENOUS
Status: ACTIVE | OUTPATIENT
Start: 2022-09-12 | End: 2022-09-12

## 2022-09-12 RX ADMIN — METOPROLOL TARTRATE 2 MG: 5 INJECTION INTRAVENOUS at 13:41

## 2022-09-12 RX ADMIN — PERFLUTREN 7 ML: 6.52 INJECTION, SUSPENSION INTRAVENOUS at 13:42

## 2022-09-12 RX ADMIN — DOBUTAMINE 10 MCG/KG/MIN: 12.5 INJECTION, SOLUTION, CONCENTRATE INTRAVENOUS at 13:32

## 2022-09-14 ENCOUNTER — ANCILLARY PROCEDURE (OUTPATIENT)
Dept: MAMMOGRAPHY | Facility: CLINIC | Age: 72
End: 2022-09-14
Attending: FAMILY MEDICINE
Payer: COMMERCIAL

## 2022-09-14 DIAGNOSIS — R92.8 ABNORMAL MAMMOGRAM OF LEFT BREAST: ICD-10-CM

## 2022-09-14 PROCEDURE — G0279 TOMOSYNTHESIS, MAMMO: HCPCS | Mod: LT | Performed by: RADIOLOGY

## 2022-09-14 PROCEDURE — 77065 DX MAMMO INCL CAD UNI: CPT | Mod: LT | Performed by: RADIOLOGY

## 2022-10-03 ENCOUNTER — TELEPHONE (OUTPATIENT)
Dept: GASTROENTEROLOGY | Facility: CLINIC | Age: 72
End: 2022-10-03

## 2022-10-03 DIAGNOSIS — Z01.812 PRE-PROCEDURE LAB EXAM: Primary | ICD-10-CM

## 2022-10-03 NOTE — TELEPHONE ENCOUNTER
Screening Questions  BLUE  KIND OF PREP RED  LOCATION [review exclusion criteria] GREEN  SEDATION TYPE        Y Are you active on mychart?       Maria Esther Miranda MD    Ordering/Referring Provider?        [employer] What type of coverage do you have?      N Have you had a positive covid test in the last 90 days?     1. 32 BMI  [BMI 40+ - review exclusion criteria]    2. Y  Are you able to give consent for your medical care? [IF NO,RN REVIEW]        3. N  Are you taking any prescription pain medications on a routine schedule?        3a.  EXTENDED PREP What kind of prescription?   4. N Do you have any chemical dependencies such as alcohol, street drugs, or methadone?    5. N Do you have any history of post-traumatic stress syndrome, severe anxiety or history of psychosis?      **If yes 3- 5 , please schedule with MAC sedation.**          IF YES TO ANY 6 - 10 - HOSPITAL SETTING ONLY.     6.   Y Do you need assistance transferring?     7.   N Have you had a heart or lung transplant?    8.   N Are you currently on dialysis?   9.   N Do you use daily home oxygen?   10. N Do you take nitroglycerin?   10a.  If yes, how often?     11. [FEMALES]  N Are you currently pregnant?    11a.  If yes, how many weeks? [ Greater than 12 weeks, OR NEEDED]    12. N Do you have Pulmonary Hypertension? *NEED PAC APPT AT [facility]*     13. N [review exclusion criteria]  Do you have any implantable devices in your body (pacemaker, defib, LVAD)?    14. N In the past 6 months, have you had any heart related issues including cardiomyopathy or heart attack?     14a.  If yes, did it require cardiac stenting if so when?     15. N Have you had a stroke or Transient ischemic attack (TIA - aka  mini stroke ) within 6 months?      16. N Do you have mod to severe Obstructive Sleep Apnea?  [Hospital only - Ok at [facility]]    17. N Do you have SEVERE AND UNCONTROLLED asthma? *NEED PAC APPT AT [facility]*     18. N Are you currently taking any blood thinners?  "    18a. If yes, inform patient to \"follow up w/ ordering provider for bridging instructions.\"    19. N Do you take the medication Phentermine?    19a. If yes, \"Hold for 7 days before procedure.  Please consult your prescribing provider if you have questions about holding this medication.\"     20. N  Do you have chronic kidney disease?      21. N  Do you have a diagnosis of diabetes?     22. Y  On a regular basis do you go 3-5 days between bowel movements?     23.  Preferred LOCAL Pharmacy for Pre Prescription    [ LIST ONLY ONE PHARMACY]        [facility] #[id number] - [place], [place] - [address] & [facility]        - CLOSING REMINDERS -    Informed patient they will need an adult    Cannot take any type of public or medical transportation alone    Conscious Sedation- Needs  for 6 hours after the procedure       MAC/General-Needs  for 24 hours after procedure    Pre-Procedure Covid test to be completed [ESSC PCR Testing Required]    Confirmed Nurse will call to complete assessment       - SCHEDULING DETAILS -     AYAZ  Surgeon    12/9  Date of Procedure  Lower Endoscopy [Colonoscopy]  Type of Procedure Scheduled   [facility] Location  EXTENDED PREP - If you answer yes to questions #1, #3, #22 (De Kamranen and CF pts)Which Colonoscopy Prep was Sent?     MAC Sedation Type     N PAC / Pre-op Required         Additional comments:  Patient request for MAC sedation.            "

## 2022-10-22 ENCOUNTER — HEALTH MAINTENANCE LETTER (OUTPATIENT)
Age: 72
End: 2022-10-22

## 2022-12-01 ENCOUNTER — TELEPHONE (OUTPATIENT)
Dept: GASTROENTEROLOGY | Facility: CLINIC | Age: 72
End: 2022-12-01

## 2022-12-01 DIAGNOSIS — Z86.0100 HISTORY OF COLONIC POLYPS: Primary | ICD-10-CM

## 2022-12-01 RX ORDER — BISACODYL 5 MG
TABLET, DELAYED RELEASE (ENTERIC COATED) ORAL
Qty: 4 TABLET | Refills: 0 | Status: SHIPPED | OUTPATIENT
Start: 2022-12-01 | End: 2023-02-13

## 2022-12-01 NOTE — TELEPHONE ENCOUNTER
Pre assessment questions completed for upcoming colonoscopy  procedure scheduled on 12.9.22    COVID policy reviewed.     Reviewed procedural arrival time 0730 and facility location [facility]; [address] [place] [address], [address], [place] [address]    Designated  policy reviewed. Instructed to have someone stay 24 hours post procedure.     Anticoagulation/blood thinners? No    Electronic implanted devices? No    Diabetic? No    Reviewed extended golytely procedure prep instructions.     Patient verbalized understanding and had no questions or concerns at this time.    Meredith Andrade RN

## 2022-12-05 ENCOUNTER — LAB (OUTPATIENT)
Dept: LAB | Facility: CLINIC | Age: 72
End: 2022-12-05
Payer: COMMERCIAL

## 2022-12-05 DIAGNOSIS — Z01.812 PRE-PROCEDURE LAB EXAM: ICD-10-CM

## 2022-12-05 LAB — SARS-COV-2 RNA RESP QL NAA+PROBE: NEGATIVE

## 2022-12-05 PROCEDURE — U0005 INFEC AGEN DETEC AMPLI PROBE: HCPCS | Mod: 90 | Performed by: PATHOLOGY

## 2022-12-05 PROCEDURE — U0003 INFECTIOUS AGENT DETECTION BY NUCLEIC ACID (DNA OR RNA); SEVERE ACUTE RESPIRATORY SYNDROME CORONAVIRUS 2 (SARS-COV-2) (CORONAVIRUS DISEASE [COVID-19]), AMPLIFIED PROBE TECHNIQUE, MAKING USE OF HIGH THROUGHPUT TECHNOLOGIES AS DESCRIBED BY CMS-2020-01-R: HCPCS | Mod: 90 | Performed by: PATHOLOGY

## 2022-12-05 PROCEDURE — 99000 SPECIMEN HANDLING OFFICE-LAB: CPT | Performed by: PATHOLOGY

## 2022-12-07 NOTE — TELEPHONE ENCOUNTER
"Pt called in asking if she can eat soup. Colonoscopy scheduled on 12/9/22. After reviewing chart, pt was prescribed extended Golytely prep. Instructed pt she should be starting clear liquid diet starting at 1:00 PM today (It was around 1250 at time of pt call). Pt expressed confusion and states \"that's 48 hours before my procedure\". Pt states she didn't receive [employer] message with prep instructions. Educated patient on Golytely extended prep. Sent [employer] message with colonoscopy prep instructions. Pt states \"I already took the Dulcolax earlier today, I don't want to take it so late. I'm older, I go to bed early\".  Educated patient on recommended timing of procedure prep. Reviewed clear liquid diet and stated options of clear liquids, pt stopped writer and stated \"No I already know those, I got some gatorade not red or purple\". Pt verbalized understanding and had no further questions regarding prep.    Shaunna Linton RN  Endoscopy Pre-Assessment RN    "

## 2022-12-08 ENCOUNTER — ANESTHESIA EVENT (OUTPATIENT)
Dept: SURGERY | Facility: AMBULATORY SURGERY CENTER | Age: 72
End: 2022-12-08
Payer: COMMERCIAL

## 2022-12-09 ENCOUNTER — ANESTHESIA (OUTPATIENT)
Dept: SURGERY | Facility: AMBULATORY SURGERY CENTER | Age: 72
End: 2022-12-09
Payer: COMMERCIAL

## 2022-12-09 ENCOUNTER — HOSPITAL ENCOUNTER (OUTPATIENT)
Facility: AMBULATORY SURGERY CENTER | Age: 72
Discharge: HOME | End: 2022-12-09
Attending: INTERNAL MEDICINE
Payer: COMMERCIAL

## 2022-12-09 VITALS
DIASTOLIC BLOOD PRESSURE: 60 MMHG | HEART RATE: 80 BPM | OXYGEN SATURATION: 96 % | RESPIRATION RATE: 16 BRPM | TEMPERATURE: 98.4 F | SYSTOLIC BLOOD PRESSURE: 110 MMHG

## 2022-12-09 VITALS — HEART RATE: 81 BPM

## 2022-12-09 LAB — COLONOSCOPY: NORMAL

## 2022-12-09 PROCEDURE — 88305 TISSUE EXAM BY PATHOLOGIST: CPT | Mod: TC | Performed by: INTERNAL MEDICINE

## 2022-12-09 PROCEDURE — 45388 COLONOSCOPY W/ABLATION: CPT | Mod: PT

## 2022-12-09 PROCEDURE — 45385 COLONOSCOPY W/LESION REMOVAL: CPT | Mod: 59,PT

## 2022-12-09 PROCEDURE — 88305 TISSUE EXAM BY PATHOLOGIST: CPT | Mod: 26 | Performed by: PATHOLOGY

## 2022-12-09 RX ORDER — HYDRALAZINE HYDROCHLORIDE 20 MG/ML
2.5-5 INJECTION INTRAMUSCULAR; INTRAVENOUS EVERY 10 MIN PRN
Status: DISCONTINUED | OUTPATIENT
Start: 2022-12-09 | End: 2022-12-10 | Stop reason: HOSPADM

## 2022-12-09 RX ORDER — LIDOCAINE HYDROCHLORIDE 20 MG/ML
INJECTION, SOLUTION INFILTRATION; PERINEURAL PRN
Status: DISCONTINUED | OUTPATIENT
Start: 2022-12-09 | End: 2022-12-09

## 2022-12-09 RX ORDER — FENTANYL CITRATE 50 UG/ML
25 INJECTION, SOLUTION INTRAMUSCULAR; INTRAVENOUS
Status: DISCONTINUED | OUTPATIENT
Start: 2022-12-09 | End: 2022-12-10 | Stop reason: HOSPADM

## 2022-12-09 RX ORDER — ONDANSETRON 2 MG/ML
4 INJECTION INTRAMUSCULAR; INTRAVENOUS
Status: DISCONTINUED | OUTPATIENT
Start: 2022-12-09 | End: 2022-12-09 | Stop reason: HOSPADM

## 2022-12-09 RX ORDER — HALOPERIDOL 5 MG/ML
1 INJECTION INTRAMUSCULAR
Status: DISCONTINUED | OUTPATIENT
Start: 2022-12-09 | End: 2022-12-10 | Stop reason: HOSPADM

## 2022-12-09 RX ORDER — LABETALOL HYDROCHLORIDE 5 MG/ML
10 INJECTION, SOLUTION INTRAVENOUS
Status: DISCONTINUED | OUTPATIENT
Start: 2022-12-09 | End: 2022-12-10 | Stop reason: HOSPADM

## 2022-12-09 RX ORDER — PROPOFOL 10 MG/ML
INJECTION, EMULSION INTRAVENOUS PRN
Status: DISCONTINUED | OUTPATIENT
Start: 2022-12-09 | End: 2022-12-09

## 2022-12-09 RX ORDER — PROCHLORPERAZINE MALEATE 5 MG
5 TABLET ORAL EVERY 6 HOURS PRN
Status: DISCONTINUED | OUTPATIENT
Start: 2022-12-09 | End: 2022-12-10 | Stop reason: HOSPADM

## 2022-12-09 RX ORDER — ALBUTEROL SULFATE 0.83 MG/ML
2.5 SOLUTION RESPIRATORY (INHALATION) EVERY 4 HOURS PRN
Status: DISCONTINUED | OUTPATIENT
Start: 2022-12-09 | End: 2022-12-10 | Stop reason: HOSPADM

## 2022-12-09 RX ORDER — LIDOCAINE 40 MG/G
CREAM TOPICAL
Status: DISCONTINUED | OUTPATIENT
Start: 2022-12-09 | End: 2022-12-09 | Stop reason: HOSPADM

## 2022-12-09 RX ORDER — ONDANSETRON 2 MG/ML
4 INJECTION INTRAMUSCULAR; INTRAVENOUS EVERY 6 HOURS PRN
Status: DISCONTINUED | OUTPATIENT
Start: 2022-12-09 | End: 2022-12-10 | Stop reason: HOSPADM

## 2022-12-09 RX ORDER — ONDANSETRON 4 MG/1
4 TABLET, ORALLY DISINTEGRATING ORAL EVERY 6 HOURS PRN
Status: DISCONTINUED | OUTPATIENT
Start: 2022-12-09 | End: 2022-12-10 | Stop reason: HOSPADM

## 2022-12-09 RX ORDER — FLUMAZENIL 0.1 MG/ML
0.2 INJECTION, SOLUTION INTRAVENOUS
Status: ACTIVE | OUTPATIENT
Start: 2022-12-09 | End: 2022-12-09

## 2022-12-09 RX ORDER — NALOXONE HYDROCHLORIDE 0.4 MG/ML
0.4 INJECTION, SOLUTION INTRAMUSCULAR; INTRAVENOUS; SUBCUTANEOUS
Status: DISCONTINUED | OUTPATIENT
Start: 2022-12-09 | End: 2022-12-10 | Stop reason: HOSPADM

## 2022-12-09 RX ORDER — PROPOFOL 10 MG/ML
INJECTION, EMULSION INTRAVENOUS CONTINUOUS PRN
Status: DISCONTINUED | OUTPATIENT
Start: 2022-12-09 | End: 2022-12-09

## 2022-12-09 RX ORDER — FENTANYL CITRATE 50 UG/ML
25 INJECTION, SOLUTION INTRAMUSCULAR; INTRAVENOUS EVERY 5 MIN PRN
Status: DISCONTINUED | OUTPATIENT
Start: 2022-12-09 | End: 2022-12-10 | Stop reason: HOSPADM

## 2022-12-09 RX ORDER — HYDROMORPHONE HYDROCHLORIDE 1 MG/ML
0.4 INJECTION, SOLUTION INTRAMUSCULAR; INTRAVENOUS; SUBCUTANEOUS EVERY 5 MIN PRN
Status: DISCONTINUED | OUTPATIENT
Start: 2022-12-09 | End: 2022-12-10 | Stop reason: HOSPADM

## 2022-12-09 RX ORDER — OXYCODONE HYDROCHLORIDE 5 MG/1
5 TABLET ORAL EVERY 4 HOURS PRN
Status: DISCONTINUED | OUTPATIENT
Start: 2022-12-09 | End: 2022-12-10 | Stop reason: HOSPADM

## 2022-12-09 RX ORDER — ONDANSETRON 4 MG/1
4 TABLET, ORALLY DISINTEGRATING ORAL EVERY 30 MIN PRN
Status: DISCONTINUED | OUTPATIENT
Start: 2022-12-09 | End: 2022-12-10 | Stop reason: HOSPADM

## 2022-12-09 RX ORDER — SODIUM CHLORIDE, SODIUM LACTATE, POTASSIUM CHLORIDE, CALCIUM CHLORIDE 600; 310; 30; 20 MG/100ML; MG/100ML; MG/100ML; MG/100ML
INJECTION, SOLUTION INTRAVENOUS CONTINUOUS
Status: DISCONTINUED | OUTPATIENT
Start: 2022-12-09 | End: 2022-12-09 | Stop reason: HOSPADM

## 2022-12-09 RX ORDER — ONDANSETRON 2 MG/ML
4 INJECTION INTRAMUSCULAR; INTRAVENOUS EVERY 30 MIN PRN
Status: DISCONTINUED | OUTPATIENT
Start: 2022-12-09 | End: 2022-12-10 | Stop reason: HOSPADM

## 2022-12-09 RX ORDER — HYDROMORPHONE HYDROCHLORIDE 1 MG/ML
0.2 INJECTION, SOLUTION INTRAMUSCULAR; INTRAVENOUS; SUBCUTANEOUS EVERY 5 MIN PRN
Status: DISCONTINUED | OUTPATIENT
Start: 2022-12-09 | End: 2022-12-10 | Stop reason: HOSPADM

## 2022-12-09 RX ORDER — FENTANYL CITRATE 50 UG/ML
50 INJECTION, SOLUTION INTRAMUSCULAR; INTRAVENOUS EVERY 5 MIN PRN
Status: DISCONTINUED | OUTPATIENT
Start: 2022-12-09 | End: 2022-12-10 | Stop reason: HOSPADM

## 2022-12-09 RX ORDER — NALOXONE HYDROCHLORIDE 0.4 MG/ML
0.2 INJECTION, SOLUTION INTRAMUSCULAR; INTRAVENOUS; SUBCUTANEOUS
Status: DISCONTINUED | OUTPATIENT
Start: 2022-12-09 | End: 2022-12-10 | Stop reason: HOSPADM

## 2022-12-09 RX ORDER — SODIUM CHLORIDE, SODIUM LACTATE, POTASSIUM CHLORIDE, CALCIUM CHLORIDE 600; 310; 30; 20 MG/100ML; MG/100ML; MG/100ML; MG/100ML
INJECTION, SOLUTION INTRAVENOUS CONTINUOUS
Status: DISCONTINUED | OUTPATIENT
Start: 2022-12-09 | End: 2022-12-10 | Stop reason: HOSPADM

## 2022-12-09 RX ADMIN — PROPOFOL 30 MG: 10 INJECTION, EMULSION INTRAVENOUS at 08:11

## 2022-12-09 RX ADMIN — PROPOFOL 50 MG: 10 INJECTION, EMULSION INTRAVENOUS at 08:08

## 2022-12-09 RX ADMIN — SODIUM CHLORIDE, SODIUM LACTATE, POTASSIUM CHLORIDE, CALCIUM CHLORIDE: 600; 310; 30; 20 INJECTION, SOLUTION INTRAVENOUS at 07:30

## 2022-12-09 RX ADMIN — SODIUM CHLORIDE, SODIUM LACTATE, POTASSIUM CHLORIDE, CALCIUM CHLORIDE: 600; 310; 30; 20 INJECTION, SOLUTION INTRAVENOUS at 07:47

## 2022-12-09 RX ADMIN — PROPOFOL 20 MG: 10 INJECTION, EMULSION INTRAVENOUS at 08:13

## 2022-12-09 RX ADMIN — PROPOFOL 150 MCG/KG/MIN: 10 INJECTION, EMULSION INTRAVENOUS at 08:08

## 2022-12-09 RX ADMIN — LIDOCAINE HYDROCHLORIDE 20 MG: 20 INJECTION, SOLUTION INFILTRATION; PERINEURAL at 08:08

## 2022-12-09 ASSESSMENT — ENCOUNTER SYMPTOMS: SEIZURES: 1

## 2022-12-09 NOTE — ANESTHESIA PREPROCEDURE EVALUATION
"Anesthesia Pre-Procedure Evaluation    Patient: Meme Butts   MRN: [id number] : 1950        Procedure : Procedure(s):  COLONOSCOPY          Past Medical History:   Diagnosis Date     Adenomatous colon polyp     tubular adenoma     At high risk for breast cancer 2017    35.4% lifetime risk by Philip model     Epilepsy (H)      Fracture of metatarsal bone of left foot 2014     Fracture, radius     and ulnar styloid fracture     GIB (gastrointestinal bleeding) 2011     Intractable chronic migraine without aura      Kidney stones ,     during pregnancy     Osteoporosis 2016    T score -4.1     Pneumonia      Prediabetes       Past Surgical History:   Procedure Laterality Date     COLONOSCOPY  3/31/2011    Procedure:COLONOSCOPY; Surgeon:PACO DIETRICH; Location:[facility]     COLONOSCOPY  3/31/2011    Procedure:COMBINED COLONOSCOPY, REMOVE TUMOR/POLYP/LESION BY SNARE; Surgeon:PACO DIETRICH; Location:[facility]     COLONOSCOPY      had polyps - path not available - repeat 3-5 yrs      ESOPHAGOSCOPY, GASTROSCOPY, DUODENOSCOPY (EGD), COMBINED  3/31/2011    Procedure:COMBINED ESOPHAGOSCOPY, GASTROSCOPY, DUODENOSCOPY (EGD); Surgeon:PACO DIETRICH; Location:[facility]     HC BREATH HYDROGEN TEST  2011    Procedure:HYDROGEN BREATH TEST; Surgeon:MANDIE BRADY; Location:[facility]     HYSTERECTOMY   or     Partial hysterectomy , urethral tube \"widened\"      ORTHOPEDIC SURGERY      L wrist      No Known Allergies   Social History     Tobacco Use     Smoking status: Former     Packs/day: 1.00     Years: 15.00     Pack years: 15.00     Types: Cigarettes     Start date: 1994     Quit date: 2009     Years since quittin.3     Smokeless tobacco: Never   Substance Use Topics     Alcohol use: Yes     Comment: 1x/month      Wt Readings from Last 1 Encounters:   22 79.8 kg (176 lb)        Anesthesia Evaluation   Pt has had prior anesthetic.         ROS/MED " HX  ENT/Pulmonary:  - neg pulmonary ROS     Neurologic:     (+) seizures (on Keppra),     Cardiovascular:     (+) -----Previous cardiac testing   Echo: Date: Results:    Stress Test: Date: 8/22 Results:  Interpretation Summary  Normal dobutamine stress echocardiogram without evidence of inducible  ischemia.     ECG Reviewed: Date: Results:    Cath: Date: Results:      METS/Exercise Tolerance:     Hematologic:  - neg hematologic  ROS     Musculoskeletal: Comment: Plantar fasciitis      GI/Hepatic:  - neg GI/hepatic ROS     Renal/Genitourinary:     (+) Nephrolithiasis ,     Endo:  - neg endo ROS     Psychiatric/Substance Use:       Infectious Disease:  - neg infectious disease ROS     Malignancy:  - neg malignancy ROS     Other:               OUTSIDE LABS:  CBC:   Lab Results   Component Value Date    WBC 5.2 08/18/2022    WBC 7.0 04/15/2019    HGB 13.3 08/18/2022    HGB 13.1 04/15/2019    HCT 40.4 08/18/2022    HCT 39.9 04/15/2019     08/18/2022     04/15/2019     BMP:   Lab Results   Component Value Date     08/18/2022     08/18/2021    POTASSIUM 4.3 08/18/2022    POTASSIUM 4.0 08/18/2021    CHLORIDE 108 08/18/2022    CHLORIDE 107 08/18/2021    CO2 26 08/18/2022    CO2 26 08/18/2021    BUN 16 08/18/2022    BUN 15 08/18/2021    CR 0.68 08/18/2022    CR 0.75 08/18/2021     (H) 08/18/2022    GLC 93 08/18/2021     COAGS:   Lab Results   Component Value Date    PTT 30 03/31/2011    INR 1.06 03/31/2011     POC:   Lab Results   Component Value Date     (H) 03/31/2011     HEPATIC:   Lab Results   Component Value Date    ALBUMIN 3.5 08/18/2022    PROTTOTAL 7.8 08/18/2022    ALT 27 08/18/2022    AST 22 08/18/2022    ALKPHOS 118 08/18/2022    BILITOTAL 0.4 08/18/2022     OTHER:   Lab Results   Component Value Date    LACT 1.4 06/14/2011    A1C 6.1 (H) 08/18/2022    THELMA 9.0 09/07/2022    PHOS 2.8 09/07/2022    MAG 2.1 09/07/2022    LIPASE 127 04/15/2019    TSH 0.70 08/18/2021        Anesthesia Plan    ASA Status:  2   NPO Status:  NPO Appropriate    Anesthesia Type: MAC.     - Reason for MAC: straight local not clinically adequate   Induction: Intravenous.   Maintenance: TIVA.        Consents    Anesthesia Plan(s) and associated risks, benefits, and realistic alternatives discussed. Questions answered and patient/representative(s) expressed understanding.    - Discussed:     - Discussed with:  Patient         Postoperative Care    Pain management: IV analgesics.   PONV prophylaxis: Background Propofol Infusion     Comments:           H&P reviewed: Unable to attach H&P to encounter due to EHR limitations. H&P Update: appropriate H&P reviewed, patient examined. No interval changes since H&P (within 30 days).         Ammy Cain MD

## 2022-12-09 NOTE — ANESTHESIA CARE TRANSFER NOTE
Patient: Meme Butts    Procedure: Procedure(s):  COLONOSCOPY, WITH HOT POLYPECTOMY       Diagnosis: History of colonic polyps [Z86.010]  Diagnosis Additional Information: No value filed.    Anesthesia Type:   MAC     Note:    Oropharynx: oropharynx clear of all foreign objects and spontaneously breathing  Level of Consciousness: awake  Oxygen Supplementation: room air    Independent Airway: airway patency satisfactory and stable  Dentition: dentition unchanged  Vital Signs Stable: post-procedure vital signs reviewed and stable  Report to RN Given: handoff report given  Patient transferred to: Phase II  Comments: Uneventful transport   Report to TIERRA Del Rosario  Exchanging well; color natl  Pt responds appropriately to command  IV patent  Lips/teeth/dentition as preop status  Questions answered    Handoff Report: Identifed the Patient, Identified the Reponsible Provider, Reviewed the pertinent medical history, Discussed the surgical course, Reviewed Intra-OP anesthesia mangement and issues during anesthesia, Set expectations for post-procedure period and Allowed opportunity for questions and acknowledgement of understanding      Vitals:  Vitals Value Taken Time   BP 99/58 12/09/22 0846   Temp 37  C (98.6  F) 12/09/22 0846   Pulse 80 12/09/22 0846   Resp 14 12/09/22 0846   SpO2 97 % 12/09/22 0846       Electronically Signed By: GWYN WOODSON CRNA  December 9, 2022  8:48 AM

## 2022-12-09 NOTE — ANESTHESIA POSTPROCEDURE EVALUATION
Patient: Meme Butts    Procedure: Procedure(s):  COLONOSCOPY, WITH HOT POLYPECTOMY       Anesthesia Type:  MAC    Note:  Disposition: Outpatient   Postop Pain Control: Uneventful            Sign Out: Well controlled pain   PONV: No   Neuro/Psych: Uneventful            Sign Out: Acceptable/Baseline neuro status   Airway/Respiratory: Uneventful            Sign Out: Acceptable/Baseline resp. status   CV/Hemodynamics: Uneventful            Sign Out: Acceptable CV status; No obvious hypovolemia; No obvious fluid overload   Other NRE: NONE   DID A NON-ROUTINE EVENT OCCUR? No           Last vitals:  Vitals Value Taken Time   /60 12/09/22 0856   Temp 36.9  C (98.4  F) 12/09/22 0856   Pulse 80 12/09/22 0856   Resp 16 12/09/22 0856   SpO2 96 % 12/09/22 0856       Electronically Signed By: Ammy Cain MD  December 9, 2022  10:27 AM

## 2022-12-09 NOTE — H&P
"Meme WHITAKER Butts  [id number]  female  72 year old      Reason for procedure/surgery: Surveillance colonoscopy    Patient Active Problem List   Diagnosis     Seizures (H)     increased alkaline phosphatase (bone specific)     History of ischemic colitis     Osteoporosis     Prediabetes     At high risk for breast cancer     Achilles bursitis or tendinitis     Plantar fascial fibromatosis     Flat foot     Juvenile myoclonic epilepsy (H)     Cerebral seizure (H)     Encounter for screening mammogram for high-risk patient     Family history of malignant neoplasm of breast       Past Surgical History:    Past Surgical History:   Procedure Laterality Date     COLONOSCOPY  3/31/2011    Procedure:COLONOSCOPY; Surgeon:PACO DIETRICH; [facility]     COLONOSCOPY  3/31/2011    Procedure:COMBINED COLONOSCOPY, REMOVE TUMOR/POLYP/LESION BY SNARE; Surgeon:PACO DIETRICH; Location: [facility]     COLONOSCOPY  2017    had polyps - path not available - repeat 3-5 yrs      ESOPHAGOSCOPY, GASTROSCOPY, DUODENOSCOPY (EGD), COMBINED  3/31/2011    Procedure:COMBINED ESOPHAGOSCOPY, GASTROSCOPY, DUODENOSCOPY (EGD); Surgeon:PACO DIETRICH; Location: [facility]     HC BREATH HYDROGEN TEST  6/2/2011    Procedure:HYDROGEN BREATH TEST; Surgeon:MANDIE BRADY; Location: [facility]     HYSTERECTOMY  1994 or 1995    Partial hysterectomy 1995, urethral tube \"widened\" 1994     ORTHOPEDIC SURGERY      L wrist       Past Medical History:   Past Medical History:   Diagnosis Date     Adenomatous colon polyp 2011    tubular adenoma     At high risk for breast cancer 02/16/2017    35.4% lifetime risk by Philip model     Epilepsy (H)      Fracture of metatarsal bone of left foot 07/2014     Fracture, radius 1990    and ulnar styloid fracture     GIB (gastrointestinal bleeding) 04/01/2011     Intractable chronic migraine without aura      Kidney stones 1970, 1975    during pregnancy     Osteoporosis 2016    T score -4.1     Pneumonia      Prediabetes        Social " "History:   Social History     Tobacco Use     Smoking status: Former     Packs/day: 1.00     Years: 15.00     Pack years: 15.00     Types: Cigarettes     Start date: 1994     Quit date: 2009     Years since quittin.3     Smokeless tobacco: Never   Substance Use Topics     Alcohol use: Yes     Comment: 1x/month       Family History:   Family History   Problem Relation Age of Onset     Breast Cancer Mother 30        lumpectomy and chemo; also \"mass in ovary\"     Colon Polyps Father      Osteoporosis Sister      Breast Cancer Sister 55     Leukemia Sister 52     Coronary Artery Disease Brother      Colon Polyps Brother      Coronary Artery Disease Early Onset Brother 50     Prostate Cancer Brother      Gastrointestinal Disease Son         intestinal transplant     Diabetes Maternal Aunt         multiple mat aunts     Ovarian Cancer Maternal Aunt 50         of ovarian cancer in 50s or 60s, unknown     Breast Cancer Paternal Aunt 36         of breast cancer recurrence in 60s     Bone Cancer Niece 19     Prostate Cancer Cousin 65     Breast Cancer Cousin 30        paternal cousin, BRCA1+ by report     Breast Cancer Cousin 40        paternal cousin, BRCA1+ by report     Breast Cancer Cousin 35        paternal cousin, BRCA1+ by report     Genetic Disorder Cousin         paternal male cousin, BRCA1+ by report     Ovarian Cancer Cousin         paternal cousin, BRCA1+ by report       Allergies: No Known Allergies    Active Medications:   Current Outpatient Medications   Medication Sig Dispense Refill     levETIRAcetam (KEPPRA) 750 MG tablet PT REPORTS TAPERING DOWN: TAKE 1 & 1/2 TABS IN THE MORNING, AND 2 TABS IN THE EVENING. 105 tablet 3     bisacodyl (DULCOLAX) 5 MG EC tablet 2 days prior to procedure, take 2 tablets at 4 pm. 1 day prior to procedure, take 2 tablets at 4 pm. For additional instructions refer to your colonoscopy prep instructions. 4 tablet 0     blood glucose (CONTOUR NEXT TEST) test " strip Use to test blood sugar 1 times daily or as directed.  Ok to substitute alternative if insurance prefers. 100 strip 3     blood glucose monitoring (ACCU-CHEK TALIA PLUS) meter device kit Use to test blood sugar 1 time daily or as directed. 1 kit 1     blood glucose monitoring (JADE MICROLET) lancets Use to test blood sugar 1 times daily or as directed.  Ok to substitute alternative if insurance prefers. 100 each 3     blood glucose monitoring (SOFTCLIX) lancets Use to test blood sugar 1 time daily or as directed. 100 each 3     CALCIUM 500 +D 500-400 MG-UNIT TABS Take 1 tablet by mouth 2 times daily (Patient not taking: No sig reported) 180 tablet 3     cholecalciferol (VITAMIN D) 1000 UNIT tablet Take 2,000 Units by mouth daily 90 tablet 3     cyclobenzaprine (FLEXERIL) 5 MG tablet        desonide (DESOWEN) 0.05 % external cream Apply topically 2 times daily 15 g 1     hydrOXYzine (ATARAX) 25 MG tablet Take 1 tablet (25 mg) by mouth every 8 hours as needed for itching This is a PRN, not scheduled. (Patient not taking: Reported on 8/17/2022) 90 tablet 1     Multiple Vitamins-Minerals (CENTRUM SILVER) per tablet Take 1 tablet by mouth daily       polyethylene glycol (GOLYTELY) 236 g suspension 2 days prior at 5pm, mix and drink half of a jug of Golytely. Drink an 8 oz. glass of Golytely every 15 minutes until half of the jug is gone. Place remainder of Golytely in the refrigerator. 1 day prior at 5 pm, drink the 2nd half of a jug of Golytely bowel prep. 6 hours before your check-in time, drink an 8 oz. glass of Golytely every 15 minutes until half of the 2nd jug of Golytely is gone. Discard remainder of second jug. 8000 mL 0     triamcinolone (KENALOG) 0.1 % external cream Apply topically 2 times daily To rash on face 30 g 0       Systemic Review:   ENT/MOUTH: NEGATIVE for ear, mouth and throat problems  RESP: NEGATIVE for significant cough or SOB  CV: NEGATIVE for chest pain, palpitations or peripheral  edema    Physical Examination:   Vital Signs: /73   Temp 97.3  F (36.3  C) (Temporal)   Resp 16   SpO2 96%   NECK: no adenopathy, no asymmetry, masses, or scars  RESP: lungs clear to auscultation - no rales, rhonchi or wheezes  CV: regular rate and rhythm, normal S1 S2, no S3 or S4, no murmur, click or rub, no peripheral edema and peripheral pulses strong  ABDOMEN: soft, nontender, no hepatosplenomegaly, no masses and bowel sounds normal  MS: no gross musculoskeletal defects noted, no edema    Plan: Appropriate to proceed as scheduled.      Eldon Nicholas MD  12/9/2022    PCP:  Maria Esther Miranda

## 2022-12-14 LAB
PATH REPORT.ADDENDUM SPEC: ABNORMAL
PATH REPORT.COMMENTS IMP SPEC: ABNORMAL
PATH REPORT.COMMENTS IMP SPEC: YES
PATH REPORT.FINAL DX SPEC: ABNORMAL
PATH REPORT.GROSS SPEC: ABNORMAL
PATH REPORT.MICROSCOPIC SPEC OTHER STN: ABNORMAL
PATH REPORT.RELEVANT HX SPEC: ABNORMAL
PHOTO IMAGE: ABNORMAL

## 2022-12-26 ENCOUNTER — TELEPHONE (OUTPATIENT)
Dept: GASTROENTEROLOGY | Facility: CLINIC | Age: 72
End: 2022-12-26

## 2022-12-26 DIAGNOSIS — C18.3 MALIGNANT NEOPLASM OF HEPATIC FLEXURE (H): Primary | ICD-10-CM

## 2022-12-26 NOTE — TELEPHONE ENCOUNTER
Message  Received: Today  Eldon Nicholas MD  P [facility]  I ordered CT scans and labs on her. Could you make sure she does these ASAP? She answers the phone. Thanks, Adriano       Spoke with pt, informed her of Dr. Nicholas message above, no further questions or concerns from pt.

## 2022-12-26 NOTE — TELEPHONE ENCOUNTER
I discussed the histopathology review with the patient. Although the margins of the resection were clear, there was lymphovascular invasion noted. Therefore, we cannot assume that the cancerous tissue was completely resected.     I ordered chest/abdomen/pelvis CT and labs. I will also place a colorectal surgery consultation.    Eldon Nicholas MD

## 2022-12-27 ENCOUNTER — PATIENT OUTREACH (OUTPATIENT)
Dept: SURGERY | Facility: CLINIC | Age: 72
End: 2022-12-27

## 2022-12-29 ENCOUNTER — ANCILLARY PROCEDURE (OUTPATIENT)
Dept: CT IMAGING | Facility: CLINIC | Age: 72
End: 2022-12-29
Attending: INTERNAL MEDICINE
Payer: COMMERCIAL

## 2022-12-29 ENCOUNTER — LAB (OUTPATIENT)
Dept: LAB | Facility: CLINIC | Age: 72
End: 2022-12-29
Payer: COMMERCIAL

## 2022-12-29 DIAGNOSIS — C18.3 MALIGNANT NEOPLASM OF HEPATIC FLEXURE (H): ICD-10-CM

## 2022-12-29 LAB
ALBUMIN SERPL BCG-MCNC: 4.2 G/DL (ref 3.5–5.2)
ALP SERPL-CCNC: 79 U/L (ref 35–104)
ALT SERPL W P-5'-P-CCNC: 22 U/L (ref 10–35)
ANION GAP SERPL CALCULATED.3IONS-SCNC: 11 MMOL/L (ref 7–15)
AST SERPL W P-5'-P-CCNC: 22 U/L (ref 10–35)
BASOPHILS # BLD AUTO: 0.1 10E3/UL (ref 0–0.2)
BASOPHILS NFR BLD AUTO: 1 %
BILIRUB SERPL-MCNC: 0.4 MG/DL
BUN SERPL-MCNC: 13.2 MG/DL (ref 8–23)
CALCIUM SERPL-MCNC: 9.7 MG/DL (ref 8.8–10.2)
CEA SERPL-MCNC: 2 NG/ML
CHLORIDE SERPL-SCNC: 104 MMOL/L (ref 98–107)
CREAT BLD-MCNC: 0.8 MG/DL (ref 0.5–1)
CREAT SERPL-MCNC: 0.7 MG/DL (ref 0.51–0.95)
DEPRECATED HCO3 PLAS-SCNC: 24 MMOL/L (ref 22–29)
EOSINOPHIL # BLD AUTO: 0.1 10E3/UL (ref 0–0.7)
EOSINOPHIL NFR BLD AUTO: 2 %
ERYTHROCYTE [DISTWIDTH] IN BLOOD BY AUTOMATED COUNT: 12.9 % (ref 10–15)
GFR SERPL CREATININE-BSD FRML MDRD: >60 ML/MIN/1.73M2
GFR SERPL CREATININE-BSD FRML MDRD: >90 ML/MIN/1.73M2
GLUCOSE SERPL-MCNC: 109 MG/DL (ref 70–99)
HCT VFR BLD AUTO: 40.5 % (ref 35–47)
HGB BLD-MCNC: 13 G/DL (ref 11.7–15.7)
IMM GRANULOCYTES # BLD: 0 10E3/UL
IMM GRANULOCYTES NFR BLD: 0 %
LYMPHOCYTES # BLD AUTO: 2 10E3/UL (ref 0.8–5.3)
LYMPHOCYTES NFR BLD AUTO: 34 %
MCH RBC QN AUTO: 28.8 PG (ref 26.5–33)
MCHC RBC AUTO-ENTMCNC: 32.1 G/DL (ref 31.5–36.5)
MCV RBC AUTO: 90 FL (ref 78–100)
MONOCYTES # BLD AUTO: 0.5 10E3/UL (ref 0–1.3)
MONOCYTES NFR BLD AUTO: 9 %
NEUTROPHILS # BLD AUTO: 3.2 10E3/UL (ref 1.6–8.3)
NEUTROPHILS NFR BLD AUTO: 54 %
NRBC # BLD AUTO: 0 10E3/UL
NRBC BLD AUTO-RTO: 0 /100
PLATELET # BLD AUTO: 311 10E3/UL (ref 150–450)
POTASSIUM SERPL-SCNC: 4.3 MMOL/L (ref 3.4–5.3)
PROT SERPL-MCNC: 7.8 G/DL (ref 6.4–8.3)
RBC # BLD AUTO: 4.52 10E6/UL (ref 3.8–5.2)
SODIUM SERPL-SCNC: 139 MMOL/L (ref 136–145)
WBC # BLD AUTO: 5.9 10E3/UL (ref 4–11)

## 2022-12-29 PROCEDURE — 71260 CT THORAX DX C+: CPT | Mod: GC | Performed by: RADIOLOGY

## 2022-12-29 PROCEDURE — 85025 COMPLETE CBC W/AUTO DIFF WBC: CPT | Performed by: PATHOLOGY

## 2022-12-29 PROCEDURE — 99000 SPECIMEN HANDLING OFFICE-LAB: CPT | Performed by: PATHOLOGY

## 2022-12-29 PROCEDURE — 82565 ASSAY OF CREATININE: CPT | Mod: 91 | Performed by: PATHOLOGY

## 2022-12-29 PROCEDURE — 80053 COMPREHEN METABOLIC PANEL: CPT | Performed by: PATHOLOGY

## 2022-12-29 PROCEDURE — 82378 CARCINOEMBRYONIC ANTIGEN: CPT | Mod: 90 | Performed by: PATHOLOGY

## 2022-12-29 PROCEDURE — 36415 COLL VENOUS BLD VENIPUNCTURE: CPT | Performed by: PATHOLOGY

## 2022-12-29 PROCEDURE — 74177 CT ABD & PELVIS W/CONTRAST: CPT | Mod: GC | Performed by: RADIOLOGY

## 2022-12-29 RX ORDER — IOPAMIDOL 755 MG/ML
100 INJECTION, SOLUTION INTRAVASCULAR ONCE
Status: COMPLETED | OUTPATIENT
Start: 2022-12-29 | End: 2022-12-29

## 2022-12-29 RX ADMIN — IOPAMIDOL 100 ML: 755 INJECTION, SOLUTION INTRAVASCULAR at 10:25

## 2022-12-30 NOTE — PROGRESS NOTES
Colon and Rectal Surgery Clinic Note    RE: Meme Butts.  : 1950.  MEGAN: 1/10/2023.    Reason for visit: colon cancer     HPI: Meme Butts is a 72 year old female who presents today for colon cancer. She underwent a screening colonoscopy with Dr. Eldon Nicholas on 2022 and was found to have a 12mm sessile polyp at the hepatic flexure that was removed but unfortunately came back as intramucosal adenocarcinoma with lymphovascular invasion. No perineural invasion. Negative margins. In addition, she had two 1-2mm polyps in the hepatic flexure that were ablated and two 5-6mm tubular adenomas in the ascending colon. She has a personal history of tubular adenomas. CT Chest/Abdomen/Pelvis revealed no metastatic disease. CEA 2.0.    Today Meme feels well.  She is constipated at baseline and has a BM every other day on average.  She does not use fiber.  She does not regularly take a laxative or stool softener but has taken miralax on occasion.  She has not had any obstructive episodes.  She is otherwise feeling well.  She presents with her two daughters today.  Of note, her son underwent an intestinal transplant in  at the [facility].  He required a small bowel transplant due to complications related to colostomy takedown after [facility]'s for diverticulitis.      Colonoscopy (2022):  Findings:        A 12 mm polyp was found in the hepatic flexure. The polyp was sessile.        The polyp was removed with a hot snare. Resection and retrieval were        complete.        Two sessile polyps were found in the ascending colon. The polyps were 5        to 6 mm in size. These polyps were removed with a cold snare. Resection        and retrieval were complete.        Two flat polyps were found in the hepatic flexure. The polyps were 1 to        2 mm in size. Fulguration to ablate the lesion by snare was successful.                                                                            "          Impression:            - One 12 mm polyp at the hepatic flexure, removed with                          a hot snare. Resected and retrieved.                          - Two 5 to 6 mm polyps in the ascending colon, removed                          with a cold snare. Resected and retrieved.                          - Two 1 to 2 mm polyps at the hepatic flexure. Treated                          with a hot snare.       Surgical Pathology (12/9/2022):  A. COLON, HEPATIC FLEXURE, POLYP:  - Intramucosal adenocarcinoma with superficial invasion; see comment  A. COLON, HEPATIC FLEXURE, POLYP:  - Lymphovascular invasion is present  - Perineural invasion is not identified  - Additional levels examined  Sections of hepatic flexure polyp show a tubulovillous adenoma with extensive intramucosal adenocarcinoma and focal superficial invasion.  The depth of invasion is difficult to assess due to the tangential nature of the specimen.  Dysplastic glands come to within 1 mm of the resection margin (negative).      B. COLON, ASCENDING, POLYPS:  - Tubular adenomas  - No high-grade dysplasia or malignancy identified      CT Chest/Abdomen/Pelvis (12/29/2022):  1. Fibrotic changes with numerous micronodules and bronchiectasis  involving predominantly the upper lobes. Appearance suggests sequela  of previous infection. Attention on follow-up is needed.  2. No evidence of malignancy in the abdomen or pelvis.    Medical history:  1. Tubular adenoma in 2011  2. High risk for breast cancer   3. Epilepsy   4. Fracture of metatarsal bone of L foot   5. Radius Fracture   6. GI bleed   7. Migraine   8. Kidney stones   9. Osteoporosis   10. Pneumonia   11. Prediabetes       Surgical history:  1. Partial Hysterectomy in 1995  2. Orthopedic surgery on L wrist       Family history:  Family History   Problem Relation Age of Onset     Breast Cancer Mother 30        lumpectomy and chemo; also \"mass in ovary\"     Colon Polyps Father      " Osteoporosis Sister      Breast Cancer Sister 55     Leukemia Sister 52     Coronary Artery Disease Brother      Colon Polyps Brother      Coronary Artery Disease Early Onset Brother 50     Prostate Cancer Brother      Gastrointestinal Disease Son         intestinal transplant     Diabetes Maternal Aunt         multiple mat aunts     Ovarian Cancer Maternal Aunt 50         of ovarian cancer in 50s or 60s, unknown     Breast Cancer Paternal Aunt 36         of breast cancer recurrence in 60s     Bone Cancer Niece 19     Prostate Cancer Cousin 65     Breast Cancer Cousin 30        paternal cousin, BRCA1+ by report     Breast Cancer Cousin 40        paternal cousin, BRCA1+ by report     Breast Cancer Cousin 35        paternal cousin, BRCA1+ by report     Genetic Disorder Cousin         paternal male cousin, BRCA1+ by report     Ovarian Cancer Cousin         paternal cousin, BRCA1+ by report       Medications:  Current Outpatient Medications   Medication Sig Dispense Refill     bisacodyl (DULCOLAX) 5 MG EC tablet 2 days prior to procedure, take 2 tablets at 4 pm. 1 day prior to procedure, take 2 tablets at 4 pm. For additional instructions refer to your colonoscopy prep instructions. 4 tablet 0     blood glucose (CONTOUR NEXT TEST) test strip Use to test blood sugar 1 times daily or as directed.  Ok to substitute alternative if insurance prefers. 100 strip 3     blood glucose monitoring (ACCU-CHEK TALIA PLUS) meter device kit Use to test blood sugar 1 time daily or as directed. 1 kit 1     blood glucose monitoring (JADE MICROLET) lancets Use to test blood sugar 1 times daily or as directed.  Ok to substitute alternative if insurance prefers. 100 each 3     blood glucose monitoring (SOFTCLIX) lancets Use to test blood sugar 1 time daily or as directed. 100 each 3     CALCIUM 500 +D 500-400 MG-UNIT TABS Take 1 tablet by mouth 2 times daily (Patient not taking: No sig reported) 180 tablet 3     cholecalciferol  (VITAMIN D) 1000 UNIT tablet Take 2,000 Units by mouth daily 90 tablet 3     cyclobenzaprine (FLEXERIL) 5 MG tablet        desonide (DESOWEN) 0.05 % external cream Apply topically 2 times daily 15 g 1     hydrOXYzine (ATARAX) 25 MG tablet Take 1 tablet (25 mg) by mouth every 8 hours as needed for itching This is a PRN, not scheduled. (Patient not taking: Reported on 2022) 90 tablet 1     levETIRAcetam (KEPPRA) 750 MG tablet PT REPORTS TAPERING DOWN: TAKE 1 & 1/2 TABS IN THE MORNING, AND 2 TABS IN THE EVENING. 105 tablet 3     Multiple Vitamins-Minerals (CENTRUM SILVER) per tablet Take 1 tablet by mouth daily       polyethylene glycol (GOLYTELY) 236 g suspension 2 days prior at 5pm, mix and drink half of a jug of Golytely. Drink an 8 oz. glass of Golytely every 15 minutes until half of the jug is gone. Place remainder of Golytely in the refrigerator. 1 day prior at 5 pm, drink the 2nd half of a jug of Golytely bowel prep. 6 hours before your check-in time, drink an 8 oz. glass of Golytely every 15 minutes until half of the 2nd jug of Golytely is gone. Discard remainder of second jug. 8000 mL 0     triamcinolone (KENALOG) 0.1 % external cream Apply topically 2 times daily To rash on face 30 g 0       Allergies:  No Known Allergies    Social history:   Social History     Tobacco Use     Smoking status: Former     Packs/day: 1.00     Years: 15.00     Pack years: 15.00     Types: Cigarettes     Start date: 1994     Quit date: 2009     Years since quittin.3     Smokeless tobacco: Never   Substance Use Topics     Alcohol use: Yes     Comment: 1x/month     Marital status: .    ROS:  A complete review of systems was performed with the patient and all systems negative except as per HPI.    Physical Examination:  There were no vitals taken for this visit.  General: Well hydrated. No acute distress.  Abdomen: Soft, Not tender      ASSESSMENT  71 y/o lady with intramucosal adenocarcinoma at hepatic  flexure, s/p polypectomy.     Risks, benefits, and alternatives of operative treatment were thoroughly discussed with the patient, he/she understands these well and agrees to proceed.    PLAN  1. Tumor Board Discussion   2. Risks and benefits of surgery (right hemicolectomy) discussed with Ms Butts including risk of lymph node metastases and inability to rule these out without right hemicolectomy; active surveillance versus right hemicolectomy (which I recommended given her otherwise good health) discussed; risk of minimally invasive right hemicolectomy, including risk of anastomotic leak requiring temporary ostomy, discussed with her as well as general anesthetic/cardiopulmonary risks  3. Schedule for robotic right hemicolectomy  4. PAC    60 minutes spent on the date of the encounter doing chart review, history and exam, imaging review, documentation and further activities as noted above.      Lalit Cardenas MD, PhD    Division of Colon and Rectal Surgery  [facility]    Referring Provider:  Eldon Nicholas MD  [address] [address] [place] [id number]     Primary Care Provider:  Maria Esther Miranda

## 2022-12-30 NOTE — TELEPHONE ENCOUNTER
Diagnosis, Referred by & from: Colon Cancer, Dr. Nicholas, STEVE [facility]   Appt date: 1.10.23   NOTES STATUS DETAILS   OFFICE NOTE from referring provider Internal 12.26.22 Dr. Eldon Nicholas, [facility] [place]   OFFICE NOTE from other specialist     DISCHARGE SUMMARY from hospital     DISCHARGE REPORT from the ER     OPERATIVE REPORT     MEDICATION LIST Internal    LABS     ANAL PAP/CEA Internal 12.29.22   BIOPSIES/PATHOLOGY RELATED TO DIAGNOSIS Internal [id number] 12.9.22, [facility]  [id number] [facility]   DIAGNOSTIC PROCEDURES     PFC TESTING (from the Pelvic floor center includes Manometry, PDNL, EMG, etc.)     COLONOSCOPY Internal 12.9.22 [facility]   8.21.17 [facility]   UPPER ENDOSCOPY (EGD)     FLEX SIGMOIDOSCOPY     ERCP     IMAGING (DISC & REPORT)      CT Internal 12.29.22 CT chest ab pelvis   MRI     XRAY     ULTRASOUND  (ENDOANAL/ENDORECTAL)

## 2023-01-03 ENCOUNTER — MYC MEDICAL ADVICE (OUTPATIENT)
Dept: SURGERY | Facility: CLINIC | Age: 73
End: 2023-01-03

## 2023-01-10 ENCOUNTER — PRE VISIT (OUTPATIENT)
Dept: SURGERY | Facility: CLINIC | Age: 73
End: 2023-01-10

## 2023-01-10 ENCOUNTER — OFFICE VISIT (OUTPATIENT)
Dept: SURGERY | Facility: CLINIC | Age: 73
End: 2023-01-10
Payer: COMMERCIAL

## 2023-01-10 VITALS — SYSTOLIC BLOOD PRESSURE: 132 MMHG | HEART RATE: 98 BPM | DIASTOLIC BLOOD PRESSURE: 76 MMHG | OXYGEN SATURATION: 96 %

## 2023-01-10 DIAGNOSIS — C18.3 MALIGNANT NEOPLASM OF HEPATIC FLEXURE (H): Primary | ICD-10-CM

## 2023-01-10 DIAGNOSIS — C18.3 MALIGNANT NEOPLASM OF HEPATIC FLEXURE (H): ICD-10-CM

## 2023-01-10 PROCEDURE — 99205 OFFICE O/P NEW HI 60 MIN: CPT | Performed by: SURGERY

## 2023-01-10 RX ORDER — POLYETHYLENE GLYCOL 3350 17 G/17G
238 POWDER, FOR SOLUTION ORAL SEE ADMIN INSTRUCTIONS
Qty: 14 PACKET | Refills: 0 | Status: ON HOLD | OUTPATIENT
Start: 2023-01-10 | End: 2023-03-01

## 2023-01-10 RX ORDER — POLYETHYLENE GLYCOL 3350 17 G/17G
119 POWDER, FOR SOLUTION ORAL SEE ADMIN INSTRUCTIONS
Qty: 7 PACKET | Refills: 0 | Status: ON HOLD | OUTPATIENT
Start: 2023-01-10 | End: 2023-03-01

## 2023-01-10 RX ORDER — METRONIDAZOLE 500 MG/1
500 TABLET ORAL EVERY 6 HOURS
Qty: 3 TABLET | Refills: 0 | Status: ON HOLD | OUTPATIENT
Start: 2023-01-10 | End: 2023-03-01

## 2023-01-10 RX ORDER — NEOMYCIN SULFATE 500 MG/1
1000 TABLET ORAL EVERY 6 HOURS
Qty: 6 TABLET | Refills: 0 | Status: ON HOLD | OUTPATIENT
Start: 2023-01-10 | End: 2023-03-01

## 2023-01-10 RX ORDER — ONDANSETRON 4 MG/1
4 TABLET, FILM COATED ORAL EVERY 6 HOURS
Qty: 3 TABLET | Refills: 0 | Status: ON HOLD | OUTPATIENT
Start: 2023-01-10 | End: 2023-03-01

## 2023-01-10 ASSESSMENT — PAIN SCALES - GENERAL: PAINLEVEL: NO PAIN (0)

## 2023-01-10 NOTE — NURSING NOTE
Chief Complaint   Patient presents with     Cancer       Vitals:    01/10/23 1412   BP: 132/76   BP Location: Left arm   Patient Position: Sitting   Cuff Size: Adult Regular   Pulse: 98   SpO2: 96%       There is no height or weight on file to calculate BMI.    Romain Forte EMT-P

## 2023-01-10 NOTE — PATIENT INSTRUCTIONS
Follow up:    Maryuri is our surgery scheduler. She will call you in 5-7 business days to schedule surgery. If you don't hear from her for some reason feel free to call her directly at [phone]  Appointments you will need: blood work, pre op physical (with our anesthesia team)  You will need to do a full bowel prep with antibiotics the day before your surgery. I have sent these to your pharmacy. You will receive the instructions via mail and [employer].   New Cancer Study    TIERRA Burr [phone]    Clinic Fax Number [phone]    Surgery Scheduling (Maryuri) [phone]    My Chart is available 24 hours a day and is a secure way to access your records and communicate with your care team.  I strongly recommend signing up if you haven't already done so, if you are comfortable with computers.  If you would like to inquire about this or are having problems with My Chart access, you may call [phone] or go online at [email or web address].  Please allow at least 24 hours for a response and extra time on weekends and Holidays.

## 2023-01-10 NOTE — LETTER
1/10/2023       RE: Meme Butts  [address]  [facility] [address]     Dear Colleague,    Thank you for referring your patient, Meme Butts, to the [facility] COLON AND RECTAL SURGERY CLINIC [place] at [facility]. Please see a copy of my visit note below.    Colon and Rectal Surgery Clinic Note    RE: Meme Butts.  : 1950.  MEGAN: 1/10/2023.    Reason for visit: colon cancer     HPI: Meme Butts is a 72 year old female who presents today for colon cancer. She underwent a screening colonoscopy with Dr. Eldon Nicholas on 2022 and was found to have a 12mm sessile polyp at the hepatic flexure that was removed but unfortunately came back as intramucosal adenocarcinoma with lymphovascular invasion. No perineural invasion. Negative margins. In addition, she had two 1-2mm polyps in the hepatic flexure that were ablated and two 5-6mm tubular adenomas in the ascending colon. She has a personal history of tubular adenomas. CT Chest/Abdomen/Pelvis revealed no metastatic disease. CEA 2.0.    Today Meme feels well.  She is constipated at baseline and has a BM every other day on average.  She does not use fiber.  She does not regularly take a laxative or stool softener but has taken miralax on occasion.  She has not had any obstructive episodes.  She is otherwise feeling well.  She presents with her two daughters today.  Of note, her son underwent an intestinal transplant in  at the [facility].  He required a small bowel transplant due to complications related to colostomy takedown after [facility]leen's for diverticulitis.      Colonoscopy (2022):  Findings:        A 12 mm polyp was found in the hepatic flexure. The polyp was sessile.        The polyp was removed with a hot snare. Resection and retrieval were        complete.        Two sessile polyps were found in the ascending colon. The polyps were 5        to  6 mm in size. These polyps were removed with a cold snare. Resection        and retrieval were complete.        Two flat polyps were found in the hepatic flexure. The polyps were 1 to        2 mm in size. Fulguration to ablate the lesion by snare was successful.                                                                                     Impression:            - One 12 mm polyp at the hepatic flexure, removed with                          a hot snare. Resected and retrieved.                          - Two 5 to 6 mm polyps in the ascending colon, removed                          with a cold snare. Resected and retrieved.                          - Two 1 to 2 mm polyps at the hepatic flexure. Treated                          with a hot snare.       Surgical Pathology (12/9/2022):  A. COLON, HEPATIC FLEXURE, POLYP:  - Intramucosal adenocarcinoma with superficial invasion; see comment  A. COLON, HEPATIC FLEXURE, POLYP:  - Lymphovascular invasion is present  - Perineural invasion is not identified  - Additional levels examined  Sections of hepatic flexure polyp show a tubulovillous adenoma with extensive intramucosal adenocarcinoma and focal superficial invasion.  The depth of invasion is difficult to assess due to the tangential nature of the specimen.  Dysplastic glands come to within 1 mm of the resection margin (negative).      B. COLON, ASCENDING, POLYPS:  - Tubular adenomas  - No high-grade dysplasia or malignancy identified      CT Chest/Abdomen/Pelvis (12/29/2022):  1. Fibrotic changes with numerous micronodules and bronchiectasis  involving predominantly the upper lobes. Appearance suggests sequela  of previous infection. Attention on follow-up is needed.  2. No evidence of malignancy in the abdomen or pelvis.    Medical history:  1. Tubular adenoma in 2011  2. High risk for breast cancer   3. Epilepsy   4. Fracture of metatarsal bone of L foot   5. Radius Fracture   6. GI bleed   7. Migraine   8. Kidney  "stones   9. Osteoporosis   10. Pneumonia   11. Prediabetes       Surgical history:  1. Partial Hysterectomy in   2. Orthopedic surgery on L wrist       Family history:  Family History   Problem Relation Age of Onset     Breast Cancer Mother 30        lumpectomy and chemo; also \"mass in ovary\"     Colon Polyps Father      Osteoporosis Sister      Breast Cancer Sister 55     Leukemia Sister 52     Coronary Artery Disease Brother      Colon Polyps Brother      Coronary Artery Disease Early Onset Brother 50     Prostate Cancer Brother      Gastrointestinal Disease Son         intestinal transplant     Diabetes Maternal Aunt         multiple mat aunts     Ovarian Cancer Maternal Aunt 50         of ovarian cancer in 50s or 60s, unknown     Breast Cancer Paternal Aunt 36         of breast cancer recurrence in 60s     Bone Cancer Niece 19     Prostate Cancer Cousin 65     Breast Cancer Cousin 30        paternal cousin, BRCA1+ by report     Breast Cancer Cousin 40        paternal cousin, BRCA1+ by report     Breast Cancer Cousin 35        paternal cousin, BRCA1+ by report     Genetic Disorder Cousin         paternal male cousin, BRCA1+ by report     Ovarian Cancer Cousin         paternal cousin, BRCA1+ by report       Medications:  Current Outpatient Medications   Medication Sig Dispense Refill     bisacodyl (DULCOLAX) 5 MG EC tablet 2 days prior to procedure, take 2 tablets at 4 pm. 1 day prior to procedure, take 2 tablets at 4 pm. For additional instructions refer to your colonoscopy prep instructions. 4 tablet 0     blood glucose (CONTOUR NEXT TEST) test strip Use to test blood sugar 1 times daily or as directed.  Ok to substitute alternative if insurance prefers. 100 strip 3     blood glucose monitoring (ACCU-CHEK TALIA PLUS) meter device kit Use to test blood sugar 1 time daily or as directed. 1 kit 1     blood glucose monitoring (JADE MICROLET) lancets Use to test blood sugar 1 times daily or as directed.  " Ok to substitute alternative if insurance prefers. 100 each 3     blood glucose monitoring (SOFTCLIX) lancets Use to test blood sugar 1 time daily or as directed. 100 each 3     CALCIUM 500 +D 500-400 MG-UNIT TABS Take 1 tablet by mouth 2 times daily (Patient not taking: No sig reported) 180 tablet 3     cholecalciferol (VITAMIN D) 1000 UNIT tablet Take 2,000 Units by mouth daily 90 tablet 3     cyclobenzaprine (FLEXERIL) 5 MG tablet        desonide (DESOWEN) 0.05 % external cream Apply topically 2 times daily 15 g 1     hydrOXYzine (ATARAX) 25 MG tablet Take 1 tablet (25 mg) by mouth every 8 hours as needed for itching This is a PRN, not scheduled. (Patient not taking: Reported on 2022) 90 tablet 1     levETIRAcetam (KEPPRA) 750 MG tablet PT REPORTS TAPERING DOWN: TAKE 1 & 1/2 TABS IN THE MORNING, AND 2 TABS IN THE EVENING. 105 tablet 3     Multiple Vitamins-Minerals (CENTRUM SILVER) per tablet Take 1 tablet by mouth daily       polyethylene glycol (GOLYTELY) 236 g suspension 2 days prior at 5pm, mix and drink half of a jug of Golytely. Drink an 8 oz. glass of Golytely every 15 minutes until half of the jug is gone. Place remainder of Golytely in the refrigerator. 1 day prior at 5 pm, drink the 2nd half of a jug of Golytely bowel prep. 6 hours before your check-in time, drink an 8 oz. glass of Golytely every 15 minutes until half of the 2nd jug of Golytely is gone. Discard remainder of second jug. 8000 mL 0     triamcinolone (KENALOG) 0.1 % external cream Apply topically 2 times daily To rash on face 30 g 0       Allergies:  No Known Allergies    Social history:   Social History     Tobacco Use     Smoking status: Former     Packs/day: 1.00     Years: 15.00     Pack years: 15.00     Types: Cigarettes     Start date: 1994     Quit date: 2009     Years since quittin.3     Smokeless tobacco: Never   Substance Use Topics     Alcohol use: Yes     Comment: 1x/month     Marital status:  .    ROS:  A complete review of systems was performed with the patient and all systems negative except as per HPI.    Physical Examination:  There were no vitals taken for this visit.  General: Well hydrated. No acute distress.  Abdomen: Soft, Not tender      ASSESSMENT  73 y/o lady with intramucosal adenocarcinoma at hepatic flexure, s/p polypectomy.     Risks, benefits, and alternatives of operative treatment were thoroughly discussed with the patient, he/she understands these well and agrees to proceed.    PLAN  1. Tumor Board Discussion   2. Risks and benefits of surgery (right hemicolectomy) discussed with Ms Butts including risk of lymph node metastases and inability to rule these out without right hemicolectomy; active surveillance versus right hemicolectomy (which I recommended given her otherwise good health) discussed; risk of minimally invasive right hemicolectomy, including risk of anastomotic leak requiring temporary ostomy, discussed with her as well as general anesthetic/cardiopulmonary risks  3. Schedule for robotic right hemicolectomy  4. PAC    60 minutes spent on the date of the encounter doing chart review, history and exam, imaging review, documentation and further activities as noted above.      Referring Provider:  Eldon Nicholas MD  [address] [place] [address]     Primary Care Provider:  Maria Esther Miranda        Again, thank you for allowing me to participate in the care of your patient.      Sincerely,    Lalit Cardenas MD

## 2023-01-12 ENCOUNTER — TELEPHONE (OUTPATIENT)
Dept: SURGERY | Facility: CLINIC | Age: 73
End: 2023-01-12
Payer: COMMERCIAL

## 2023-01-12 NOTE — TELEPHONE ENCOUNTER
Case Request received to schedule the following case with Dr. Lalit Cardenas. Diagnosis is Malignant neoplasm of hepatic flexure (H). So, it is important that patient get in for surgery in the near future.    Additional Instructions for the Case   Multi-surgeon case:  no   Time in minutes:  300   Anesthesia: general   Prep: full bowel prep with antibiotics   Pre-Op: PAC   Labs: yes   WOC: no   Special equipment: robot highly preferred   Special Instructions: no     Schedule with Violet Bhatti NP or Farideh WATTS 2-3 weeks after surgery.   Schedule with Surgeon 3-4 weeks after Violet Bhatti NP     On 1/12/2023:  Patient called in to see if we had found a surgery date for her yet. I said that we are working on it, and that we are looking for a date outside of Dr. Cardenas's standard availability in order to get her in sooner. I told patient that I would call her as soon as we have a date in mind.    I messaged Aminah RN, to see if wait list for 2/28/2023 with a possible robot may work for this patient's surgery even if the robot is tentative, and also if it would Dr. Cardenas's schedule. Awaiting response.    Maryuri Palacios  Annalisa-op Coordinator  [facility]  Direct Phone: [phone]

## 2023-01-16 ENCOUNTER — TUMOR CONFERENCE (OUTPATIENT)
Dept: ONCOLOGY | Facility: CLINIC | Age: 73
End: 2023-01-16
Payer: COMMERCIAL

## 2023-01-16 NOTE — TUMOR CONFERENCE
Tumor Conference Information  Specialties Present: Medical oncology, Radiation oncology, Pathology, Radiology, Surgery  Patient Status: Prospective  Stage: Intramucosal adenocarcinoma with superficial invasion, Lymphovascular invasion is present, neg margins  Treatment to Date: None  Clinical Trials: Not discussed  Genetic Testing Discussed/Recommended?: No  Supportive Care Services Discussed/Recommended?: No  Recommended Plan: Follows evidence-based guidelines (Comment: Right hemicolectomy)  Did the review exceed 30 minutes?: did not           Documentation / Disclaimer Cancer Tumor Board Note  Cancer tumor board recommendations do not override what is determined to be reasonable care and treatment, which is dependent on the circumstances of a patient's case; the patient's medical, social, and personal concerns; and the clinical judgment of the oncologist [physician].

## 2023-01-19 NOTE — TELEPHONE ENCOUNTER
Spoke with patient via phone, completed the following tentative scheduling for wait list surgery date 2/28/2023 with Dr. Lalit Cardenas, then sent Surgery Packet to patient via MyChart and Mail including related scheduling information and prep instructions:     Required: Yes, you will need a  18 years or older to drive you home from your procedure as well as stay with you for 24 hours after your procedure, if you are discharged the same day as your procedure.    Surgery: Robotic Right Hemicolectomy    Date: Tuesday February 28, 2023*      Surgeon: Dr. Lalit Cardenas   *Wait list date not final until confirmed within 14 days before surgery date    Time: You will receive a call 1-3 days prior to your surgery with your finalized surgery and arrival time.     Location:     [address])      [place] [address]      [phone]      [email or web address]     Upcoming Related Appointments:     Feb 13, 2023  9:15 AM  (Arrive by 9:00 AM)  PAC EVALUATION with Michelle Cano PA-C  [facility] [place] ([facility] )   [phone]    [address] [facility] 5th Floor [facility] [address]     Feb 13, 2023 10:45 AM  LAB with [facility]  [facility] [place] ([facility] )   [phone]    [address] [facility] 1st [facility] [address]     Mar 15, 2023 10:00 AM  (Arrive by 9:45 AM)  Post Op with Farideh Cano PA-C  [facility] [place] ([facility] ) [phone]    [address] [facility] 4th Floor [facility] [address]     Apr 11, 2023  9:30 AM  (Arrive by 9:15 AM)  Post Op with Lalit Cardenas MD  [facility] [place] ([facility] )  "[phone]    [address] [address]        Pre-surgical Preparation:    MiraLAX/Gatorade, Antibiotics, Zofran  - see \"Day Before Surgery\"   - obtain medications and ingredients in advance    Please go to your local pharmacy or store and purchase:   - TWO 8.3oz (238g) bottles of Miralax (Powder)   - 96 oz of Gatorade (no red or purple)     Maryuri Palacios  Annalisa-op Coordinator  [place]  Direct Phone: [phone]      "

## 2023-01-20 NOTE — TELEPHONE ENCOUNTER
FUTURE VISIT INFORMATION      SURGERY INFORMATION:    Date: 23    Location: uu or    Surgeon:  Lalit Cardenas MD    Anesthesia Type:  general    Procedure: Robotic right hemicolectomy    Consult: ov 1/10/23    RECORDS REQUESTED FROM:       Primary Care Provider: Maria Esther Miranda MD PhD- [facility]    Most recent EKG+ Tracin22    Most recent Cardiac Stress Test: 22

## 2023-02-12 LAB
ABO/RH(D): NORMAL
ANTIBODY SCREEN: NEGATIVE
SPECIMEN EXPIRATION DATE: NORMAL

## 2023-02-13 ENCOUNTER — PRE VISIT (OUTPATIENT)
Dept: SURGERY | Facility: CLINIC | Age: 73
End: 2023-02-13

## 2023-02-13 ENCOUNTER — ANESTHESIA EVENT (OUTPATIENT)
Dept: SURGERY | Facility: CLINIC | Age: 73
End: 2023-02-13

## 2023-02-13 ENCOUNTER — OFFICE VISIT (OUTPATIENT)
Dept: SURGERY | Facility: CLINIC | Age: 73
End: 2023-02-13
Payer: COMMERCIAL

## 2023-02-13 ENCOUNTER — LAB (OUTPATIENT)
Dept: LAB | Facility: CLINIC | Age: 73
End: 2023-02-13
Attending: SURGERY
Payer: COMMERCIAL

## 2023-02-13 VITALS
BODY MASS INDEX: 31.87 KG/M2 | DIASTOLIC BLOOD PRESSURE: 80 MMHG | RESPIRATION RATE: 16 BRPM | OXYGEN SATURATION: 95 % | WEIGHT: 173.2 LBS | HEART RATE: 98 BPM | TEMPERATURE: 97.6 F | HEIGHT: 62 IN | SYSTOLIC BLOOD PRESSURE: 115 MMHG

## 2023-02-13 DIAGNOSIS — C18.3 MALIGNANT NEOPLASM OF HEPATIC FLEXURE (H): ICD-10-CM

## 2023-02-13 DIAGNOSIS — Z01.818 PRE-OP EXAMINATION: Primary | ICD-10-CM

## 2023-02-13 DIAGNOSIS — R73.03 PREDIABETES: ICD-10-CM

## 2023-02-13 DIAGNOSIS — Z01.818 PRE-OP EXAMINATION: ICD-10-CM

## 2023-02-13 LAB
ALBUMIN SERPL BCG-MCNC: 4.5 G/DL (ref 3.5–5.2)
ALP SERPL-CCNC: 88 U/L (ref 35–104)
ALT SERPL W P-5'-P-CCNC: 22 U/L (ref 10–35)
ANION GAP SERPL CALCULATED.3IONS-SCNC: 11 MMOL/L (ref 7–15)
AST SERPL W P-5'-P-CCNC: 29 U/L (ref 10–35)
BASOPHILS # BLD AUTO: 0.1 10E3/UL (ref 0–0.2)
BASOPHILS NFR BLD AUTO: 1 %
BILIRUB SERPL-MCNC: 0.4 MG/DL
BUN SERPL-MCNC: 11.3 MG/DL (ref 8–23)
CALCIUM SERPL-MCNC: 10.4 MG/DL (ref 8.8–10.2)
CHLORIDE SERPL-SCNC: 100 MMOL/L (ref 98–107)
CREAT SERPL-MCNC: 0.75 MG/DL (ref 0.51–0.95)
DEPRECATED HCO3 PLAS-SCNC: 27 MMOL/L (ref 22–29)
EOSINOPHIL # BLD AUTO: 0.1 10E3/UL (ref 0–0.7)
EOSINOPHIL NFR BLD AUTO: 1 %
ERYTHROCYTE [DISTWIDTH] IN BLOOD BY AUTOMATED COUNT: 12.6 % (ref 10–15)
GFR SERPL CREATININE-BSD FRML MDRD: 84 ML/MIN/1.73M2
GLUCOSE SERPL-MCNC: 122 MG/DL (ref 70–99)
HBA1C MFR BLD: 6.6 %
HCT VFR BLD AUTO: 41.6 % (ref 35–47)
HGB BLD-MCNC: 13.6 G/DL (ref 11.7–15.7)
IMM GRANULOCYTES # BLD: 0 10E3/UL
IMM GRANULOCYTES NFR BLD: 0 %
LYMPHOCYTES # BLD AUTO: 2.2 10E3/UL (ref 0.8–5.3)
LYMPHOCYTES NFR BLD AUTO: 28 %
MCH RBC QN AUTO: 29.2 PG (ref 26.5–33)
MCHC RBC AUTO-ENTMCNC: 32.7 G/DL (ref 31.5–36.5)
MCV RBC AUTO: 90 FL (ref 78–100)
MONOCYTES # BLD AUTO: 0.6 10E3/UL (ref 0–1.3)
MONOCYTES NFR BLD AUTO: 8 %
NEUTROPHILS # BLD AUTO: 4.8 10E3/UL (ref 1.6–8.3)
NEUTROPHILS NFR BLD AUTO: 62 %
NRBC # BLD AUTO: 0 10E3/UL
NRBC BLD AUTO-RTO: 0 /100
PLATELET # BLD AUTO: 306 10E3/UL (ref 150–450)
POTASSIUM SERPL-SCNC: 4.5 MMOL/L (ref 3.4–5.3)
PROT SERPL-MCNC: 8.4 G/DL (ref 6.4–8.3)
RBC # BLD AUTO: 4.65 10E6/UL (ref 3.8–5.2)
SODIUM SERPL-SCNC: 138 MMOL/L (ref 136–145)
WBC # BLD AUTO: 7.7 10E3/UL (ref 4–11)

## 2023-02-13 PROCEDURE — 85025 COMPLETE CBC W/AUTO DIFF WBC: CPT | Performed by: PATHOLOGY

## 2023-02-13 PROCEDURE — 86901 BLOOD TYPING SEROLOGIC RH(D): CPT | Mod: 90 | Performed by: PATHOLOGY

## 2023-02-13 PROCEDURE — 36415 COLL VENOUS BLD VENIPUNCTURE: CPT | Performed by: PATHOLOGY

## 2023-02-13 PROCEDURE — 86900 BLOOD TYPING SEROLOGIC ABO: CPT | Mod: 90 | Performed by: PATHOLOGY

## 2023-02-13 PROCEDURE — 86850 RBC ANTIBODY SCREEN: CPT | Mod: 90 | Performed by: PATHOLOGY

## 2023-02-13 PROCEDURE — 84134 ASSAY OF PREALBUMIN: CPT | Mod: 90 | Performed by: PATHOLOGY

## 2023-02-13 PROCEDURE — 99000 SPECIMEN HANDLING OFFICE-LAB: CPT | Performed by: PATHOLOGY

## 2023-02-13 PROCEDURE — 83036 HEMOGLOBIN GLYCOSYLATED A1C: CPT | Mod: 90 | Performed by: PATHOLOGY

## 2023-02-13 PROCEDURE — 99203 OFFICE O/P NEW LOW 30 MIN: CPT | Performed by: PHYSICIAN ASSISTANT

## 2023-02-13 PROCEDURE — 80053 COMPREHEN METABOLIC PANEL: CPT | Performed by: PATHOLOGY

## 2023-02-13 ASSESSMENT — PAIN SCALES - GENERAL: PAINLEVEL: NO PAIN (0)

## 2023-02-13 ASSESSMENT — LIFESTYLE VARIABLES: TOBACCO_USE: 1

## 2023-02-13 ASSESSMENT — ENCOUNTER SYMPTOMS: SEIZURES: 1

## 2023-02-13 NOTE — H&P (VIEW-ONLY)
Pre-Operative H & P     CC:  Preoperative exam to assess for increased cardiopulmonary risk while undergoing surgery and anesthesia.    Date of Encounter: 2/13/2023  Primary Care Physician:  Maria Esther Miranda     Reason for visit:   Encounter Diagnoses   Name Primary?     Pre-op examination Yes     Malignant neoplasm of hepatic flexure (H)      Prediabetes        HPI  Meme Butts is a 73 year old female who presents for pre-operative H & P in preparation for  Procedure Information     Date/Time: 2/28/23     Procedure: Robotic right hemicolectomy    Anesthesia type: General     Pre-op diagnosis: Malignant neoplasm of hepatic flexure (H)    Location: [facility]    Providers: Dr. Cardenas          The patient is a 73-year-old woman with a past medical history significant for migraines, seizures, osteoporosis, obesity, prediabetes, history of GI bleed, history of a stone who presented to Dr. Cardenas clinic on 1/10/2023 after having a screening colonoscopy and found to have colon cancer.  During their meeting they discussed surgical treatment options and the patient has been scheduled for the procedure as above.    History is obtained from the patient and chart reviewnone    Hx of abnormal bleeding or anti-platelet use: none    Menstrual history: No LMP recorded. Patient has had a hysterectomy.:      Past Medical History  Past Medical History:   Diagnosis Date     Adenomatous colon polyp 2011    tubular adenoma     At high risk for breast cancer 02/16/2017    35.4% lifetime risk by Philip model     Epilepsy (H)      Fracture of metatarsal bone of left foot 07/2014     Fracture, radius 1990    and ulnar styloid fracture     GIB (gastrointestinal bleeding) 04/01/2011     Intractable chronic migraine without aura      Kidney stones 1970, 1975    during pregnancy     Malignant neoplasm of hepatic flexure (H)      Migraine      Osteoporosis 2016    T score -4.1  "    Pneumonia      Prediabetes        Past Surgical History  Past Surgical History:   Procedure Laterality Date     COLONOSCOPY  3/31/2011    Procedure:COLONOSCOPY; Surgeon:PACO DIETRICH; Location: GI     COLONOSCOPY  3/31/2011    Procedure:COMBINED COLONOSCOPY, REMOVE TUMOR/POLYP/LESION BY SNARE; Surgeon:PACO DIETRICH; Location: GI     COLONOSCOPY  2017    had polyps - path not available - repeat 3-5 yrs      COLONOSCOPY N/A 12/9/2022    Procedure: COLONOSCOPY, WITH HOT POLYPECTOMY;  Surgeon: Eldon Nicholas MD;  Location: [facility] OR     ESOPHAGOSCOPY, GASTROSCOPY, DUODENOSCOPY (EGD), COMBINED  3/31/2011    Procedure:COMBINED ESOPHAGOSCOPY, GASTROSCOPY, DUODENOSCOPY (EGD); Surgeon:PACO DIETRICH; Location: GI     HC BREATH HYDROGEN TEST  6/2/2011    Procedure:HYDROGEN BREATH TEST; Surgeon:MANDIE BRADY; Location: GI     HYSTERECTOMY  1994 or 1995    Partial hysterectomy 1995, urethral tube \"widened\" 1994     ORTHOPEDIC SURGERY      L wrist       Prior to Admission Medications  Current Outpatient Medications   Medication Sig Dispense Refill     CALCIUM 500 +D 500-400 MG-UNIT TABS Take 1 tablet by mouth daily at 2 pm 180 tablet 3     cholecalciferol (VITAMIN D) 1000 UNIT tablet Take 2,000 Units by mouth daily at 2 pm 90 tablet 3     cyclobenzaprine (FLEXERIL) 5 MG tablet as needed       levETIRAcetam (KEPPRA) 750 MG tablet PT REPORTS TAPERING DOWN: TAKE 1 & 1/2 TABS IN THE MORNING, AND 2 TABS IN THE EVENING. 105 tablet 3     Multiple Vitamins-Minerals (CENTRUM SILVER) per tablet Take 1 tablet by mouth every morning       metroNIDAZOLE (FLAGYL) 500 MG tablet Take 1 tablet (500 mg) by mouth every 6 hours At 8:00 am, 2:00 pm, 8:00 pm the day prior to your surgery with neomycin and zofran. (Patient not taking: Reported on 2/13/2023) 3 tablet 0     neomycin (MYCIFRADIN) 500 MG tablet Take 2 tablets (1,000 mg) by mouth every 6 hours At 8:00 am, 2:00 pm, 8:00 pm the day prior to your surgery with flagyl and " "zofran. (Patient not taking: Reported on 2023) 6 tablet 0     ondansetron (ZOFRAN) 4 MG tablet Take 1 tablet (4 mg) by mouth every 6 hours At 8:00 am, 2:00 pm, 8:00 pm the day prior to your surgery with neomycin and flagyl. (Patient not taking: Reported on 2023) 3 tablet 0     polyethylene glycol (MIRALAX) 17 g packet Take 119 g by mouth See Admin Instructions Starting at 8 pm night prior to surgery. Refer to \"Getting Ready for Surgery\" instructions. (Patient not taking: Reported on 2023) 7 packet 0     polyethylene glycol (MIRALAX) 17 g packet Take 238 g by mouth See Admin Instructions Starting at 4 pm night prior to surgery. Refer to \"Getting Ready for Surgery\" instructions. (Patient not taking: Reported on 2023) 14 packet 0       Allergies  No Known Allergies    Social History  Social History     Socioeconomic History     Marital status:      Spouse name: Quentin     Number of children: 3     Years of education: Not on file     Highest education level: Not on file   Occupational History     Occupation:      Employer: RETIRED   Tobacco Use     Smoking status: Former     Packs/day: 1.00     Years: 10.00     Pack years: 10.00     Types: Cigarettes     Start date: 1994     Quit date: 2009     Years since quittin.5     Smokeless tobacco: Never     Tobacco comments:     On and off for a total of 10 years of smoking    Substance and Sexual Activity     Alcohol use: Yes     Comment: 1x/month     Drug use: No     Sexual activity: Not Currently     Partners: Male     Birth control/protection: Post-menopausal   Other Topics Concern     Parent/sibling w/ CABG, MI or angioplasty before 65F 55M? Not Asked   Social History Narrative    Lives in a house, has  3 children,       lost 1 child, retired      Social Determinants of Health     Financial Resource Strain: Not on file   Food Insecurity: Not on file   Transportation Needs: Not on file   Physical " "Activity: Not on file   Stress: Not on file   Social Connections: Not on file   Intimate Partner Violence: Not on file   Housing Stability: Not on file       Family History  Family History   Problem Relation Age of Onset     Breast Cancer Mother 30        lumpectomy and chemo; also \"mass in ovary\"     Colon Polyps Father      Osteoporosis Sister      Breast Cancer Sister 55     Leukemia Sister 52     Coronary Artery Disease Brother      Colon Polyps Brother      Coronary Artery Disease Early Onset Brother 50     Prostate Cancer Brother      Gastrointestinal Disease Son         intestinal transplant     Diabetes Maternal Aunt         multiple mat aunts     Ovarian Cancer Maternal Aunt 50         of ovarian cancer in 50s or 60s, unknown     Breast Cancer Paternal Aunt 36         of breast cancer recurrence in 60s     Bone Cancer Niece 19     Prostate Cancer Cousin 65     Breast Cancer Cousin 30        paternal cousin, BRCA1+ by report     Breast Cancer Cousin 40        paternal cousin, BRCA1+ by report     Breast Cancer Cousin 35        paternal cousin, BRCA1+ by report     Genetic Disorder Cousin         paternal male cousin, BRCA1+ by report     Ovarian Cancer Cousin         paternal cousin, BRCA1+ by report     Anesthesia Reaction No family hx of      Venous thrombosis No family hx of        Review of Systems  The complete review of systems is negative other than noted in the HPI or here.   Anesthesia Evaluation   Pt has had prior anesthetic. Type: MAC and General.    No history of anesthetic complications       ROS/MED HX  ENT/Pulmonary: Comment: Post nasal drip     (+) tobacco use, Past use,  (-) recent URI   Neurologic:     (+) migraines, seizures, last seizure: , features: grand mal ,  (-) no CVA and no TIA   Cardiovascular:     (+) -----Previous cardiac testing   Echo: Date: Results:    Stress Test: Date: 22 Results:  Interpretation Summary  Normal dobutamine stress echocardiogram without " "evidence of inducible  ischemia.     Target heart rate was achieved. Heart rate response to dobutamine was normal.  Blood pressure response was flat. No regional wall motion abnormalities at  rest. With stress, the left ventricular ejection fraction increased from 55-  60% to greater than 65% and the left ventricular size decreased appropriately.  No subjective symptoms to suggest ischemia, lightheadedness noted  There was no ECG evidence of ischemia.     Screening echocardiogram is with normal biventricular function. There is trace  TR. Normal estimated pulmonary artery pressure. The aortic root and visualized  ascending aorta are normal.    ECG Reviewed: Date: 8/17/22 Results:  SR  Cath: Date: Results:      METS/Exercise Tolerance: >4 METS    Hematologic:  - neg hematologic  ROS     Musculoskeletal:  - neg musculoskeletal ROS     GI/Hepatic: Comment: History of GI bleed   (-) GERD   Renal/Genitourinary:     (+) Nephrolithiasis  (h/o with pregnancy 1970 and 1975),     Endo: Comment: Osteoporosis       Psychiatric/Substance Use:  - neg psychiatric ROS     Infectious Disease:  - neg infectious disease ROS     Malignancy:   (+) Malignancy, History of GI.GI CA Active status post.        Other:  - neg other ROS          /80 (BP Location: Right arm, Patient Position: Sitting, Cuff Size: Adult Regular)   Pulse 98   Temp 97.6  F (36.4  C) (Oral)   Resp 16   Ht 1.575 m (5' 2\")   Wt 78.6 kg (173 lb 3.2 oz)   SpO2 95%   Breastfeeding No   BMI 31.68 kg/m      Physical Exam   Constitutional: Awake, alert, cooperative, no apparent distress, and appears stated age.  Eyes: Pupils equal, round and reactive to light, extra ocular muscles intact, sclera clear, conjunctiva normal.  HENT: Normocephalic, oral pharynx with moist mucus membranes, good dentition. No goiter appreciated.   Respiratory: Clear to auscultation bilaterally, no crackles or wheezing.  Cardiovascular: Regular rate and rhythm, normal S1 and S2, and no " murmur noted.  Carotids +2, no bruits. No edema. Palpable pulses to radial  DP and PT arteries.   GI: Normal bowel sounds, soft, non-distended, non-tender, no masses palpated, no hepatosplenomegaly.  Lymph/Hematologic: No cervical lymphadenopathy and no supraclavicular lymphadenopathy.  Genitourinary:  defer  Skin: Warm and dry.  No rashes at anticipated surgical site.   Musculoskeletal: Full ROM of neck. There is no redness, warmth, or swelling of the joints. Gross motor strength is normal.    Neurologic: Awake, alert, oriented to name, place and time. Cranial nerves II-XII are grossly intact. Gait is normal.   Neuropsychiatric: Calm, cooperative. Normal affect.     Prior Labs/Diagnostic Studies   All labs and imaging personally reviewed     EKG/ stress test - if available please see in ROS above     Zio patch 9/12/22      The patient's records and results personally reviewed by this provider.     Outside records reviewed from: Care Everywhere    LAB/DIAGNOSTIC STUDIES TODAY:       Latest Reference Range & Units 02/13/23 10:37   Sodium 136 - 145 mmol/L 138   Potassium 3.4 - 5.3 mmol/L 4.5   Chloride 98 - 107 mmol/L 100   Carbon Dioxide (CO2) 22 - 29 mmol/L 27   Urea Nitrogen 8.0 - 23.0 mg/dL 11.3   Creatinine 0.51 - 0.95 mg/dL 0.75   GFR Estimate >60 mL/min/1.73m2 84   Calcium 8.8 - 10.2 mg/dL 10.4 (H)   Anion Gap 7 - 15 mmol/L 11   Albumin 3.5 - 5.2 g/dL 4.5   Protein Total 6.4 - 8.3 g/dL 8.4 (H)   Alkaline Phosphatase 35 - 104 U/L 88   ALT 10 - 35 U/L 22   AST 10 - 35 U/L 29   Bilirubin Total <=1.2 mg/dL 0.4   Glucose 70 - 99 mg/dL 122 (H)   WBC 4.0 - 11.0 10e3/uL 7.7   Hemoglobin 11.7 - 15.7 g/dL 13.6   Hematocrit 35.0 - 47.0 % 41.6   Platelet Count 150 - 450 10e3/uL 306   RBC Count 3.80 - 5.20 10e6/uL 4.65   MCV 78 - 100 fL 90   MCH 26.5 - 33.0 pg 29.2   MCHC 31.5 - 36.5 g/dL 32.7   RDW 10.0 - 15.0 % 12.6   % Neutrophils % 62   % Lymphocytes % 28   % Monocytes % 8   % Eosinophils % 1   % Basophils % 1    Absolute Basophils 0.0 - 0.2 10e3/uL 0.1   Absolute Eosinophils 0.0 - 0.7 10e3/uL 0.1   Absolute Immature Granulocytes <=0.4 10e3/uL 0.0   Absolute Lymphocytes 0.8 - 5.3 10e3/uL 2.2   Absolute Monocytes 0.0 - 1.3 10e3/uL 0.6   % Immature Granulocytes % 0   Absolute Neutrophils 1.6 - 8.3 10e3/uL 4.8   Absolute NRBCs 10e3/uL 0.0   NRBCs per 100 WBC <1 /100 0      Latest Reference Range & Units 02/13/23 10:37   Hemoglobin A1C <5.7 % 6.6 (H)     Patient is now in the diabetic range. Okay to proceed with surgery but should follow up with her PCP after surgery.     Assessment      Meme Butts is a 73 year old female seen as a PAC referral for risk assessment and optimization for anesthesia.    Plan/Recommendations  Pt will be optimized for the proposed procedure.  See below for details on the assessment, risk, and preoperative recommendations    NEUROLOGY  - History of Seizure and migraine - patient diagnosed with grand mal seizures at 16 years old and last seizure in 1976.   -Post Op delirium risk factors:  Age    ENT  - No current airway concerns.  Will need to be reassessed day of surgery.  Mallampati: III  TM: > 3    CARDIAC  - No history of CAD, Hypertension and Afib   ~ The patient was having chest pain and underwent EKG, stress test and zio patch without any significant cardiac findings.   - METS (Metabolic Equivalents)  Patient performs 4 or more METS exercise without symptoms            Total Score: 0      RCRI-Low risk: Class 2 0.9% complication rate            Total Score: 1    RCRI: High Risk Surgery    ~ The patient goes to the gym and circuit training and the [email or web address]. She denies any cardiac symptoms.     PULMONARY  - Obstructive Sleep Apnea  No current risk of obstructive sleep apnea   DEXTER Low Risk            Total Score: 1    DEXTER: Over 50 ys old      - Denies asthma or inhaler use  - Tobacco History      History   Smoking Status     Former     Packs/day: 1.00     Years: 10.00     Types: Cigarettes  "    Start date: 8/17/1994     Quit date: 8/17/2009   Smokeless Tobacco     Never       GI  - history of GI bleed in 2011.   ~ Malignant neoplasm of hepatic flexure - procedure as above.   PONV High Risk  Total Score: 3           1 AN PONV: Pt is Female    1 AN PONV: Patient is not a current smoker    1 AN PONV: Intended Post Op Opioids        /RENAL  - Baseline Creatinine    ~ History of nephrolithiasis associated with pregnancy only.     ENDOCRINE    - BMI: Estimated body mass index is 31.68 kg/m  as calculated from the following:    Height as of this encounter: 1.575 m (5' 2\").    Weight as of this encounter: 78.6 kg (173 lb 3.2 oz).  Obesity (BMI >30)  - osteoporosis - hold calcium and Vit D DOS.   ~ Prediabetes - diet and exercise controlled. Will recheck A1c today as last A1c was 6.1 on 8/18/22    HEME  VTE Medium Risk 1.8%            Total Score: 6    VTE: Greater than 59 yrs old    VTE: Current cancer      - No history of abnormal bleeding or antiplatelet use.      MSK  ~ Prior foot and wrist fractures      Different anesthesia methods/types have been discussed with the patient, but they are aware that the final plan will be decided by the assigned anesthesia provider on the date of service.    The patient is optimized for their procedure. AVS with information on surgery time/arrival time, meds and NPO status given by nursing staff. No further diagnostic testing indicated.      On the day of service:     Prep time: 9 minutes  Visit time: 16 minutes  Documentation time: 5 minutes  ------------------------------------------  Total time: 30 minutes      Michelle Cano PA-C  Preoperative Assessment Center  [facility]  Clinic and Surgery Center  Phone: [phone]  Fax: [phone]  "

## 2023-02-13 NOTE — H&P
Pre-Operative H & P     CC:  Preoperative exam to assess for increased cardiopulmonary risk while undergoing surgery and anesthesia.    Date of Encounter: 2/13/2023  Primary Care Physician:  Maria Esther Miranda     Reason for visit:   Encounter Diagnoses   Name Primary?     Pre-op examination Yes     Malignant neoplasm of hepatic flexure (H)      Prediabetes        HPI  Meme Butts is a 73 year old female who presents for pre-operative H & P in preparation for  Procedure Information     Date/Time: 2/28/23     Procedure: Robotic right hemicolectomy    Anesthesia type: General     Pre-op diagnosis: Malignant neoplasm of hepatic flexure (H)    Location: [facility]    Providers: Dr. Cardenas          The patient is a 73-year-old woman with a past medical history significant for migraines, seizures, osteoporosis, obesity, prediabetes, history of GI bleed, history of a stone who presented to Dr. Cardenas clinic on 1/10/2023 after having a screening colonoscopy and found to have colon cancer.  During their meeting they discussed surgical treatment options and the patient has been scheduled for the procedure as above.    History is obtained from the patient and chart reviewnone    Hx of abnormal bleeding or anti-platelet use: none    Menstrual history: No LMP recorded. Patient has had a hysterectomy.:      Past Medical History  Past Medical History:   Diagnosis Date     Adenomatous colon polyp 2011    tubular adenoma     At high risk for breast cancer 02/16/2017    35.4% lifetime risk by Philip model     Epilepsy (H)      Fracture of metatarsal bone of left foot 07/2014     Fracture, radius 1990    and ulnar styloid fracture     GIB (gastrointestinal bleeding) 04/01/2011     Intractable chronic migraine without aura      Kidney stones 1970, 1975    during pregnancy     Malignant neoplasm of hepatic flexure (H)      Migraine      Osteoporosis 2016    T score -4.1  "    Pneumonia      Prediabetes        Past Surgical History  Past Surgical History:   Procedure Laterality Date     COLONOSCOPY  3/31/2011    Procedure:COLONOSCOPY; Surgeon:PACO DIETRICH; Location: GI     COLONOSCOPY  3/31/2011    Procedure:COMBINED COLONOSCOPY, REMOVE TUMOR/POLYP/LESION BY SNARE; Surgeon:PACO DIETRICH; Location: GI     COLONOSCOPY  2017    had polyps - path not available - repeat 3-5 yrs      COLONOSCOPY N/A 12/9/2022    Procedure: COLONOSCOPY, WITH HOT POLYPECTOMY;  Surgeon: Eldon Nichoals MD;  Location: [facility] OR     ESOPHAGOSCOPY, GASTROSCOPY, DUODENOSCOPY (EGD), COMBINED  3/31/2011    Procedure:COMBINED ESOPHAGOSCOPY, GASTROSCOPY, DUODENOSCOPY (EGD); Surgeon:PACO DIETRICH; Location: GI     HC BREATH HYDROGEN TEST  6/2/2011    Procedure:HYDROGEN BREATH TEST; Surgeon:MANDIE BRADY; Location: GI     HYSTERECTOMY  1994 or 1995    Partial hysterectomy 1995, urethral tube \"widened\" 1994     ORTHOPEDIC SURGERY      L wrist       Prior to Admission Medications  Current Outpatient Medications   Medication Sig Dispense Refill     CALCIUM 500 +D 500-400 MG-UNIT TABS Take 1 tablet by mouth daily at 2 pm 180 tablet 3     cholecalciferol (VITAMIN D) 1000 UNIT tablet Take 2,000 Units by mouth daily at 2 pm 90 tablet 3     cyclobenzaprine (FLEXERIL) 5 MG tablet as needed       levETIRAcetam (KEPPRA) 750 MG tablet PT REPORTS TAPERING DOWN: TAKE 1 & 1/2 TABS IN THE MORNING, AND 2 TABS IN THE EVENING. 105 tablet 3     Multiple Vitamins-Minerals (CENTRUM SILVER) per tablet Take 1 tablet by mouth every morning       metroNIDAZOLE (FLAGYL) 500 MG tablet Take 1 tablet (500 mg) by mouth every 6 hours At 8:00 am, 2:00 pm, 8:00 pm the day prior to your surgery with neomycin and zofran. (Patient not taking: Reported on 2/13/2023) 3 tablet 0     neomycin (MYCIFRADIN) 500 MG tablet Take 2 tablets (1,000 mg) by mouth every 6 hours At 8:00 am, 2:00 pm, 8:00 pm the day prior to your surgery with flagyl and " "zofran. (Patient not taking: Reported on 2023) 6 tablet 0     ondansetron (ZOFRAN) 4 MG tablet Take 1 tablet (4 mg) by mouth every 6 hours At 8:00 am, 2:00 pm, 8:00 pm the day prior to your surgery with neomycin and flagyl. (Patient not taking: Reported on 2023) 3 tablet 0     polyethylene glycol (MIRALAX) 17 g packet Take 119 g by mouth See Admin Instructions Starting at 8 pm night prior to surgery. Refer to \"Getting Ready for Surgery\" instructions. (Patient not taking: Reported on 2023) 7 packet 0     polyethylene glycol (MIRALAX) 17 g packet Take 238 g by mouth See Admin Instructions Starting at 4 pm night prior to surgery. Refer to \"Getting Ready for Surgery\" instructions. (Patient not taking: Reported on 2023) 14 packet 0       Allergies  No Known Allergies    Social History  Social History     Socioeconomic History     Marital status:      Spouse name: Quentin     Number of children: 3     Years of education: Not on file     Highest education level: Not on file   Occupational History     Occupation:      Employer: RETIRED   Tobacco Use     Smoking status: Former     Packs/day: 1.00     Years: 10.00     Pack years: 10.00     Types: Cigarettes     Start date: 1994     Quit date: 2009     Years since quittin.5     Smokeless tobacco: Never     Tobacco comments:     On and off for a total of 10 years of smoking    Substance and Sexual Activity     Alcohol use: Yes     Comment: 1x/month     Drug use: No     Sexual activity: Not Currently     Partners: Male     Birth control/protection: Post-menopausal   Other Topics Concern     Parent/sibling w/ CABG, MI or angioplasty before 65F 55M? Not Asked   Social History Narrative    Lives in a house, has  3 children,       lost 1 child, retired      Social Determinants of Health     Financial Resource Strain: Not on file   Food Insecurity: Not on file   Transportation Needs: Not on file   Physical " "Activity: Not on file   Stress: Not on file   Social Connections: Not on file   Intimate Partner Violence: Not on file   Housing Stability: Not on file       Family History  Family History   Problem Relation Age of Onset     Breast Cancer Mother 30        lumpectomy and chemo; also \"mass in ovary\"     Colon Polyps Father      Osteoporosis Sister      Breast Cancer Sister 55     Leukemia Sister 52     Coronary Artery Disease Brother      Colon Polyps Brother      Coronary Artery Disease Early Onset Brother 50     Prostate Cancer Brother      Gastrointestinal Disease Son         intestinal transplant     Diabetes Maternal Aunt         multiple mat aunts     Ovarian Cancer Maternal Aunt 50         of ovarian cancer in 50s or 60s, unknown     Breast Cancer Paternal Aunt 36         of breast cancer recurrence in 60s     Bone Cancer Niece 19     Prostate Cancer Cousin 65     Breast Cancer Cousin 30        paternal cousin, BRCA1+ by report     Breast Cancer Cousin 40        paternal cousin, BRCA1+ by report     Breast Cancer Cousin 35        paternal cousin, BRCA1+ by report     Genetic Disorder Cousin         paternal male cousin, BRCA1+ by report     Ovarian Cancer Cousin         paternal cousin, BRCA1+ by report     Anesthesia Reaction No family hx of      Venous thrombosis No family hx of        Review of Systems  The complete review of systems is negative other than noted in the HPI or here.   Anesthesia Evaluation   Pt has had prior anesthetic. Type: MAC and General.    No history of anesthetic complications       ROS/MED HX  ENT/Pulmonary: Comment: Post nasal drip     (+) tobacco use, Past use,  (-) recent URI   Neurologic:     (+) migraines, seizures, last seizure: , features: grand mal ,  (-) no CVA and no TIA   Cardiovascular:     (+) -----Previous cardiac testing   Echo: Date: Results:    Stress Test: Date: 22 Results:  Interpretation Summary  Normal dobutamine stress echocardiogram without " "evidence of inducible  ischemia.     Target heart rate was achieved. Heart rate response to dobutamine was normal.  Blood pressure response was flat. No regional wall motion abnormalities at  rest. With stress, the left ventricular ejection fraction increased from 55-  60% to greater than 65% and the left ventricular size decreased appropriately.  No subjective symptoms to suggest ischemia, lightheadedness noted  There was no ECG evidence of ischemia.     Screening echocardiogram is with normal biventricular function. There is trace  TR. Normal estimated pulmonary artery pressure. The aortic root and visualized  ascending aorta are normal.    ECG Reviewed: Date: 8/17/22 Results:  SR  Cath: Date: Results:      METS/Exercise Tolerance: >4 METS    Hematologic:  - neg hematologic  ROS     Musculoskeletal:  - neg musculoskeletal ROS     GI/Hepatic: Comment: History of GI bleed   (-) GERD   Renal/Genitourinary:     (+) Nephrolithiasis  (h/o with pregnancy 1970 and 1975),     Endo: Comment: Osteoporosis       Psychiatric/Substance Use:  - neg psychiatric ROS     Infectious Disease:  - neg infectious disease ROS     Malignancy:   (+) Malignancy, History of GI.GI CA Active status post.        Other:  - neg other ROS          /80 (BP Location: Right arm, Patient Position: Sitting, Cuff Size: Adult Regular)   Pulse 98   Temp 97.6  F (36.4  C) (Oral)   Resp 16   Ht 1.575 m (5' 2\")   Wt 78.6 kg (173 lb 3.2 oz)   SpO2 95%   Breastfeeding No   BMI 31.68 kg/m      Physical Exam   Constitutional: Awake, alert, cooperative, no apparent distress, and appears stated age.  Eyes: Pupils equal, round and reactive to light, extra ocular muscles intact, sclera clear, conjunctiva normal.  HENT: Normocephalic, oral pharynx with moist mucus membranes, good dentition. No goiter appreciated.   Respiratory: Clear to auscultation bilaterally, no crackles or wheezing.  Cardiovascular: Regular rate and rhythm, normal S1 and S2, and no " murmur noted.  Carotids +2, no bruits. No edema. Palpable pulses to radial  DP and PT arteries.   GI: Normal bowel sounds, soft, non-distended, non-tender, no masses palpated, no hepatosplenomegaly.  Lymph/Hematologic: No cervical lymphadenopathy and no supraclavicular lymphadenopathy.  Genitourinary:  defer  Skin: Warm and dry.  No rashes at anticipated surgical site.   Musculoskeletal: Full ROM of neck. There is no redness, warmth, or swelling of the joints. Gross motor strength is normal.    Neurologic: Awake, alert, oriented to name, place and time. Cranial nerves II-XII are grossly intact. Gait is normal.   Neuropsychiatric: Calm, cooperative. Normal affect.     Prior Labs/Diagnostic Studies   All labs and imaging personally reviewed     EKG/ stress test - if available please see in ROS above     Zio patch 9/12/22      The patient's records and results personally reviewed by this provider.     Outside records reviewed from: Care Everywhere    LAB/DIAGNOSTIC STUDIES TODAY:       Latest Reference Range & Units 02/13/23 10:37   Sodium 136 - 145 mmol/L 138   Potassium 3.4 - 5.3 mmol/L 4.5   Chloride 98 - 107 mmol/L 100   Carbon Dioxide (CO2) 22 - 29 mmol/L 27   Urea Nitrogen 8.0 - 23.0 mg/dL 11.3   Creatinine 0.51 - 0.95 mg/dL 0.75   GFR Estimate >60 mL/min/1.73m2 84   Calcium 8.8 - 10.2 mg/dL 10.4 (H)   Anion Gap 7 - 15 mmol/L 11   Albumin 3.5 - 5.2 g/dL 4.5   Protein Total 6.4 - 8.3 g/dL 8.4 (H)   Alkaline Phosphatase 35 - 104 U/L 88   ALT 10 - 35 U/L 22   AST 10 - 35 U/L 29   Bilirubin Total <=1.2 mg/dL 0.4   Glucose 70 - 99 mg/dL 122 (H)   WBC 4.0 - 11.0 10e3/uL 7.7   Hemoglobin 11.7 - 15.7 g/dL 13.6   Hematocrit 35.0 - 47.0 % 41.6   Platelet Count 150 - 450 10e3/uL 306   RBC Count 3.80 - 5.20 10e6/uL 4.65   MCV 78 - 100 fL 90   MCH 26.5 - 33.0 pg 29.2   MCHC 31.5 - 36.5 g/dL 32.7   RDW 10.0 - 15.0 % 12.6   % Neutrophils % 62   % Lymphocytes % 28   % Monocytes % 8   % Eosinophils % 1   % Basophils % 1    Absolute Basophils 0.0 - 0.2 10e3/uL 0.1   Absolute Eosinophils 0.0 - 0.7 10e3/uL 0.1   Absolute Immature Granulocytes <=0.4 10e3/uL 0.0   Absolute Lymphocytes 0.8 - 5.3 10e3/uL 2.2   Absolute Monocytes 0.0 - 1.3 10e3/uL 0.6   % Immature Granulocytes % 0   Absolute Neutrophils 1.6 - 8.3 10e3/uL 4.8   Absolute NRBCs 10e3/uL 0.0   NRBCs per 100 WBC <1 /100 0      Latest Reference Range & Units 02/13/23 10:37   Hemoglobin A1C <5.7 % 6.6 (H)     Patient is now in the diabetic range. Okay to proceed with surgery but should follow up with her PCP after surgery.     Assessment      Meme Butts is a 73 year old female seen as a PAC referral for risk assessment and optimization for anesthesia.    Plan/Recommendations  Pt will be optimized for the proposed procedure.  See below for details on the assessment, risk, and preoperative recommendations    NEUROLOGY  - History of Seizure and migraine - patient diagnosed with grand mal seizures at 16 years old and last seizure in 1976.   -Post Op delirium risk factors:  Age    ENT  - No current airway concerns.  Will need to be reassessed day of surgery.  Mallampati: III  TM: > 3    CARDIAC  - No history of CAD, Hypertension and Afib   ~ The patient was having chest pain and underwent EKG, stress test and zio patch without any significant cardiac findings.   - METS (Metabolic Equivalents)  Patient performs 4 or more METS exercise without symptoms            Total Score: 0      RCRI-Low risk: Class 2 0.9% complication rate            Total Score: 1    RCRI: High Risk Surgery    ~ The patient goes to the gym and circuit training and the [employer]. She denies any cardiac symptoms.     PULMONARY  - Obstructive Sleep Apnea  No current risk of obstructive sleep apnea   DEXTER Low Risk            Total Score: 1    DEXTER: Over 50 ys old      - Denies asthma or inhaler use  - Tobacco History      History   Smoking Status     Former     Packs/day: 1.00     Years: 10.00     Types: Cigarettes  "    Start date: 8/17/1994     Quit date: 8/17/2009   Smokeless Tobacco     Never       GI  - history of GI bleed in 2011.   ~ Malignant neoplasm of hepatic flexure - procedure as above.   PONV High Risk  Total Score: 3           1 AN PONV: Pt is Female    1 AN PONV: Patient is not a current smoker    1 AN PONV: Intended Post Op Opioids        /RENAL  - Baseline Creatinine    ~ History of nephrolithiasis associated with pregnancy only.     ENDOCRINE    - BMI: Estimated body mass index is 31.68 kg/m  as calculated from the following:    Height as of this encounter: 1.575 m (5' 2\").    Weight as of this encounter: 78.6 kg (173 lb 3.2 oz).  Obesity (BMI >30)  - osteoporosis - hold calcium and Vit D DOS.   ~ Prediabetes - diet and exercise controlled. Will recheck A1c today as last A1c was 6.1 on 8/18/22    HEME  VTE Medium Risk 1.8%            Total Score: 6    VTE: Greater than 59 yrs old    VTE: Current cancer      - No history of abnormal bleeding or antiplatelet use.      MSK  ~ Prior foot and wrist fractures      Different anesthesia methods/types have been discussed with the patient, but they are aware that the final plan will be decided by the assigned anesthesia provider on the date of service.    The patient is optimized for their procedure. AVS with information on surgery time/arrival time, meds and NPO status given by nursing staff. No further diagnostic testing indicated.      On the day of service:     Prep time: 9 minutes  Visit time: 16 minutes  Documentation time: 5 minutes  ------------------------------------------  Total time: 30 minutes      Michelle Cano PA-C  Preoperative Assessment Center  [facility]  Clinic and Surgery Center  Phone: [phone]  Fax: [phone]  "

## 2023-02-13 NOTE — PATIENT INSTRUCTIONS
Preparing for Your Surgery      Name:  Meme Butts   MRN:  [id number]   :  1950   Today's Date:  2023       Arriving for surgery:  Surgery date:  2023  Arrival time:  6:00 AM     Surgeries and procedures: Adult patients can have 2 visitors all through the surgery process.     Visiting hours: 8 a.m. to 8:30 p.m.     Hospital: Adult patients and children under age 18 can have 4 visitor at a time     No visitors under the age of 5 are allowed for hospital patients.  Double occupancy rooms: Patients can have only two visitors at a time.     Patients with disabilities: Can have a support person with them (family member, service provider     Or someone well informed about their needs) plus the allowed number of visitors     Patients confirmed or suspected to have symptoms of COVID 19 or flu:     No visitors allowed for adult patients.   Children (under age 18) can have 1 named visitor.     People who are sick or showing symptoms of COVID 19 or flu:    Are not allowed to visit patients--we can only make exceptions in special situations.       Please follow these guidelines for your visit:   Arrive wearing a mask over your mouth and nose; we will give you a medical mask to wear    If you arrive wearing a cloth mask.   Keep it on during your entire visit, even when in patient's room.   If you don't wear a mask we'll ask you to leave.     Clean your hands with alcohol hand . Do this when you arrive at and leave the building and patient room,    And again after you touch your mask or anything in the room.     You can t visit if you have a fever, cough, shortness of breath, muscle aches, headaches, sore throat    Or diarrhea      Stay 6 feet away from others during your visit and between visits     Go directly to and from the room you are visiting.     Stay in the patient s room during your visit. Limit going to other places in the hospital as much as possible     Leave bags and jackets at home  or in the car.     For everyone s health, please don t come and go during your visit. That includes for smoking   during your visit.     Please come to:     [facility] [address]  [address] [place]  [address]    -   Parking is available in the Patient Visitor Ramp on [facility].     -   When entering the hospital you will be asked COVID screening questions, you will then be directed to Registration.  Registration will direct you to the 3rd floor Surgery waiting room.     -   Please ask if you need an escort or a wheelchair to the Surgery Waiting Room.  Preop number- [phone]      What can I eat or drink?  -  You may eat and drink normally per [place] instructions (Clear liquid diet all day 2/27)  -  You may have clear liquids until 2 hours prior to arrival time. (Until 4:00 AM)    Examples of clear liquids:  Water  Clear broth  Juices (apple, white grape, white cranberry  and cider) without pulp  Noncarbonated, powder based beverages  (lemonade and Ashok-Aid)  Sodas (Sprite, 7-Up, ginger ale and seltzer)  Coffee or tea (without milk or cream)  Gatorade    -  No Alcohol for at least 24 hours before surgery.     Which medicines can I take?    Hold Aspirin for 7 days before surgery.   **Hold Multivitamins for 7 days before surgery.  Hold Supplements for 7 days before surgery.  Hold Ibuprofen (Advil, Motrin) for 1 day before surgery--unless otherwise directed by surgeon.  Hold Naproxen (Aleve) for 4 days before surgery.      -  DO NOT take these medications the day of surgery:  Calcium  Vitamin D    -  PLEASE TAKE these medications the day of surgery:  Keppra  Flexeril if needed    How do I prepare myself?  - Please take 2 showers before surgery using Scrubcare or Hibiclens soap.    Use this soap only from the neck to your toes.     Leave the soap on your skin for one minute--then rinse thoroughly.      You may use your own shampoo  and conditioner. No other hair products.   - Please remove all jewelry and body piercings.  - No lotions, deodorants or fragrance.  - No makeup or fingernail polish.   - Bring your ID and insurance card.    -If you have a Deep Brain Stimulator, Spinal Cord Stimulator, or any Neuro Stimulator device---you must bring the remote control to the hospital.      ALL PATIENTS GOING HOME THE SAME DAY OF SURGERY ARE REQUIRED TO HAVE A RESPONSIBLE ADULT TO DRIVE AND BE IN ATTENDANCE WITH THEM FOR 24 HOURS FOLLOWING SURGERY.    Covid testing policy as of 12/06/2022  Your surgeon will notify and schedule you for a COVID test if one is needed before surgery--please direct any questions or COVID symptoms to your surgeon      Questions or Concerns:    - For any questions regarding the day of surgery or your hospital stay, please contact the Pre Admission Nursing Office at [phone].       - If you have health changes between today and your surgery, please call your surgeon.       - For questions after surgery, please call your surgeons office.

## 2023-02-14 LAB
BLOOD BANK CHART COMMENT: NORMAL
SPECIMEN EXPIRATION DATE: NORMAL

## 2023-02-15 ENCOUNTER — APPOINTMENT (OUTPATIENT)
Dept: GENERAL RADIOLOGY | Facility: CLINIC | Age: 73
End: 2023-02-15
Attending: EMERGENCY MEDICINE
Payer: COMMERCIAL

## 2023-02-15 ENCOUNTER — HOSPITAL ENCOUNTER (EMERGENCY)
Facility: CLINIC | Age: 73
Discharge: HOME | End: 2023-02-15
Attending: EMERGENCY MEDICINE | Admitting: EMERGENCY MEDICINE
Payer: COMMERCIAL

## 2023-02-15 VITALS
DIASTOLIC BLOOD PRESSURE: 76 MMHG | OXYGEN SATURATION: 96 % | HEART RATE: 93 BPM | RESPIRATION RATE: 16 BRPM | TEMPERATURE: 96.8 F | SYSTOLIC BLOOD PRESSURE: 155 MMHG

## 2023-02-15 DIAGNOSIS — S52.531A CLOSED COLLES' FRACTURE OF RIGHT RADIUS, INITIAL ENCOUNTER: ICD-10-CM

## 2023-02-15 LAB — PREALB SERPL IA-MCNC: 27 MG/DL (ref 15–45)

## 2023-02-15 PROCEDURE — 99284 EMERGENCY DEPT VISIT MOD MDM: CPT | Mod: 25

## 2023-02-15 PROCEDURE — 250N000013 HC RX MED GY IP 250 OP 250 PS 637: Performed by: EMERGENCY MEDICINE

## 2023-02-15 PROCEDURE — 25600 CLTX DST RDL FX/EPHYS SEP WO: CPT | Mod: RT

## 2023-02-15 PROCEDURE — 73110 X-RAY EXAM OF WRIST: CPT | Mod: RT

## 2023-02-15 RX ORDER — HYDROCODONE BITARTRATE AND ACETAMINOPHEN 5; 325 MG/1; MG/1
2 TABLET ORAL ONCE
Status: COMPLETED | OUTPATIENT
Start: 2023-02-15 | End: 2023-02-15

## 2023-02-15 RX ORDER — IBUPROFEN 600 MG/1
600 TABLET, FILM COATED ORAL ONCE
Status: COMPLETED | OUTPATIENT
Start: 2023-02-15 | End: 2023-02-15

## 2023-02-15 RX ORDER — HYDROCODONE BITARTRATE AND ACETAMINOPHEN 5; 325 MG/1; MG/1
1 TABLET ORAL EVERY 4 HOURS PRN
Qty: 12 TABLET | Refills: 0 | Status: ON HOLD | OUTPATIENT
Start: 2023-02-15 | End: 2023-03-05

## 2023-02-15 RX ORDER — IBUPROFEN 600 MG/1
600 TABLET, FILM COATED ORAL EVERY 8 HOURS PRN
Qty: 30 TABLET | Refills: 0 | Status: SHIPPED | OUTPATIENT
Start: 2023-02-15 | End: 2023-10-05

## 2023-02-15 RX ADMIN — IBUPROFEN 600 MG: 600 TABLET ORAL at 13:21

## 2023-02-15 RX ADMIN — HYDROCODONE BITARTRATE AND ACETAMINOPHEN 2 TABLET: 5; 325 TABLET ORAL at 13:20

## 2023-02-15 ASSESSMENT — ENCOUNTER SYMPTOMS: ARTHRALGIAS: 1

## 2023-02-15 NOTE — ED PROVIDER NOTES
History     Chief Complaint:  Wrist Injury       The history is provided by the patient.      Meme Butts is a 73 year old female, right handed, with a history of colon cancer, epilepsy, prediabetes, ischemic colitis, among others, who presents with a right wrist injury after slipping and falling on the ice prior to her arrival. She notes that her daughter brought her to the ED. She denies taking any pain medications for her injury. She mentions that she has a surgery on 02/28 for colon cancer.    Independent Historian:   None - Patient Only  Review of External Notes:     ROS:  Review of Systems   Musculoskeletal: Positive for arthralgias (right wrist).   All other systems reviewed and are negative.      Allergies:  The patient has no known allergies.     Medications:    Flexeril  Keppra  Flagyl  Mycifradin  Zofran  Miralax    Past Medical History:    Adenomatous colon polyp   At high risk for breast cancer   Epilepsy  Fracture of metatarsal bone of left foot   Fracture, radius   GIB     Kidney stones   Malignant neoplasm of hepatic flexure  Migraine   Osteoporosis   Pneumonia   Prediabetes   Seizures  increased alkaline phosphatase  Ischemic colitis  Osteoporosis  Achilles bursitis or tendinitis  Plantar fascial fibromatosis  Flat foot  Talipes planus    Past Surgical History:    Colonoscopy x 4  EGD   Breath hydrogen test  Hysterectomy  Left wrist surgery     Family History:    Mother: breast cancer, stomach cancer  Father: colon polyps  Sister(s): osteoporosis, breast cancer, leukemia  Brother(s): CAD, colon polyps, prostate cancer  Son(s): GI disease  Daughter(s): asthma    Social History:  The patient presents to the ED alone.  The patient presents to the ED via car.  PCP: Maria Esther Miranda     Physical Exam     Patient Vitals for the past 24 hrs:   BP Temp Pulse Resp SpO2   02/15/23 1213 (!) 155/76 96.8  F (36  C) 93 16 96 %        Physical Exam    General: Resting uncomfortably on the  [facility]  Head:  The scalp, face, and head appear normal  Eyes:  The pupils are normal    Conjunctivae and sclera appear normal  ENT:    The nose is normal    Ears/pinnae are normal  Neck:  Normal range of motion  MS:  Right arm: there is tenderness and deformity of distal radius. There is tenderness over the ulnar styloid. Vascular sensory and motor exam to the hand and fingers is normal. There is no proximal forearm or elbow pain.  Skin:  No rash or lesions noted.  Neuro: Speech is normal and fluent  Psych:  Awake. Alert.  Normal affect.      Appropriate interactions          Emergency Department Course     Imaging:  XR Wrist Right G/E 3 Views   Final Result   IMPRESSION:   1.  Acute mild comminuted intra-articular fracture of the distal   radius. Transverse fracture of the distal metaphysis with mild   impaction and mild-moderate volar apex angulation. Nondisplaced   longitudinal fracture extending through the central aspect of the   distal articular surface. The distal articular surface remains   congruent.   2.  Mild ulna positive variance. Small mildly displaced fracture of   the ulnar styloid process.   3.  Soft tissue swelling about the wrist.      CHE OLIVA MD            SYSTEM ID:  [id number]         Report per radiology    Procedures       Splint Placement     Procedure: Splint Placement     Indication: Fracture    Consent: Verbal     Location: Right Wrist    Preparation: Wounds were cleansed and dressed with a non-adherent bandage     Procedure detail:   Splint was applied by Myself  Splint type: Volar dorsal 3 inches  Splint material: Ortho glass  After placement I checked and adjusted the fit as needed to ensure proper positioning/fit   Sensation and circulation are intact after splint placement     Patient Status: The patient tolerated the procedure well: Yes. There were no complications.      Emergency Department Course & Assessments:       Interventions:  Medications   HYDROcodone-acetaminophen  (NORCO) 5-325 MG per tablet 2 tablet (2 tablets Oral Given 2/15/23 1320)   ibuprofen (ADVIL/MOTRIN) tablet 600 mg (600 mg Oral Given 2/15/23 1321)      Independent Interpretation (X-rays, CTs, rhythm strip):  I looked at the x-ray of the forearm.  There is an intra-articular comminuted distal radius fracture with slight angulation and minimal displacement of the articular surface.  The ulnar styloid is fractured as well.  This is somewhat impacted.  This may well require definitive orthopedic surgical intervention.    Assessments:  1307 I obtained history and examined the patient as noted above.  1400 I rechecked the patient and performed splint procedure    Disposition:  The patient was discharged to home.     Impression & Plan      Medical Decision Making:  This patient presents to the emergency department after a fall on ice.  She suffered a intra-articular comminuted distal radius fracture and ulnar styloid fracture.  This may well require operative management.  This was placed in a volar dorsal splint with Ortho-Glass and placed in a sling, this does not require reduction at this time.  She is neurovascularly intact.  The patient notes that she has upcoming surgery for colon cancer she is concerned about how this will impact that surgery.  Hopefully if surgical care is needed on the wrist that can be done in an expedited fashion.  We will send the patient to hand surgery for follow-up.  Prescriptions have been sent to [facility].  No other life-threatening etiologies are detected.    Diagnosis:    ICD-10-CM    1. Closed Colles' fracture of right radius, initial encounter  S52.531A            Discharge Medications:  New Prescriptions    HYDROCODONE-ACETAMINOPHEN (NORCO) 5-325 MG TABLET    Take 1 tablet by mouth every 4 hours as needed for pain or moderate pain (4-6)    IBUPROFEN (ADVIL/MOTRIN) 600 MG TABLET    Take 1 tablet (600 mg) by mouth every 8 hours as needed for moderate pain (4-6)          Scribe  Disclosure:  I, Yohan Cedillo, am serving as a scribe at 1:41 PM on 2/15/2023 to document services personally performed by Santhosh Carrington MD, based on my observations and the provider's statements to me.   2/15/2023   Santhosh Carrington MD Rock, Michael P, MD  02/15/23 1509

## 2023-02-15 NOTE — ED TRIAGE NOTES
Slipped on ice and fell onto ice and braced self with right wrist. Pt report severe pain in this wrist now

## 2023-02-15 NOTE — DISCHARGE INSTRUCTIONS
Call [facility] orthopedics and make an appointment to be seen in the next few days.  This will likely require surgery.    Take ibuprofen and Norco as needed for pain.  Elevate as much as possible and wear the sling as needed for comfort

## 2023-02-16 ENCOUNTER — TELEPHONE (OUTPATIENT)
Dept: SURGERY | Facility: CLINIC | Age: 73
End: 2023-02-16
Payer: COMMERCIAL

## 2023-02-16 NOTE — TELEPHONE ENCOUNTER
[facility] Call Center    Phone Message    May a detailed message be left on voicemail: yes     Reason for Call: Other: Patient has upcoming surgery with you on 2/28.  She fell yesterday and broke her wrist and needs surgery asap.  Please call to discuss her Colocretal surgery and if it should be delayed.      Action Taken: Message routed to:  [facility] CRS    Travel Screening: Not Applicable

## 2023-02-16 NOTE — CONFIDENTIAL NOTE
Meme fell yesterday 2/15 and broke her wrist - she has a right hemicolectomy on 2/28 with Dr. Cardenas.     She will see [facility] orthopedics on 2/21/23    Updated Dr. Cardenas - he said that he was ok with her having surgery for her wrist before 2/28.    Will follow up with her next week to discuss plan of care with her wrist and ensure 2/28 surgery can still happen.

## 2023-02-17 ENCOUNTER — TEAM CONFERENCE (OUTPATIENT)
Dept: SURGERY | Facility: CLINIC | Age: 73
End: 2023-02-17
Payer: COMMERCIAL

## 2023-02-17 NOTE — CONFIDENTIAL NOTE
COLON AND RECTAL SURGERY HUDDLE:    Patient was reviewed in preporation for their surgery the following was reviewed and has been completed:    Surgeon: Dr. Lalit Cardenas     Surgery & Date: 2/28 robotic right hemicolectomy      Last MD Note: reviewed    Anesthesia Type: General    Other Providers: N/A    PAC: Yes    WOC: N/A    Labs: Yes    Bowel Prep: Yes MiraLAX / Gatorade  and Antibiotic    Packet: Yes    Imaging: N/A    Post-Op Appointments: Yes    COVID: N/A    Pre op soap: Yes Questions about shower: no    Is patient on TPN?: No   If yes, I contacted the TPN pharmacist by paging the  pharmacy at [phone] or calling [phone]. I also contacted Анна WATTS with inpatient colon and rectal team.     Pre op call complete: Yes    Surgery Clinical Trials Office Consent Approach Documentation    Building A Robotic Surgical Video Registry for Future Use in Surgical Automation (IRB [id number])    ICF Version Date / IRB Approval Date:  Version dt 9/7/2022, IRB (Willem) Approved 10/12/2022    I spoke with Meme Butts by phone on 2/17/2023 and they would like to be contacted by Robotics study staff to proceed with e-consent process.

## 2023-02-21 ENCOUNTER — PATIENT OUTREACH (OUTPATIENT)
Dept: SURGERY | Facility: CLINIC | Age: 73
End: 2023-02-21
Payer: COMMERCIAL

## 2023-02-27 ENCOUNTER — ANESTHESIA EVENT (OUTPATIENT)
Dept: SURGERY | Facility: CLINIC | Age: 73
DRG: 330 | End: 2023-02-27
Payer: COMMERCIAL

## 2023-02-27 ASSESSMENT — ENCOUNTER SYMPTOMS: SEIZURES: 1

## 2023-02-27 ASSESSMENT — LIFESTYLE VARIABLES: TOBACCO_USE: 1

## 2023-02-27 NOTE — ANESTHESIA PREPROCEDURE EVALUATION
"Anesthesia Pre-Procedure Evaluation    Patient: Meme Butts   MRN: [id number] : 1950        Procedure : Procedure(s):  Robotic right hemicolectomy          Past Medical History:   Diagnosis Date     Adenomatous colon polyp     tubular adenoma     At high risk for breast cancer 2017    35.4% lifetime risk by Philip model     Epilepsy (H)      Fracture of metatarsal bone of left foot 2014     Fracture, radius     and ulnar styloid fracture     GIB (gastrointestinal bleeding) 2011     Intractable chronic migraine without aura      Kidney stones ,     during pregnancy     Malignant neoplasm of hepatic flexure (H)      Migraine      Osteoporosis 2016    T score -4.1     Pneumonia      Prediabetes       Past Surgical History:   Procedure Laterality Date     COLONOSCOPY  3/31/2011    Procedure:COLONOSCOPY; Surgeon:APCO DIETRICH; [facility]     COLONOSCOPY  3/31/2011    Procedure:COMBINED COLONOSCOPY, REMOVE TUMOR/POLYP/LESION BY SNARE; Surgeon:PACO DIETRICH; Location:[facility]     COLONOSCOPY      had polyps - path not available - repeat 3-5 yrs      COLONOSCOPY N/A 2022    Procedure: COLONOSCOPY, WITH HOT POLYPECTOMY;  Surgeon: Eldon Nicholas MD;  Location: [facility] OR     ESOPHAGOSCOPY, GASTROSCOPY, DUODENOSCOPY (EGD), COMBINED  3/31/2011    Procedure:COMBINED ESOPHAGOSCOPY, GASTROSCOPY, DUODENOSCOPY (EGD); Surgeon:PACO DIETRICH; Location:[facility]     HC BREATH HYDROGEN TEST  2011    Procedure:HYDROGEN BREATH TEST; Surgeon:MANDIE BRADY; Location: GI     HYSTERECTOMY   or     Partial hysterectomy , urethral tube \"widened\"      ORTHOPEDIC SURGERY      L wrist      No Known Allergies   Social History     Tobacco Use     Smoking status: Former     Packs/day: 1.00     Years: 10.00     Pack years: 10.00     Types: Cigarettes     Start date: 1994     Quit date: 2009     Years since quittin.5     Smokeless tobacco: Never     Tobacco " comments:     On and off for a total of 10 years of smoking    Substance Use Topics     Alcohol use: Yes     Comment: 1x/month      Wt Readings from Last 1 Encounters:   02/13/23 78.6 kg (173 lb 3.2 oz)        Anesthesia Evaluation   Pt has had prior anesthetic. Type: MAC and General.    No history of anesthetic complications       ROS/MED HX  ENT/Pulmonary: Comment: Post nasal drip     (+) tobacco use, Past use,  (-) recent URI   Neurologic:     (+) migraines, seizures, last seizure: 1976, features: grand mal ,  (-) no CVA and no TIA   Cardiovascular:     (+) -----Previous cardiac testing   Echo: Date: Results:    Stress Test: Date: 9/12/22 Results:  Interpretation Summary  Normal dobutamine stress echocardiogram without evidence of inducible  ischemia.     Target heart rate was achieved. Heart rate response to dobutamine was normal.  Blood pressure response was flat. No regional wall motion abnormalities at  rest. With stress, the left ventricular ejection fraction increased from 55-  60% to greater than 65% and the left ventricular size decreased appropriately.  No subjective symptoms to suggest ischemia, lightheadedness noted  There was no ECG evidence of ischemia.     Screening echocardiogram is with normal biventricular function. There is trace  TR. Normal estimated pulmonary artery pressure. The aortic root and visualized  ascending aorta are normal.    ECG Reviewed: Date: 8/17/22 Results:  SR  Cath: Date: Results:      METS/Exercise Tolerance: >4 METS    Hematologic:  - neg hematologic  ROS     Musculoskeletal:  - neg musculoskeletal ROS     GI/Hepatic: Comment: History of GI bleed   (-) GERD   Renal/Genitourinary:     (+) Nephrolithiasis  (h/o with pregnancy 1970 and 1975),     Endo: Comment: Osteoporosis       Psychiatric/Substance Use:  - neg psychiatric ROS     Infectious Disease:  - neg infectious disease ROS     Malignancy:   (+) Malignancy, History of GI.GI CA Active status post.        Other:  - neg  other ROS             OUTSIDE LABS:  CBC:   Lab Results   Component Value Date    WBC 7.7 02/13/2023    WBC 5.9 12/29/2022    HGB 13.6 02/13/2023    HGB 13.0 12/29/2022    HCT 41.6 02/13/2023    HCT 40.5 12/29/2022     02/13/2023     12/29/2022     BMP:   Lab Results   Component Value Date     02/13/2023     12/29/2022    POTASSIUM 4.5 02/13/2023    POTASSIUM 4.3 12/29/2022    CHLORIDE 100 02/13/2023    CHLORIDE 104 12/29/2022    CO2 27 02/13/2023    CO2 24 12/29/2022    BUN 11.3 02/13/2023    BUN 13.2 12/29/2022    CR 0.75 02/13/2023    CR 0.8 12/29/2022     (H) 02/13/2023     (H) 12/29/2022     COAGS:   Lab Results   Component Value Date    PTT 30 03/31/2011    INR 1.06 03/31/2011     POC:   Lab Results   Component Value Date     (H) 03/31/2011     HEPATIC:   Lab Results   Component Value Date    ALBUMIN 4.5 02/13/2023    PROTTOTAL 8.4 (H) 02/13/2023    ALT 22 02/13/2023    AST 29 02/13/2023    ALKPHOS 88 02/13/2023    BILITOTAL 0.4 02/13/2023     OTHER:   Lab Results   Component Value Date    LACT 1.4 06/14/2011    A1C 6.6 (H) 02/13/2023    THELMA 10.4 (H) 02/13/2023    PHOS 2.8 09/07/2022    MAG 2.1 09/07/2022    LIPASE 127 04/15/2019    TSH 0.70 08/18/2021       Anesthesia Plan    ASA Status:  3      Anesthesia Type: General.     - Airway: ETT   Induction: Intravenous, Propofol.   Maintenance: Balanced.   Techniques and Equipment:     - Lines/Monitors: 2nd IV     Consents            Postoperative Care            Comments:                Bogdan Darden MD

## 2023-02-28 ENCOUNTER — ANESTHESIA (OUTPATIENT)
Dept: SURGERY | Facility: CLINIC | Age: 73
DRG: 330 | End: 2023-02-28
Payer: COMMERCIAL

## 2023-02-28 ENCOUNTER — HOSPITAL ENCOUNTER (INPATIENT)
Facility: CLINIC | Age: 73
LOS: 6 days | Discharge: HOME | DRG: 330 | End: 2023-03-06
Attending: SURGERY | Admitting: SURGERY
Payer: COMMERCIAL

## 2023-02-28 DIAGNOSIS — C18.3 MALIGNANT NEOPLASM OF HEPATIC FLEXURE (H): Primary | ICD-10-CM

## 2023-02-28 PROBLEM — C18.9 COLON CANCER (H): Status: ACTIVE | Noted: 2023-02-28

## 2023-02-28 LAB
BASE EXCESS BLDV CALC-SCNC: -2.9 MMOL/L (ref -8.1–1.9)
CA-I BLD-MCNC: 4.6 MG/DL (ref 4.4–5.2)
CREAT SERPL-MCNC: 0.71 MG/DL (ref 0.51–0.95)
GFR SERPL CREATININE-BSD FRML MDRD: 90 ML/MIN/1.73M2
GLUCOSE BLD-MCNC: 158 MG/DL (ref 70–99)
GLUCOSE BLDC GLUCOMTR-MCNC: 144 MG/DL (ref 70–99)
HCO3 BLDV-SCNC: 24 MMOL/L (ref 21–28)
HGB BLD-MCNC: 11.5 G/DL (ref 11.7–15.7)
HOLD SPECIMEN: NORMAL
LACTATE BLD-SCNC: 3 MMOL/L
O2/TOTAL GAS SETTING VFR VENT: 50 %
PCO2 BLDV: 47 MM HG (ref 40–50)
PH BLDV: 7.31 [PH] (ref 7.32–7.43)
PO2 BLDV: 43 MM HG (ref 25–47)
POTASSIUM BLD-SCNC: 4.7 MMOL/L (ref 3.5–5)
SARS-COV-2 RNA RESP QL NAA+PROBE: NEGATIVE
SODIUM BLD-SCNC: 140 MMOL/L (ref 133–144)

## 2023-02-28 PROCEDURE — 36415 COLL VENOUS BLD VENIPUNCTURE: CPT | Performed by: SURGERY

## 2023-02-28 PROCEDURE — 250N000013 HC RX MED GY IP 250 OP 250 PS 637: Performed by: SURGERY

## 2023-02-28 PROCEDURE — 360N000080 HC SURGERY LEVEL 7, PER MIN: Performed by: SURGERY

## 2023-02-28 PROCEDURE — 84132 ASSAY OF SERUM POTASSIUM: CPT

## 2023-02-28 PROCEDURE — 250N000013 HC RX MED GY IP 250 OP 250 PS 637

## 2023-02-28 PROCEDURE — 88329 PATH CONSLTJ DRG SURG: CPT | Performed by: PATHOLOGY

## 2023-02-28 PROCEDURE — U0005 INFEC AGEN DETEC AMPLI PROBE: HCPCS

## 2023-02-28 PROCEDURE — 250N000011 HC RX IP 250 OP 636: Performed by: ANESTHESIOLOGY

## 2023-02-28 PROCEDURE — 250N000011 HC RX IP 250 OP 636: Performed by: SURGERY

## 2023-02-28 PROCEDURE — 258N000003 HC RX IP 258 OP 636: Performed by: SURGERY

## 2023-02-28 PROCEDURE — 250N000009 HC RX 250: Performed by: NURSE ANESTHETIST, CERTIFIED REGISTERED

## 2023-02-28 PROCEDURE — 88309 TISSUE EXAM BY PATHOLOGIST: CPT | Mod: 26 | Performed by: PATHOLOGY

## 2023-02-28 PROCEDURE — P9045 ALBUMIN (HUMAN), 5%, 250 ML: HCPCS | Performed by: NURSE ANESTHETIST, CERTIFIED REGISTERED

## 2023-02-28 PROCEDURE — 250N000009 HC RX 250: Performed by: ANESTHESIOLOGY

## 2023-02-28 PROCEDURE — 999N000157 HC STATISTIC RCP TIME EA 10 MIN

## 2023-02-28 PROCEDURE — 710N000010 HC RECOVERY PHASE 1, LEVEL 2, PER MIN: Performed by: SURGERY

## 2023-02-28 PROCEDURE — 999N000141 HC STATISTIC PRE-PROCEDURE NURSING ASSESSMENT: Performed by: SURGERY

## 2023-02-28 PROCEDURE — 82803 BLOOD GASES ANY COMBINATION: CPT

## 2023-02-28 PROCEDURE — 8E0W4CZ ROBOTIC ASSISTED PROCEDURE OF TRUNK REGION, PERCUTANEOUS ENDOSCOPIC APPROACH: ICD-10-PCS | Performed by: SURGERY

## 2023-02-28 PROCEDURE — 258N000003 HC RX IP 258 OP 636: Performed by: NURSE ANESTHETIST, CERTIFIED REGISTERED

## 2023-02-28 PROCEDURE — 88309 TISSUE EXAM BY PATHOLOGIST: CPT | Mod: TC | Performed by: SURGERY

## 2023-02-28 PROCEDURE — 120N000002 HC R&B MED SURG/OB UMMC

## 2023-02-28 PROCEDURE — C9290 INJ, BUPIVACAINE LIPOSOME: HCPCS | Performed by: ANESTHESIOLOGY

## 2023-02-28 PROCEDURE — 250N000011 HC RX IP 250 OP 636

## 2023-02-28 PROCEDURE — 250N000025 HC SEVOFLURANE, PER MIN: Performed by: SURGERY

## 2023-02-28 PROCEDURE — 04Q64ZZ REPAIR RIGHT COLIC ARTERY, PERCUTANEOUS ENDOSCOPIC APPROACH: ICD-10-PCS | Performed by: SURGERY

## 2023-02-28 PROCEDURE — 82330 ASSAY OF CALCIUM: CPT

## 2023-02-28 PROCEDURE — 44205 LAP COLECTOMY PART W/ILEUM: CPT | Mod: GC | Performed by: SURGERY

## 2023-02-28 PROCEDURE — 0DBF4ZZ EXCISION OF RIGHT LARGE INTESTINE, PERCUTANEOUS ENDOSCOPIC APPROACH: ICD-10-PCS | Performed by: SURGERY

## 2023-02-28 PROCEDURE — 272N000001 HC OR GENERAL SUPPLY STERILE: Performed by: SURGERY

## 2023-02-28 PROCEDURE — 4A1BXSH MONITORING OF GASTROINTESTINAL VASCULAR PERFUSION USING INDOCYANINE GREEN DYE, EXTERNAL APPROACH: ICD-10-PCS | Performed by: SURGERY

## 2023-02-28 PROCEDURE — 370N000017 HC ANESTHESIA TECHNICAL FEE, PER MIN: Performed by: SURGERY

## 2023-02-28 PROCEDURE — 82565 ASSAY OF CREATININE: CPT | Performed by: SURGERY

## 2023-02-28 PROCEDURE — 250N000011 HC RX IP 250 OP 636: Performed by: NURSE ANESTHETIST, CERTIFIED REGISTERED

## 2023-02-28 RX ORDER — DEXAMETHASONE SODIUM PHOSPHATE 4 MG/ML
INJECTION, SOLUTION INTRA-ARTICULAR; INTRALESIONAL; INTRAMUSCULAR; INTRAVENOUS; SOFT TISSUE PRN
Status: DISCONTINUED | OUTPATIENT
Start: 2023-02-28 | End: 2023-02-28

## 2023-02-28 RX ORDER — PROPOFOL 10 MG/ML
INJECTION, EMULSION INTRAVENOUS PRN
Status: DISCONTINUED | OUTPATIENT
Start: 2023-02-28 | End: 2023-02-28

## 2023-02-28 RX ORDER — ONDANSETRON 4 MG/1
4 TABLET, ORALLY DISINTEGRATING ORAL EVERY 30 MIN PRN
Status: DISCONTINUED | OUTPATIENT
Start: 2023-02-28 | End: 2023-02-28 | Stop reason: HOSPADM

## 2023-02-28 RX ORDER — HYDROMORPHONE HCL IN WATER/PF 6 MG/30 ML
0.4 PATIENT CONTROLLED ANALGESIA SYRINGE INTRAVENOUS
Status: DISCONTINUED | OUTPATIENT
Start: 2023-02-28 | End: 2023-03-01

## 2023-02-28 RX ORDER — ONDANSETRON 2 MG/ML
4 INJECTION INTRAMUSCULAR; INTRAVENOUS ONCE
Status: COMPLETED | OUTPATIENT
Start: 2023-02-28 | End: 2023-02-28

## 2023-02-28 RX ORDER — HYDRALAZINE HYDROCHLORIDE 20 MG/ML
2.5-5 INJECTION INTRAMUSCULAR; INTRAVENOUS EVERY 10 MIN PRN
Status: DISCONTINUED | OUTPATIENT
Start: 2023-02-28 | End: 2023-02-28 | Stop reason: HOSPADM

## 2023-02-28 RX ORDER — LABETALOL HYDROCHLORIDE 5 MG/ML
10 INJECTION, SOLUTION INTRAVENOUS
Status: DISCONTINUED | OUTPATIENT
Start: 2023-02-28 | End: 2023-02-28 | Stop reason: HOSPADM

## 2023-02-28 RX ORDER — SODIUM CHLORIDE, SODIUM LACTATE, POTASSIUM CHLORIDE, CALCIUM CHLORIDE 600; 310; 30; 20 MG/100ML; MG/100ML; MG/100ML; MG/100ML
INJECTION, SOLUTION INTRAVENOUS CONTINUOUS PRN
Status: DISCONTINUED | OUTPATIENT
Start: 2023-02-28 | End: 2023-02-28

## 2023-02-28 RX ORDER — LIDOCAINE 40 MG/G
CREAM TOPICAL
Status: DISCONTINUED | OUTPATIENT
Start: 2023-02-28 | End: 2023-03-06 | Stop reason: HOSPADM

## 2023-02-28 RX ORDER — LIDOCAINE HYDROCHLORIDE 20 MG/ML
INJECTION, SOLUTION INFILTRATION; PERINEURAL PRN
Status: DISCONTINUED | OUTPATIENT
Start: 2023-02-28 | End: 2023-02-28

## 2023-02-28 RX ORDER — ACETAMINOPHEN 325 MG/1
650 TABLET ORAL EVERY 4 HOURS PRN
Status: DISCONTINUED | OUTPATIENT
Start: 2023-03-03 | End: 2023-03-06 | Stop reason: HOSPADM

## 2023-02-28 RX ORDER — ONDANSETRON 2 MG/ML
4 INJECTION INTRAMUSCULAR; INTRAVENOUS EVERY 6 HOURS PRN
Status: DISCONTINUED | OUTPATIENT
Start: 2023-02-28 | End: 2023-03-06 | Stop reason: HOSPADM

## 2023-02-28 RX ORDER — ONDANSETRON 2 MG/ML
4 INJECTION INTRAMUSCULAR; INTRAVENOUS EVERY 30 MIN PRN
Status: DISCONTINUED | OUTPATIENT
Start: 2023-02-28 | End: 2023-02-28 | Stop reason: HOSPADM

## 2023-02-28 RX ORDER — SODIUM CHLORIDE, SODIUM LACTATE, POTASSIUM CHLORIDE, CALCIUM CHLORIDE 600; 310; 30; 20 MG/100ML; MG/100ML; MG/100ML; MG/100ML
INJECTION, SOLUTION INTRAVENOUS CONTINUOUS
Status: DISCONTINUED | OUTPATIENT
Start: 2023-02-28 | End: 2023-03-02

## 2023-02-28 RX ORDER — ONDANSETRON 4 MG/1
4 TABLET, ORALLY DISINTEGRATING ORAL EVERY 6 HOURS PRN
Status: DISCONTINUED | OUTPATIENT
Start: 2023-02-28 | End: 2023-03-06 | Stop reason: HOSPADM

## 2023-02-28 RX ORDER — HYDROMORPHONE HCL IN WATER/PF 6 MG/30 ML
0.2 PATIENT CONTROLLED ANALGESIA SYRINGE INTRAVENOUS EVERY 5 MIN PRN
Status: DISCONTINUED | OUTPATIENT
Start: 2023-02-28 | End: 2023-02-28 | Stop reason: HOSPADM

## 2023-02-28 RX ORDER — MEPERIDINE HYDROCHLORIDE 25 MG/ML
12.5 INJECTION INTRAMUSCULAR; INTRAVENOUS; SUBCUTANEOUS EVERY 5 MIN PRN
Status: DISCONTINUED | OUTPATIENT
Start: 2023-02-28 | End: 2023-02-28 | Stop reason: HOSPADM

## 2023-02-28 RX ORDER — HYDROMORPHONE HCL IN WATER/PF 6 MG/30 ML
0.4 PATIENT CONTROLLED ANALGESIA SYRINGE INTRAVENOUS EVERY 5 MIN PRN
Status: DISCONTINUED | OUTPATIENT
Start: 2023-02-28 | End: 2023-02-28 | Stop reason: HOSPADM

## 2023-02-28 RX ORDER — NALOXONE HYDROCHLORIDE 0.4 MG/ML
0.4 INJECTION, SOLUTION INTRAMUSCULAR; INTRAVENOUS; SUBCUTANEOUS
Status: DISCONTINUED | OUTPATIENT
Start: 2023-02-28 | End: 2023-02-28 | Stop reason: HOSPADM

## 2023-02-28 RX ORDER — FENTANYL CITRATE 50 UG/ML
25 INJECTION, SOLUTION INTRAMUSCULAR; INTRAVENOUS EVERY 5 MIN PRN
Status: DISCONTINUED | OUTPATIENT
Start: 2023-02-28 | End: 2023-02-28 | Stop reason: HOSPADM

## 2023-02-28 RX ORDER — SCOLOPAMINE TRANSDERMAL SYSTEM 1 MG/1
1 PATCH, EXTENDED RELEASE TRANSDERMAL ONCE
Status: COMPLETED | OUTPATIENT
Start: 2023-02-28 | End: 2023-03-01

## 2023-02-28 RX ORDER — FENTANYL CITRATE 50 UG/ML
25-50 INJECTION, SOLUTION INTRAMUSCULAR; INTRAVENOUS
Status: DISCONTINUED | OUTPATIENT
Start: 2023-02-28 | End: 2023-02-28 | Stop reason: HOSPADM

## 2023-02-28 RX ORDER — DIAZEPAM 10 MG/2ML
2.5 INJECTION, SOLUTION INTRAMUSCULAR; INTRAVENOUS
Status: DISCONTINUED | OUTPATIENT
Start: 2023-02-28 | End: 2023-02-28 | Stop reason: HOSPADM

## 2023-02-28 RX ORDER — SODIUM CHLORIDE, SODIUM LACTATE, POTASSIUM CHLORIDE, CALCIUM CHLORIDE 600; 310; 30; 20 MG/100ML; MG/100ML; MG/100ML; MG/100ML
INJECTION, SOLUTION INTRAVENOUS CONTINUOUS
Status: DISCONTINUED | OUTPATIENT
Start: 2023-02-28 | End: 2023-02-28 | Stop reason: HOSPADM

## 2023-02-28 RX ORDER — CEFAZOLIN SODIUM/WATER 2 G/20 ML
2 SYRINGE (ML) INTRAVENOUS
Status: COMPLETED | OUTPATIENT
Start: 2023-02-28 | End: 2023-02-28

## 2023-02-28 RX ORDER — FENTANYL CITRATE 50 UG/ML
INJECTION, SOLUTION INTRAMUSCULAR; INTRAVENOUS PRN
Status: DISCONTINUED | OUTPATIENT
Start: 2023-02-28 | End: 2023-02-28

## 2023-02-28 RX ORDER — HYDROMORPHONE HCL IN WATER/PF 6 MG/30 ML
0.2 PATIENT CONTROLLED ANALGESIA SYRINGE INTRAVENOUS
Status: DISCONTINUED | OUTPATIENT
Start: 2023-02-28 | End: 2023-03-05

## 2023-02-28 RX ORDER — ENOXAPARIN SODIUM 100 MG/ML
40 INJECTION SUBCUTANEOUS EVERY 24 HOURS
Status: DISCONTINUED | OUTPATIENT
Start: 2023-03-01 | End: 2023-03-06 | Stop reason: HOSPADM

## 2023-02-28 RX ORDER — NALOXONE HYDROCHLORIDE 0.4 MG/ML
0.2 INJECTION, SOLUTION INTRAMUSCULAR; INTRAVENOUS; SUBCUTANEOUS
Status: DISCONTINUED | OUTPATIENT
Start: 2023-02-28 | End: 2023-03-06 | Stop reason: HOSPADM

## 2023-02-28 RX ORDER — NALOXONE HYDROCHLORIDE 0.4 MG/ML
0.4 INJECTION, SOLUTION INTRAMUSCULAR; INTRAVENOUS; SUBCUTANEOUS
Status: DISCONTINUED | OUTPATIENT
Start: 2023-02-28 | End: 2023-03-06 | Stop reason: HOSPADM

## 2023-02-28 RX ORDER — INDOCYANINE GREEN AND WATER 25 MG
KIT INJECTION PRN
Status: DISCONTINUED | OUTPATIENT
Start: 2023-02-28 | End: 2023-02-28

## 2023-02-28 RX ORDER — FLUMAZENIL 0.1 MG/ML
0.2 INJECTION, SOLUTION INTRAVENOUS
Status: DISCONTINUED | OUTPATIENT
Start: 2023-02-28 | End: 2023-02-28 | Stop reason: HOSPADM

## 2023-02-28 RX ORDER — NALOXONE HYDROCHLORIDE 0.4 MG/ML
0.2 INJECTION, SOLUTION INTRAMUSCULAR; INTRAVENOUS; SUBCUTANEOUS
Status: DISCONTINUED | OUTPATIENT
Start: 2023-02-28 | End: 2023-02-28 | Stop reason: HOSPADM

## 2023-02-28 RX ORDER — CEFAZOLIN SODIUM/WATER 2 G/20 ML
2 SYRINGE (ML) INTRAVENOUS SEE ADMIN INSTRUCTIONS
Status: DISCONTINUED | OUTPATIENT
Start: 2023-02-28 | End: 2023-02-28 | Stop reason: HOSPADM

## 2023-02-28 RX ORDER — ACETAMINOPHEN 325 MG/1
975 TABLET ORAL EVERY 8 HOURS
Status: DISCONTINUED | OUTPATIENT
Start: 2023-02-28 | End: 2023-03-01

## 2023-02-28 RX ORDER — BUPIVACAINE HYDROCHLORIDE 2.5 MG/ML
INJECTION, SOLUTION EPIDURAL; INFILTRATION; INTRACAUDAL
Status: COMPLETED | OUTPATIENT
Start: 2023-02-28 | End: 2023-02-28

## 2023-02-28 RX ORDER — METRONIDAZOLE 500 MG/100ML
500 INJECTION, SOLUTION INTRAVENOUS
Status: COMPLETED | OUTPATIENT
Start: 2023-02-28 | End: 2023-02-28

## 2023-02-28 RX ORDER — ENOXAPARIN SODIUM 100 MG/ML
40 INJECTION SUBCUTANEOUS
Status: COMPLETED | OUTPATIENT
Start: 2023-02-28 | End: 2023-02-28

## 2023-02-28 RX ORDER — FENTANYL CITRATE 50 UG/ML
50 INJECTION, SOLUTION INTRAMUSCULAR; INTRAVENOUS EVERY 5 MIN PRN
Status: DISCONTINUED | OUTPATIENT
Start: 2023-02-28 | End: 2023-02-28 | Stop reason: HOSPADM

## 2023-02-28 RX ORDER — OXYCODONE HYDROCHLORIDE 10 MG/1
10 TABLET ORAL EVERY 4 HOURS PRN
Status: DISCONTINUED | OUTPATIENT
Start: 2023-02-28 | End: 2023-03-06 | Stop reason: HOSPADM

## 2023-02-28 RX ORDER — ACETAMINOPHEN 325 MG/1
975 TABLET ORAL ONCE
Status: DISCONTINUED | OUTPATIENT
Start: 2023-02-28 | End: 2023-02-28

## 2023-02-28 RX ORDER — HALOPERIDOL 5 MG/ML
1 INJECTION INTRAMUSCULAR
Status: DISCONTINUED | OUTPATIENT
Start: 2023-02-28 | End: 2023-02-28 | Stop reason: HOSPADM

## 2023-02-28 RX ORDER — ACETAMINOPHEN 325 MG/1
975 TABLET ORAL ONCE
Status: COMPLETED | OUTPATIENT
Start: 2023-02-28 | End: 2023-02-28

## 2023-02-28 RX ORDER — OXYCODONE HYDROCHLORIDE 5 MG/1
5 TABLET ORAL EVERY 4 HOURS PRN
Status: DISCONTINUED | OUTPATIENT
Start: 2023-02-28 | End: 2023-03-06 | Stop reason: HOSPADM

## 2023-02-28 RX ORDER — GABAPENTIN 100 MG/1
100 CAPSULE ORAL AT BEDTIME
Status: DISCONTINUED | OUTPATIENT
Start: 2023-02-28 | End: 2023-03-06 | Stop reason: HOSPADM

## 2023-02-28 RX ADMIN — GLUCAGON 0.4 MG: KIT at 13:15

## 2023-02-28 RX ADMIN — SODIUM CHLORIDE, POTASSIUM CHLORIDE, SODIUM LACTATE AND CALCIUM CHLORIDE: 600; 310; 30; 20 INJECTION, SOLUTION INTRAVENOUS at 12:30

## 2023-02-28 RX ADMIN — SCOPALAMINE 1 PATCH: 1 PATCH, EXTENDED RELEASE TRANSDERMAL at 06:58

## 2023-02-28 RX ADMIN — ACETAMINOPHEN 975 MG: 325 TABLET ORAL at 06:59

## 2023-02-28 RX ADMIN — Medication 10 MG: at 13:49

## 2023-02-28 RX ADMIN — Medication 20 MG: at 12:44

## 2023-02-28 RX ADMIN — ONDANSETRON 4 MG: 2 INJECTION INTRAMUSCULAR; INTRAVENOUS at 08:36

## 2023-02-28 RX ADMIN — PHENYLEPHRINE HYDROCHLORIDE 200 MCG: 10 INJECTION INTRAVENOUS at 12:11

## 2023-02-28 RX ADMIN — ACETAMINOPHEN 975 MG: 325 TABLET, FILM COATED ORAL at 18:50

## 2023-02-28 RX ADMIN — Medication 10 MG: at 12:41

## 2023-02-28 RX ADMIN — LIDOCAINE HYDROCHLORIDE 100 MG: 20 INJECTION, SOLUTION INFILTRATION; PERINEURAL at 08:14

## 2023-02-28 RX ADMIN — FENTANYL CITRATE 50 MCG: 50 INJECTION, SOLUTION INTRAMUSCULAR; INTRAVENOUS at 09:10

## 2023-02-28 RX ADMIN — FENTANYL CITRATE 50 MCG: 50 INJECTION, SOLUTION INTRAMUSCULAR; INTRAVENOUS at 09:44

## 2023-02-28 RX ADMIN — Medication 10 MG: at 11:27

## 2023-02-28 RX ADMIN — MIDAZOLAM 1 MG: 1 INJECTION INTRAMUSCULAR; INTRAVENOUS at 07:58

## 2023-02-28 RX ADMIN — GABAPENTIN 100 MG: 100 CAPSULE ORAL at 21:45

## 2023-02-28 RX ADMIN — FENTANYL CITRATE 25 MCG: 50 INJECTION, SOLUTION INTRAMUSCULAR; INTRAVENOUS at 16:05

## 2023-02-28 RX ADMIN — FENTANYL CITRATE 25 MCG: 50 INJECTION, SOLUTION INTRAMUSCULAR; INTRAVENOUS at 16:10

## 2023-02-28 RX ADMIN — ALBUMIN (HUMAN): 12.5 SOLUTION INTRAVENOUS at 12:52

## 2023-02-28 RX ADMIN — FENTANYL CITRATE 50 MCG: 50 INJECTION, SOLUTION INTRAMUSCULAR; INTRAVENOUS at 06:55

## 2023-02-28 RX ADMIN — HYDROMORPHONE HYDROCHLORIDE 0.2 MG: 0.2 INJECTION, SOLUTION INTRAMUSCULAR; INTRAVENOUS; SUBCUTANEOUS at 16:38

## 2023-02-28 RX ADMIN — ONDANSETRON 4 MG: 2 INJECTION INTRAMUSCULAR; INTRAVENOUS at 14:27

## 2023-02-28 RX ADMIN — PROPOFOL 50 MG: 10 INJECTION, EMULSION INTRAVENOUS at 14:36

## 2023-02-28 RX ADMIN — BUPIVACAINE 20 ML: 13.3 INJECTION, SUSPENSION, LIPOSOMAL INFILTRATION at 06:45

## 2023-02-28 RX ADMIN — HYDROMORPHONE HYDROCHLORIDE 0.5 MG: 1 INJECTION, SOLUTION INTRAMUSCULAR; INTRAVENOUS; SUBCUTANEOUS at 15:07

## 2023-02-28 RX ADMIN — SODIUM CHLORIDE, POTASSIUM CHLORIDE, SODIUM LACTATE AND CALCIUM CHLORIDE: 600; 310; 30; 20 INJECTION, SOLUTION INTRAVENOUS at 16:11

## 2023-02-28 RX ADMIN — Medication 1125 MG: at 21:40

## 2023-02-28 RX ADMIN — Medication 20 MG: at 09:44

## 2023-02-28 RX ADMIN — Medication 10 MG: at 14:33

## 2023-02-28 RX ADMIN — FENTANYL CITRATE 50 MCG: 50 INJECTION, SOLUTION INTRAMUSCULAR; INTRAVENOUS at 13:09

## 2023-02-28 RX ADMIN — Medication 2 G: at 08:36

## 2023-02-28 RX ADMIN — Medication 50 MG: at 08:16

## 2023-02-28 RX ADMIN — MIDAZOLAM 1 MG: 1 INJECTION INTRAMUSCULAR; INTRAVENOUS at 06:55

## 2023-02-28 RX ADMIN — ENOXAPARIN SODIUM 40 MG: 40 INJECTION SUBCUTANEOUS at 06:59

## 2023-02-28 RX ADMIN — Medication 2 G: at 12:36

## 2023-02-28 RX ADMIN — METRONIDAZOLE 500 MG: 500 INJECTION, SOLUTION INTRAVENOUS at 08:20

## 2023-02-28 RX ADMIN — Medication 20 MG: at 08:56

## 2023-02-28 RX ADMIN — SODIUM CHLORIDE, POTASSIUM CHLORIDE, SODIUM LACTATE AND CALCIUM CHLORIDE: 600; 310; 30; 20 INJECTION, SOLUTION INTRAVENOUS at 07:58

## 2023-02-28 RX ADMIN — FENTANYL CITRATE 50 MCG: 50 INJECTION, SOLUTION INTRAMUSCULAR; INTRAVENOUS at 08:58

## 2023-02-28 RX ADMIN — Medication 20 MG: at 10:40

## 2023-02-28 RX ADMIN — FENTANYL CITRATE 100 MCG: 50 INJECTION, SOLUTION INTRAMUSCULAR; INTRAVENOUS at 08:14

## 2023-02-28 RX ADMIN — SUGAMMADEX 200 MG: 100 INJECTION, SOLUTION INTRAVENOUS at 15:13

## 2023-02-28 RX ADMIN — BUPIVACAINE HYDROCHLORIDE 20 ML: 2.5 INJECTION, SOLUTION EPIDURAL; INFILTRATION; INTRACAUDAL; PERINEURAL at 06:45

## 2023-02-28 RX ADMIN — OXYCODONE HYDROCHLORIDE 10 MG: 10 TABLET ORAL at 19:05

## 2023-02-28 RX ADMIN — FENTANYL CITRATE 50 MCG: 50 INJECTION, SOLUTION INTRAMUSCULAR; INTRAVENOUS at 12:56

## 2023-02-28 RX ADMIN — INDOCYANINE GREEN AND WATER 7.5 MG: KIT at 12:47

## 2023-02-28 RX ADMIN — SODIUM CHLORIDE, POTASSIUM CHLORIDE, SODIUM LACTATE AND CALCIUM CHLORIDE: 600; 310; 30; 20 INJECTION, SOLUTION INTRAVENOUS at 08:38

## 2023-02-28 RX ADMIN — HYDROMORPHONE HYDROCHLORIDE 0.2 MG: 0.2 INJECTION, SOLUTION INTRAMUSCULAR; INTRAVENOUS; SUBCUTANEOUS at 16:50

## 2023-02-28 RX ADMIN — PROPOFOL 90 MG: 10 INJECTION, EMULSION INTRAVENOUS at 08:16

## 2023-02-28 RX ADMIN — DEXAMETHASONE SODIUM PHOSPHATE 10 MG: 4 INJECTION, SOLUTION INTRA-ARTICULAR; INTRALESIONAL; INTRAMUSCULAR; INTRAVENOUS; SOFT TISSUE at 08:16

## 2023-02-28 RX ADMIN — PHENYLEPHRINE HYDROCHLORIDE 200 MCG: 10 INJECTION INTRAVENOUS at 12:09

## 2023-02-28 RX ADMIN — HYDROMORPHONE HYDROCHLORIDE 0.5 MG: 1 INJECTION, SOLUTION INTRAMUSCULAR; INTRAVENOUS; SUBCUTANEOUS at 10:22

## 2023-02-28 ASSESSMENT — ACTIVITIES OF DAILY LIVING (ADL)
ADLS_ACUITY_SCORE: 22
ADLS_ACUITY_SCORE: 23
ADLS_ACUITY_SCORE: 29
ADLS_ACUITY_SCORE: 23
ADLS_ACUITY_SCORE: 29
ADLS_ACUITY_SCORE: 23
ADLS_ACUITY_SCORE: 23

## 2023-02-28 NOTE — ANESTHESIA POSTPROCEDURE EVALUATION
Patient: Meme Butts    Procedure: Procedure(s):  Robotic right hemicolectomy, appendectomy       Anesthesia Type:  General    Note:  Disposition: Admission   Postop Pain Control: Uneventful            Sign Out: Well controlled pain   PONV: No   Neuro/Psych: Uneventful            Sign Out: Acceptable/Baseline neuro status   Airway/Respiratory: Uneventful            Sign Out: Acceptable/Baseline resp. status   CV/Hemodynamics: Uneventful            Sign Out: Acceptable CV status; No obvious hypovolemia; No obvious fluid overload   Other NRE: NONE   DID A NON-ROUTINE EVENT OCCUR? No           Last vitals:  Vitals Value Taken Time   /62 02/28/23 1645   Temp 37.1  C (98.8  F) 02/28/23 1526   Pulse 101 02/28/23 1653   Resp 9 02/28/23 1653   SpO2 94 % 02/28/23 1653   Vitals shown include unvalidated device data.    Electronically Signed By: Santhosh Kyle  February 28, 2023  4:55 PM

## 2023-02-28 NOTE — PHARMACY-ADMISSION MEDICATION HISTORY
Admission Medication History Completed by Pharmacy    See [facility] Admission Navigator for allergy information, preferred outpatient pharmacy, prior to admission medications and immunization status.     Medication History Sources:     Care Everyhwere, Dispense hx, Pre-op RN assessment    Changes made to PTA medication list (reason):    Changed: Keppra to 1125mg BID which aligns with dispense hx and Care Everywhere neuro note.    Prior to Admission medications    Medication Sig Last Dose Taking? Auth Provider Long Term End Date   CALCIUM 500 +D 500-400 MG-UNIT TABS Take 1 tablet by mouth daily at 2 pm Past Month Yes Gale Monroy MD     cholecalciferol (VITAMIN D) 1000 UNIT tablet Take 2,000 Units by mouth daily at 2 pm Past Month Yes Gale Monroy MD     cyclobenzaprine (FLEXERIL) 5 MG tablet as needed Unknown Yes Reported, Patient     ibuprofen (ADVIL/MOTRIN) 600 MG tablet Take 1 tablet (600 mg) by mouth every 8 hours as needed for moderate pain (4-6) Past Week Yes Santhosh Carrington MD     levETIRAcetam (KEPPRA) 750 MG tablet PT REPORTS TAPERING DOWN: TAKE 1 & 1/2 TABS IN THE MORNING, AND 2 TABS IN THE EVENING.  Patient taking differently: Take 1,125 mg by mouth 2 times daily 2/27/2023 at 2000 Yes Maria Esther Miranda MD PhD Yes    metroNIDAZOLE (FLAGYL) 500 MG tablet Take 1 tablet (500 mg) by mouth every 6 hours At 8:00 am, 2:00 pm, 8:00 pm the day prior to your surgery with neomycin and zofran. 2/27/2023 at 2000 Yes Lalit Cardenas MD     neomycin (MYCIFRADIN) 500 MG tablet Take 2 tablets (1,000 mg) by mouth every 6 hours At 8:00 am, 2:00 pm, 8:00 pm the day prior to your surgery with flagyl and zofran. 2/27/2023 at 2000 Yes Lalit Cardenas MD     ondansetron (ZOFRAN) 4 MG tablet Take 1 tablet (4 mg) by mouth every 6 hours At 8:00 am, 2:00 pm, 8:00 pm the day prior to your surgery with neomycin and flagyl. 2/27/2023 at 2000 Yes Lalit Cardenas MD     polyethylene glycol  "(MIRALAX) 17 g packet Take 119 g by mouth See Admin Instructions Starting at 8 pm night prior to surgery. Refer to \"Getting Ready for Surgery\" instructions. 2/27/2023 at 1600 Yes Lalit Cardenas MD     polyethylene glycol (MIRALAX) 17 g packet Take 238 g by mouth See Admin Instructions Starting at 4 pm night prior to surgery. Refer to \"Getting Ready for Surgery\" instructions. 2/27/2023 at 1600 Yes Lalit Cardenas MD     HYDROcodone-acetaminophen (NORCO) 5-325 MG tablet Take 1 tablet by mouth every 4 hours as needed for pain or moderate pain (4-6) 2/24/2023  Santhosh Carrington MD     Multiple Vitamins-Minerals (CENTRUM SILVER) per tablet Take 1 tablet by mouth every morning 2/21/2023  Reported, Patient         Date completed: 02/28/23    Medication history completed by: Ananya Mcnair [facility]  "

## 2023-02-28 NOTE — OR NURSING
Bilateral TAP block performed without complications.  VSS.  Pt tolerated well.  Will continue to monitor.

## 2023-02-28 NOTE — ANESTHESIA CARE TRANSFER NOTE
Patient: Meme Butts    Procedure: Procedure(s):  Robotic right hemicolectomy, appendectomy       Diagnosis: Malignant neoplasm of hepatic flexure (H) [C18.3]  Diagnosis Additional Information: No value filed.    Anesthesia Type:   General     Note:    Oropharynx: oropharynx clear of all foreign objects and spontaneously breathing  Level of Consciousness: awake and drowsy  Oxygen Supplementation: face mask  Level of Supplemental Oxygen (L/min / FiO2): 6L  Independent Airway: airway patency satisfactory and stable  Dentition: dentition unchanged  Vital Signs Stable: post-procedure vital signs reviewed and stable  Report to RN Given: handoff report given  Patient transferred to: PACU    Handoff Report: Identifed the Patient, Identified the Reponsible Provider, Reviewed the pertinent medical history, Discussed the surgical course, Reviewed Intra-OP anesthesia mangement and issues during anesthesia, Set expectations for post-procedure period and Allowed opportunity for questions and acknowledgement of understanding      Vitals:  Vitals Value Taken Time   /63 02/28/23 1530   Temp     Pulse 92 02/28/23 1531   Resp 12 02/28/23 1531   SpO2 96 % 02/28/23 1531   Vitals shown include unvalidated device data.    Electronically Signed By: GWYN Bhatti CRNA  February 28, 2023  3:32 PM

## 2023-02-28 NOTE — ANESTHESIA PROCEDURE NOTES
Airway       Patient location during procedure: OR       Procedure Start/Stop Times: 2/28/2023 8:18 AM  Staff -        CRNA: Maryuri Bajwa APRN CRNA       Performed By: CRNA  Consent for Airway        Urgency: elective  Indications and Patient Condition       Indications for airway management: carolynn-procedural       Induction type:intravenous       Mask difficulty assessment: 1 - vent by mask    Final Airway Details       Final airway type: endotracheal airway       Successful airway: ETT - single and Oral  Endotracheal Airway Details        ETT size (mm): 7.0       Cuffed: yes       Successful intubation technique: direct laryngoscopy       DL Blade Type: Carver 2       Grade View of Cords: 1       Adjucts: stylet       Position: Right       Measured from: gums/teeth       Secured at (cm): 21       Bite block used: None    Post intubation assessment        Placement verified by: capnometry, equal breath sounds and chest rise        Number of attempts at approach: 1       Number of other approaches attempted: 0       Secured with: pink tape       Ease of procedure: easy       Dentition: Intact and Unchanged    Medication(s) Administered   Medication Administration Time: 2/28/2023 8:18 AM

## 2023-02-28 NOTE — ANESTHESIA PROCEDURE NOTES
"TAP Procedure Note    Pre-Procedure   Staff -        Anesthesiologist:  Benito Blake DO       Resident/Fellow: Santhosh Kyle       Performed By: resident       Location: OR       Pre-Anesthestic Checklist: patient identified, IV checked, site marked, risks and benefits discussed, informed consent, monitors and equipment checked, pre-op evaluation, at physician/surgeon's request and post-op pain management  Timeout:       Correct Patient: Yes        Correct Procedure: Yes        Correct Site: Yes        Correct Position: Yes        Correct Laterality: Yes        Site Marked: Yes  Procedure Documentation  Procedure: TAP       Laterality: bilateral       Patient Position: supine       Skin prep: Chloraprep       Needle Type: short bevel       Needle Gauge: 21.        Needle Length (millimeters): 100        Ultrasound guided       1. Ultrasound was used to identify targeted nerve, plexus, vascular marker, or fascial plane and place a needle adjacent to it in real-time.       2. Ultrasound was used to visualize the spread of anesthetic in close proximity to the above referenced structure.    Assessment/Narrative         The placement was negative for: blood aspirated, painful injection and site bleeding       Paresthesias: No.       Bolus given via needle..        Secured via.        Insertion/Infusion Method: Single Shot       Complications: none    Medication(s) Administered   Bupivacaine 0.25% PF (Infiltration) - Infiltration   20 mL - 2/28/2023 6:45:00 AM  Bupivacaine liposome (Exparel) 1.3% LA inj susp (Infiltration) - Infiltration   20 mL - 2/28/2023 6:45:00 AM    FOR [facility] ([facility]) ONLY:   Pain Team Contact information: please page the Pain Team Via [employer]. Search \"Pain\". During daytime hours, please page the attending first. At night please page the resident first.    "

## 2023-02-28 NOTE — BRIEF OP NOTE
[facility]    Brief Operative Note    Pre-operative diagnosis: Malignant neoplasm of hepatic flexure (H) [C18.3]  Post-operative diagnosis Same as pre-operative diagnosis    Procedure: Procedure(s):  Robotic right hemicolectomy, appendectomy  Surgeon: Surgeon(s) and Role:     * Lalit Cardenas MD - Primary     * Cha Rockwell MD - Resident - Assisting     * Toni Wilson MD - Fellow - Assisting     * Troy Wright MD  Anesthesia: General with Block   Estimated Blood Loss: 200 ml    Drains: None  Specimens:   ID Type Source Tests Collected by Time Destination   1 : Terminal Ileum, Right Colon, Appendix Tissue Other SURGICAL PATHOLOGY EXAM Lalit Cardenas MD 2/28/2023  2:16 PM      Findings:   Robotic Right Colectomy with Intracorporeal anastomosis. Bleeding from colon mesentery vessels during dissection, approximately 200cc blood lose. Controlled with thermal energy devices.      ICG confirmed perfusion of ileocolic anastomosis site. A 60mm Blue staplerwas used to form an isoperistaltic side to side anastomosis and the common otomy was closed with with a two layer runing suture closure. .  Complications: None.  Implants: * No implants in log *    Toni Wilson MD, MPH  Fellow in Colon and Rectal Surgery  [facility]

## 2023-02-28 NOTE — INTERVAL H&P NOTE
"I have reviewed the virtual H&P that is linked to this encounter. The physical exam performed by anesthesia during this surgical encounter serves as the physical portion of that the virtual H&P. There are no significant changes from that history and physical as noted.    Clinical Conditions Present on Arrival:  Clinically Significant Risk Factors Present on Admission            # Hypercalcemia: Highest Ca = 10.4 mg/dL in last 30 days, will monitor as appropriate         # DMII: A1C = 6.6 % (Ref range: <5.7 %) within past 6 months  # Obesity: Estimated body mass index is 31.81 kg/m  as calculated from the following:    Height as of this encounter: 1.575 m (5' 2\").    Weight as of this encounter: 78.9 kg (173 lb 15.1 oz).       "

## 2023-03-01 ENCOUNTER — APPOINTMENT (OUTPATIENT)
Dept: PHYSICAL THERAPY | Facility: CLINIC | Age: 73
DRG: 330 | End: 2023-03-01
Attending: SURGERY
Payer: COMMERCIAL

## 2023-03-01 LAB
ANION GAP SERPL CALCULATED.3IONS-SCNC: 10 MMOL/L (ref 7–15)
BUN SERPL-MCNC: 8.5 MG/DL (ref 8–23)
CALCIUM SERPL-MCNC: 8.3 MG/DL (ref 8.8–10.2)
CHLORIDE SERPL-SCNC: 107 MMOL/L (ref 98–107)
CREAT SERPL-MCNC: 0.63 MG/DL (ref 0.51–0.95)
DEPRECATED HCO3 PLAS-SCNC: 24 MMOL/L (ref 22–29)
ERYTHROCYTE [DISTWIDTH] IN BLOOD BY AUTOMATED COUNT: 13.1 % (ref 10–15)
GFR SERPL CREATININE-BSD FRML MDRD: >90 ML/MIN/1.73M2
GLUCOSE SERPL-MCNC: 107 MG/DL (ref 70–99)
HCT VFR BLD AUTO: 27.9 % (ref 35–47)
HGB BLD-MCNC: 8.5 G/DL (ref 11.7–15.7)
HGB BLD-MCNC: 8.7 G/DL (ref 11.7–15.7)
MAGNESIUM SERPL-MCNC: 2.1 MG/DL (ref 1.7–2.3)
MCH RBC QN AUTO: 29.4 PG (ref 26.5–33)
MCHC RBC AUTO-ENTMCNC: 31.2 G/DL (ref 31.5–36.5)
MCV RBC AUTO: 94 FL (ref 78–100)
PHOSPHATE SERPL-MCNC: 2.4 MG/DL (ref 2.5–4.5)
PLATELET # BLD AUTO: 246 10E3/UL (ref 150–450)
POTASSIUM SERPL-SCNC: 3.7 MMOL/L (ref 3.4–5.3)
RBC # BLD AUTO: 2.96 10E6/UL (ref 3.8–5.2)
SODIUM SERPL-SCNC: 141 MMOL/L (ref 136–145)
WBC # BLD AUTO: 12 10E3/UL (ref 4–11)

## 2023-03-01 PROCEDURE — 120N000002 HC R&B MED SURG/OB UMMC

## 2023-03-01 PROCEDURE — 85048 AUTOMATED LEUKOCYTE COUNT: CPT | Performed by: SURGERY

## 2023-03-01 PROCEDURE — 97161 PT EVAL LOW COMPLEX 20 MIN: CPT | Mod: GP | Performed by: REHABILITATION PRACTITIONER

## 2023-03-01 PROCEDURE — 85018 HEMOGLOBIN: CPT

## 2023-03-01 PROCEDURE — 250N000013 HC RX MED GY IP 250 OP 250 PS 637

## 2023-03-01 PROCEDURE — 97116 GAIT TRAINING THERAPY: CPT | Mod: GP | Performed by: REHABILITATION PRACTITIONER

## 2023-03-01 PROCEDURE — 250N000013 HC RX MED GY IP 250 OP 250 PS 637: Performed by: PHYSICIAN ASSISTANT

## 2023-03-01 PROCEDURE — 80048 BASIC METABOLIC PNL TOTAL CA: CPT | Performed by: SURGERY

## 2023-03-01 PROCEDURE — 36415 COLL VENOUS BLD VENIPUNCTURE: CPT

## 2023-03-01 PROCEDURE — 999N000128 HC STATISTIC PERIPHERAL IV START W/O US GUIDANCE

## 2023-03-01 PROCEDURE — 258N000003 HC RX IP 258 OP 636: Performed by: SURGERY

## 2023-03-01 PROCEDURE — 250N000013 HC RX MED GY IP 250 OP 250 PS 637: Performed by: SURGERY

## 2023-03-01 PROCEDURE — 84100 ASSAY OF PHOSPHORUS: CPT | Performed by: SURGERY

## 2023-03-01 PROCEDURE — 36415 COLL VENOUS BLD VENIPUNCTURE: CPT | Performed by: SURGERY

## 2023-03-01 PROCEDURE — 97530 THERAPEUTIC ACTIVITIES: CPT | Mod: GP | Performed by: REHABILITATION PRACTITIONER

## 2023-03-01 PROCEDURE — 83735 ASSAY OF MAGNESIUM: CPT | Performed by: SURGERY

## 2023-03-01 PROCEDURE — 250N000011 HC RX IP 250 OP 636: Performed by: SURGERY

## 2023-03-01 PROCEDURE — 250N000009 HC RX 250: Performed by: SURGERY

## 2023-03-01 RX ORDER — ACETAMINOPHEN 325 MG/1
975 TABLET ORAL EVERY 8 HOURS
Status: DISCONTINUED | OUTPATIENT
Start: 2023-03-01 | End: 2023-03-06 | Stop reason: HOSPADM

## 2023-03-01 RX ADMIN — ACETAMINOPHEN 975 MG: 325 TABLET ORAL at 18:27

## 2023-03-01 RX ADMIN — OXYCODONE HYDROCHLORIDE 10 MG: 10 TABLET ORAL at 14:58

## 2023-03-01 RX ADMIN — GABAPENTIN 100 MG: 100 CAPSULE ORAL at 21:44

## 2023-03-01 RX ADMIN — ACETAMINOPHEN 975 MG: 325 TABLET ORAL at 11:00

## 2023-03-01 RX ADMIN — ACETAMINOPHEN 975 MG: 325 TABLET, FILM COATED ORAL at 01:57

## 2023-03-01 RX ADMIN — POTASSIUM PHOSPHATE, MONOBASIC POTASSIUM PHOSPHATE, DIBASIC 9 MMOL: 224; 236 INJECTION, SOLUTION, CONCENTRATE INTRAVENOUS at 11:39

## 2023-03-01 RX ADMIN — OXYCODONE HYDROCHLORIDE 10 MG: 10 TABLET ORAL at 19:31

## 2023-03-01 RX ADMIN — OXYCODONE HYDROCHLORIDE 10 MG: 10 TABLET ORAL at 23:36

## 2023-03-01 RX ADMIN — Medication 1125 MG: at 19:40

## 2023-03-01 RX ADMIN — Medication 1125 MG: at 08:23

## 2023-03-01 RX ADMIN — HYDROMORPHONE HYDROCHLORIDE 0.2 MG: 0.2 INJECTION, SOLUTION INTRAMUSCULAR; INTRAVENOUS; SUBCUTANEOUS at 16:27

## 2023-03-01 ASSESSMENT — ACTIVITIES OF DAILY LIVING (ADL)
ADLS_ACUITY_SCORE: 29
ADLS_ACUITY_SCORE: 29
ADLS_ACUITY_SCORE: 30
ADLS_ACUITY_SCORE: 29
ADLS_ACUITY_SCORE: 32
ADLS_ACUITY_SCORE: 30
ADLS_ACUITY_SCORE: 31
ADLS_ACUITY_SCORE: 29
ADLS_ACUITY_SCORE: 31
ADLS_ACUITY_SCORE: 29

## 2023-03-01 NOTE — OP NOTE
[facility] Colorectal Surgery Operative Report  February 28, 2023     PREOPERATIVE DIAGNOSIS:  1. Adenocarcinoma of hepatic flexure of colon  2. Family history of BRCA1  3. Epilepsy  4. Hx of GI bleed  5. Migraines  6. Nephrolithiasis  7. Osteoporosis  8. Prediabetes     POSTOPERATIVE DIAGNOSIS:   1. Adenocarcinoma of hepatic flexure of colon  2. Family history of BRCA1  3. Epilepsy  4. Hx of GI bleed  5. Migraines  6. Nephrolithiasis  7. Osteoporosis  8. Prediabetes     PROCEDURE:  1. Laparoscopic robotic-assisted right hemicolectomy.  2. ICG angiography     ANESTHESIA: General endotracheal anesthesia plus bilateral TAP blocks.     SURGEON:  Lalit Cardenas MD, PhD     SECOND SURGEON: Troy Wright MD - Dr Wright's assistance was needed during the case for about 30 minutes when we ran into heavy bleeding from a mesenteric vessel that required the assistance and skill of a second attending; following control of the bleeding Dr Troy Wright scrubbed out of the case     ASSISTANT(S): Toni Wilson MD - Colorectal Surgery Fellow;     2nd Assistant: Farideh Cano PA-C; Ms Cano's assistance was needed at the bedside to assist in tissue retraction and switching robotic instruments; her presence at the bedside allowed Dr Wilson to remain at the robotic console and actually participate in the performance of the operation which was of much greater educational value     INDICATIONS FOR SURGERY: Meme Butts is a 73 year old lady who was found to have a malignant polyp completely resected at the hepatic flexure.  Unfortunately the polyp had an intra-mucosal adenocarcinoma with lymphovascular and perineural invasion.  Therefore, due to the not insignificant chance of lymph node metastases a right hemicolectomy was recommended.  Preoperative imaging was otherwise negative for metastatic disease. I recommended elective right hemicolectomy after discussion at tumor board.     General risks related to abdominal  "surgery were reviewed with the patient. These include, but are not limited to, death, myocardial infarction, pneumonia, urinary tract infection, deep venous thrombosis with or without pulmonary embolus, abdominal infection from bowel injury or abscess, fistula, anastomotic leak that may require reoperation and a stoma, ureteral injury, urinary dysfunction, sexual dysfunction, bowel obstruction, wound infection, and bleeding.     OPERATIVE PROCEDURE: After obtaining informed consent, the patient was brought to the operating room and placed in supine position with arms tucked. The patient underwent preoperative bilateral TAP blocks. Appropriate preoperative mechanical and chemical deep venous thrombosis prophylaxis, as well as preoperative prophylactic parenteral antibiotics were given. General endotracheal anesthesia was gently induced. Bilateral lower extremity pneumatic compression devices were applied and all pressure points were cushioned. A Rooney catheter was inserted without difficulty.      The abdomen was then preped and draped in the standard sterile fashion. After a \"time-out\" was performed,  3 cm Pfannenstiel incision was made. The anterior fascia was opened with electrocautery. The muscle was bluntly dissected, and the peritoneum was then sharply opened as well. A Mini GelPort was placed.  The cap of the port was then placed, an 8 mm robotic port and an assistant port was placed through the gelport and the abdomen was insufflated without difficulty up to 15mmHg. A 10mm 30 degree angle robotic laparoscope was then used to explore the peritoneal cavity.      No evidence of bleeding, bowel injury or metastatic disease was visualized. The patient was positioned in slight Trendelenberg position with left side down and right side up and the small bowel and omentum were then displaced towards left abdomen.      The Quilli Xi robot was docked and all robotic instruments were placed under direct vision. I then " went to the robotic console to perform the operation.      We began our dissection at the hepatic flexure.  Omentum was resected off of this and the lesser sac was entered.  We extended our dissection of the transverse colon past the falciform ligament.  We then dissected off the proximal transverse colon and hepatic flexure,  the mesentery of transverse colon off of the retroperitoneum.  We then extended this mobilization proximally, moving around the hepatic flexure and down along the distal right colon.  After we reached about shelter to the cecum we switched positions and performed a lateral to medial dissection of the proximal right colon, starting at the cecum and working distally until we had met our prior dissection.    At this point we mobilized the distal small bowel off of the pelvic sidewall and retroperitoneum.  The right ureter was identified and was well encased in retroperitoneal fat which remained not violated.  We then transected the distal ileum, about 2-3 cm proximal to the ileocecal valve, with a single firing of the 60 mm robotic stapler with a blue load.      We readily identified the ileocolic pedicle and skeletonized this.  We then took the pedicle with a single firing of the 60 mm robotic stapler with a white load.  We then proceeded to complete our dissection in a medial to lateral dissection,  the mesentery of the colon from the duodenum and retroperitoneum.  We reached our prior lateral dissection along the right colon and hepatic flexure and completed the dissection by taking the last remaining attachments of the line of Toldt and the hepatic flexure.       After obtaining adequate mobilization we then took the mesentery with the vessel sealer, including a probable right colic artery, until we reached our intended distal transection site.  Of note, during the transection of the mesentery we avulsed what was probably a right colic vessel (likely vein) which caused a  fair amount of bleeding, about 300-400 ml total.  During this time, Dr Wright stepped in to offer assistance at the robotic console while I scrubbed in and prepared for possible laparotomy.  Dr Wright was able to isolate the bleeding vessel and gained hemostasis with electrocautery.  After we suctioned and irrigated, we were assured that the bleeding had stopped and we had gained hemostasis.  During this brief period the patient's blood pressure briefly dropped but responded to a single dose of phenylephrine.  Her blood pressure normalized without further pharmacologic intervention after hemostasis was gained and a hemoglobin level was drawn, which was 11. She did not require transfusion.    We completed the transection of the mesentery, including isolating and stapling (60 mm white load) what I believe was a right branch of the middle colic artery.      ICG angiography was performed and confirmed that the colon distal to our distal transection site was well perfused.  We performed the distal transection with a single firing of the robotic stapler with a blue load (60 mm).  After this the right colon was placed in the right lower quadrant.     The distal small bowel and transverse colon were arranged in an isoperistaltic fashion, making sure to not twist the mesentery of the small bowel, and we created our anastomosis.  We did this by creating a paired colotomy/enterotomy and then inserting an arm of the robotic stapler (60 mm) in both the small and large intestine.  We created the anastomosis with a single firing.  It looked great and there was minimal, if any, bleeding from the staple line.  We then closed the common enterotomy in two layers with a six inch 3-0 V-lock suture and then 3-0 vicryl suture in Lembert fashion.  We made sure to close the lower aspect of the common enterotomy.    There was a small serosal tear in the area of the colon proximal to the anastomosis and this was repaired with running 3-0  "vicryl suture.  We used this suture to perform a \"crotch stitch\" to bring the small bowel in apposition to the serosal tear on the proximal colon.     The abdomen was irrigated and hemostasis was corroborated.  There was no further bleeding noted from the area where we had previously avulsed a probable right colic vessel. The right colon was placed in a 15 cm endocatch bag.  The robot was undocked.  The 12 mm robotic port was closed with 0 vicryl in figure of 8 fashion with the Stephon-Damico device.    The robotic ports and AirSeal port were removed under direct vision and the right colon specimen was removed through the Pfannenstiel incision.  The abdomen was desufflated.     The peritoneum at the Pfannenstiel incision was closed with vicryl.  The fascia was closed with #1 PDS in running fashion.     As Dr Wilson was closing the abdomen I took the specimen to the pathology lab.  The specimen was opened in front of me and the pathologist identified a probable scar at the hepatic flexure which we believe was likely the scar from the polypectomy which identified this early cancer.  This scar was 18 cm from our distal margin.       Instrument, sponge, and needle counts were all correct, as reported to me at this time. Wounds were irrigated and dried, and hemostasis was corroborated. Skin incisions were re-approximated with running subcuticular 4-0 Monocryl sutures and exofilm. The patient tolerated the procedure well.     COMPLICATIONS: none.     ESTIMATED BLOOD LOSS: 50 mL.     SPECIMEN(S): right colon, appendix, and terminal ileum     OPERATIVE COUNT: Complete.     OPERATIVE FINDINGS:   1. Stapled isoperistaltic ileocolic anastomosis  2. Otherwise normal anatomy - no evidence of metastatic disease     Lalit Cardenas MD, PhD   Division of Colon and Rectal Surgery  [facility]        CC:  [facility] Surgery billing.    "

## 2023-03-01 NOTE — PROGRESS NOTES
"   03/01/23 0255   Appointment Info   Signing Clinician's Name / Credentials (PT) Maryuri Dao DPT   Rehab Comments (PT) abdominal precautions   Living Environment   People in Home child(heide), adult  (daughter)   Current Living Arrangements house   Home Accessibility stairs to enter home   Number of Stairs, Main Entrance 2   Transportation Anticipated car, drives self   Living Environment Comments Pt lives with her daughter who is available to help her at home. Pts other daughter in town from [place] to assist as well.   Self-Care   Usual Activity Tolerance excellent   Current Activity Tolerance moderate   Regular Exercise Yes   Activity/Exercise Type walking;strength training   Exercise Amount/Frequency 3-5 times/wk   Equipment Currently Used at Home none   Fall history within last six months yes  (fell and broke right wrist)   Number of times patient has fallen within last six months 1   Activity/Exercise/Self-Care Comment Pt reports IND with ADLs and ambulation at baseline.   General Information   Onset of Illness/Injury or Date of Surgery 02/28/23   Referring Physician Cha Rockwell MD   Pertinent History of Current Problem (include personal factors and/or comorbidities that impact the POC) Per chart review \"Pt is a 73 year old female PMH epilespy (last seizure in 1995 while attempting to wean down and off keppra ), history of GI bleeds, migraines, pre-DM, family history of BRCA1, found to have hepatic flexure adenocarcinoma.  Now s/p Lap robotic assisted right hemicolectomy with unexpected bleeding from right colic artery on 2/28.\"   Existing Precautions/Restrictions fall;abdominal   Weight-Bearing Status - RUE other (see comments)  (per patient in wrist splint for 3 weeks, requested clarifying orders from MD)   Cognition   Affect/Mental Status (Cognition) WNL   Orientation Status (Cognition) oriented x 4   Follows Commands (Cognition) WNL   Pain Assessment   Patient Currently in Pain Yes, see Vital Sign " flowsheet  (abdominal pain)   Integumentary/Edema   Integumentary/Edema other (describe)   Integumentary/Edema Comments abdominal incision, not viewed by PT, abdominal binder in place   Posture    Posture Forward head position   Range of Motion (ROM)   Range of Motion ROM is WFL   Strength (Manual Muscle Testing)   Strength Comments Formal MMT not tested, 3/5 strength observed with functional transfers   Bed Mobility   Comment, (Bed Mobility) Pt tx supine->sit with SBA.   Transfers   Comment, (Transfers) Pt tx sit->stand with CGA.   Gait/Stairs (Locomotion)   Comment, (Gait/Stairs) Pt amb ~ 5 feet with CGA.   Balance   Balance other (describe)   Balance Comments minimal sway in static standing   Sensory Examination   Sensory Perception patient reports no sensory changes   Clinical Impression   Criteria for Skilled Therapeutic Intervention Yes, treatment indicated   PT Diagnosis (PT) Impaired Functional Mobility   Influenced by the following impairments decreased strength, activity intolerance, impaired balance   Functional limitations due to impairments bed mob, transfers, gait   Clinical Presentation (PT Evaluation Complexity) Stable/Uncomplicated   Clinical Presentation Rationale PMHx, clinical judgement, current presentation   Clinical Decision Making (Complexity) low complexity   Planned Therapy Interventions (PT) bed mobility training;gait training;neuromuscular re-education;stair training;transfer training   PT Total Evaluation Time   PT Eval, Low Complexity Minutes (20128) 4   Plan of Care Review   Plan of Care Reviewed With patient   Physical Therapy Goals   PT Frequency Daily   PT Predicted Duration/Target Date for Goal Attainment 03/08/23   PT Goals Bed Mobility;Transfers;Gait;Stairs   PT: Bed Mobility Independent;Supine to/from sit;Rolling;Within precautions   PT: Transfers Independent;Sit to/from stand;Bed to/from chair;Within precautions   PT: Gait Independent;Greater than 200 feet   PT: Stairs  Independent;2 stairs   PT Discharge Planning   PT Plan bed mob, transfers, gait   PT Discharge Recommendation (DC Rec) home with assist   PT Rationale for DC Rec Pt is mobilizinng well, anticipate pt will be safe to discharge home with assistance for ADLs and chores that require greater than 10 pounds of lifting.   PT Brief overview of current status up with A x 1   Total Session Time   Total Session Time (sum of timed and untimed services) 8

## 2023-03-01 NOTE — PLAN OF CARE
Goal Outcome Evaluation: POD0 of a robotic right hemicolectomy. A&Ox4, tachy 100s on 2 LPM NC. Pain is controlled with oxycodone and tylenol. Lap sites [place] DAI in place. Rooney with adequate UO. Continue with post op care.

## 2023-03-01 NOTE — PLAN OF CARE
Goal Outcome Evaluation:      POD1     Status: Robotic right hemicolectomy, appendectomy.    VS: T 36.5, HR 81 BP 96/51, RR 16, O2 96 on 2 L    Neuro: A&O x4 and can make her needs known.    Cardiac:WNL                  Respiratory: WNL    GI/:  Have a leach which is draining very well. NO BM or gas yet,    Diet/appetite: Clear liquid    Activity: Pt was only able to dangle at bed side. I will recommend  two assist to transfer for now    Pain: Pt. Have OXY andTly..    Skin/drains: Midline incision dry and is intact.  Lines:

## 2023-03-01 NOTE — DISCHARGE SUMMARY
[facility]  Discharge Summary  Colon and Rectal Surgery     Meme Butts MRN# [id number]   YOB: 1950 Age: 73 year old     Date of Admission:  2/28/2023  Date of Discharge::  3/6/2023  Admitting Physician:  Lalit Cardenas MD  Discharge Physician:  Lalit Cardenas MD  Primary Care Physician:        Maria Esther Miranda          Admission Diagnoses:   Malignant neoplasm of hepatic flexure (H) [C18.3]  Colon cancer (H) [C18.9]    Epilepsy, last seizure in 1976  Migraines  History of GI bleed  Nephrolithiasis  Osteoporosis  Pre-diabetes  Family history of BRCA1          Discharge Diagnosis:   Malignant neoplasm of hepatic flexure (H) [C18.3]  Colon cancer (H) [C18.9]  Acute blood loss anemia  Hypophosphatemia  Fever      Epilepsy, last seizure in 1976  Migraines  History of GI bleed  Nephrolithiasis  Osteoporosis  Pre-diabetes  Family history of BRCA1         Procedures:   2/28/2023:   PROCEDURE:  1. Laparoscopic robotic-assisted right hemicolectomy.  2. ICG angiography     ANESTHESIA: General endotracheal anesthesia plus bilateral TAP blocks.          Consultations:   PHYSICAL THERAPY ADULT IP CONSULT  OCCUPATIONAL THERAPY ADULT IP CONSULT         Imaging Studies:     Results for orders placed or performed during the hospital encounter of 02/28/23   POC US Guidance Needle Placement    Narrative    Bilateral TAP block     Impression    Bilateral TAP block           Medications Prior to Admission:     Medications Prior to Admission   Medication Sig Dispense Refill Last Dose     CALCIUM 500 +D 500-400 MG-UNIT TABS Take 1 tablet by mouth daily at 2 pm 180 tablet 3 Past Month     cholecalciferol (VITAMIN D) 1000 UNIT tablet Take 2,000 Units by mouth daily at 2 pm 90 tablet 3 Past Month     cyclobenzaprine (FLEXERIL) 5 MG tablet as needed   Unknown     ibuprofen (ADVIL/MOTRIN) 600 MG tablet Take 1 tablet (600 mg) by mouth every 8 hours as  "needed for moderate pain (4-6) 30 tablet 0 Past Week     levETIRAcetam (KEPPRA) 750 MG tablet PT REPORTS TAPERING DOWN: TAKE 1 & 1/2 TABS IN THE MORNING, AND 2 TABS IN THE EVENING. (Patient taking differently: Take 1,125 mg by mouth 2 times daily) 105 tablet 3 2/27/2023 at 2000     Multiple Vitamins-Minerals (CENTRUM SILVER) per tablet Take 1 tablet by mouth every morning   2/21/2023     [DISCONTINUED] HYDROcodone-acetaminophen (NORCO) 5-325 MG tablet Take 1 tablet by mouth every 4 hours as needed for pain or moderate pain (4-6) 12 tablet 0 2/24/2023     [DISCONTINUED] metroNIDAZOLE (FLAGYL) 500 MG tablet Take 1 tablet (500 mg) by mouth every 6 hours At 8:00 am, 2:00 pm, 8:00 pm the day prior to your surgery with neomycin and zofran. 3 tablet 0 2/27/2023 at 2000     [DISCONTINUED] neomycin (MYCIFRADIN) 500 MG tablet Take 2 tablets (1,000 mg) by mouth every 6 hours At 8:00 am, 2:00 pm, 8:00 pm the day prior to your surgery with flagyl and zofran. 6 tablet 0 2/27/2023 at 2000     [DISCONTINUED] ondansetron (ZOFRAN) 4 MG tablet Take 1 tablet (4 mg) by mouth every 6 hours At 8:00 am, 2:00 pm, 8:00 pm the day prior to your surgery with neomycin and flagyl. 3 tablet 0 2/27/2023 at 2000     [DISCONTINUED] polyethylene glycol (MIRALAX) 17 g packet Take 119 g by mouth See Admin Instructions Starting at 8 pm night prior to surgery. Refer to \"Getting Ready for Surgery\" instructions. 7 packet 0 2/27/2023 at 1600     [DISCONTINUED] polyethylene glycol (MIRALAX) 17 g packet Take 238 g by mouth See Admin Instructions Starting at 4 pm night prior to surgery. Refer to \"Getting Ready for Surgery\" instructions. 14 packet 0 2/27/2023 at 1600          Discharge Medications:     Current Discharge Medication List      START taking these medications    Details   acetaminophen (TYLENOL) 325 MG tablet Take 3 tablets (975 mg) by mouth every 8 hours for 10 days  Qty: 90 tablet, Refills: 0    Associated Diagnoses: Malignant neoplasm of hepatic " flexure (H)      enoxaparin ANTICOAGULANT (LOVENOX) 40 MG/0.4ML syringe Inject 0.4 mLs (40 mg) Subcutaneous every 24 hours for 27 days  Qty: 10.8 mL, Refills: 0    Associated Diagnoses: Malignant neoplasm of hepatic flexure (H)      gabapentin (NEURONTIN) 100 MG capsule Take 1 capsule (100 mg) by mouth At Bedtime for 10 days  Qty: 10 capsule, Refills: 0    Associated Diagnoses: Malignant neoplasm of hepatic flexure (H)      oxyCODONE (ROXICODONE) 5 MG tablet Take 1 tablet (5 mg) by mouth every 4 hours as needed for moderate to severe pain  Qty: 22 tablet, Refills: 0    Associated Diagnoses: Malignant neoplasm of hepatic flexure (H)      simethicone (MYLICON) 80 MG chewable tablet Take 1 tablet (80 mg) by mouth every 6 hours as needed for cramping  Qty: 20 tablet, Refills: 0    Associated Diagnoses: Malignant neoplasm of hepatic flexure (H)         CONTINUE these medications which have NOT CHANGED    Details   CALCIUM 500 +D 500-400 MG-UNIT TABS Take 1 tablet by mouth daily at 2 pm  Qty: 180 tablet, Refills: 3      cholecalciferol (VITAMIN D) 1000 UNIT tablet Take 2,000 Units by mouth daily at 2 pm  Qty: 90 tablet, Refills: 3      cyclobenzaprine (FLEXERIL) 5 MG tablet as needed      ibuprofen (ADVIL/MOTRIN) 600 MG tablet Take 1 tablet (600 mg) by mouth every 8 hours as needed for moderate pain (4-6)  Qty: 30 tablet, Refills: 0      levETIRAcetam (KEPPRA) 750 MG tablet PT REPORTS TAPERING DOWN: TAKE 1 & 1/2 TABS IN THE MORNING, AND 2 TABS IN THE EVENING.  Qty: 105 tablet, Refills: 3    Associated Diagnoses: Nonintractable epilepsy without status epilepticus, unspecified epilepsy type (H)      Multiple Vitamins-Minerals (CENTRUM SILVER) per tablet Take 1 tablet by mouth every morning    Associated Diagnoses: Abnormal results of liver function studies; Osteoporosis; Fatigue; Post-menopausal; Obesity; Family history of diabetes mellitus         STOP taking these medications       HYDROcodone-acetaminophen (NORCO) 5-325  MG tablet Comments:   Reason for Stopping:         metroNIDAZOLE (FLAGYL) 500 MG tablet Comments:   Reason for Stopping:         neomycin (MYCIFRADIN) 500 MG tablet Comments:   Reason for Stopping:         ondansetron (ZOFRAN) 4 MG tablet Comments:   Reason for Stopping:         polyethylene glycol (MIRALAX) 17 g packet Comments:   Reason for Stopping:         polyethylene glycol (MIRALAX) 17 g packet Comments:   Reason for Stopping:                   Brief History of Illness:   73 year old female with PMH epilepsy, last grand mal in 1976, pre-diabetes, history of GI bleeds, who was found to have intramucosal adenocarcinoma of the hepatic flexure.  Now s/p laparoscopic robotic assisted right hemicolectomy on 2/28/2023.           Hospital Course:   Post-operatively pt was gently fluid resuscitated.  Rooney was removed and pt was able to void.  Due to unexpected intra-operative bleeding, serial Hgb were obtained.  Initially standard post-operative prophylactic lovenox was held.  Hgb did stabilize in  The 8s.  Lovenox ppx was initiated.  Pt had eventual return of bowel function and was able to tolerate small amounts of a low fiber diet.     OnPOD# 3 pt spiked a fever that was associated with HR in the 120s.  BC and urinalysis were obtained and were negative.  Pt was empirically started on zosyn (3/2-3/4).  CT abdomen/pelvis with IV and PO contrast was done which showed a patent anastomosis without contrast extravasation. Patient continued to do well off of antibiotics without fevers, tachycardia and WBC normalized. She was noted to have purulent drainage, erythema and induration at her right AC IV site consistent with suppurative thrombophlebitis. IV was removed and she was encouraged to keep RUE elevated with warm/cool compresses. She was monitored in the hospital an additional day to ensure the erythema did not spread distally toward her recently placed orthopedic hardware in her right wrist.     Pt was seen by PT and  "deemed appropriate for discharge home.  Pt was taught how to self-inject post-operative prophylactic lovenox for which she is to do for a total of 30 days. Post-operative pain was controlled with scheduled tylenol, ibuprofen, gabapentin, robaxin and prn oxycodone.     Patient is to follow up in the Colon and Rectal Surgery Clinic with Farideh Cano PA-C on 3/15 and then with Dr. Cardenas on 4/11.         Day of Discharge Physical Exam:   Blood pressure 127/67, pulse 78, temperature 97.2  F (36.2  C), temperature source Temporal, resp. rate 16, height 1.575 m (5' 2\"), weight 78.9 kg (173 lb 15.1 oz), SpO2 94 %, not currently breastfeeding.    Gen:      AAOx3, NAD  Pulm:    Non-labored breathing  Abd:      Abdominal binder in place, abd soft, non-distended, appropriately tender, no guarding/rebound               Incision C/D/I   Ext:       Warm and well-perfused, right wrist with ace wrap, able to wiggle right fingers, sensation intact to right hand. Right antecubital space with erythema over previous IV site and surrounding induration, no change from yesterday. Scab removed, unable to express drainage from site.         Final Pathology Result:   Pending at time of discharge           Discharge Instructions and Follow-Up:     Discharge Procedure Orders   Primary Care - Care Coordination Referral   Standing Status: Future   Referral Priority: Routine: Next available opening Referral Type: Care Coordination   Number of Visits Requested: 1     Reason for your hospital stay   Order Comments: 2/28/2023:  PROCEDURE:  1. Laparoscopic robotic-assisted right hemicolectomy.  2. ICG angiography     ANESTHESIA: General endotracheal anesthesia plus bilateral TAP blocks.     Activity   Order Comments: Your activity upon discharge:  ACTIVITY  -No lifting, pushing, pulling greater than 10 lbs and no strenuous exercise for 6 weeks   -No driving while on narcotic analgesics (i.e. Percocet, oxycodone, Vicodin)  -No driving until you are " able to fully twist to both sides or slam on brakes quickly and without any pain  -We encourage walking at least 4-5 times per day     Order Specific Question Answer Comments   Is discharge order? Yes      Adult [facility] Follow-up and recommended labs and tests   Order Comments: WOUND CARE  -Inspect your wounds daily for signs of infection (increased redness, drainage, pain)  -Keep your wound clean and dry  -You may shower, but do not soak in tub or pool    - Keep your right arm elevated as much as possible.   - Apply warm compresses to the inside of your right arm as you were doing in the hospital.   - Call our team if any increased pain, redness or drainage from right arm.     NOTIFY  Please contact Aminah Vann RN or Audra Angelo RN at [phone] for problems after discharge such as:  -Temperature > 101F, chills, rigors, dizziness  -Redness around or purulent drainage from wound  -Inability to tolerate diet, nausea or vomiting  -You stop passing gas, develop significant bloating, abdominal pain  -Have blood in stools/vomit  -Have severe diarrhea/constipation  -Any other questions or concerns.  - At nights (after 4:30pm), on weekends, or if urgent, call [phone] and ask the  to speak with the on-call Colorectal Surgery resident or fellow      Medication Instructions  Some of your medications may have changed. Please take only prescribed and resumed medications     FOLLOW-UP  1.  You will need to follow-up with Farideh Cano PA-C in the Colon and Rectal Surgery clinic on 3/15 and then with [facility] Staff: Dr. Lalit Cardenas on 4/11.  Please contact our Nurses (phone # [phone]) if you have not heard from our clinic in 3 business days afer discharge to schedule a follow-up appointment.      Appointments on [facility] and/or [facility] (with [facility] or [facility] provider or service). Call [phone] if you haven't heard regarding these appointments within 7 days of discharge.     Diet   Order Comments:  Follow this diet upon discharge:   DIET  -Low Residue Diet for at least 4-6 weeks unless cleared by Colorectal surgery.  No raw vegetables, fruit skins, fibrous foods that require a lot of chewing, nuts, seeds, corn, popcorn.   -We recommend eating slowly, chewing thoroughly, eating small frequent meals throughout the day  -Stay well hydrated.     Order Specific Question Answer Comments   Is discharge order? Yes             Home Health Care:     Not needed           Discharge Disposition:     Discharged to home      Condition at discharge: Stable      Pt was seen with Dr. Myers and discussed with staff Dr. Theresa Rockwell, DO  Colon and Rectal Surgery

## 2023-03-01 NOTE — OP NOTE
Assistant Surgeon Operative Report     Please see Dr Cardenas's full operative report. I was asked to join him during unexpected bleeding given the lack of any qualified assistance.     In brief, this is a 74yo F with a hx of malignant polyp at the hepatic fkexure.     I was called into the room due to unexpected bleeding from the colonic mesentery, which was difficult to control. I sat at the robotic console, while STEFANIE Russo was bed-assisting, helping with exposure and suctioning. I inspected the cut edge of the mesentery and promptly identified pulsatile bleeding from what appeared to be an avulsed right colic artery. Bipolar cautery and vessel sealer energy device were used to isolate the bleeding vessel from the surrounding structures and gain hemostasis. The field was then irrigated and suctioned. No further bleeding was noted. At this point, Dr Wilson and Dr Cardenas took over the case and continued the dissection of the colonic mesentery following the division of the right branch of the middle colic vein.    My assistance increased the speed of the operation, preventing a conversion to open, thus reducing patient exposure to anesthesia and improving surgical safety.     I was present during the hemostasis while dividing the colonic mesentery for a total of 30 min. The patient tolerated the procedure well.     Troy rWight MD  Division of Colon & Rectal Surgery  Department of Surgery  [facility]  [phone]

## 2023-03-01 NOTE — PROGRESS NOTES
Colorectal Surgery Progress Note  [facility]  POD#1      Subjective:  Doing ok.  Denies nausea/emesis.  Denies passing gas.  Pain somewhat controlled.      Vitals:  Vitals:    02/28/23 2100 03/01/23 0023 03/01/23 0300 03/01/23 0802   BP: 90/47 98/44 96/51 106/53   BP Location: Left arm Left arm Left arm Left arm   Cuff Size:       Pulse: 95 88 81 80   Resp: 16 16 16 18   Temp: 98  F (36.7  C) 97.2  F (36.2  C) 97.9  F (36.6  C) 99.1  F (37.3  C)   TempSrc: Oral Temporal Temporal Temporal   SpO2: 96% 94% 96% 95%   Weight:       Height:         I/O:  I/O last 3 completed shifts:  In: 2850 [I.V.:2600]  Out: 1260 [Urine:860; Blood:400]    Physical Exam:  Gen: AAOx3, NAD  Pulm: Non-labored breathing  Abd: Soft, minimally distended, appropriately tender, no guarding/rebound   Incision C/D/I   Ext:  Warm and well-perfused    BMP  Recent Labs   Lab 03/01/23  0705 02/28/23  1852 02/28/23  1220 02/28/23  0612   NA  --   --  140  --    POTASSIUM  --   --  4.7  --    CR  --  0.71  --   --    GLC  --   --  158* 144*   MAG 2.1  --   --   --    PHOS 2.4*  --   --   --      CBC  Recent Labs   Lab 03/01/23  0705 02/28/23  1220   WBC 12.0*  --    HGB 8.7* 11.5*   HCT 27.9*  --      --          ASSESSMENT: This is a 73 year old female PMH epilespy (last seizure in 1995 while attempting to wean down and off keppra ), history of GI bleeds, migraines, pre-DM, family history of BRCA1, found to have hepatic flexure adenocarcinoma.  Now s/p Lap robotic assisted right hemicolectomy with unexpected bleeding from right colic artery on 2/28.    Neuro/Pain: tylenol, gabapentin, oxycodone, IV dilaudid prn  - continue home keppra.  Low threshold to change to IV keppra if concerned about absorption  CV:  Softer bps, likely due to anemia of blood loss, anesthesia, pain medications.  Fluid resuscitate, serial Hgb.  Monitor BPs.    PULM: encourage IS.  GI/FEN:   - CLD.  Likely can adv to LRD later for dinner if  does well during day  - LR @ 75  - ambulate as able   : remove leach today  Heme:  Hgb 8.7 today from 11.5 yesterday and 13s a few weeks ago.  Hold Lovenox ppx.  Trend Hgb.  Recheck Hgb this afternoon.   - should have 2 large bore IV for access at all times   -Low threshold to check in evening if low BPs/orthostasis.    ID: no acute concerns at this time  Endocrine: no acute concerns at this time    Activity: as tolerated.  PT/OT  Ppx: hold lovenox ppx due to anemia/acute blood loss  Dispo: pending ROBF.  Should receive lovenox ppx x30 days for colon cancer but will trend Hgb first.       Chely Gifford PA-C ..................3/1/2023   8:08 AM  Colon and Rectal Surgery    Patient was seen and discussed with Dr. Wilson    The above plan of care was performed and communicated to me by Dr. Cardenas    I spent 35 minute face-to-face or coordinating care of Meme Butts. Over 50% of our time on the unit was spent counseling the patient and/or coordinating care as documented in the assessment and plan.     [id number] post op hospital visit

## 2023-03-01 NOTE — PROGRESS NOTES
"Surgery [facility] Post-op Check    S: Patient seen at bedside. Pain is well controlled on current regimen. Has not yet had sips of CLD. Denies n/v, cp, sob. Urinating appropriately through leach. Has not yet ambulated out of bed.       O: /58   Pulse 104   Temp 97.4  F (36.3  C) (Oral)   Resp 18   Ht 1.575 m (5' 2\")   Wt 78.9 kg (173 lb 15.1 oz)   SpO2 93%   BMI 31.81 kg/m      Gen: A&O x3, NAD   Resp: NLB on RA  CV: rrr, wwp  Abdomen: soft, appropriate non-focal tenderness, non-distended, left sided incisions cdi with surgical glue in place  : leach in place, urine clear and yellow  Extremities: no oedema noted, wwp, SCDs in place    A/P: 72yo female with h/o migraines, seizures on keppra, osteoporosis, obesity, and intramucosal colon adenocarcinoma at hepatic flexure. Now s/p robotic right hemicolectomy and appendectomy with Dr Cardenas on 2/28/2023. Patient recovering appropriately.    - multimodal pain control  - CLD  - Leach catheter in place  - Ordered PTA Keppra  - pulmonary hygiene, IS  - OOB  - Rest of care per primary team    Shirley Rossi, PGY1  General Surgery Resident   "

## 2023-03-01 NOTE — PROGRESS NOTES
Admitted/transferred from:   2 RN full   skin assessment completed by Kaylin Daniels, RN and Justice RN.  Skin assessment finding: issues found lap sites x5, PIV x2.    Interventions/actions: skin interventions Continue to monitor     Will continue to monitor.

## 2023-03-01 NOTE — PROGRESS NOTES
[id number]    Temp: 99.1  F (37.3  C) Temp src: Temporal BP: 120/53 Pulse: 85   Resp: 16 SpO2: 94 % O2 Device: None (Room air) Oxygen Delivery: 2 LPM     Pt is AOx4. VSS. On 2L of O2. CAPNO. Desats to low 90's with RA. Pain managed with vannesa tylenol. Denies nausea. Leach with good urine output. Phosphorus replaced, still infusing. Recheck in the AM. PIV x2 infusing. LR running at 75. Tolerating clear liquid diet.    Pt is still due to walk. Walker was ordered. Will keep leach in until pt is able to tolerate getting out of bed. PT is working with pt now.

## 2023-03-02 ENCOUNTER — APPOINTMENT (OUTPATIENT)
Dept: PHYSICAL THERAPY | Facility: CLINIC | Age: 73
DRG: 330 | End: 2023-03-02
Attending: SURGERY
Payer: COMMERCIAL

## 2023-03-02 ENCOUNTER — APPOINTMENT (OUTPATIENT)
Dept: GENERAL RADIOLOGY | Facility: CLINIC | Age: 73
DRG: 330 | End: 2023-03-02
Attending: SURGERY
Payer: COMMERCIAL

## 2023-03-02 LAB
ALBUMIN UR-MCNC: NEGATIVE MG/DL
ANION GAP SERPL CALCULATED.3IONS-SCNC: 10 MMOL/L (ref 7–15)
APPEARANCE UR: CLEAR
BILIRUB UR QL STRIP: NEGATIVE
BUN SERPL-MCNC: 6.2 MG/DL (ref 8–23)
CALCIUM SERPL-MCNC: 8.3 MG/DL (ref 8.8–10.2)
CHLORIDE SERPL-SCNC: 103 MMOL/L (ref 98–107)
COLOR UR AUTO: ABNORMAL
CREAT SERPL-MCNC: 0.63 MG/DL (ref 0.51–0.95)
DEPRECATED HCO3 PLAS-SCNC: 25 MMOL/L (ref 22–29)
ERYTHROCYTE [DISTWIDTH] IN BLOOD BY AUTOMATED COUNT: 12.9 % (ref 10–15)
ERYTHROCYTE [DISTWIDTH] IN BLOOD BY AUTOMATED COUNT: 13 % (ref 10–15)
GFR SERPL CREATININE-BSD FRML MDRD: >90 ML/MIN/1.73M2
GLUCOSE SERPL-MCNC: 104 MG/DL (ref 70–99)
GLUCOSE UR STRIP-MCNC: NEGATIVE MG/DL
HCT VFR BLD AUTO: 25.1 % (ref 35–47)
HCT VFR BLD AUTO: 27.8 % (ref 35–47)
HGB BLD-MCNC: 7.8 G/DL (ref 11.7–15.7)
HGB BLD-MCNC: 8.4 G/DL (ref 11.7–15.7)
HGB UR QL STRIP: ABNORMAL
KETONES UR STRIP-MCNC: NEGATIVE MG/DL
LACTATE SERPL-SCNC: 1.7 MMOL/L (ref 0.7–2)
LEUKOCYTE ESTERASE UR QL STRIP: NEGATIVE
MAGNESIUM SERPL-MCNC: 1.8 MG/DL (ref 1.7–2.3)
MCH RBC QN AUTO: 29.1 PG (ref 26.5–33)
MCH RBC QN AUTO: 29.1 PG (ref 26.5–33)
MCHC RBC AUTO-ENTMCNC: 30.2 G/DL (ref 31.5–36.5)
MCHC RBC AUTO-ENTMCNC: 31.1 G/DL (ref 31.5–36.5)
MCV RBC AUTO: 94 FL (ref 78–100)
MCV RBC AUTO: 96 FL (ref 78–100)
MUCOUS THREADS #/AREA URNS LPF: PRESENT /LPF
NITRATE UR QL: NEGATIVE
PH UR STRIP: 6 [PH] (ref 5–7)
PHOSPHATE SERPL-MCNC: 1.5 MG/DL (ref 2.5–4.5)
PHOSPHATE SERPL-MCNC: 2.2 MG/DL (ref 2.5–4.5)
PLATELET # BLD AUTO: 225 10E3/UL (ref 150–450)
PLATELET # BLD AUTO: 262 10E3/UL (ref 150–450)
POTASSIUM SERPL-SCNC: 3.4 MMOL/L (ref 3.4–5.3)
POTASSIUM SERPL-SCNC: 3.4 MMOL/L (ref 3.4–5.3)
POTASSIUM SERPL-SCNC: 3.8 MMOL/L (ref 3.4–5.3)
RBC # BLD AUTO: 2.68 10E6/UL (ref 3.8–5.2)
RBC # BLD AUTO: 2.89 10E6/UL (ref 3.8–5.2)
RBC URINE: 5 /HPF
SODIUM SERPL-SCNC: 138 MMOL/L (ref 136–145)
SP GR UR STRIP: 1.01 (ref 1–1.03)
SQUAMOUS EPITHELIAL: 4 /HPF
UROBILINOGEN UR STRIP-MCNC: NORMAL MG/DL
WBC # BLD AUTO: 13.4 10E3/UL (ref 4–11)
WBC # BLD AUTO: 14.1 10E3/UL (ref 4–11)
WBC URINE: 5 /HPF

## 2023-03-02 PROCEDURE — 250N000011 HC RX IP 250 OP 636

## 2023-03-02 PROCEDURE — 999N000111 HC STATISTIC OT IP EVAL DEFER

## 2023-03-02 PROCEDURE — 258N000003 HC RX IP 258 OP 636: Performed by: SURGERY

## 2023-03-02 PROCEDURE — 83605 ASSAY OF LACTIC ACID: CPT | Performed by: SURGERY

## 2023-03-02 PROCEDURE — 250N000013 HC RX MED GY IP 250 OP 250 PS 637: Performed by: PHYSICIAN ASSISTANT

## 2023-03-02 PROCEDURE — 36415 COLL VENOUS BLD VENIPUNCTURE: CPT

## 2023-03-02 PROCEDURE — 82310 ASSAY OF CALCIUM: CPT | Performed by: SURGERY

## 2023-03-02 PROCEDURE — 250N000013 HC RX MED GY IP 250 OP 250 PS 637

## 2023-03-02 PROCEDURE — 85027 COMPLETE CBC AUTOMATED: CPT | Performed by: SURGERY

## 2023-03-02 PROCEDURE — 36415 COLL VENOUS BLD VENIPUNCTURE: CPT | Performed by: SURGERY

## 2023-03-02 PROCEDURE — 250N000013 HC RX MED GY IP 250 OP 250 PS 637: Performed by: SURGERY

## 2023-03-02 PROCEDURE — 93005 ELECTROCARDIOGRAM TRACING: CPT

## 2023-03-02 PROCEDURE — 97530 THERAPEUTIC ACTIVITIES: CPT | Mod: GP | Performed by: REHABILITATION PRACTITIONER

## 2023-03-02 PROCEDURE — 250N000009 HC RX 250: Performed by: SURGERY

## 2023-03-02 PROCEDURE — 97116 GAIT TRAINING THERAPY: CPT | Mod: GP | Performed by: REHABILITATION PRACTITIONER

## 2023-03-02 PROCEDURE — 84100 ASSAY OF PHOSPHORUS: CPT | Performed by: SURGERY

## 2023-03-02 PROCEDURE — 120N000002 HC R&B MED SURG/OB UMMC

## 2023-03-02 PROCEDURE — 85027 COMPLETE CBC AUTOMATED: CPT

## 2023-03-02 PROCEDURE — 71045 X-RAY EXAM CHEST 1 VIEW: CPT | Mod: 26 | Performed by: RADIOLOGY

## 2023-03-02 PROCEDURE — 250N000011 HC RX IP 250 OP 636: Performed by: SURGERY

## 2023-03-02 PROCEDURE — 84132 ASSAY OF SERUM POTASSIUM: CPT | Performed by: SURGERY

## 2023-03-02 PROCEDURE — 93010 ELECTROCARDIOGRAM REPORT: CPT | Performed by: INTERNAL MEDICINE

## 2023-03-02 PROCEDURE — 81001 URINALYSIS AUTO W/SCOPE: CPT

## 2023-03-02 PROCEDURE — 84100 ASSAY OF PHOSPHORUS: CPT

## 2023-03-02 PROCEDURE — 71045 X-RAY EXAM CHEST 1 VIEW: CPT

## 2023-03-02 PROCEDURE — 83735 ASSAY OF MAGNESIUM: CPT | Performed by: SURGERY

## 2023-03-02 PROCEDURE — 87040 BLOOD CULTURE FOR BACTERIA: CPT

## 2023-03-02 PROCEDURE — 258N000003 HC RX IP 258 OP 636

## 2023-03-02 RX ORDER — PIPERACILLIN SODIUM, TAZOBACTAM SODIUM 3; .375 G/15ML; G/15ML
3.38 INJECTION, POWDER, LYOPHILIZED, FOR SOLUTION INTRAVENOUS EVERY 6 HOURS
Status: DISCONTINUED | OUTPATIENT
Start: 2023-03-02 | End: 2023-03-04

## 2023-03-02 RX ORDER — POTASSIUM CHLORIDE 750 MG/1
40 TABLET, EXTENDED RELEASE ORAL ONCE
Status: COMPLETED | OUTPATIENT
Start: 2023-03-02 | End: 2023-03-02

## 2023-03-02 RX ORDER — SODIUM CHLORIDE, SODIUM LACTATE, POTASSIUM CHLORIDE, CALCIUM CHLORIDE 600; 310; 30; 20 MG/100ML; MG/100ML; MG/100ML; MG/100ML
INJECTION, SOLUTION INTRAVENOUS CONTINUOUS
Status: DISCONTINUED | OUTPATIENT
Start: 2023-03-02 | End: 2023-03-04

## 2023-03-02 RX ADMIN — OXYCODONE HYDROCHLORIDE 10 MG: 10 TABLET ORAL at 03:33

## 2023-03-02 RX ADMIN — OXYCODONE HYDROCHLORIDE 10 MG: 10 TABLET ORAL at 11:45

## 2023-03-02 RX ADMIN — HYDROMORPHONE HYDROCHLORIDE 0.2 MG: 0.2 INJECTION, SOLUTION INTRAMUSCULAR; INTRAVENOUS; SUBCUTANEOUS at 02:44

## 2023-03-02 RX ADMIN — SODIUM CHLORIDE, POTASSIUM CHLORIDE, SODIUM LACTATE AND CALCIUM CHLORIDE 500 ML: 600; 310; 30; 20 INJECTION, SOLUTION INTRAVENOUS at 19:36

## 2023-03-02 RX ADMIN — POTASSIUM CHLORIDE 40 MEQ: 750 TABLET, EXTENDED RELEASE ORAL at 11:16

## 2023-03-02 RX ADMIN — SODIUM CHLORIDE, POTASSIUM CHLORIDE, SODIUM LACTATE AND CALCIUM CHLORIDE: 600; 310; 30; 20 INJECTION, SOLUTION INTRAVENOUS at 21:13

## 2023-03-02 RX ADMIN — Medication 1125 MG: at 08:14

## 2023-03-02 RX ADMIN — ACETAMINOPHEN 975 MG: 325 TABLET ORAL at 02:31

## 2023-03-02 RX ADMIN — GABAPENTIN 100 MG: 100 CAPSULE ORAL at 21:25

## 2023-03-02 RX ADMIN — OXYCODONE HYDROCHLORIDE 5 MG: 5 TABLET ORAL at 21:26

## 2023-03-02 RX ADMIN — POTASSIUM CHLORIDE 40 MEQ: 750 TABLET, EXTENDED RELEASE ORAL at 15:41

## 2023-03-02 RX ADMIN — OXYCODONE HYDROCHLORIDE 10 MG: 10 TABLET ORAL at 15:50

## 2023-03-02 RX ADMIN — OXYCODONE HYDROCHLORIDE 10 MG: 10 TABLET ORAL at 07:34

## 2023-03-02 RX ADMIN — ACETAMINOPHEN 975 MG: 325 TABLET ORAL at 18:30

## 2023-03-02 RX ADMIN — HYDROMORPHONE HYDROCHLORIDE 0.2 MG: 0.2 INJECTION, SOLUTION INTRAMUSCULAR; INTRAVENOUS; SUBCUTANEOUS at 17:08

## 2023-03-02 RX ADMIN — Medication 1125 MG: at 20:05

## 2023-03-02 RX ADMIN — ACETAMINOPHEN 975 MG: 325 TABLET ORAL at 11:16

## 2023-03-02 RX ADMIN — POTASSIUM PHOSPHATE, MONOBASIC POTASSIUM PHOSPHATE, DIBASIC 15 MMOL: 224; 236 INJECTION, SOLUTION, CONCENTRATE INTRAVENOUS at 23:41

## 2023-03-02 RX ADMIN — PIPERACILLIN AND TAZOBACTAM 3.38 G: 3; .375 INJECTION, POWDER, LYOPHILIZED, FOR SOLUTION INTRAVENOUS at 20:02

## 2023-03-02 RX ADMIN — SODIUM PHOSPHATE, MONOBASIC, MONOHYDRATE AND SODIUM PHOSPHATE, DIBASIC, ANHYDROUS 9 MMOL: 276; 142 INJECTION, SOLUTION INTRAVENOUS at 11:15

## 2023-03-02 ASSESSMENT — ACTIVITIES OF DAILY LIVING (ADL)
ADLS_ACUITY_SCORE: 32
ADLS_ACUITY_SCORE: 32
ADLS_ACUITY_SCORE: 30
ADLS_ACUITY_SCORE: 30
ADLS_ACUITY_SCORE: 32
ADLS_ACUITY_SCORE: 30
ADLS_ACUITY_SCORE: 32
ADLS_ACUITY_SCORE: 30
ADLS_ACUITY_SCORE: 32

## 2023-03-02 NOTE — PLAN OF CARE
Occupational Therapy: Orders received. Chart reviewed and discussed with care team.? Occupational Therapy not indicated due to per discussion with PT, pt near functional baseline with no IP OT needs.? Defer discharge recommendations to PT.? Will complete orders.

## 2023-03-02 NOTE — PROGRESS NOTES
.  Colorectal Surgery Progress Note  [facility]  POD#2      Subjective:  Feeling better today. Has passed gas and had a liquid BM. On low fiber diet, but only had a very small amount of eggs and toast. No nausea or vomiting    Vitals:  Vitals:    03/01/23 2225 03/02/23 0629 03/02/23 0750 03/02/23 0817   BP: 107/51 95/65 94/48 105/54   BP Location: Left arm Left arm Left arm Left arm   Pulse: 95 107 91 101   Resp: 16 18 18    Temp: 99.7  F (37.6  C) 98.9  F (37.2  C) 98.2  F (36.8  C)    TempSrc: Oral Oral Oral    SpO2: 97% 97% 98% 96%   Weight:       Height:         I/O:  I/O last 3 completed shifts:  In: 700 [P.O.:700]  Out: 4075 [Urine:4075]    Physical Exam:  Gen:      AAOx3, NAD  Pulm:    Non-labored breathing  Abd:      Abdominal binder in place, abd soft, minimally distended, appropriately tender, no guarding/rebound               Incision C/D/I   Ext:       Warm and well-perfused, right wrist with ace wrap    BMP  Recent Labs   Lab 03/02/23  0712 03/01/23  0705 02/28/23  1852 02/28/23  1220 02/28/23  0612    141  --  140  --    POTASSIUM 3.4 3.7  --  4.7  --    CHLORIDE 103 107  --   --   --    CO2 25 24  --   --   --    BUN 6.2* 8.5  --   --   --    CR 0.63 0.63 0.71  --   --    * 107*  --  158* 144*   MAG 1.8 2.1  --   --   --    PHOS 2.2* 2.4*  --   --   --      CBC  Recent Labs   Lab 03/02/23  0712 03/01/23  1253 03/01/23  0705 02/28/23  1220   WBC 13.4*  --  12.0*  --    HGB 7.8* 8.5* 8.7* 11.5*   HCT 25.1*  --  27.9*  --      --  246  --          ASSESSMENT:   This is a 73 year old female [facility] epilespy (last seizure in 1995 while attempting to wean down and off keppra ), history of GI bleeds, migraines, pre-DM, family history of BRCA1, found to have hepatic flexure adenocarcinoma.  Now s/p Lap robotic assisted right hemicolectomy with unexpected bleeding from right colic artery on 2/28.     Neuro/Pain: tylenol, gabapentin, oxycodone, IV dilaudid prn  -  continue home keppra.  Low threshold to change to IV keppra if concerned about absorption  CV:  Softer bps, likely due to anemia of blood loss, anesthesia, pain medications.  SBP  today with HR 's.   PULM: encourage IS.  GI/FEN:   - LFD, encourage po intake  - Discontinue IVF  - ambulate as able   - replete electrolytes per protocol   : Voiding spontaneously  Heme:  Initial postop hgb 8.7 from 11.5 POD0 and 13s a few weeks ago. Hgb recheck stable at 8.5 and this morning is 7.8. Continuing to hold lovenox for now. Recheck CBC tomorrow.  - should have 2 large bore IV for access at all times    ID: no acute concerns at this time  Endocrine: no acute concerns at this time     Activity: as tolerated.  PT/OT  Ppx: hold lovenox ppx due to anemia/acute blood loss  Dispo: PT recommending home on discharge. Should receive lovenox ppx x30 days for colon cancer but will trend Hgb first.

## 2023-03-02 NOTE — PLAN OF CARE
Goal Outcome Evaluation: POD1 of a R hemicolectomy and appy. Assumed care from [id number]. A&Ox4, Tachy 100s on 2 LPM NC. Weaned to room air while sitting, 2 LPM while ambulating. LS clear, diminished, Abd binder on, On a LFD, but only ordered eggs. Rooney removed, DTV. Has not passed gas, LBM prior to surgery. Pt ambulated in the room and up to the chair.     Continue with post op care.

## 2023-03-02 NOTE — PROGRESS NOTES
Pt resting overnight. Soft BP's, HR in low 100's, on 1-2 L O2 w/ activity, RA while awake, 99.9 F temp better this morning. Pain mostly 4-5/10, 8/10 when moving. Prn oxycodone and IV Dilaudid given. Pain well controlled w/ pain meds given on schedule. Lap site CDI. Hypo bs, +flatus this AM, -n/v. Able to void overnight, small amount liquid stool as well. Assist 1-2 when OOB as pt does get dizzy at times. Less dizziness this AM. Unable to get a bladder scan as pt has lap site right where scan needs to be done but adequate UOP measured.     Erika Howard, RN on 3/2/2023 at 7:28 AM

## 2023-03-02 NOTE — PROGRESS NOTES
[id number]    Temp: 100  F (37.8  C) Temp src: Oral BP: 118/58 Pulse: 102   Resp: 18 SpO2: 93 % O2 Device: Nasal cannula Oxygen Delivery: 1/2 LPM     Pt is AOx4. VSS. Occasionally hypotensive. Attempted weaning off O2 but desated to high 80's. Sating WDL on 1/2 NC. Pain controlled with prn oxycodone and vannesa tylenol. Denies nausea. Lap sites x5 CDI. Up to the bedside common with assist x1. Worked with PT, tolerating increased activity. Passing gas. Having small liquid stools. Voiding AUOP. Replaced potassium and phosphorus, recheck in the morning. PIV x2 SL'd. Tolerating low fiber diet.

## 2023-03-03 ENCOUNTER — APPOINTMENT (OUTPATIENT)
Dept: CT IMAGING | Facility: CLINIC | Age: 73
DRG: 330 | End: 2023-03-03
Attending: SURGERY
Payer: COMMERCIAL

## 2023-03-03 ENCOUNTER — APPOINTMENT (OUTPATIENT)
Dept: PHYSICAL THERAPY | Facility: CLINIC | Age: 73
DRG: 330 | End: 2023-03-03
Attending: SURGERY
Payer: COMMERCIAL

## 2023-03-03 LAB
ERYTHROCYTE [DISTWIDTH] IN BLOOD BY AUTOMATED COUNT: 12.9 % (ref 10–15)
HCT VFR BLD AUTO: 29.2 % (ref 35–47)
HGB BLD-MCNC: 8.7 G/DL (ref 11.7–15.7)
MAGNESIUM SERPL-MCNC: 1.9 MG/DL (ref 1.7–2.3)
MCH RBC QN AUTO: 29.2 PG (ref 26.5–33)
MCHC RBC AUTO-ENTMCNC: 29.8 G/DL (ref 31.5–36.5)
MCV RBC AUTO: 98 FL (ref 78–100)
PHOSPHATE SERPL-MCNC: 3.6 MG/DL (ref 2.5–4.5)
PLATELET # BLD AUTO: 238 10E3/UL (ref 150–450)
POTASSIUM SERPL-SCNC: 3.7 MMOL/L (ref 3.4–5.3)
RBC # BLD AUTO: 2.98 10E6/UL (ref 3.8–5.2)
WBC # BLD AUTO: 14.6 10E3/UL (ref 4–11)

## 2023-03-03 PROCEDURE — 258N000003 HC RX IP 258 OP 636

## 2023-03-03 PROCEDURE — 74177 CT ABD & PELVIS W/CONTRAST: CPT | Mod: 26 | Performed by: RADIOLOGY

## 2023-03-03 PROCEDURE — 83735 ASSAY OF MAGNESIUM: CPT | Performed by: SURGERY

## 2023-03-03 PROCEDURE — 250N000011 HC RX IP 250 OP 636: Performed by: SURGERY

## 2023-03-03 PROCEDURE — 97530 THERAPEUTIC ACTIVITIES: CPT | Mod: GP | Performed by: REHABILITATION PRACTITIONER

## 2023-03-03 PROCEDURE — 85027 COMPLETE CBC AUTOMATED: CPT

## 2023-03-03 PROCEDURE — 250N000013 HC RX MED GY IP 250 OP 250 PS 637: Performed by: PHYSICIAN ASSISTANT

## 2023-03-03 PROCEDURE — 74177 CT ABD & PELVIS W/CONTRAST: CPT

## 2023-03-03 PROCEDURE — 71260 CT THORAX DX C+: CPT | Mod: 26 | Performed by: RADIOLOGY

## 2023-03-03 PROCEDURE — 36415 COLL VENOUS BLD VENIPUNCTURE: CPT | Performed by: SURGERY

## 2023-03-03 PROCEDURE — 250N000011 HC RX IP 250 OP 636

## 2023-03-03 PROCEDURE — 120N000002 HC R&B MED SURG/OB UMMC

## 2023-03-03 PROCEDURE — 258N000003 HC RX IP 258 OP 636: Performed by: PHYSICIAN ASSISTANT

## 2023-03-03 PROCEDURE — 84132 ASSAY OF SERUM POTASSIUM: CPT | Performed by: SURGERY

## 2023-03-03 PROCEDURE — 84100 ASSAY OF PHOSPHORUS: CPT | Performed by: SURGERY

## 2023-03-03 PROCEDURE — 250N000013 HC RX MED GY IP 250 OP 250 PS 637

## 2023-03-03 PROCEDURE — 250N000013 HC RX MED GY IP 250 OP 250 PS 637: Performed by: SURGERY

## 2023-03-03 RX ORDER — IOPAMIDOL 755 MG/ML
100 INJECTION, SOLUTION INTRAVASCULAR ONCE
Status: COMPLETED | OUTPATIENT
Start: 2023-03-03 | End: 2023-03-03

## 2023-03-03 RX ADMIN — ONDANSETRON 4 MG: 4 TABLET, ORALLY DISINTEGRATING ORAL at 11:12

## 2023-03-03 RX ADMIN — ACETAMINOPHEN 975 MG: 325 TABLET ORAL at 18:19

## 2023-03-03 RX ADMIN — ACETAMINOPHEN 975 MG: 325 TABLET ORAL at 02:08

## 2023-03-03 RX ADMIN — PIPERACILLIN AND TAZOBACTAM 3.38 G: 3; .375 INJECTION, POWDER, LYOPHILIZED, FOR SOLUTION INTRAVENOUS at 13:56

## 2023-03-03 RX ADMIN — OXYCODONE HYDROCHLORIDE 5 MG: 5 TABLET ORAL at 08:00

## 2023-03-03 RX ADMIN — Medication 1125 MG: at 07:59

## 2023-03-03 RX ADMIN — IOPAMIDOL 100 ML: 755 INJECTION, SOLUTION INTRAVENOUS at 12:36

## 2023-03-03 RX ADMIN — PIPERACILLIN AND TAZOBACTAM 3.38 G: 3; .375 INJECTION, POWDER, LYOPHILIZED, FOR SOLUTION INTRAVENOUS at 08:00

## 2023-03-03 RX ADMIN — SODIUM CHLORIDE, POTASSIUM CHLORIDE, SODIUM LACTATE AND CALCIUM CHLORIDE: 600; 310; 30; 20 INJECTION, SOLUTION INTRAVENOUS at 19:14

## 2023-03-03 RX ADMIN — PIPERACILLIN AND TAZOBACTAM 3.38 G: 3; .375 INJECTION, POWDER, LYOPHILIZED, FOR SOLUTION INTRAVENOUS at 02:04

## 2023-03-03 RX ADMIN — ENOXAPARIN SODIUM 40 MG: 40 INJECTION SUBCUTANEOUS at 10:29

## 2023-03-03 RX ADMIN — OXYCODONE HYDROCHLORIDE 10 MG: 10 TABLET ORAL at 12:54

## 2023-03-03 RX ADMIN — PROCHLORPERAZINE EDISYLATE 5 MG: 5 INJECTION INTRAMUSCULAR; INTRAVENOUS at 13:04

## 2023-03-03 RX ADMIN — Medication 1125 MG: at 21:19

## 2023-03-03 RX ADMIN — ACETAMINOPHEN 975 MG: 325 TABLET ORAL at 10:29

## 2023-03-03 RX ADMIN — GABAPENTIN 100 MG: 100 CAPSULE ORAL at 22:59

## 2023-03-03 RX ADMIN — OXYCODONE HYDROCHLORIDE 10 MG: 10 TABLET ORAL at 21:18

## 2023-03-03 RX ADMIN — SODIUM CHLORIDE, POTASSIUM CHLORIDE, SODIUM LACTATE AND CALCIUM CHLORIDE: 600; 310; 30; 20 INJECTION, SOLUTION INTRAVENOUS at 02:42

## 2023-03-03 RX ADMIN — PIPERACILLIN AND TAZOBACTAM 3.38 G: 3; .375 INJECTION, POWDER, LYOPHILIZED, FOR SOLUTION INTRAVENOUS at 21:42

## 2023-03-03 RX ADMIN — OXYCODONE HYDROCHLORIDE 5 MG: 5 TABLET ORAL at 02:07

## 2023-03-03 ASSESSMENT — ACTIVITIES OF DAILY LIVING (ADL)
ADLS_ACUITY_SCORE: 30
ADLS_ACUITY_SCORE: 30
ADLS_ACUITY_SCORE: 31
ADLS_ACUITY_SCORE: 30
ADLS_ACUITY_SCORE: 30
ADLS_ACUITY_SCORE: 31
ADLS_ACUITY_SCORE: 30
ADLS_ACUITY_SCORE: 31
ADLS_ACUITY_SCORE: 30
ADLS_ACUITY_SCORE: 31

## 2023-03-03 NOTE — PLAN OF CARE
Goal Outcome Evaluation: POD3 of a R hemicolectomy and appendectomy. A&Ox4, Vafebrile VSS on 1 LPM NC. UP SBA, voiding in the bathroom adequately, Pain is controlled with PO oxycodone and tylenol. Abd. Binder in place. Passed gas 3/2 with liquid BM, No gas or BM for the past 16 hours. Ambulated to the bathroom once. NPO with sips for meds. Phos replacing.     Plan: CT with oral angio.

## 2023-03-03 NOTE — PLAN OF CARE
Pt with softer BPs, but otherwise vss on RA. A&Ox4. Up with SBA and IV pole- ambulated in hallway 1x and to bathroom multiple times during shift. Pain tolerated with Tylenol and Oxycodone. Pt had intermittent nausea after consuming oral contrast and receiving IV contrast- relieved after PO Zofran and IV Compazine given. Now on low fiber diet. Voiding spontaneously. Passing gas and had 2x small BMs this shift. Abdominal lap sites are clean, dry, intact and open to air. Pt went down to CT this shift. R PIV infusing LR at 50 mL/hr and L PIV is infusing NS TKO. K, Mg, & Phos all to be rechecked tomorrow AM. R wrist cast is clean, dry, & intact- no numbness or tingling reported and finger coloration is WNL.

## 2023-03-03 NOTE — PROVIDER NOTIFICATION
Paged Dr. Smith    7C [id number] R.P. Temp is 101.1 oral  on 2 LPM NC. /58. SIRS did not trigger. Do you want any labs? HR >110 sustained is new. Grace NEVAREZ RN [id number]    Dr. Smith assessed pt. Ordered labs, UA, EKG, CXR, Blood cultures, gave 500ml bolus and restarted fluids, started IV pip-tazo. NPO    K+ recheck 3.8 Phos recheck 1.5. Phos replacement ordered per protocol.

## 2023-03-03 NOTE — PLAN OF CARE
Goal Outcome Evaluation: POD2 of a R hemicolectomy and appy. A&Ox4, tachy in the 110s around 1800. Temp 101.1 triggered SIRS, highest temp 103.9, HR 120s, O2 sats 88-89 on room air, placed on 2 LPM. Team notified, see note. Pt denies CP and SOB though she holds her breath when in pain. Ambulated the aguila around 1700, passed gas, Liquid BM today. Bolus given and fluids restarted. Pain is controlled with PO oxycodone, dilaudid, tylenol, gabapentin. K+ recheck 3.8, Phos 1.5, replacing for the second time. Up to chair after bolus of LR, Voided 400ml. Ambulated out of the room briefly. Abd binder in place. ON 1 LPM NC.      Plan: Pending rounds

## 2023-03-03 NOTE — PROVIDER NOTIFICATION
Notified Resident at 4:22 PM regarding low blood pressure.      Spoke with resident    Comments: BP 89/46, HR 87    As pt is going very well and is not symptomatic, will just continue to monitor

## 2023-03-03 NOTE — PROGRESS NOTES
Colorectal Surgery Progress Note  [facility]  POD#3      Subjective:  Started on zosyn and underwent fever workup overnight for Tmax 103.9. Tachycardic to 120's. Prior to this, ate small amounts of low fiber diet without any nausea or vomiting. Continues to pass gas. No BM.     Vitals:  Vitals:    03/02/23 2300 03/03/23 0200 03/03/23 0621 03/03/23 0644   BP:   95/54    BP Location:   Left arm    Pulse: 88 90 84 97   Resp:   16    Temp:  98.4  F (36.9  C) 97.6  F (36.4  C)    TempSrc:  Oral Temporal    SpO2: 95% 96% 91% 96%   Weight:       Height:         I/O:  I/O last 3 completed shifts:  In: 2145 [P.O.:960; I.V.:685; IV Piggyback:500]  Out: 2450 [Urine:2450]    Physical Exam:  Gen:      AAOx3, NAD  Pulm:    Non-labored breathing  Abd:      Abdominal binder in place, abd soft, minimally distended, appropriately tender, no guarding/rebound               Incision C/D/I   Ext:       Warm and well-perfused, right wrist with ace wrap    BMP  Recent Labs   Lab 03/03/23  0644 03/02/23 2002 03/02/23  1410 03/02/23  0712 03/01/23  0705 03/01/23  0705 02/28/23  1852 02/28/23  1220 02/28/23  0612   NA  --   --   --  138  --  141  --  140  --    POTASSIUM 3.7 3.8 3.4 3.4   < > 3.7  --  4.7  --    CHLORIDE  --   --   --  103  --  107  --   --   --    CO2  --   --   --  25  --  24  --   --   --    BUN  --   --   --  6.2*  --  8.5  --   --   --    CR  --   --   --  0.63  --  0.63 0.71  --   --    GLC  --   --   --  104*  --  107*  --  158* 144*   MAG 1.9  --   --  1.8  --  2.1  --   --   --    PHOS 3.6 1.5*  --  2.2*  --  2.4*  --   --   --     < > = values in this interval not displayed.     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  Now s/p Lap robotic assisted right hemicolectomy with unexpected bleeding from right colic artery on 2/28.     Neuro/Pain: tylenol, gabapentin, oxycodone, IV dilaudid prn  - continue home keppra.  Low threshold to change to IV keppra if concerned about absorption  CV:  Softer bps, likely due to anemia of blood loss, anesthesia, pain medications.  SBP 95 today with improvement of HR to 80-90s.   PULM: encourage IS.  GI/FEN:   - NPO pending CT scan  - LR at 75cc/hr  - ambulate as able   - replete electrolytes per protocol   : Voiding spontaneously  Heme:  Initial postop hgb 8.7 from 11.5 POD0 and 13s a few weeks ago. Hgb recheck stable at 8.5 and then 7.8 on POD2. 8.7 today. Will start lovenox. Recheck CBC tomorrow.    ID: WBC 14.6, Bcx x 2 pending, CXR without infectious source, UA without leukocyte esterase, nitrite, WBCs, CTAP today to evaluate for intraabdominal source of infection. Continue zosyn (3/2- )  Endocrine: no acute concerns at this time     Activity: as tolerated.  PT/OT  Ppx: lovenox  Dispo: PT recommending home on discharge. Should receive lovenox ppx x30 days for colon cancer.    Cha Rockwell, DO  Colorectal Surgery

## 2023-03-04 ENCOUNTER — APPOINTMENT (OUTPATIENT)
Dept: PHYSICAL THERAPY | Facility: CLINIC | Age: 73
DRG: 330 | End: 2023-03-04
Attending: SURGERY
Payer: COMMERCIAL

## 2023-03-04 LAB
ATRIAL RATE - MUSE: 117 BPM
DIASTOLIC BLOOD PRESSURE - MUSE: NORMAL MMHG
ERYTHROCYTE [DISTWIDTH] IN BLOOD BY AUTOMATED COUNT: 13 % (ref 10–15)
HCT VFR BLD AUTO: 24.5 % (ref 35–47)
HGB BLD-MCNC: 7.6 G/DL (ref 11.7–15.7)
INTERPRETATION ECG - MUSE: NORMAL
MAGNESIUM SERPL-MCNC: 1.9 MG/DL (ref 1.7–2.3)
MCH RBC QN AUTO: 29.1 PG (ref 26.5–33)
MCHC RBC AUTO-ENTMCNC: 31 G/DL (ref 31.5–36.5)
MCV RBC AUTO: 94 FL (ref 78–100)
P AXIS - MUSE: 44 DEGREES
PHOSPHATE SERPL-MCNC: 3.1 MG/DL (ref 2.5–4.5)
PLATELET # BLD AUTO: 264 10E3/UL (ref 150–450)
POTASSIUM SERPL-SCNC: 3.4 MMOL/L (ref 3.4–5.3)
POTASSIUM SERPL-SCNC: 3.7 MMOL/L (ref 3.4–5.3)
PR INTERVAL - MUSE: 138 MS
QRS DURATION - MUSE: 68 MS
QT - MUSE: 294 MS
QTC - MUSE: 410 MS
R AXIS - MUSE: 25 DEGREES
RBC # BLD AUTO: 2.61 10E6/UL (ref 3.8–5.2)
SYSTOLIC BLOOD PRESSURE - MUSE: NORMAL MMHG
T AXIS - MUSE: 11 DEGREES
VENTRICULAR RATE- MUSE: 117 BPM
WBC # BLD AUTO: 12.1 10E3/UL (ref 4–11)

## 2023-03-04 PROCEDURE — 120N000002 HC R&B MED SURG/OB UMMC

## 2023-03-04 PROCEDURE — 250N000013 HC RX MED GY IP 250 OP 250 PS 637

## 2023-03-04 PROCEDURE — 85027 COMPLETE CBC AUTOMATED: CPT

## 2023-03-04 PROCEDURE — 250N000011 HC RX IP 250 OP 636

## 2023-03-04 PROCEDURE — 36415 COLL VENOUS BLD VENIPUNCTURE: CPT | Performed by: SURGERY

## 2023-03-04 PROCEDURE — 250N000011 HC RX IP 250 OP 636: Performed by: SURGERY

## 2023-03-04 PROCEDURE — 250N000013 HC RX MED GY IP 250 OP 250 PS 637: Performed by: PHYSICIAN ASSISTANT

## 2023-03-04 PROCEDURE — 83735 ASSAY OF MAGNESIUM: CPT | Performed by: SURGERY

## 2023-03-04 PROCEDURE — 97116 GAIT TRAINING THERAPY: CPT | Mod: GP

## 2023-03-04 PROCEDURE — 97530 THERAPEUTIC ACTIVITIES: CPT | Mod: GP

## 2023-03-04 PROCEDURE — 250N000013 HC RX MED GY IP 250 OP 250 PS 637: Performed by: SURGERY

## 2023-03-04 PROCEDURE — 84100 ASSAY OF PHOSPHORUS: CPT | Performed by: SURGERY

## 2023-03-04 PROCEDURE — 250N000013 HC RX MED GY IP 250 OP 250 PS 637: Performed by: STUDENT IN AN ORGANIZED HEALTH CARE EDUCATION/TRAINING PROGRAM

## 2023-03-04 PROCEDURE — 84132 ASSAY OF SERUM POTASSIUM: CPT | Performed by: SURGERY

## 2023-03-04 RX ORDER — SIMETHICONE 40MG/0.6ML
40 SUSPENSION, DROPS(FINAL DOSAGE FORM)(ML) ORAL EVERY 6 HOURS PRN
Status: DISCONTINUED | OUTPATIENT
Start: 2023-03-04 | End: 2023-03-04

## 2023-03-04 RX ORDER — POTASSIUM CHLORIDE 750 MG/1
40 TABLET, EXTENDED RELEASE ORAL ONCE
Status: COMPLETED | OUTPATIENT
Start: 2023-03-04 | End: 2023-03-04

## 2023-03-04 RX ORDER — SIMETHICONE 80 MG
80 TABLET,CHEWABLE ORAL EVERY 6 HOURS PRN
Status: DISCONTINUED | OUTPATIENT
Start: 2023-03-04 | End: 2023-03-06 | Stop reason: HOSPADM

## 2023-03-04 RX ADMIN — GABAPENTIN 100 MG: 100 CAPSULE ORAL at 23:01

## 2023-03-04 RX ADMIN — SIMETHICONE 80 MG: 80 TABLET, CHEWABLE ORAL at 15:22

## 2023-03-04 RX ADMIN — ACETAMINOPHEN 975 MG: 325 TABLET ORAL at 11:24

## 2023-03-04 RX ADMIN — POTASSIUM CHLORIDE 40 MEQ: 750 TABLET, EXTENDED RELEASE ORAL at 10:29

## 2023-03-04 RX ADMIN — PIPERACILLIN AND TAZOBACTAM 3.38 G: 3; .375 INJECTION, POWDER, LYOPHILIZED, FOR SOLUTION INTRAVENOUS at 03:53

## 2023-03-04 RX ADMIN — ACETAMINOPHEN 975 MG: 325 TABLET ORAL at 18:10

## 2023-03-04 RX ADMIN — OXYCODONE HYDROCHLORIDE 10 MG: 10 TABLET ORAL at 18:10

## 2023-03-04 RX ADMIN — OXYCODONE HYDROCHLORIDE 10 MG: 10 TABLET ORAL at 03:58

## 2023-03-04 RX ADMIN — SIMETHICONE 80 MG: 80 TABLET, CHEWABLE ORAL at 20:52

## 2023-03-04 RX ADMIN — ACETAMINOPHEN 975 MG: 325 TABLET ORAL at 03:08

## 2023-03-04 RX ADMIN — OXYCODONE HYDROCHLORIDE 10 MG: 10 TABLET ORAL at 08:34

## 2023-03-04 RX ADMIN — ENOXAPARIN SODIUM 40 MG: 40 INJECTION SUBCUTANEOUS at 15:22

## 2023-03-04 RX ADMIN — OXYCODONE HYDROCHLORIDE 5 MG: 5 TABLET ORAL at 13:43

## 2023-03-04 RX ADMIN — Medication 1125 MG: at 08:34

## 2023-03-04 RX ADMIN — Medication 1125 MG: at 20:53

## 2023-03-04 ASSESSMENT — ACTIVITIES OF DAILY LIVING (ADL)
ADLS_ACUITY_SCORE: 31

## 2023-03-04 NOTE — PROGRESS NOTES
Colorectal Surgery Progress Note  [facility]  POD#4 Robotic right hemicolectomy, appendectomy:     ASSESSMENT: Meme Butts is a 73 year old female POD#4 from Robotic right hemicolectomy for polyp with HGD. Had a fever 2 nights ago with negative infectious work-up, since afebrile with relatively stable hemodynamics. Her Bps have been in 80-s90s but without reflex tachycardia, with strong UOP and without symptoms of hypotension. WBC now down.    Stop abx  Repeat Cbc tomorrow  Conitnue diet  Multimodal pain control  DVT ppx  OOB 4x  Home anticipated tomorrow if CBC appropriate    Michelle Dewitt MD   Colon and Rectal Surgery Fellow  Patient was seen and discussed with Dr. Elise  Please page the CRS first call in AMION with questions/concerns.     ===========================================    Subjective: Pain with movement but controlled, mostly in her back muscles. Tolerating diet without n/v. + BM/Flatus    Vitals:  Vitals:    03/04/23 0032 03/04/23 0550 03/04/23 0557 03/04/23 0723   BP: 92/40 (!) 81/45 (!) 87/42 (!) 89/44   BP Location: Left arm Right arm Left arm Left arm   Pulse: 88 81 79 77   Resp: 16 16 16 16   Temp:  98.4  F (36.9  C)  98.4  F (36.9  C)   TempSrc:  Oral  Oral   SpO2:    96%   Weight:       Height:         I/O:  I/O last 3 completed shifts:  In: 2257.5 [P.O.:970; I.V.:1287.5]  Out: 2700 [Urine:2700]    Physical Exam:  Gen: AAOx3, NAD   Pulm: Non-labored breathing on RA  Abd: Soft, non-distended, appropriately tender, no guarding   Incision C/D/I  Ext:  Warm and well-perfused    BMP  Recent Labs   Lab 03/03/23  0644 03/02/23  2002 03/02/23  1410 03/02/23  0712 03/01/23  0705 03/01/23  0705 02/28/23  1852 02/28/23  1220 02/28/23  0612   NA  --   --   --  138  --  141  --  140  --    POTASSIUM 3.7 3.8 3.4 3.4   < > 3.7  --  4.7  --    CHLORIDE  --   --   --  103  --  107  --   --   --    CO2  --   --   --  25  --  24  --   --   --    BUN  --   --   --  6.2*   --  8.5  --   --   --    CR  --   --   --  0.63  --  0.63 0.71  --   --    GLC  --   --   --  104*  --  107*  --  158* 144*   MAG 1.9  --   --  1.8  --  2.1  --   --   --    PHOS 3.6 1.5*  --  2.2*  --  2.4*  --   --   --     < > = values in this interval not displayed.     CBC  Recent Labs   Lab 03/03/23  0644 03/02/23  1849 03/02/23  0712 03/01/23  1253 03/01/23  0705   WBC 14.6* 14.1* 13.4*  --  12.0*   HGB 8.7* 8.4* 7.8* 8.5* 8.7*   HCT 29.2* 27.8* 25.1*  --  27.9*    262 225  --  246

## 2023-03-04 NOTE — PROVIDER NOTIFICATION
Notified Resident at 10:00 AM regarding pt's hemoglobin being 7.6 this AM.      Spoke with: Star Solomon MD    Orders were not obtained.    Comments: Team is still okay with giving pt's her Lovenox injection today, even with drop in hemoglobin. Platelets are stable

## 2023-03-04 NOTE — PLAN OF CARE
Goal Outcome Evaluation:      Plan of Care Reviewed With: patient    POD#4  s/p Lap robotic assisted right hemicolectomy with unexpected bleeding from right colic artery on 2/28.    A//O x4. Soft Bps but other VSS, on room air. Denies nausea. SBA with IV pole. Ambulated in hallways early shift. Tolerating low-fiber diet. Pain managed with PRN oxycodone x2, and scheduled tylenol. Voids spont with adequate output. Passing gas and had 1 loose stool this shift. Wrist cast CDI, and no report of numbness or tingling present on this shift.  R PIV infusing LR at 50 mL/hr and L PIV is infusing NS TKO. Cont monitor.

## 2023-03-04 NOTE — PLAN OF CARE
Pt with softer BPs but is otherwise vss. A&Ox4. Pain tolerated with Tylenol and Oxycodone. Up with SBA and IV pole. Denies nausea and tolerating low fiber diet well. Voiding spontaneously, passing gas, and had 2x BMs this shift. Abdominal lap sites are clean, dry, intact, and open to air. BLE edema noticed by pt at end of shift with a little tenderness reported- team notified and pt encouraged to ambulate, use PCDs, and elevate feet when lying down. Redness and swelling noticed at old R PIV site- Resident at bedside saw site, and RN was told to keep an eye on it. L PIV is saline locked. Pt to potentially discharge home tomorrow if CBC is appropriate. Lovenox teaching given to and administration demonstrated by pt's daughter. R wrist cast is clean, dry, & intact- no numbness, tingling reported and no discoloration noted. K replaced this shift. K, Mg, and Phos rechecks scheduled for tomorrow AM.

## 2023-03-05 ENCOUNTER — APPOINTMENT (OUTPATIENT)
Dept: PHYSICAL THERAPY | Facility: CLINIC | Age: 73
DRG: 330 | End: 2023-03-05
Attending: SURGERY
Payer: COMMERCIAL

## 2023-03-05 LAB
ERYTHROCYTE [DISTWIDTH] IN BLOOD BY AUTOMATED COUNT: 12.8 % (ref 10–15)
HCT VFR BLD AUTO: 22.8 % (ref 35–47)
HGB BLD-MCNC: 7.2 G/DL (ref 11.7–15.7)
MAGNESIUM SERPL-MCNC: 2 MG/DL (ref 1.7–2.3)
MCH RBC QN AUTO: 29.9 PG (ref 26.5–33)
MCHC RBC AUTO-ENTMCNC: 31.6 G/DL (ref 31.5–36.5)
MCV RBC AUTO: 95 FL (ref 78–100)
PHOSPHATE SERPL-MCNC: 3 MG/DL (ref 2.5–4.5)
PLATELET # BLD AUTO: 274 10E3/UL (ref 150–450)
POTASSIUM SERPL-SCNC: 3.9 MMOL/L (ref 3.4–5.3)
RBC # BLD AUTO: 2.41 10E6/UL (ref 3.8–5.2)
WBC # BLD AUTO: 9.4 10E3/UL (ref 4–11)

## 2023-03-05 PROCEDURE — 250N000013 HC RX MED GY IP 250 OP 250 PS 637

## 2023-03-05 PROCEDURE — 85027 COMPLETE CBC AUTOMATED: CPT

## 2023-03-05 PROCEDURE — 250N000013 HC RX MED GY IP 250 OP 250 PS 637: Performed by: SURGERY

## 2023-03-05 PROCEDURE — 97116 GAIT TRAINING THERAPY: CPT | Mod: GP

## 2023-03-05 PROCEDURE — 250N000013 HC RX MED GY IP 250 OP 250 PS 637: Performed by: PHYSICIAN ASSISTANT

## 2023-03-05 PROCEDURE — 120N000002 HC R&B MED SURG/OB UMMC

## 2023-03-05 PROCEDURE — 83735 ASSAY OF MAGNESIUM: CPT | Performed by: SURGERY

## 2023-03-05 PROCEDURE — 84132 ASSAY OF SERUM POTASSIUM: CPT | Performed by: SURGERY

## 2023-03-05 PROCEDURE — 84100 ASSAY OF PHOSPHORUS: CPT | Performed by: SURGERY

## 2023-03-05 PROCEDURE — 250N000013 HC RX MED GY IP 250 OP 250 PS 637: Performed by: STUDENT IN AN ORGANIZED HEALTH CARE EDUCATION/TRAINING PROGRAM

## 2023-03-05 PROCEDURE — 97530 THERAPEUTIC ACTIVITIES: CPT | Mod: GP

## 2023-03-05 PROCEDURE — 36415 COLL VENOUS BLD VENIPUNCTURE: CPT | Performed by: SURGERY

## 2023-03-05 PROCEDURE — 250N000011 HC RX IP 250 OP 636: Performed by: SURGERY

## 2023-03-05 RX ORDER — SIMETHICONE 80 MG
80 TABLET,CHEWABLE ORAL EVERY 6 HOURS PRN
Qty: 20 TABLET | Refills: 0 | Status: SHIPPED | OUTPATIENT
Start: 2023-03-05 | End: 2023-09-12

## 2023-03-05 RX ORDER — GABAPENTIN 100 MG/1
100 CAPSULE ORAL AT BEDTIME
Qty: 10 CAPSULE | Refills: 0 | Status: SHIPPED | OUTPATIENT
Start: 2023-03-05 | End: 2023-09-12

## 2023-03-05 RX ORDER — ACETAMINOPHEN 325 MG/1
975 TABLET ORAL EVERY 8 HOURS
Qty: 90 TABLET | Refills: 0 | Status: SHIPPED | OUTPATIENT
Start: 2023-03-05 | End: 2023-03-15

## 2023-03-05 RX ORDER — ENOXAPARIN SODIUM 100 MG/ML
40 INJECTION SUBCUTANEOUS EVERY 24 HOURS
Qty: 10.8 ML | Refills: 0 | Status: SHIPPED | OUTPATIENT
Start: 2023-03-05 | End: 2023-04-01

## 2023-03-05 RX ORDER — OXYCODONE HYDROCHLORIDE 5 MG/1
5 TABLET ORAL EVERY 4 HOURS PRN
Qty: 22 TABLET | Refills: 0 | Status: SHIPPED | OUTPATIENT
Start: 2023-03-05 | End: 2023-03-15

## 2023-03-05 RX ADMIN — ACETAMINOPHEN 975 MG: 325 TABLET ORAL at 10:07

## 2023-03-05 RX ADMIN — Medication 1125 MG: at 21:04

## 2023-03-05 RX ADMIN — ENOXAPARIN SODIUM 40 MG: 40 INJECTION SUBCUTANEOUS at 16:00

## 2023-03-05 RX ADMIN — ACETAMINOPHEN 975 MG: 325 TABLET ORAL at 18:15

## 2023-03-05 RX ADMIN — SIMETHICONE 80 MG: 80 TABLET, CHEWABLE ORAL at 08:44

## 2023-03-05 RX ADMIN — Medication 1125 MG: at 08:44

## 2023-03-05 RX ADMIN — GABAPENTIN 100 MG: 100 CAPSULE ORAL at 22:21

## 2023-03-05 RX ADMIN — OXYCODONE HYDROCHLORIDE 10 MG: 10 TABLET ORAL at 17:16

## 2023-03-05 RX ADMIN — SIMETHICONE 80 MG: 80 TABLET, CHEWABLE ORAL at 16:04

## 2023-03-05 RX ADMIN — OXYCODONE HYDROCHLORIDE 5 MG: 5 TABLET ORAL at 00:27

## 2023-03-05 RX ADMIN — ACETAMINOPHEN 975 MG: 325 TABLET ORAL at 02:34

## 2023-03-05 RX ADMIN — OXYCODONE HYDROCHLORIDE 5 MG: 5 TABLET ORAL at 08:44

## 2023-03-05 ASSESSMENT — ACTIVITIES OF DAILY LIVING (ADL)
ADLS_ACUITY_SCORE: 31

## 2023-03-05 NOTE — PROGRESS NOTES
"Colorectal Surgery Progress Note  [facility]  POD#5      Subjective:  Zosyn stopped yesterday and patient continues to do well. Afebrile without tachycardia. Did not \"pus\" coming from her IV site. IV has been removed. Also noted some swelling in her feet. Pain controlled, tolerating a diet and having bowel function.     Vitals:  Vitals:    03/04/23 0942 03/04/23 1519 03/04/23 2251 03/05/23 0641   BP: (!) 98/36 107/55 108/53 103/53   BP Location: Left arm Right arm Right arm    Patient Position: Semi-Madsen's      Pulse:  95 94 78   Resp:  18 16 16   Temp:  97.5  F (36.4  C) 98.1  F (36.7  C) 98.1  F (36.7  C)   TempSrc:  Temporal Temporal Temporal   SpO2:  97% 92%    Weight:       Height:         I/O:  I/O last 3 completed shifts:  In: 1300 [P.O.:1300]  Out: 3425 [Urine:3425]    Physical Exam:  Gen:      AAOx3, NAD  Pulm:    Non-labored breathing  Abd:      Abdominal binder in place, abd soft, minimally distended, appropriately tender, no guarding/rebound               Incision C/D/I   Ext:       Warm and well-perfused, right wrist with ace wrap, able to wiggle right fingers, sensation intact to right hand. Right antecubital space with erythema over previous IV site and surrounding induration.     BMP  Recent Labs   Lab 03/04/23  1537 03/04/23  0843 03/03/23  0644 03/02/23 2002 03/02/23  1410 03/02/23  0712 03/01/23  0705 03/01/23  0705 02/28/23  1852 02/28/23  1220 02/28/23  0612   NA  --   --   --   --   --  138  --  141  --  140  --    POTASSIUM 3.7 3.4 3.7 3.8   < > 3.4   < > 3.7  --  4.7  --    CHLORIDE  --   --   --   --   --  103  --  107  --   --   --    CO2  --   --   --   --   --  25  --  24  --   --   --    BUN  --   --   --   --   --  6.2*  --  8.5  --   --   --    CR  --   --   --   --   --  0.63  --  0.63 0.71  --   --    GLC  --   --   --   --   --  104*  --  107*  --  158* 144*   MAG  --  1.9 1.9  --   --  1.8  --  2.1  --   --   --    PHOS  --  3.1 3.6 1.5*  --  2.2*  "  < > 2.4*  --   --   --     < > = values in this interval not displayed.     CBC  Recent Labs   Lab 03/04/23  0843 03/03/23  0644 03/02/23  1849 03/02/23  0712   WBC 12.1* 14.6* 14.1* 13.4*   HGB 7.6* 8.7* 8.4* 7.8*   HCT 24.5* 29.2* 27.8* 25.1*    238 262 225         ASSESSMENT:   This is a 73 year old female [facility] epilespy (last seizure in 1995 while attempting to wean down and off keppra ), history of GI bleeds, migraines, pre-DM, family history of BRCA1, found to have hepatic flexure adenocarcinoma.  Now s/p Lap robotic assisted right hemicolectomy with unexpected bleeding from right colic vein on 2/28.     Neuro/Pain: tylenol, gabapentin, oxycodone  - continue home keppra.  Low threshold to change to IV keppra if concerned about absorption  CV:  Softer bps with SBP 80-90s, likely due to anemia of blood loss, anesthesia, pain medications.  Patient asymptomatic, mentating well without tachycardia and with excellent UOP.   PULM: encourage IS.  GI/FEN:   - LFD  - ambulate as able   - replete electrolytes per protocol   : Voiding spontaneously  Heme:  Initial postop hgb 8.7 from 11.5 POD0 and 13s a few weeks ago. Hgb recheck stable at 8.5 and then 7.8 on POD2. 8.7 today. Lovenox started 3/3.  ID: Tmax 103.9 on 3/2. Zosyn 3/2-3/4.   - BCx NGTD.   - CXR without infectious source  - UA without leukocyte esterase, nitrite, WBCs  - CTAP revealed patent anastomosis without leak  Suppurative thrombophlebitis noted 3/5  - IV removed  - Start cool/warm compresses  - Elevate RUE  - Will monitor site for streaking erythema   - CBC pending  Endocrine: no acute concerns at this time     Activity: as tolerated.  PT/OT  Ppx: lovenox  Dispo: PT recommending home on discharge. Should receive lovenox ppx x30 days for colon cancer. Will keep patient in the hospital today to monitor IV site given titanium bar in close proximity s/p recent ortho sx. If erythema does not spread will discharge tomorrow.    Seen with CRS fellow   Esdras and discussed with Dr. Garcia.     Cha Rockwell, DO  Colorectal Surgery

## 2023-03-05 NOTE — PLAN OF CARE
Goal Outcome Evaluation:      Plan of Care Reviewed With: patient       POD#5 s/p Lap robotic assisted right hemicolectomy with unexpected bleeding from right colic artery on 2/28.     A//O x4. VSS, on room air. Denies nausea. SBA. Ambulated in hallways early shift. Tolerating low-fiber diet. Pain managed with PRN oxycodone and scheduled tylenol. Voids spont with adequate output. Passing gas and had 1 loose stool this shift. Wrist cast CDI, and no report of numbness or tingling present on this shift.  PIV SL. Cont monitor.

## 2023-03-05 NOTE — PLAN OF CARE
Pt vss and A&Ox4. Pain tolerated with Tylenol and Oxycodone. Up with SBA. Denies nausea and tolerating low fiber diet well. Pt was requesting to see a dietitian tomorrow in hopes to get some questions answered about it. Voiding spontaneously, passing gas, and had 1x BM this shift. Abdominal lap sites are clean, dry, intact, and open to air. Old R PIV site marked, heat and cold used on site- no streaking noticed. PIV is saline locked. Hope for pt to discharge home tomorrow.

## 2023-03-06 VITALS
WEIGHT: 173.94 LBS | HEIGHT: 62 IN | DIASTOLIC BLOOD PRESSURE: 67 MMHG | OXYGEN SATURATION: 94 % | BODY MASS INDEX: 32.01 KG/M2 | SYSTOLIC BLOOD PRESSURE: 127 MMHG | TEMPERATURE: 97.2 F | RESPIRATION RATE: 16 BRPM | HEART RATE: 78 BPM

## 2023-03-06 LAB
MAGNESIUM SERPL-MCNC: 2.1 MG/DL (ref 1.7–2.3)
PHOSPHATE SERPL-MCNC: 3.3 MG/DL (ref 2.5–4.5)
PLATELET # BLD AUTO: 332 10E3/UL (ref 150–450)
POTASSIUM SERPL-SCNC: 3.7 MMOL/L (ref 3.4–5.3)

## 2023-03-06 PROCEDURE — 250N000013 HC RX MED GY IP 250 OP 250 PS 637

## 2023-03-06 PROCEDURE — 250N000013 HC RX MED GY IP 250 OP 250 PS 637: Performed by: PHYSICIAN ASSISTANT

## 2023-03-06 PROCEDURE — 250N000013 HC RX MED GY IP 250 OP 250 PS 637: Performed by: SURGERY

## 2023-03-06 PROCEDURE — 36415 COLL VENOUS BLD VENIPUNCTURE: CPT | Performed by: SURGERY

## 2023-03-06 PROCEDURE — 84132 ASSAY OF SERUM POTASSIUM: CPT | Performed by: SURGERY

## 2023-03-06 PROCEDURE — 84100 ASSAY OF PHOSPHORUS: CPT | Performed by: SURGERY

## 2023-03-06 PROCEDURE — 83735 ASSAY OF MAGNESIUM: CPT | Performed by: SURGERY

## 2023-03-06 PROCEDURE — 85049 AUTOMATED PLATELET COUNT: CPT | Performed by: SURGERY

## 2023-03-06 RX ADMIN — OXYCODONE HYDROCHLORIDE 5 MG: 5 TABLET ORAL at 01:22

## 2023-03-06 RX ADMIN — ACETAMINOPHEN 975 MG: 325 TABLET ORAL at 02:51

## 2023-03-06 RX ADMIN — Medication 1125 MG: at 09:16

## 2023-03-06 RX ADMIN — OXYCODONE HYDROCHLORIDE 5 MG: 5 TABLET ORAL at 10:56

## 2023-03-06 RX ADMIN — ACETAMINOPHEN 975 MG: 325 TABLET ORAL at 10:56

## 2023-03-06 ASSESSMENT — ACTIVITIES OF DAILY LIVING (ADL)
ADLS_ACUITY_SCORE: 31

## 2023-03-06 NOTE — PROGRESS NOTES
[id number]  Temp: 97.2  F (36.2  C) Temp src: Temporal BP: 127/67 Pulse: 78   Resp: 16 SpO2: 94 % O2 Device: None (Room air)       Pt is AOx4. VSS. RA. Pain managed with vannesa tylenol and prn oxycodone. Denies nausea. Passing gas. No BM. Independent in room. Lap sites SUHAS, CDI. RUE, old IV site with dried yellow scab, marked. RUE cast WDL. Dietitian consulted for low fiber diet teaching per pt request. Tolerating diet. Discharging to home.

## 2023-03-06 NOTE — PLAN OF CARE
Goal Outcome Evaluation:      Plan of Care Reviewed With: patient       POD#6  s/p Lap robotic assisted right hemicolectomy with unexpected bleeding from right colic artery on 2/28.     A//O x4. VSS, on room air. Denies nausea. SBA with ambulation. Tolerating low-fiber diet. Pain managed with PRN oxycodone and scheduled tylenol. Voids spont with adequate output. Passing gas and no stool this shift.  Wrist cast CDI, and no report of numbness or tingling present on this shift.  Abdominal lap sites CDI and open to air. Old R PIV site marked. PIV SL. Cont monitor.

## 2023-03-06 NOTE — PROGRESS NOTES
CLINICAL NUTRITION SERVICES    Reason for Assessment:    Per RN, pt requesting education on low fiber diet.     Diet History:  Patient has no history of low fiber diet education.    Nutrition Diagnosis: Food- and nutrition-related knowledge deficit r/t no previous knowledge of low fiber diet as evidenced by patient report    Interventions:    Provided instruction on low fiber diet. Discussed each food group and foods to eat and avoid. Answered patient's questions regarding diet.     Provided handouts : Fiber-Restricted Nutrition Therapy (Nutrition Care Manual)     Goals:     Patient will verbalize at least 5 low fiber foods acceptable on diet and 5 high fiber foods to avoid.    Follow-up:      Patient to ask any further nutrition-related questions before discharge.  In addition, pt may request outpatient RD appointment.    Aaliyah Villarreal RD, , LD  Weekday Pager: [phone]  Weekday Units covered: 7C (all beds) and 5A (beds 5201 through 5209)  Weekend/Holiday RD Pager: [phone]

## 2023-03-06 NOTE — PROGRESS NOTES
Physical Therapy Discharge Summary    Reason for therapy discharge:    Discharged to home.    Progress towards therapy goal(s). See goals on Care Plan in [facility] electronic health record for goal details.  Goals met    Therapy recommendation(s):    Continue home exercise program.

## 2023-03-07 LAB
BACTERIA BLD CULT: NO GROWTH
BACTERIA BLD CULT: NO GROWTH

## 2023-03-08 ENCOUNTER — PATIENT OUTREACH (OUTPATIENT)
Dept: SURGERY | Facility: CLINIC | Age: 73
End: 2023-03-08
Payer: COMMERCIAL

## 2023-03-08 LAB
PATH REPORT.COMMENTS IMP SPEC: NORMAL
PATH REPORT.COMMENTS IMP SPEC: NORMAL
PATH REPORT.FINAL DX SPEC: NORMAL
PATH REPORT.GROSS SPEC: NORMAL
PATH REPORT.MICROSCOPIC SPEC OTHER STN: NORMAL
PATH REPORT.RELEVANT HX SPEC: NORMAL
PHOTO IMAGE: NORMAL

## 2023-03-08 NOTE — CONFIDENTIAL NOTE
Post Op Note  Called to check on patient postoperatively after hospital discharge.     Patient is S/p lap robotic assisted right hemicolectomy on 2/28. with Dr. Lalit Cardenas. Admitted 2/28 and discharged on 3/6.      Pain is well controlled with  tylenol, gabapentin, simethicone, oxycodone     Patient is eating and drinking normally. Patient is on a low fiber diet.  Encouraged patient to drink 8-10 glasses of water a day.     Patient is passing flats, is having regular bowel movements.    Patient is voiding normally and urine is light in color.    Patient is not set up with home care.    Patient does not require supplies.     Patient's pathology was not reviewed with them.     Patient Denies nausea and vomiting.    Patient Denies any fevers or chills.    Patient's incision is CDI. Patient reminded NOT to remove any dressings over their incisions.     Patient is on a activity restriction. Lifting 10 pounds for 6 weeks.     Patient Denies needing any forms completed.     Follow up is set up with Farideh Cano PA-C on 3/15 and with Dr. Lalit Cardenas  on 4/11.   Encouraged the patient to contact the clinic in the meantime with any fevers, chills, nausea, vomiting, increased colostomy output, no colostomy output, dizziness, lightheadedness, uncontrolled pain, changes to the incisions, or with any questions or concerns.  Lovenox injections going well    Patient's questions were answered to their stated satisfaction and they are in agreement with this plan.    TIERRA Zhu [phone]  Colon & Rectal Surgery Clinic  [facility]

## 2023-03-14 NOTE — PROGRESS NOTES
Colon and Rectal Surgery Postoperative Clinic Note    RE: Meme Butts  : 1950  MEGAN: 3/15/2023    Meme Butts is a very pleasant 73 year old female with a history of colon adenocarcinoma at the hepatic flexure now status post robotic right hemicolectomy with Dr. Cardenas on 23.     Final Diagnosis   A.  TERMINAL ILEUM, APPENDIX, RIGHT COLON, RIGHT HEMICOLECTOMY:  - Benign colon; no polyp or residual carcinoma identified  - Margins negative  - 30 benign lymph nodes (0/30)  - Appendix with fibrous obliteration  - Benign fibroadipose tissue, consistent with omentum     Interval history: Doing well. Wrist is braced now. Minimal pain from surgery. On a low fiber diet. Sometimes constipated, wondering if she should take a laxative.    Physical Examination:   /74 (BP Location: Left arm, Patient Position: Sitting, Cuff Size: Adult Large)   Pulse 79   SpO2 96%   General: alert, oriented, in no acute distress, sitting comfortably  HEENT: moist mucous membranes  Abdomen: Laparoscopic and pfannenstiel incisions well approximated with glue in place, starting to flake off. No surrounding erythema.    Assessment/Plan:  73 year old female with a history of colon adenocarcinoma at the hepatic flexure now status post robotic right hemicolectomy with Dr. Cardenas on 23. Meme is doing very well after surgery. May slowly transition to a regular diet. Avoid lifting >10 lb for another month. She is scheduled to follow up with Dr. Cardenas on 23. Contact us in the meantime with questions. Patient's questions were answered to her stated satisfaction and she is in agreement with this plan.     Medical history:  Past Medical History:   Diagnosis Date     Adenomatous colon polyp     tubular adenoma     At high risk for breast cancer 2017    35.4% lifetime risk by Philip model     Epilepsy (H)      Fracture of metatarsal bone of left foot 2014     Fracture, radius     and ulnar  "styloid fracture     GIB (gastrointestinal bleeding) 04/01/2011     Intractable chronic migraine without aura      Kidney stones 1970, 1975    during pregnancy     Malignant neoplasm of hepatic flexure (H)      Migraine      Osteoporosis 2016    T score -4.1     Pneumonia      Prediabetes        Surgical history:  Past Surgical History:   Procedure Laterality Date     COLONOSCOPY  3/31/2011    Procedure:COLONOSCOPY; Surgeon:PACO DIETRICH; Location:[facility]     COLONOSCOPY  3/31/2011    Procedure:COMBINED COLONOSCOPY, REMOVE TUMOR/POLYP/LESION BY SNARE; Surgeon:PACO DIETRICH; Location:[facility]     COLONOSCOPY  2017    had polyps - path not available - repeat 3-5 yrs      COLONOSCOPY N/A 12/9/2022    Procedure: COLONOSCOPY, WITH HOT POLYPECTOMY;  Surgeon: Eldon Nicholas MD;  Location: [facility] OR     DAVINCI COLECTOMY Right 2/28/2023    Procedure: Robotic right hemicolectomy, appendectomy;  Surgeon: Lalit Cardenas MD;  Location: [facility] OR     ESOPHAGOSCOPY, GASTROSCOPY, DUODENOSCOPY (EGD), COMBINED  3/31/2011    Procedure:COMBINED ESOPHAGOSCOPY, GASTROSCOPY, DUODENOSCOPY (EGD); Surgeon:PACO DIETRICH; Location:[facility]     HC BREATH HYDROGEN TEST  6/2/2011    Procedure:HYDROGEN BREATH TEST; Surgeon:MANDIE BRADY; Location:[facility]     HYSTERECTOMY  1994 or 1995    Partial hysterectomy 1995, urethral tube \"widened\" 1994     ORTHOPEDIC SURGERY      L wrist       Problem list:    Patient Active Problem List    Diagnosis Date Noted     Colon cancer (H) 02/28/2023     Priority: Medium     Encounter for screening mammogram for high-risk patient 02/17/2017     Priority: Medium     Family history of malignant neoplasm of breast 02/17/2017     Priority: Medium     At high risk for breast cancer 02/16/2017     Priority: Medium     35.4% lifetime risk by Philip model       History of ischemic colitis 10/13/2016     Priority: Medium     Osteoporosis 10/13/2016     Priority: Medium     DEXA 11/2016, FRAX major osteoporotic 13%, " hip fracture 3.8%  [  ] VFA ordered       Prediabetes 10/13/2016     Priority: Medium     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     Priority: Medium     increased alkaline phosphatase (bone specific) 10/31/2014     Priority: Medium     Cerebral seizure (H) 07/24/2012     Priority: Medium     Achilles bursitis or tendinitis 01/19/2012     Priority: Medium     Plantar fascial fibromatosis 01/19/2012     Priority: Medium     Flat foot 01/19/2012     Priority: Medium     Seizures (H)      Priority: Medium     No seizures for years (as of 10/13/16)         Medications:  Current Outpatient Medications   Medication Sig Dispense Refill     acetaminophen (TYLENOL) 325 MG tablet Take 3 tablets (975 mg) by mouth every 8 hours for 10 days 90 tablet 0     CALCIUM 500 +D 500-400 MG-UNIT TABS Take 1 tablet by mouth daily at 2 pm 180 tablet 3     cholecalciferol (VITAMIN D) 1000 UNIT tablet Take 2,000 Units by mouth daily at 2 pm 90 tablet 3     cyclobenzaprine (FLEXERIL) 5 MG tablet as needed       enoxaparin ANTICOAGULANT (LOVENOX) 40 MG/0.4ML syringe Inject 0.4 mLs (40 mg) Subcutaneous every 24 hours for 27 days 10.8 mL 0     gabapentin (NEURONTIN) 100 MG capsule Take 1 capsule (100 mg) by mouth At Bedtime for 10 days 10 capsule 0     ibuprofen (ADVIL/MOTRIN) 600 MG tablet Take 1 tablet (600 mg) by mouth every 8 hours as needed for moderate pain (4-6) 30 tablet 0     levETIRAcetam (KEPPRA) 750 MG tablet PT REPORTS TAPERING DOWN: TAKE 1 & 1/2 TABS IN THE MORNING, AND 2 TABS IN THE EVENING. (Patient taking differently: Take 1,125 mg by mouth 2 times daily) 105 tablet 3     Multiple Vitamins-Minerals (CENTRUM SILVER) per tablet Take 1 tablet by mouth every morning       simethicone (MYLICON) 80 MG chewable tablet Take 1 tablet (80 mg) by mouth every 6 hours as needed for cramping 20 tablet 0     oxyCODONE (ROXICODONE) 5 MG tablet Take 1 tablet (5 mg) by mouth every 4 hours as needed for moderate to severe pain (Patient not  "taking: Reported on 3/15/2023) 22 tablet 0       Allergies:  No Known Allergies    Family history:  Family History   Problem Relation Age of Onset     Breast Cancer Mother 30        lumpectomy and chemo; also \"mass in ovary\"     Colon Polyps Father      Osteoporosis Sister      Breast Cancer Sister 55     Leukemia Sister 52     Coronary Artery Disease Brother      Colon Polyps Brother      Coronary Artery Disease Early Onset Brother 50     Prostate Cancer Brother      Gastrointestinal Disease Son         intestinal transplant     Diabetes Maternal Aunt         multiple mat aunts     Ovarian Cancer Maternal Aunt 50         of ovarian cancer in 50s or 60s, unknown     Breast Cancer Paternal Aunt 36         of breast cancer recurrence in 60s     Bone Cancer Niece 19     Prostate Cancer Cousin 65     Breast Cancer Cousin 30        paternal cousin, BRCA1+ by report     Breast Cancer Cousin 40        paternal cousin, BRCA1+ by report     Breast Cancer Cousin 35        paternal cousin, BRCA1+ by report     Genetic Disorder Cousin         paternal male cousin, BRCA1+ by report     Ovarian Cancer Cousin         paternal cousin, BRCA1+ by report     Anesthesia Reaction No family hx of      Venous thrombosis No family hx of        Social history:  Social History     Tobacco Use     Smoking status: Former     Packs/day: 1.00     Years: 10.00     Pack years: 10.00     Types: Cigarettes     Start date: 1994     Quit date: 2009     Years since quittin.5     Smokeless tobacco: Never     Tobacco comments:     On and off for a total of 10 years of smoking    Substance Use Topics     Alcohol use: Yes     Comment: 1x/month     Marital status: .  Occupation: retired.    Nursing Notes:   Romain Forte EMT  3/15/2023  9:57 AM  Signed  Chief Complaint   Patient presents with     Post-op Visit       Vitals:    03/15/23 0955   BP: 122/74   BP Location: Left arm   Patient Position: Sitting   Cuff Size: Adult " Large   Pulse: 79   SpO2: 96%       There is no height or weight on file to calculate BMI.    Romain Forte EMT-P         15 minutes spent on the date of the encounter doing chart review, history and exam, documentation and further activities as noted above.   This is a postop visit.      Farideh Cano PA-C  Colon and Rectal Surgery  [facility]

## 2023-03-15 ENCOUNTER — OFFICE VISIT (OUTPATIENT)
Dept: SURGERY | Facility: CLINIC | Age: 73
End: 2023-03-15
Payer: COMMERCIAL

## 2023-03-15 VITALS — HEART RATE: 79 BPM | DIASTOLIC BLOOD PRESSURE: 74 MMHG | OXYGEN SATURATION: 96 % | SYSTOLIC BLOOD PRESSURE: 122 MMHG

## 2023-03-15 DIAGNOSIS — Z09 FOLLOW-UP EXAMINATION AFTER COLORECTAL SURGERY: Primary | ICD-10-CM

## 2023-03-15 PROCEDURE — 99024 POSTOP FOLLOW-UP VISIT: CPT

## 2023-03-15 ASSESSMENT — PAIN SCALES - GENERAL: PAINLEVEL: NO PAIN (0)

## 2023-03-15 NOTE — LETTER
3/15/2023       RE: Meme Butts  [address]  [facility] [address]     Dear Colleague,    Thank you for referring your patient, Meme Butts, to the [facility] COLON AND RE[facility] SURGERY [facility]INIC [place] at [facility]. Please see a copy of my visit note below.    Colon and Rectal Surgery Postoperative Clinic Note    RE: Meme Butts  : 1950  MEGAN: 3/15/2023    Meme Butts is a very pleasant 73 year old female with a history of colon adenocarcinoma at the hepatic flexure now status post robotic right hemicolectomy with Dr. Cardenas on 23.     Final Diagnosis   A.  TERMINAL ILEUM, APPENDIX, RIGHT COLON, RIGHT HEMICOLECTOMY:  - Benign colon; no polyp or residual carcinoma identified  - Margins negative  - 30 benign lymph nodes (0/30)  - Appendix with fibrous obliteration  - Benign fibroadipose tissue, consistent with omentum     Interval history: Doing well. Wrist is braced now. Minimal pain from surgery. On a low fiber diet. Sometimes constipated, wondering if she should take a laxative.    Physical Examination:   /74 (BP Location: Left arm, Patient Position: Sitting, Cuff Size: Adult Large)   Pulse 79   SpO2 96%   General: alert, oriented, in no acute distress, sitting comfortably  HEENT: moist mucous membranes  Abdomen: Laparoscopic and pfannenstiel incisions well approximated with glue in place, starting to flake off. No surrounding erythema.    Assessment/Plan:  73 year old female with a history of colon adenocarcinoma at the hepatic flexure now status post robotic right hemicolectomy with Dr. Cardenas on 23. Meme is doing very well after surgery. May slowly transition to a regular diet. Avoid lifting >10 lb for another month. She is scheduled to follow up with Dr. Cardenas on 23. Contact us in the meantime with questions. Patient's questions were answered to her stated satisfaction and she is in  "agreement with this plan.     Medical history:  Past Medical History:   Diagnosis Date     Adenomatous colon polyp 2011    tubular adenoma     At high risk for breast cancer 02/16/2017    35.4% lifetime risk by Philip model     Epilepsy (H)      Fracture of metatarsal bone of left foot 07/2014     Fracture, radius 1990    and ulnar styloid fracture     GIB (gastrointestinal bleeding) 04/01/2011     Intractable chronic migraine without aura      Kidney stones 1970, 1975    during pregnancy     Malignant neoplasm of hepatic flexure (H)      Migraine      Osteoporosis 2016    T score -4.1     Pneumonia      Prediabetes        Surgical history:  Past Surgical History:   Procedure Laterality Date     COLONOSCOPY  3/31/2011    Procedure:COLONOSCOPY; Surgeon:PACO DIETRICH; Location: [facility]     COLONOSCOPY  3/31/2011    Procedure:COMBINED COLONOSCOPY, REMOVE TUMOR/POLYP/LESION BY SNARE; Surgeon:PACO DIETRICH; [facility]     COLONOSCOPY  2017    had polyps - path not available - repeat 3-5 yrs      COLONOSCOPY N/A 12/9/2022    Procedure: COLONOSCOPY, WITH HOT POLYPECTOMY;  Surgeon: Eldon Nicholas MD;  [place] [facility] [place]     DAVINCI COLECTOMY Right 2/28/2023    Procedure: Robotic right hemicolectomy, appendectomy;  Surgeon: Lalit Cardenas MD;  Location:  OR     ESOPHAGOSCOPY, GASTROSCOPY, DUODENOSCOPY (EGD), COMBINED  3/31/2011    Procedure:COMBINED ESOPHAGOSCOPY, GASTROSCOPY, DUODENOSCOPY (EGD); Surgeon:PACO DIETRICH; Location: GI     HC BREATH HYDROGEN TEST  6/2/2011    Procedure:HYDROGEN BREATH TEST; Surgeon:MANDIE BRADY; Location: GI     HYSTERECTOMY  1994 or 1995    Partial hysterectomy 1995, urethral tube \"widened\" 1994     ORTHOPEDIC SURGERY      L wrist       Problem list:    Patient Active Problem List    Diagnosis Date Noted     Colon cancer (H) 02/28/2023     Priority: Medium     Encounter for screening mammogram for high-risk patient 02/17/2017     Priority: Medium     Family history " of malignant neoplasm of breast 02/17/2017     Priority: Medium     At high risk for breast cancer 02/16/2017     Priority: Medium     35.4% lifetime risk by Philip model       History of ischemic colitis 10/13/2016     Priority: Medium     Osteoporosis 10/13/2016     Priority: Medium     DEXA 11/2016, FRAX major osteoporotic 13%, hip fracture 3.8%  [  ] VFA ordered       Prediabetes 10/13/2016     Priority: Medium     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     Priority: Medium     increased alkaline phosphatase (bone specific) 10/31/2014     Priority: Medium     Cerebral seizure (H) 07/24/2012     Priority: Medium     Achilles bursitis or tendinitis 01/19/2012     Priority: Medium     Plantar fascial fibromatosis 01/19/2012     Priority: Medium     Flat foot 01/19/2012     Priority: Medium     Seizures (H)      Priority: Medium     No seizures for years (as of 10/13/16)         Medications:  Current Outpatient Medications   Medication Sig Dispense Refill     acetaminophen (TYLENOL) 325 MG tablet Take 3 tablets (975 mg) by mouth every 8 hours for 10 days 90 tablet 0     CALCIUM 500 +D 500-400 MG-UNIT TABS Take 1 tablet by mouth daily at 2 pm 180 tablet 3     cholecalciferol (VITAMIN D) 1000 UNIT tablet Take 2,000 Units by mouth daily at 2 pm 90 tablet 3     cyclobenzaprine (FLEXERIL) 5 MG tablet as needed       enoxaparin ANTICOAGULANT (LOVENOX) 40 MG/0.4ML syringe Inject 0.4 mLs (40 mg) Subcutaneous every 24 hours for 27 days 10.8 mL 0     gabapentin (NEURONTIN) 100 MG capsule Take 1 capsule (100 mg) by mouth At Bedtime for 10 days 10 capsule 0     ibuprofen (ADVIL/MOTRIN) 600 MG tablet Take 1 tablet (600 mg) by mouth every 8 hours as needed for moderate pain (4-6) 30 tablet 0     levETIRAcetam (KEPPRA) 750 MG tablet PT REPORTS TAPERING DOWN: TAKE 1 & 1/2 TABS IN THE MORNING, AND 2 TABS IN THE EVENING. (Patient taking differently: Take 1,125 mg by mouth 2 times daily) 105 tablet 3     Multiple Vitamins-Minerals  "(CENTRUM SILVER) per tablet Take 1 tablet by mouth every morning       simethicone (MYLICON) 80 MG chewable tablet Take 1 tablet (80 mg) by mouth every 6 hours as needed for cramping 20 tablet 0     oxyCODONE (ROXICODONE) 5 MG tablet Take 1 tablet (5 mg) by mouth every 4 hours as needed for moderate to severe pain (Patient not taking: Reported on 3/15/2023) 22 tablet 0       Allergies:  No Known Allergies    Family history:  Family History   Problem Relation Age of Onset     Breast Cancer Mother 30        lumpectomy and chemo; also \"mass in ovary\"     Colon Polyps Father      Osteoporosis Sister      Breast Cancer Sister 55     Leukemia Sister 52     Coronary Artery Disease Brother      Colon Polyps Brother      Coronary Artery Disease Early Onset Brother 50     Prostate Cancer Brother      Gastrointestinal Disease Son         intestinal transplant     Diabetes Maternal Aunt         multiple mat aunts     Ovarian Cancer Maternal Aunt 50         of ovarian cancer in 50s or 60s, unknown     Breast Cancer Paternal Aunt 36         of breast cancer recurrence in 60s     Bone Cancer Niece 19     Prostate Cancer Cousin 65     Breast Cancer Cousin 30        paternal cousin, BRCA1+ by report     Breast Cancer Cousin 40        paternal cousin, BRCA1+ by report     Breast Cancer Cousin 35        paternal cousin, BRCA1+ by report     Genetic Disorder Cousin         paternal male cousin, BRCA1+ by report     Ovarian Cancer Cousin         paternal cousin, BRCA1+ by report     Anesthesia Reaction No family hx of      Venous thrombosis No family hx of        Social history:  Social History     Tobacco Use     Smoking status: Former     Packs/day: 1.00     Years: 10.00     Pack years: 10.00     Types: Cigarettes     Start date: 1994     Quit date: 2009     Years since quittin.5     Smokeless tobacco: Never     Tobacco comments:     On and off for a total of 10 years of smoking    Substance Use Topics     " Alcohol use: Yes     Comment: 1x/month     Marital status: .  Occupation: retired.    Nursing Notes:   Romain Forte, EMT  3/15/2023  9:57 AM  Signed  Chief Complaint   Patient presents with     Post-op Visit       Vitals:    03/15/23 0955   BP: 122/74   BP Location: Left arm   Patient Position: Sitting   Cuff Size: Adult Large   Pulse: 79   SpO2: 96%       There is no height or weight on file to calculate BMI.    Romain Forte EMT-P         15 minutes spent on the date of the encounter doing chart review, history and exam, documentation and further activities as noted above.   This is a postop visit.      Farideh Cano PA-C  Colon and Rectal Surgery  [facility]

## 2023-03-15 NOTE — NURSING NOTE
Chief Complaint   Patient presents with     Post-op Visit       Vitals:    03/15/23 0955   BP: 122/74   BP Location: Left arm   Patient Position: Sitting   Cuff Size: Adult Large   Pulse: 79   SpO2: 96%       There is no height or weight on file to calculate BMI.    Romain Forte EMT-P

## 2023-08-14 ENCOUNTER — TRANSFERRED RECORDS (OUTPATIENT)
Dept: HEALTH INFORMATION MANAGEMENT | Facility: CLINIC | Age: 73
End: 2023-08-14

## 2023-09-12 ENCOUNTER — OFFICE VISIT (OUTPATIENT)
Dept: INTERNAL MEDICINE | Facility: CLINIC | Age: 73
End: 2023-09-12
Payer: COMMERCIAL

## 2023-09-12 ENCOUNTER — LAB (OUTPATIENT)
Dept: LAB | Facility: CLINIC | Age: 73
End: 2023-09-12
Payer: COMMERCIAL

## 2023-09-12 VITALS
DIASTOLIC BLOOD PRESSURE: 79 MMHG | BODY MASS INDEX: 30.31 KG/M2 | HEART RATE: 110 BPM | SYSTOLIC BLOOD PRESSURE: 128 MMHG | WEIGHT: 165.7 LBS | OXYGEN SATURATION: 97 %

## 2023-09-12 DIAGNOSIS — Z01.818 PREOP GENERAL PHYSICAL EXAM: Primary | ICD-10-CM

## 2023-09-12 DIAGNOSIS — E11.9 TYPE 2 DIABETES MELLITUS WITHOUT COMPLICATION, WITHOUT LONG-TERM CURRENT USE OF INSULIN (H): ICD-10-CM

## 2023-09-12 DIAGNOSIS — Z01.818 PREOP GENERAL PHYSICAL EXAM: ICD-10-CM

## 2023-09-12 DIAGNOSIS — G40.909 CEREBRAL SEIZURE (H): ICD-10-CM

## 2023-09-12 DIAGNOSIS — H25.9 AGE-RELATED CATARACT OF BOTH EYES, UNSPECIFIED AGE-RELATED CATARACT TYPE: ICD-10-CM

## 2023-09-12 LAB
ERYTHROCYTE [DISTWIDTH] IN BLOOD BY AUTOMATED COUNT: 13.4 % (ref 10–15)
HBA1C MFR BLD: 6.5 %
HCT VFR BLD AUTO: 38.8 % (ref 35–47)
HGB BLD-MCNC: 12.7 G/DL (ref 11.7–15.7)
MCH RBC QN AUTO: 28.5 PG (ref 26.5–33)
MCHC RBC AUTO-ENTMCNC: 32.7 G/DL (ref 31.5–36.5)
MCV RBC AUTO: 87 FL (ref 78–100)
PLATELET # BLD AUTO: 319 10E3/UL (ref 150–450)
RBC # BLD AUTO: 4.45 10E6/UL (ref 3.8–5.2)
WBC # BLD AUTO: 7.1 10E3/UL (ref 4–11)

## 2023-09-12 PROCEDURE — 83036 HEMOGLOBIN GLYCOSYLATED A1C: CPT | Performed by: INTERNAL MEDICINE

## 2023-09-12 PROCEDURE — 99000 SPECIMEN HANDLING OFFICE-LAB: CPT | Performed by: PATHOLOGY

## 2023-09-12 PROCEDURE — 99213 OFFICE O/P EST LOW 20 MIN: CPT | Mod: GE

## 2023-09-12 PROCEDURE — 36415 COLL VENOUS BLD VENIPUNCTURE: CPT | Performed by: PATHOLOGY

## 2023-09-12 PROCEDURE — 85027 COMPLETE CBC AUTOMATED: CPT | Performed by: PATHOLOGY

## 2023-09-12 ASSESSMENT — ENCOUNTER SYMPTOMS
NEUROLOGICAL NEGATIVE: 1
CARDIOVASCULAR NEGATIVE: 1
HEMATOLOGIC/LYMPHATIC NEGATIVE: 1
EYES NEGATIVE: 1
RESPIRATORY NEGATIVE: 1
GASTROINTESTINAL NEGATIVE: 1
CONSTITUTIONAL NEGATIVE: 1

## 2023-09-12 NOTE — NURSING NOTE
Meme Butts is a 73 year old female that presents in clinic today for the following:     Chief Complaint   Patient presents with    Pre-Op Exam     Pt here for pre-op exam       The patient's allergies and medications were reviewed. The patient's vitals were obtained without incident. The patient does not have any other questions for the provider.     Willis Mendez, EMT at 12:11 PM on 9/12/2023.  Primary Care Clinic: [phone]

## 2023-09-12 NOTE — PROGRESS NOTES
Surgeon (please enter first and last name): Luis M Saunders MD  Fax number for Preop Evaluation: [phone]  Location of Surgery: [facility] [place]  Date of Surgery: 9/18 and 10/02  Procedure: Intraocular lens  History of reaction to anesthesia? No    Willis Mendez, EMT at 11:58 AM on 9/12/2023.

## 2023-09-12 NOTE — PROGRESS NOTES
PRIMARY CARE CENTER  Pre-operative evaluation    Meme Butts is a 73 year old woman who presents for a preoperative evaluation.  PCP: Maria Esther Miranda     Subjective   73 year old female with colon adenocarcinoma of the hepatic flexure s/p R hemicolectomy, well controlled epilespy on keppra, recent ORIF of R hand from traumatic fall presenting for a pre-op eval for cataract surgery scheduled for 9/18/23 right eye and 10/2/23 left eye.     Patient does not have any concerns/complaints at this time. Denies fever, chills, night sweats, chest pain, SOB, cough, abdominal pain, diarrhea. No complications of previous surgeries.     PREOPERATIVE EVALUATION:  Today's date: 09/12/23    Surgical Information (as known today):  Surgery/Procedure: IOL  Surgery Location: [facility]  Surgery Date: see above  Time of Surgery: 11 AM  Where patient plans to recover: At home with family  Fax number for surgical facility: Note does not need to be faxed, will be available electronically in Epic.    Type of Anesthesia Anticipated: Sedation      HPI related to upcoming procedure:  Cataract surgery    Risk Assessment  Physical activity:        - Not limited, works in yard up to 2 hours at a time without limitation, exercises 3-4x weekly    Respiratory concerns:  None    Clotting concerns:  None    Other chronic medical concerns  Epilepsy      Preoperative Review of :   reviewed - no record of controlled substances prescribed.      No Known Allergies      Health Care Directive:  Patient does not have a Health Care Directive          Review of Systems   Constitutional: Negative.    HENT: Negative.     Eyes: Negative.    Respiratory: Negative.     Cardiovascular: Negative.    Gastrointestinal: Negative.    Neurological: Negative.    Hematological: Negative.        As part of this encounter, I reviewed (and updated as able) the past medical, family, and social history.  (Please see past histories (etc) appended to this note at  the bottom)    Objective     /79 (BP Location: Right arm, Patient Position: Sitting, Cuff Size: Adult Regular)   Pulse 110   Wt 75.2 kg (165 lb 11.2 oz)   SpO2 97%   BMI 30.31 kg/m      Physical Exam  Constitutional:       Appearance: Normal appearance.   HENT:      Head: Atraumatic.      Mouth/Throat:      Mouth: Mucous membranes are moist.   Eyes:      Extraocular Movements: Extraocular movements intact.   Cardiovascular:      Rate and Rhythm: Normal rate and regular rhythm.   Pulmonary:      Effort: Pulmonary effort is normal.      Breath sounds: Normal breath sounds.   Abdominal:      General: Abdomen is flat.      Palpations: Abdomen is soft.   Musculoskeletal:      Cervical back: Normal range of motion and neck supple.   Skin:     General: Skin is warm and dry.   Neurological:      General: No focal deficit present.      Mental Status: She is alert and oriented to person, place, and time.              No EKG required for low risk surgery (cataract, skin procedure, breast biopsy, etc).          Assessment & Plan     Preoperative Assessment and recommendation    Appreciate the opportunity to be involved in this patient's care.    Revised Cardiac Risk Index (RCRI):  The patient has the following serious cardiovascular risks for perioperative complications:   - No serious cardiac risks = 0 points   RCRI Interpretation: 0 points: Class I (very low risk - 0.4% complication rate)    Procedure risk. My understanding is that the described procedure is considered LOW risk.         - Take your keppra as usual ; on same dosage long term, no recent levels on file. Saw neurology in 8/2022, no changes to plans  - Avoid NSAIDs 10 days before surgery  - Avoid alcohol day before surgery  - CBC Today       - No identified additional risk factors other than previously addressed    Otherwise, patient appears to be medically optimized for upcoming procedure, assuming appropriate medical supervision onsite for anesthesia  or other developments.           Patient was seen and plan of care was discussed with Dr. Juma Gallardo DO  09/12/23 12:37 PM   [facility]   Clinic phone [phone]        Addendum: Past histories included in Epic as of this documentation      Patient Active Problem List   Diagnosis    Seizures (H)    increased alkaline phosphatase (bone specific)    History of ischemic colitis    Osteoporosis    Prediabetes    At high risk for breast cancer    Achilles bursitis or tendinitis    Plantar fascial fibromatosis    Flat foot    Juvenile myoclonic epilepsy (H)    Cerebral seizure (H)    Encounter for screening mammogram for high-risk patient    Family history of malignant neoplasm of breast    Colon cancer (H)       Past Medical History:   Diagnosis Date    Adenomatous colon polyp 2011    tubular adenoma    At high risk for breast cancer 02/16/2017    35.4% lifetime risk by Philip model    Epilepsy (H)     Fracture of metatarsal bone of left foot 07/2014    Fracture, radius 1990    and ulnar styloid fracture    GIB (gastrointestinal bleeding) 04/01/2011    Intractable chronic migraine without aura     Kidney stones 1970, 1975    during pregnancy    Malignant neoplasm of hepatic flexure (H)     Migraine     Osteoporosis 2016    T score -4.1    Pneumonia     Prediabetes        Past Surgical History:   Procedure Laterality Date    COLONOSCOPY  3/31/2011    Procedure:COLONOSCOPY; Surgeon:PACO DIETRICH; Location: GI    COLONOSCOPY  3/31/2011    Procedure:COMBINED COLONOSCOPY, REMOVE TUMOR/POLYP/LESION BY SNARE; Surgeon:PACO DIETRICH; Location: GI    COLONOSCOPY  2017    had polyps - path not available - repeat 3-5 yrs     COLONOSCOPY N/A 12/9/2022    Procedure: COLONOSCOPY, WITH HOT POLYPECTOMY;  Surgeon: Eldon Nicholas MD;  [place] [facility] [place]    [facility] COLECTOMY Right 2/28/2023    Procedure: Robotic right hemicolectomy, appendectomy;   "Surgeon: Lalit Cardenas MD;  Location: [facility] OR    ESOPHAGOSCOPY, GASTROSCOPY, DUODENOSCOPY (EGD), COMBINED  3/31/2011    Procedure:COMBINED ESOPHAGOSCOPY, GASTROSCOPY, DUODENOSCOPY (EGD); Surgeon:PACO DIETRICH; Location:[facility]    HC BREATH HYDROGEN TEST  2011    Procedure:HYDROGEN BREATH TEST; Surgeon:MANDIE BRADY; Location:[facility]    HYSTERECTOMY   or     Partial hysterectomy , urethral tube \"widened\"     ORTHOPEDIC SURGERY      L wrist       Current Outpatient Medications   Medication Sig Dispense Refill    CALCIUM 500 +D 500-400 MG-UNIT TABS Take 1 tablet by mouth daily at 2 pm 180 tablet 3    cholecalciferol (VITAMIN D) 1000 UNIT tablet Take 2,000 Units by mouth daily at 2 pm 90 tablet 3    cyclobenzaprine (FLEXERIL) 5 MG tablet as needed      ibuprofen (ADVIL/MOTRIN) 600 MG tablet Take 1 tablet (600 mg) by mouth every 8 hours as needed for moderate pain (4-6) 30 tablet 0    levETIRAcetam (KEPPRA) 750 MG tablet PT REPORTS TAPERING DOWN: TAKE 1 & 1/2 TABS IN THE MORNING, AND 2 TABS IN THE EVENING. (Patient taking differently: Take 1,125 mg by mouth 2 times daily) 105 tablet 3    Multiple Vitamins-Minerals (CENTRUM SILVER) per tablet Take 1 tablet by mouth every morning         Family History   Problem Relation Age of Onset    Breast Cancer Mother 30        lumpectomy and chemo; also \"mass in ovary\"    Colon Polyps Father     Osteoporosis Sister     Breast Cancer Sister 55    Leukemia Sister 52    Coronary Artery Disease Brother     Colon Polyps Brother     Coronary Artery Disease Early Onset Brother 50    Prostate Cancer Brother     Gastrointestinal Disease Son         intestinal transplant    Diabetes Maternal Aunt         multiple mat aunts    Ovarian Cancer Maternal Aunt 50         of ovarian cancer in 50s or 60s, unknown    Breast Cancer Paternal Aunt 36         of breast cancer recurrence in 60s    Bone Cancer Niece 19    Prostate Cancer Cousin 65    Breast Cancer " Cousin 30        paternal cousin, BRCA1+ by report    Breast Cancer Cousin 40        paternal cousin, BRCA1+ by report    Breast Cancer Cousin 35        paternal cousin, BRCA1+ by report    Genetic Disorder Cousin         paternal male cousin, BRCA1+ by report    Ovarian Cancer Cousin         paternal cousin, BRCA1+ by report    Anesthesia Reaction No family hx of     Venous thrombosis No family hx of        Social History     Social History Narrative    Lives in a house, has  3 children,       lost 1 child, retired        Tobacco Use    Smoking status: Former     Packs/day: 1.00     Years: 10.00     Pack years: 10.00     Types: Cigarettes     Start date: 1994     Quit date: 2009     Years since quittin.0    Smokeless tobacco: Never    Tobacco comments:     On and off for a total of 10 years of smoking    Substance and Sexual Activity    Alcohol use: Yes     Comment: 1x/month    Drug use: No    Sexual activity: Not Currently     Partners: Male     Birth control/protection: Post-menopausal                                   Answers submitted by the patient for this visit:  Symptoms you have experienced in the last 30 days (Submitted on 2023)  General Symptoms: No  Skin Symptoms: No  HENT Symptoms: No  EYE SYMPTOMS: No  HEART SYMPTOMS: No  LUNG SYMPTOMS: No  INTESTINAL SYMPTOMS: No  URINARY SYMPTOMS: No  GYNECOLOGIC SYMPTOMS: No  BREAST SYMPTOMS: No  SKELETAL SYMPTOMS: No  BLOOD SYMPTOMS: No  NERVOUS SYSTEM SYMPTOMS: No  MENTAL HEALTH SYMPTOMS: No

## 2023-10-04 ENCOUNTER — E-VISIT (OUTPATIENT)
Dept: URGENT CARE | Facility: CLINIC | Age: 73
End: 2023-10-04
Payer: COMMERCIAL

## 2023-10-04 ENCOUNTER — MYC MEDICAL ADVICE (OUTPATIENT)
Dept: FAMILY MEDICINE | Facility: CLINIC | Age: 73
End: 2023-10-04

## 2023-10-04 ENCOUNTER — E-VISIT (OUTPATIENT)
Dept: URGENT CARE | Facility: CLINIC | Age: 73
End: 2023-10-04

## 2023-10-04 DIAGNOSIS — J02.9 PHARYNGITIS, UNSPECIFIED ETIOLOGY: Primary | ICD-10-CM

## 2023-10-04 PROCEDURE — 99207 PR NON-BILLABLE SERV PER CHARTING: CPT | Performed by: FAMILY MEDICINE

## 2023-10-04 PROCEDURE — 99421 OL DIG E/M SVC 5-10 MIN: CPT | Performed by: FAMILY MEDICINE

## 2023-10-04 NOTE — PATIENT INSTRUCTIONS
Sore Throat: Care Instructions  Overview     Infection by bacteria or a virus causes most sore throats. Cigarette smoke, dry air, air pollution, allergies, and yelling can also cause a sore throat. Sore throats can be painful and annoying. Fortunately, most sore throats go away on their own. If you have a bacterial infection, your doctor may prescribe antibiotics.  Follow-up care is a key part of your treatment and safety. Be sure to make and go to all appointments, and call your doctor if you are having problems. It's also a good idea to know your test results and keep a list of the medicines you take.  How can you care for yourself at home?  If your doctor prescribed antibiotics, take them as directed. Do not stop taking them just because you feel better. You need to take the full course of antibiotics.  Gargle with warm salt water several times a day to help reduce swelling and relieve pain. Mix 1/2 teaspoon of salt in 1 cup of warm water.  Take an over-the-counter pain medicine, such as acetaminophen (Tylenol), ibuprofen (Advil, Motrin), or naproxen (Aleve). Read and follow all instructions on the label.  Be careful when taking over-the-counter cold or flu medicines and Tylenol at the same time. Many of these medicines have acetaminophen, which is Tylenol. Read the labels to make sure that you are not taking more than the recommended dose. Too much acetaminophen (Tylenol) can be harmful.  Drink plenty of fluids. Fluids may help soothe an irritated throat. Hot fluids, such as tea or soup, may help decrease throat pain.  Use over-the-counter throat lozenges to soothe pain. Regular cough drops or hard candy may also help. These should not be given to young children because of the risk of choking.  Do not smoke or allow others to smoke around you. If you need help quitting, talk to your doctor about stop-smoking programs and medicines. These can increase your chances of quitting for good.  Use a vaporizer or  "humidifier to add moisture to your bedroom. Follow the directions for cleaning the machine.  When should you call for help?   Call your doctor now or seek immediate medical care if:    You have trouble breathing.     Your sore throat gets much worse on one side.     You have new or worse trouble swallowing.     You have a new or higher fever.   Watch closely for changes in your health, and be sure to contact your doctor if you do not get better as expected.  Where can you learn more?  Go to https://www.[employer].net/patiented  Enter U420 in the search box to learn more about \"Sore Throat: Care Instructions.\"  Current as of: March 1, 2023               Content Version: 13.7    4210-2610 [employer].   Care instructions adapted under license by your healthcare professional. If you have questions about a medical condition or this instruction, always ask your healthcare professional. [employer] disclaims any warranty or liability for your use of this information.      Meme,    Thank you for choosing us for your care. I have placed an order for a lab test(s). View your full visit summary for details by clicking on the link below. You can schedule a lab only appointment right here in [employer], or by calling [phone].    You will receive your lab results and next steps via [employer] when the lab results return.    Sincerely,  Abhijit Hernandez MD     "

## 2023-10-05 ENCOUNTER — VIRTUAL VISIT (OUTPATIENT)
Dept: URGENT CARE | Facility: CLINIC | Age: 73
End: 2023-10-05
Payer: COMMERCIAL

## 2023-10-05 DIAGNOSIS — U07.1 INFECTION DUE TO 2019 NOVEL CORONAVIRUS: Primary | ICD-10-CM

## 2023-10-05 PROCEDURE — 99441 PR PHYSICIAN TELEPHONE EVALUATION 5-10 MIN: CPT | Mod: 95

## 2023-10-05 RX ORDER — KETOROLAC TROMETHAMINE 5 MG/ML
1 SOLUTION OPHTHALMIC 2 TIMES DAILY
COMMUNITY
Start: 2023-09-27 | End: 2024-03-07

## 2023-10-05 RX ORDER — PREDNISOLONE ACETATE 10 MG/ML
1 SUSPENSION/ DROPS OPHTHALMIC 2 TIMES DAILY
COMMUNITY
Start: 2023-09-27 | End: 2024-06-25

## 2023-10-05 RX ORDER — MOXIFLOXACIN 5 MG/ML
1 SOLUTION/ DROPS OPHTHALMIC 2 TIMES DAILY
COMMUNITY
Start: 2023-09-27 | End: 2024-03-07

## 2023-10-05 NOTE — PATIENT INSTRUCTIONS
Symptoms began October 1  Stay home and away from others through October 6  You may be around others wearing a well fitting mask October 7-11  Your isolation restrictions are over on October 12 as long as your symptoms are improving and you have been fever free for 24 hours, even if you still test positive for COVID.  However, if you test negative twice, at least 48 hours apart between days 6-10, you can stop wearing your mask earlier    Paxlovid can cause a rebound. This can happen if there is enough virus left in your body when the course of medication is complete that your symptoms develop again. If you start to feel ill again soon after finishing your course of Paxlovid, please repeat the above isolation to prevent spreading the virus to others.    Check O2 levels. If your number stays at 90 or below at rest, go to the emergency room.    Visit the CDC websites for more information and most up to date guidelines:  www.cdc.gov/coronavirus/2019-ncov/your-health/isolation.html  www.cdc.gov/coronavirus/2019-ncov/hcp/duration-isolation.html

## 2023-10-05 NOTE — PROGRESS NOTES
"Assessment & Plan     Infection due to 2019 novel coronavirus  - nirmatrelvir and ritonavir (PAXLOVID) 300 mg/100 mg therapy pack; Take 3 tablets by mouth 2 times daily for 5 days    No follow-ups on file.   COVID-19 positive patient.  Encounter for consideration of medication intervention. Patient does qualify for a prescription. Full discussion with patient including medication options, risks and benefits. Potential drug interactions reviewed with patient.     Treatment Planned: Paxlovid  Temporary change to home medications: none     Estimated body mass index is 30.31 kg/m  as calculated from the following:    Height as of 2/28/23: 1.575 m (5' 2\").    Weight as of 9/12/23: 75.2 kg (165 lb 11.2 oz).  GFR Estimate   Date Value Ref Range Status   03/02/2023 >90 >60 mL/min/1.73m2 Final     Comment:     eGFR calculated using 2021 CKD-EPI equation.   09/18/2020 81 >60 mL/min/[1.73_m2] Final     Comment:     Non  GFR Calc  Starting 12/18/2018, serum creatinine based estimated GFR (eGFR) will be   calculated using the Chronic Kidney Disease Epidemiology Collaboration   (CKD-EPI) equation.       GFR, ESTIMATED POCT   Date Value Ref Range Status   12/29/2022 >60 >60 mL/min/1.73m2 Final     ALT   Date Value Ref Range Status   02/13/2023 22 10 - 35 U/L Final   04/15/2019 22 0 - 50 U/L Final     AST   Date Value Ref Range Status   02/13/2023 29 10 - 35 U/L Final   04/15/2019 20 0 - 45 U/L Final     Alkaline Phosphatase   Date Value Ref Range Status   02/13/2023 88 35 - 104 U/L Final   04/15/2019 92 40 - 150 U/L Final     Lab Results   Component Value Date    [id number] Negative 02/28/2023     MARIA L [facility]    Subjective   Meme Butts is a 73 year old who presents for the following health issues    HPI    Meme presents via telephone after testing positive for COVID-19.  Symptoms first began 4 or 5 days ago.  She had a sore throat and pain in her tonsils, " postnasal drainage, cough that feels it is coming from her throat.  She is also noted sinus pain and pressure.  Symptoms were much worse yesterday and overall today she does not feel she is improving a whole lot.    Review of Systems   ROS negative except as stated above.      Objective    Physical Exam   healthy, alert and no distress  PSYCH: Alert and oriented times 3; coherent speech, normal   rate and volume, able to articulate logical thoughts, able   to abstract reason, no tangential thoughts, no hallucinations   or delusions. Affect is normal and pleasant  RESP: No cough, no audible wheezing, able to talk in full sentences  Remainder of exam unable to be completed due to telephone visits    Call duration: 9 min  Provider location: offsite at home, [facility]    No results found for any visits on 10/05/23.

## 2023-11-05 ENCOUNTER — HEALTH MAINTENANCE LETTER (OUTPATIENT)
Age: 73
End: 2023-11-05

## 2023-11-28 ENCOUNTER — PATIENT OUTREACH (OUTPATIENT)
Dept: GASTROENTEROLOGY | Facility: CLINIC | Age: 73
End: 2023-11-28
Payer: COMMERCIAL

## 2023-11-28 DIAGNOSIS — Z12.11 SPECIAL SCREENING FOR MALIGNANT NEOPLASMS, COLON: Primary | ICD-10-CM

## 2023-11-28 NOTE — PROGRESS NOTES
"CRC Screening Colonoscopy Referral Review    Patient meets the inclusion criteria for screening colonoscopy standing order.    Ordering/Referring Provider:  Dr. Maria Esther Miranda    BMI: Estimated body mass index is 30.31 kg/m  as calculated from the following:    Height as of 2/28/23: 1.575 m (5' 2\").    Weight as of 9/12/23: 75.2 kg (165 lb 11.2 oz).     Sedation:  Does patient have any of the following conditions affecting sedation?  No medical conditions affecting sedation.    Previous Scopes:  Any previous recommendations or follow up needs based on previous scope?  na / No recommendations.    Medical Concerns to Postpone Order:  Does patient have any of the following medical concerns that should postpone/delay colonoscopy referral?  No medical conditions affecting colonoscopy referral.    Final Referral Details:  Based on patient's medical history patient is appropriate for referral order with moderate sedation. If patient's BMI > 50 do not schedule in [facility].  "

## 2023-12-27 ENCOUNTER — TELEPHONE (OUTPATIENT)
Dept: GASTROENTEROLOGY | Facility: CLINIC | Age: 73
End: 2023-12-27
Payer: COMMERCIAL

## 2023-12-27 DIAGNOSIS — Z12.11 SPECIAL SCREENING FOR MALIGNANT NEOPLASMS, COLON: Primary | ICD-10-CM

## 2023-12-27 NOTE — TELEPHONE ENCOUNTER
"Endoscopy Scheduling Screen    Have you had a positive Covid test in the last 14 days?  No    Are you active on MyChart?   Yes    What insurance is in the chart?  Other:  [facility]    Ordering/Referring Provider: Maria Esther Miranda MD     (If ordering provider performs procedure, schedule with ordering provider unless otherwise instructed. )    BMI: Estimated body mass index is 30.31 kg/m  as calculated from the following:    Height as of 2/28/23: 1.575 m (5' 2\").    Weight as of 9/12/23: 75.2 kg (165 lb 11.2 oz).     Sedation Ordered  moderate sedation.   If patient BMI > 50 do not schedule in [facility].    If patient BMI > 45 do not schedule at [facility].    Are you taking methadone or Suboxone?  No    Are you taking any prescription medications for pain 3 or more times per week?   NO - No RN review required.    Do you have a history of malignant hyperthermia or adverse reaction to anesthesia?  No    (Females) Are you currently pregnant?   No     Have you been diagnosed or told you have pulmonary hypertension?   No    Do you have an LVAD?  No    Have you been told you have moderate to severe sleep apnea?  No    Have you been told you have COPD, asthma, or any other lung disease?  No    Do you have any heart conditions?  No     Have you ever had an organ transplant?   No    Have you ever had or are you awaiting a heart or lung transplant?   No    Have you had a stroke or transient ischemic attack (TIA aka \"mini stroke\" in the last 6 months?   No    Have you been diagnosed with or been told you have cirrhosis of the liver?   No    Are you currently on dialysis?   No    Do you need assistance transferring?   No    BMI: Estimated body mass index is 30.31 kg/m  as calculated from the following:    Height as of 2/28/23: 1.575 m (5' 2\").    Weight as of 9/12/23: 75.2 kg (165 lb 11.2 oz).     Is patients BMI > 40 and scheduling location [facility]?  No    Do you take an injectable medication for weight loss or diabetes (excluding " insulin)?  No    Do you take the medication Naltrexone?  No    Do you take blood thinners?  No       Prep   Are you currently on dialysis or do you have chronic kidney disease?  No    Do you have a diagnosis of diabetes?  No    Do you have a diagnosis of cystic fibrosis (CF)?  No    On a regular basis do you go 3 -5 days between bowel movements?  No    BMI > 40?  No    Preferred Pharmacy:      [facility]/pharmacy #[id number] - [place], [place] - [address]  [address] [address]  Phone: [phone] Fax: [phone]      Final Scheduling Details   Colonoscopy prep sent?  Standard MiraLAX    Procedure scheduled  Colonoscopy    Surgeon:  MARY     Date of procedure:  3/20     Pre-OP / PAC:   No - Not required for this site.    Location  [facility] - [facility] - Per order.    Sedation   MAC/Deep Sedation  Next available, pt okay with MAC      Patient Reminders:   You will receive a call from a Nurse to review instructions and health history.  This assessment must be completed prior to your procedure.  Failure to complete the Nurse assessment may result in the procedure being cancelled.      On the day of your procedure, please designate an adult(s) who can drive you home stay with you for the next 24 hours. The medicines used in the exam will make you sleepy. You will not be able to drive.      You cannot take public transportation, ride share services, or non-medical taxi service without a responsible caregiver.  Medical transport services are allowed with the requirement that a responsible caregiver will receive you at your destination.  We require that drivers and caregivers are confirmed prior to your procedure.

## 2024-02-19 NOTE — PROGRESS NOTES
{PROVIDER CHARTING PREFERENCE:973719}    Steve Valentine is a 74 year old, presenting for the following health issues:  No chief complaint on file.  {(!) Visit Details have not yet been documented.  Please enter Visit Details and then use this list to pull in documentation. (Optional):645700}  HPI     {MA/LPN/RN Pre-Provider Visit Orders- hCG/UA/Strep (Optional):084817}  {SUPERLIST (Optional):831274}  {additonal problems for provider to add (Optional):463196}    {ROS Picklists (Optional):075496}      Objective    There were no vitals taken for this visit.  There is no height or weight on file to calculate BMI.  Physical Exam   {Exam List (Optional):180548}    {Diagnostic Test Results (Optional):471641}        Signed Electronically by: Maria Esther Miranda MD PhD  {Email feedback regarding this note to [email or web address]   :869622}

## 2024-02-21 ENCOUNTER — OFFICE VISIT (OUTPATIENT)
Dept: FAMILY MEDICINE | Facility: CLINIC | Age: 74
End: 2024-02-21
Payer: COMMERCIAL

## 2024-02-21 VITALS
HEART RATE: 104 BPM | BODY MASS INDEX: 30.86 KG/M2 | OXYGEN SATURATION: 96 % | WEIGHT: 168.7 LBS | DIASTOLIC BLOOD PRESSURE: 80 MMHG | SYSTOLIC BLOOD PRESSURE: 119 MMHG

## 2024-02-21 DIAGNOSIS — E11.9 TYPE 2 DIABETES MELLITUS WITHOUT COMPLICATION, WITHOUT LONG-TERM CURRENT USE OF INSULIN (H): ICD-10-CM

## 2024-02-21 DIAGNOSIS — R05.3 CHRONIC COUGH: Primary | ICD-10-CM

## 2024-02-21 DIAGNOSIS — R09.82 POST-NASAL DRIP: ICD-10-CM

## 2024-02-21 DIAGNOSIS — M79.671 PAIN IN BOTH FEET: ICD-10-CM

## 2024-02-21 DIAGNOSIS — M79.672 PAIN IN BOTH FEET: ICD-10-CM

## 2024-02-21 DIAGNOSIS — M81.0 SENILE OSTEOPOROSIS: ICD-10-CM

## 2024-02-21 DIAGNOSIS — M62.838 MUSCLE SPASM: ICD-10-CM

## 2024-02-21 PROCEDURE — 99215 OFFICE O/P EST HI 40 MIN: CPT | Performed by: FAMILY MEDICINE

## 2024-02-21 PROCEDURE — 99417 PROLNG OP E/M EACH 15 MIN: CPT | Performed by: FAMILY MEDICINE

## 2024-02-21 RX ORDER — MOMETASONE FUROATE MONOHYDRATE 50 UG/1
2 SPRAY, METERED NASAL DAILY
Qty: 17 G | Refills: 1 | Status: SHIPPED | OUTPATIENT
Start: 2024-02-21 | End: 2024-03-27

## 2024-02-21 RX ORDER — RESPIRATORY SYNCYTIAL VIRUS VACCINE 120MCG/0.5
0.5 KIT INTRAMUSCULAR ONCE
Qty: 1 EACH | Refills: 0 | Status: CANCELLED | OUTPATIENT
Start: 2024-02-21 | End: 2024-02-21

## 2024-02-21 RX ORDER — CYCLOBENZAPRINE HCL 5 MG
5 TABLET ORAL 2 TIMES DAILY PRN
Qty: 60 TABLET | Refills: 0 | Status: SHIPPED | OUTPATIENT
Start: 2024-02-21 | End: 2024-07-27

## 2024-02-21 ASSESSMENT — LIFESTYLE VARIABLES: SMOKING_STATUS: 0

## 2024-02-21 ASSESSMENT — ENCOUNTER SYMPTOMS
SHORTNESS OF BREATH: 1
COUGH: 1

## 2024-02-21 NOTE — PATIENT INSTRUCTIONS
Cxr today  See podiatry for feet  See ENT for cough and nasal congestion and PND  Blood work today  Urine test today  Do refresher course for diabetes  Find your glucometer   Use nasonex in both nostrils daily       Get a DEXA      See me in 3-4 wks

## 2024-02-21 NOTE — PROGRESS NOTES
Assessment & Plan     Diabetes mellitus, type 2 (H)  Currently patient is not on any medication has really not paid much attention to her diabetes as she has had multiple other medical problems this past year.  Her last A1c was reasonable in September.  She is not on medication.  She does not check her blood sugars.  We talked about going back to diabetic education for refresher.  I encouraged her to find her glucometer and start doing blood sugars fasting and come back in 4 weeks.  Will also check her urine for protein.  She will need an eye exam at some point.  - Lipid panel reflex to direct LDL Non-fasting; Future  - BASIC METABOLIC PANEL; Future  - Hemoglobin A1c; Future  - Albumin Random Urine Quantitative with Creat Ratio; Future  - Adult Diabetes Education  Referral; Future    Osteoporosis  She fractured her wrist last January 2023.  It was a fragility fracture she should get a DEXA.  We also discussed calcium  - DEXA HIP/PELVIS/SPINE - Future; Future    Chronic cough  Her cough could be from a chronic sinusitis and postnasal drip.  It could also be from reactive airway.  She does have pets perhaps she has a low-grade allergy.  Will have her see ENT, meantime I gave her nasal next to use as a nasal spray.  Will also get a chest x-ray.  - XR Chest 2 Views; Future    Post-nasal drip  As above she could have a chronic sinusitis and some postnasal drip.  Will have her see ENT and I did start Nasonex  - Adult ENT  Referral; Future  - mometasone (NASONEX) 50 MCG/ACT nasal spray; Spray 2 sprays into both nostrils daily    Muscle spasm  She has intermittent muscle spasms and I did refill Flexeril  - cyclobenzaprine (FLEXERIL) 5 MG tablet; Take 1 tablet (5 mg) by mouth 2 times daily as needed for muscle spasms    Pain in both feet  She has pain on the lateral surface of the both feet that extends up onto the dorsum of the foot.  It does not appear to be plantar fasciitis although it acts like that  "as it is worse when she gets up in the morning.  She just got new shoes.  Will have her see podiatry to see if they can offer anything to help with this.  - Orthopedic  Referral; Future      Time note (e5, 40'): The total of my time (on the date of service) for this service was 58 minutes, including discussion/face-to-face, chart review, interpretation not otherwise reported, documentation, and updating of the computerized record.        Return in about 4 weeks (around 3/20/2024) for Routine Visit.    Steve Valentine is a 74 year old, presenting for the following health issues:  Cough (Persistent dry cough in morning, afternoon and evening, every day, often twice a day. Ongoing for years, but has worsened recently. Feels like something is stuck in throat, irritated feeling after talking for a while. No associated fever. ), Patient has had a persistent cough for the past several years that has worsened over the last year. She states the cough has now gotten to the point that it is bothering everyone around her. Her cough seems to worsen in the morning, while eating and at night. She has a chronic runny nose and feels this drips down the back of her throat. She states \"it feels like something is getting stuck.\" She has been coughing so hard at times that she gets a bloody nose. Her cough has been productive of clear sputum and some green sputum in the mornings. She has been using Clear nose spray and Claritin which both seem to help. The cough has not been associated with any fever, chills, sinus pain, sore throat, ear pain, chest pain or shortness of breath. She does not have any known history of allergies but does note that she has both dogs and cats in the house. She denies any history of smoking, vaping or any recreational drug use.    Musculoskeletal Problem (Pt reports trouble walking and foot pain after walking on a vacation to [place] on 1/18/24. Pain in feet persists, hurts when standing up " or walking for any length of time. Radiates up legs and into back after walking for a long time, pt also becomes nauseous.) The patient reports she has been struggling with pain in the outer edges and tops of her feet since her trip to [place] about a month ago. Her pain is worst during the first few steps after rising from a sitting position. She describes the pain as a throbbing sensation. At rest, she rates her pain a 3 or 4 out of 10 in severity, but during those first few steps upon rising, she rates her pain upwards of an 8-10 in severity. She has tried wearing new hard soled shoes, taking ibuprofen and using ice, but has not had any relief. She notes that her feet are always swollen. She is not using compression stockings. She did see orthopedics for similar pain many years ago who diagnosed her with plantar fasciitis. She received an injection at that time with no relief.     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 On 2/28/2023, she underwent laparoscopic robotic-assisted right hemicolectomy and ICG angiography. She did not undergo any chemotherapy. She is scheduled for follow up colonoscopy within the next month.     Osteoporosis   Wrist fracture  2/2023  fell on the ice.   Needs a DXA        Diabetes  - not doing any bs checks at home. Lst A1C was 9/2023 6.5  Did attend diabetic education about 1 yr ago.  Thinks she has a glucometer at home.         2/21/2024     3:03 PM   Additional Questions   Roomed by VARUN LAROSE     Cough  This is a chronic problem. The current episode started more than 1 week ago. The problem occurs every few hours. The problem has been gradually worsening. The cough is Non-productive. There has been no fever. The fever has been present for Less than 1 day. Associated symptoms include shortness of breath. Associated symptoms comments: Pt gets dizzy and once had a bloody nose . She has tried cough syrup and mist (nasal spray) for the  symptoms. The treatment provided mild (nasal spray helped somewhat) relief. She is not a smoker.   Musculoskeletal Problem  This is a new problem. The current episode started 1 to 4 weeks ago. The problem occurs daily. The problem has been rapidly worsening. Associated symptoms include coughing. The symptoms are aggravated by walking. She has tried relaxation (putting feet up helped) for the symptoms. The treatment provided mild relief.   History of Present Illness       Reason for visit:  Yearly  Symptom onset:  More than a month  Symptoms include:  Foot and back pain, cough  Symptom intensity:  Moderate  Symptom progression:  Worsening    She eats 0-1 servings of fruits and vegetables daily.She consumes 1 sweetened beverage(s) daily.She exercises with enough effort to increase her heart rate 9 or less minutes per day.  She exercises with enough effort to increase her heart rate 3 or less days per week.   She is taking medications regularly.               Review of Systems  Constitutional, neuro, ENT, endocrine, pulmonary, cardiac, gastrointestinal, genitourinary, musculoskeletal, integument and psychiatric systems are negative, except as otherwise noted.      Objective    /80 (BP Location: Right arm, Patient Position: Sitting, Cuff Size: Adult Large)   Pulse 104   Wt 76.5 kg (168 lb 11.2 oz)   SpO2 96%   BMI 30.86 kg/m    Body mass index is 30.86 kg/m .  Physical Exam   GENERAL: alert and no distress - raspy cough   HENT: ear canals and TM's normal - mild amt wax - , nose and mouth without ulcers or lesions no sinus or facial pressure or pain   NECK: no adenopathy, no asymmetry, masses, or scars  RESP: lungs clear to auscultation - no rales, rhonchi or wheezes  CV: regular rate and rhythm, normal S1 S2, no S3 or S4, no murmur, click or rub, no peripheral edema  MS: Examination of both feet reveals no tenderness to palpation of the heel nor the lateral surface of the both feet.  She has full range of  motion of the ankles bilaterally.  She has good pulses dorsalis pedis and posterior tib in both feet.  no edema  SKIN: no suspicious lesions or rashes  NEURO: Normal strength and tone, mentation intact and speech normal  PSYCH: mentation appears normal, affect normal/bright  LYMPH: no cervical, supraclavicular, adenopathy            Signed Electronically by: Maria Esther Miranda MD PhD

## 2024-02-21 NOTE — Clinical Note
Please help patient schedule labs, x-ray, and 1 month follow up. Appointment ran very late and we didn't have time before her next appointment. Thanks!

## 2024-02-22 ENCOUNTER — TELEPHONE (OUTPATIENT)
Dept: FAMILY MEDICINE | Facility: CLINIC | Age: 74
End: 2024-02-22
Payer: COMMERCIAL

## 2024-02-23 ENCOUNTER — ANCILLARY PROCEDURE (OUTPATIENT)
Dept: GENERAL RADIOLOGY | Facility: CLINIC | Age: 74
End: 2024-02-23
Attending: FAMILY MEDICINE
Payer: COMMERCIAL

## 2024-02-23 ENCOUNTER — PATIENT OUTREACH (OUTPATIENT)
Dept: ONCOLOGY | Facility: CLINIC | Age: 74
End: 2024-02-23

## 2024-02-23 DIAGNOSIS — R05.3 CHRONIC COUGH: ICD-10-CM

## 2024-02-23 DIAGNOSIS — R91.8 PULMONARY NODULES: Primary | ICD-10-CM

## 2024-02-23 PROCEDURE — 71046 X-RAY EXAM CHEST 2 VIEWS: CPT | Mod: GC | Performed by: RADIOLOGY

## 2024-02-23 NOTE — PROGRESS NOTES
New IP (Interventional Pulmonology) referral rec'd.  Chart reviewed.         New Patient: Interventional Pulmonary (Lung nodule) Nurse Navigator Note    [facility]    Referred to (specialty): Interventional Pulmonary (Lung nodule)    Requested provider (if applicable): n/a    Date Referral Received: 2/24/2024    Evaluation for :  Lung nodule    Clinical History (per Nurse review of records provided):    **BOOK MARKED**    Date TBD:  CT Chest to be scheduled        Exam: XR CHEST 2 VIEWS, 2/23/2024 10:03 AM     Comparison: 3/2/2023, CT chest 3/23/2023     History: Chronic cough     Findings:  PA and lateral views of the chest.     Trachea is midline. Mediastinum is within normal limits.  Cardiopulmonary silhouette is within normal limits. Vague right upper  lobe interstitial and nodular opacities and scarring. No focal  consolidation. Anterior subsegmental atelectasis seen on the lateral  view. There is no pneumothorax or pleural effusion. The upper abdomen  is unremarkable.                                                                      Impression:   1. No new focal consolidation.  2. Right upper lobe nodular opacities as previously demonstrated on CT  3/3/2023. May consider CT chest to evaluate for stability consider  bronchoscopy with BAL to assess for mycobacteria..     I have personally reviewed the examination and initial interpretation  and I agree with the findings.     JUSTICE HAYNES MD    Records Location: Epic     Records Needed: none    Additional testing needed prior to consult: CT Chest and PFT's

## 2024-02-23 NOTE — PROGRESS NOTES
2-23-24 CXR shows pulmonary nodules - will get chest CT to evaluate stability and there is question of mycobacterium infection - will have her see pulmonary.  Maria Esther Miranda MD, PhD

## 2024-03-07 PROBLEM — R56.9 SEIZURE (H): Status: ACTIVE | Noted: 2019-08-15

## 2024-03-07 PROBLEM — U07.1 LAB TEST POSITIVE FOR DETECTION OF COVID-19 VIRUS: Status: ACTIVE | Noted: 2023-10-01

## 2024-03-07 PROBLEM — G43.009 MIGRAINE WITHOUT AURA AND WITHOUT STATUS MIGRAINOSUS, NOT INTRACTABLE: Status: ACTIVE | Noted: 2022-08-22

## 2024-03-07 RX ORDER — BISACODYL 5 MG/1
TABLET, DELAYED RELEASE ORAL
Qty: 4 TABLET | Refills: 0 | Status: SHIPPED | OUTPATIENT
Start: 2024-03-07 | End: 2024-06-25

## 2024-03-07 NOTE — TELEPHONE ENCOUNTER
Patient initially sent instructions for miralax prep. Review of chart states pt has diagnosis of diabetes mellitus type 2. Ordered Golytely prep and new instructions sent to pt.         Standard Golytely Bowel Prep. Instructions were sent   [facility]ARMACY #[id number] - [place] - [address] [facility]

## 2024-03-07 NOTE — TELEPHONE ENCOUNTER
RECORDS STATUS - ALL OTHER DIAGNOSIS      RECORDS RECEIVED FROM: [facility] - Internal records   DATE RECEIVED: 3/7

## 2024-03-08 ENCOUNTER — TELEPHONE (OUTPATIENT)
Dept: GASTROENTEROLOGY | Facility: CLINIC | Age: 74
End: 2024-03-08
Payer: COMMERCIAL

## 2024-03-08 NOTE — TELEPHONE ENCOUNTER
Pre visit planning completed.      Procedure details:    Patient scheduled for Colonoscopy  on 3/20/24.     Arrival time: 0830. Procedure time 0930    Pre op exam needed? N/A    Facility location: [facility]; [address] [place] [address], [address] Floor, [place] [address]    Sedation type: MAC    Indication for procedure: Screening      Chart review:     Electronic implanted devices? No    Recent diagnosis of diverticulitis within the last 6 weeks? No    Diabetic? Yes. Not on diabetic medication    Diabetic medication HOLDING recommendations: (if applicable)  Oral diabetic medications: No  Diabetic injectables: No  Insulin: No      Medication review:    Anticoagulants? No    NSAIDS? No    Other medication HOLDING recommendations:  N/A      Prep for procedure:     Bowel prep recommendation: Standard Golytely    Due to:  diabetes.     Prep instructions sent via Leatha Herman RN     KS    3/7/24  1:35 PM  Note  Patient initially sent instructions for miralax prep. Review of chart states pt has diagnosis of diabetes mellitus type 2. Ordered Golytely prep and new instructions sent to pt.            Standard Golytely Bowel Prep. Instructions were sent   [facility]/PHARMACY #[id number] - [place], [place] - [address]          Shelby Villa RN  Endoscopy Procedure Pre Assessment RN  [phone] option 4

## 2024-03-08 NOTE — TELEPHONE ENCOUNTER
Pre assessment completed for upcoming procedure.      Procedure details:    Patient scheduled for Colonoscopy  on 3/20/24.     Arrival time: 0830. Procedure time 0930     Pre op exam needed? N/A     Facility location: [facility]; [address] [place] [address], [address], [place] [address]     Sedation type: MAC    Designated  policy reviewed. Instructed to have someone stay 24 hours post procedure.       Prep for procedure:     Reviewed procedure prep instructions.     Patient verbalized understanding and had no questions or concerns at this time.        Shelby Villa RN  Endoscopy Procedure Pre Assessment RN  [phone] option 4

## 2024-03-15 ENCOUNTER — ANCILLARY PROCEDURE (OUTPATIENT)
Dept: GENERAL RADIOLOGY | Facility: CLINIC | Age: 74
End: 2024-03-15
Attending: PODIATRIST
Payer: COMMERCIAL

## 2024-03-15 ENCOUNTER — OFFICE VISIT (OUTPATIENT)
Dept: PODIATRY | Facility: CLINIC | Age: 74
End: 2024-03-15
Attending: FAMILY MEDICINE
Payer: COMMERCIAL

## 2024-03-15 VITALS
HEART RATE: 102 BPM | SYSTOLIC BLOOD PRESSURE: 138 MMHG | BODY MASS INDEX: 30.91 KG/M2 | DIASTOLIC BLOOD PRESSURE: 83 MMHG | HEIGHT: 62 IN | WEIGHT: 168 LBS

## 2024-03-15 DIAGNOSIS — M25.572 SINUS TARSI SYNDROME OF BOTH ANKLES: Primary | ICD-10-CM

## 2024-03-15 DIAGNOSIS — M79.671 PAIN IN BOTH FEET: ICD-10-CM

## 2024-03-15 DIAGNOSIS — M19.071 ARTHRITIS OF BOTH FEET: ICD-10-CM

## 2024-03-15 DIAGNOSIS — M19.072 ARTHRITIS OF BOTH FEET: ICD-10-CM

## 2024-03-15 DIAGNOSIS — M20.42 HAMMERTOE OF LEFT FOOT: ICD-10-CM

## 2024-03-15 DIAGNOSIS — M79.672 PAIN IN BOTH FEET: ICD-10-CM

## 2024-03-15 DIAGNOSIS — M25.572 SINUS TARSI SYNDROME OF BOTH ANKLES: ICD-10-CM

## 2024-03-15 DIAGNOSIS — M25.571 SINUS TARSI SYNDROME OF BOTH ANKLES: ICD-10-CM

## 2024-03-15 DIAGNOSIS — M25.571 SINUS TARSI SYNDROME OF BOTH ANKLES: Primary | ICD-10-CM

## 2024-03-15 PROCEDURE — 73630 X-RAY EXAM OF FOOT: CPT | Mod: TC | Performed by: RADIOLOGY

## 2024-03-15 PROCEDURE — 99203 OFFICE O/P NEW LOW 30 MIN: CPT | Performed by: PODIATRIST

## 2024-03-15 ASSESSMENT — PAIN SCALES - GENERAL: PAINLEVEL: WORST PAIN (10)

## 2024-03-15 NOTE — PATIENT INSTRUCTIONS
Thank you for choosing [facility]!    DR CHAUDHRY'S CLINIC:  [facility]   [address]   [place] [address]      TRIAGE LINE: [phone]  APPOINTMENTS: [phone]  RADIOLOGY: [phone]  SET UP SURGERY: [phone]  PHYSICAL THERAPY: [phone]   FAX NUMBER: [phone]  BILLING QUESTIONS: [phone]       Follow up: as needed.     Recommend Superfeet inserts green color.  Can get these at [employer], [employer] or online.    SINUS TARSI SYNDROME  Clinical disorder characterized by specific symptoms and signs localized to the sinus tarsi (known as the  eye of the foot ), which refers to an opening on the outside of the foot between the ankle and heel bone.   HISTORY  First described by George Mata in 1957. He also described a surgical procedure to address this problem (called the SHANAE Mata procedure) that involves removal of all or a portion of the contents of the sinus tarsi.  ETIOLOGY  Cause can be due to an inversion (rolling out) ankle sprain (70-80% of the time) or can be due to a  pinching  or impingement of the soft tissues in the sinus tarsi due to a very pronated (rolling in) foot (20-30% of the time).  CLINICAL PRESENTATION  Patients present with localize pain to the sinus tarsi region with a feeling of instability and aggravation by weight bearing activity. These patients do poorly on uneven surfaces, i.e., grass and gravel. Physical examination reveals pain to palpation of the sinus tarsi with aggravation on foot inversion (turning in) or eversion (turning out). Looseness and instability of the ankle and foot joints may be present as well.  DIAGNOSTIC TESTING  May include x-rays, bone scan, CT scan and MRI evaluation. Injection with local anesthetic is diagnostic for localizing this problem to the sinus tarsi. Many times this is a diagnosis make by excluding other common problems in the foot as definitive  diagnostic findings are rarely present. MRI is probably the one best test to shoe changes in the tissues of the sinus tarsi involving either inflammation or scar tissue from previous injury. Ankle arthroscopy may also be beneficial to directly evaluate the sinus for damaged tissue.  TREATMENT  After a diagnosis is established conservative treatment can be initiated which is generally very effective in eliminating the problem. Treatment may include anti-inflammatories, stable shoes, period of immobilization, ankle sleeve and over-the-counter orthoses. Resistant cases may require a course of oral steroids, series of steroid injectionss, physical therapy or custom orthoses. Rarely is surgery indicated and if needed open surgery (through an incision) or closed surgery (via arthroscopy) can be considered. Excellent results should be expected but surgery is not a panacea and should be considered as a last resort.  SUMMARY  STS is a problem that can occur commonly after an ankle sprain or in someone who has a severly pronated foot. Diagnosis is critical as this will dictate appropriate treatment which can differ significantly from other common problems seen in the foot and ankle. Conservative treatment is usually effective and surgery is rarely needed and should be considered after an adequate and thorough trial of conservative treatment.  EXERCISES  After your physician, physical therapist or  feels your ankle has made progress significant enough to begin more advanced exercises, he or she may recommend some of the exercises that follow. He or she may also advise you to continue with the exercises which you completed in Phase I of your rehabilitation. While completing these exercises, remember:   Restoring tissue flexibility helps normal motion to return to the joints. This allows healthier, less painful movement and activity.   An effective stretch should be held for at least 30 seconds.   A stretch  should never be painful. You should only feel a gentle lengthening or release in the stretched tissue.    RANGE OF MOTION- Ankle Plantar Flexion   Sit with your right / left leg crossed over your opposite knee.   Use your opposite hand to pull the top of your foot and toes toward you.   You should feel a gentle stretch on the top of your foot/ankle. Hold this position for 10 seconds. Repeat 10-12 time.  RANGE OF MOTION - Ankle Eversion   Sit with your right / left ankle crossed over your opposite knee.    your foot with your opposite hand, placing your thumb on the top of your foot and your fingers across the bottom of your foot.   Gently push your foot downward with a slight rotation so your littlest toes rise slightly.   You should feel a gentle stretch on the inside of your ankle. Hold the stretch for 10 seconds.   Repeat 10-12 times  RANGE OF MOTION - Ankle Inversion   Sit with your right / left ankle crossed over your opposite knee.    your foot with your opposite hand, placing your thumb on the bottom of your foot and your fingers across the top of your foot.   Gently pull your foot so the smallest toe comes toward you and your thumb pushes the inside of the ball of your foot away from you.   You should feel a gentle stretch on the outside of your ankle. Hold the stretch for 10 seconds.   Repeat 10-12 times    RANGE OF MOTION - Ankle Dorsiflexion, Active Assisted   Remove shoes and sit on a chair that is preferably not on a carpeted surface.   Place right / left foot under knee. Extend your opposite leg for support.   Keeping your heel down, slide your right / left foot back toward the chair until you feel a stretch at your ankle or calf. If you do not feel a stretch, slide your bottom forward to the edge of the chair, while still keeping your heel down.   Hold this stretch for 10 seconds.   Repeat 10-12 times.     STRETCH - Gastrocsoleus, Standing   Note: This exercise can place a lot of stress on  your foot and ankle. Please complete this exercise only if specifically instructed by your caregiver.   Place the ball of your right / left foot on a step, keeping your other foot firmly on the same step.   Hold on to the wall or a rail for balance.   Slowly lift your other foot, allowing your body weight to press your heel down over the edge of the step.   You should feel a stretch in your right / left calf.   Hold this position for 10 seconds.   Repeat this exercise with a slight bend in your right / left knee.   Repeat 10-12 times   STRENGTHENING EXERCISES - Subtalar Dislocation Phase II   These are some of the exercises you may progress to in your rehabilitation program. Do not begin these until you have your clinician's permission. Although your condition has improved, the Phase I exercises will continue to be helpful and you may continue to complete them. As you complete strengthening exercises, remember:   Strong muscles with good endurance tolerate stress better.   Do the exercises as initially prescribed by your caregiver. Progress slowly with each exercise, gradually increasing the number of repetitions and weight used under his or her guidance.   You may experience muscle soreness or fatigue, but the pain or discomfort you are trying to eliminate should never worsen during these exercises. If this pain does worsen, stop and make certain you are following the directions exactly. If the pain is still present after adjustments, discontinue the exercise until you can discuss the trouble with your clinician.   You may experience muscle soreness or fatigue, but the pain or discomfort you are trying to eliminate should never worsen during these exercises. If this pain does worsen, stop and make certain you are following the directions exactly. If the pain is still present after adjustments, discontinue the exercise until you can discuss the trouble with your clinician.   STRENGTH - Plantar-flexors, Standing    Stand with your feet, shoulder width apart. Steady yourself with a wall or table using as little support as needed.   Keeping your weight evenly spread over the width of your feet, rise up on your toes.*   Hold this position for 10 seconds.   Repeat 10-12 times  *If this is too easy, shift your weight toward your right / left leg until you feel challenged. Ultimately, you may be asked to do this exercise with you right / left foot only.   STRENGTH - Plantar-flexors, Eccentric   Note: This exercise can place a lot of stress on your foot and ankle. Please complete this exercise only if specifically instructed by your caregiver.   Place the balls of your feet on a step. With your hands, use only enough support from a wall or rail to keep your balance.   Keep your knees straight and rise up on your toes.   Slowly shift your weight entirely to your right / left toes and  your opposite foot. Gently and with controlled movement, lower your weight through your right / left foot so that your heel drops below the level of the step. You will feel a slight stretch in the back of your right / left calf at the ending position.   Use the healthy leg to help rise up onto the balls of both feet, then lower weight only on the right / left leg again. Build up to 15 repetitions. Then progress to 3 consecutive sets of 15 repetitions.*   After completing the above exercise, complete the same exercise with a slight knee bend (about 30 degrees). Again, build up to 15 repetitions. Then progress to 3 consecutive sets of 15 repetitions.*   Perform this exercise 3 times per day.   *When you easily complete 3 sets of 15, your physician, physical therapist or  may advise you to add resistance by wearing a backpack filled with additional weight.    OSTEOARTHRITIS OF THE FOOT & ANKLE  Osteoarthritis is a condition characterized by the breakdown and eventual loss of cartilage in one or more joints. Cartilage (the connective  tissue found at the end of the bones in the joints) protects and cushions the bones during movement. When cartilage deteriorates or is lost, symptoms develop that can restrict one s ability to easily perform daily activities.  Osteoarthritis is also known as degenerative arthritis, reflecting its nature to develop as part of the aging process. As the most common form of arthritis, osteoarthritis affects millions of Americans. Some people refer to osteoarthritis simply as arthritis, even though there are many different types of arthritis.  Osteoarthritis appears at various joints throughout the body, including the hands, feet, spine, hips, and knees. In the foot, the disease most frequently occurs in the big toe, although it is also often found in the midfoot and ankle.  CAUSES  Osteoarthritis is considered a  wear and tear  disease because the cartilage in the joint wears down with repeated stress and use over time. As the cartilage deteriorates and gets thinner, the bones lose their protective covering and eventually may rub together, causing pain and inflammation of the joint.  An injury may also lead to osteoarthritis, although it may take months or years after the injury for the condition to develop. For example, osteoarthritis in the big toe is often caused by kicking or jamming the toe, or by dropping something on the toe. Osteoarthritis in the midfoot is often caused by dropping something on it, or by a sprain or fracture. In the ankle, osteoarthritis is usually caused by a fracture and occasionally by a severe sprain.  Sometimes osteoarthritis develops as a result of abnormal foot mechanics such as flat feet or high arches. A flat foot causes less stability in the ligaments (bands of tissue that connect bones), resulting in excessive strain on the joints, which can cause arthritis. A high arch is rigid and lacks mobility, causing a jamming of joints that creates an increased risk of  arthritis.  SYMPTOMS  People with osteoarthritis in the foot or ankle experience, in varying degrees, one or more of the following: Pain and stiffness in the joint, swelling in or near the joint, or difficulty walking or bending the joint.   Some patients with osteoarthritis also develop a bone spur (a bony protrusion) at the affected joint. Shoe pressure may cause pain at the site of a bone spur, and in some cases blisters or calluses may form over its surface. Bone spurs can also limit the movement of the joint.    DIAGNOSIS  In diagnosing osteoarthritis, the foot and ankle surgeon will examine the foot thoroughly, looking for swelling in the joint, limited mobility, and pain with movement. In some cases, deformity and/or enlargement (spur) of the joint may be noted. X-rays may be ordered to evaluate the extent of the disease.  NON-SURGICAL TREATMENT  To help relieve symptoms, the surgeon may begin treating osteoarthritis with one or more of the following non-surgical approaches:  Oral medications. Nonsteroidal anti-inflammatory drugs (NSAIDs), such as ibuprofen, are often helpful in reducing the inflammation and pain. Occasionally a prescription for a steroid medication is needed to adequately reduce symptoms.   Orthotic devices. Custom orthotic devices (shoe inserts) are often prescribed to provide support to improve the foot s mechanics or cushioning to help minimize pain.   Bracing. Bracing, which restricts motion and supports the joint, can reduce pain during walking and help prevent further deformity.   Immobilization. Protecting the foot from movement by wearing a cast or removable cast-boot may be necessary to allow the inflammation to resolve.   Steroid injections. In some cases, steroid injections are applied to the affected joint to deliver anti-inflammatory medication.   Physical therapy. Exercises to strengthen the muscles, especially when the osteoarthritis occurs in the ankle, may give the patient  greater stability and help avoid injury that might worsen the condition.   DO I NEED SURGERY?  When osteoarthritis has progressed substantially or failed to improve with non-surgical treatment, surgery may be recommended. In advanced cases, surgery may be the only option. The goal of surgery is to decrease pain and improve function. The foot and ankle surgeon will consider a number of factors when selecting the procedure best suited to the patient s condition and lifestyle.    [email or web address] or call 9-939-PEDCellce[employer]ix  TOE COVERS/CAPS

## 2024-03-15 NOTE — LETTER
3/15/2024         RE: Meme Butts  [address]  [facility] [id number]        Dear Colleague,    Thank you for referring your patient, Meme Butts, to the [facility]. Please see a copy of my visit note below.    PATIENT HISTORY:  Dr. Miranda requested I see this patient for their foot issue.  Meme Butts is a 74 year old female who presents to clinic for pain to both feet.  Notes has been going on for years.  Worse a few months ago after she was walking around in [place] and was 10 out of 10 pain.  States she gets stiffness in the morning and it takes a while for her joints to get going.  She is tried soaking, elevating and ibuprofen.  Denies specific injury.  Wondering what is causing the pain and what to be done for it.    Review of Systems:  Patient denies fever, chills, rash, wound, numbness, weakness, heart burn, blood in stool, chest pain with activity, calf pain when walking, shortness of breath with activity, chronic cough, easy bleeding/bruising, swelling of ankles, excessive thirst, fatigue, depression, anxiety.  Patient admits to limping, stiffness..     PAST MEDICAL HISTORY:   Past Medical History:   Diagnosis Date     Adenomatous colon polyp 2011    tubular adenoma     At high risk for breast cancer 02/16/2017    35.4% lifetime risk by Philip model     Epilepsy (H)      Fracture of metatarsal bone of left foot 07/2014     Fracture, radius 1990    and ulnar styloid fracture     GIB (gastrointestinal bleeding) 04/01/2011     Intractable chronic migraine without aura      Kidney stones 1970, 1975    during pregnancy     Malignant neoplasm of hepatic flexure (H)      Migraine      Osteoporosis 2016    T score -4.1     Pneumonia      Prediabetes         PAST SURGICAL HISTORY:   Past Surgical History:   Procedure Laterality Date     COLONOSCOPY  3/31/2011    Procedure:COLONOSCOPY; Surgeon:PACO DIETRICH; Location: GI     COLONOSCOPY  3/31/2011     "Procedure:COMBINED COLONOSCOPY, REMOVE TUMOR/POLYP/LESION BY SNARE; Surgeon:PACO DIETRICH; Location: GI     COLONOSCOPY  2017    had polyps - path not available - repeat 3-5 yrs      COLONOSCOPY N/A 12/9/2022    Procedure: COLONOSCOPY, WITH HOT POLYPECTOMY;  Surgeon: Eldon Nicholas MD;  Location: [facility] OR     DAVINCI COLECTOMY Right 2/28/2023    Procedure: Robotic right hemicolectomy, appendectomy;  Surgeon: Lalit Cardenas MD;  Location:  OR     ESOPHAGOSCOPY, GASTROSCOPY, DUODENOSCOPY (EGD), COMBINED  3/31/2011    Procedure:COMBINED ESOPHAGOSCOPY, GASTROSCOPY, DUODENOSCOPY (EGD); Surgeon:PACO DIETRICH; Location: GI     HC BREATH HYDROGEN TEST  6/2/2011    Procedure:HYDROGEN BREATH TEST; Surgeon:MANDIE BRADY; Location: GI     HYSTERECTOMY  1994 or 1995    Partial hysterectomy 1995, urethral tube \"widened\" 1994     ORTHOPEDIC SURGERY      L wrist        MEDICATIONS:   Current Outpatient Medications:      bisacodyl (DULCOLAX) 5 MG EC tablet, Take 2 tablets at 3 pm the day before your procedure. If your procedure is before 11 am, take 2 additional tablets at 11 pm. If your procedure is after 11 am, take 2 additional tablets at 6 am. For additional instructions refer to your colonoscopy prep instructions., Disp: 4 tablet, Rfl: 0     CALCIUM 500 +D 500-400 MG-UNIT TABS, Take 1 tablet by mouth daily at 2 pm, Disp: 180 tablet, Rfl: 3     cholecalciferol (VITAMIN D) 1000 UNIT tablet, Take 2,000 Units by mouth daily at 2 pm, Disp: 90 tablet, Rfl: 3     cyclobenzaprine (FLEXERIL) 5 MG tablet, Take 1 tablet (5 mg) by mouth 2 times daily as needed for muscle spasms, Disp: 60 tablet, Rfl: 0     levETIRAcetam (KEPPRA) 750 MG tablet, PT REPORTS TAPERING DOWN: TAKE 1 & 1/2 TABS IN THE MORNING, AND 2 TABS IN THE EVENING. (Patient taking differently: Take 1,125 mg by mouth 2 times daily), Disp: 105 tablet, Rfl: 3     mometasone (NASONEX) 50 MCG/ACT nasal spray, Spray 2 sprays into both nostrils daily, " Disp: 17 g, Rfl: 1     Multiple Vitamins-Minerals (CENTRUM SILVER) per tablet, Take 1 tablet by mouth every morning, Disp: , Rfl:      polyethylene glycol (GOLYTELY) 236 g suspension, The night before the exam at 6 pm drink an 8-ounce glass every 15 minutes until the jug is half empty. If you arrive before 11 AM: Drink the other half of the Golytely jug at 11 PM night before procedure. If you arrive after 11 AM: Drink the other half of the Golytely jug at 6 AM day of procedure. For additional instructions refer to your colonoscopy prep instructions., Disp: 4000 mL, Rfl: 0     prednisoLONE acetate (PRED FORTE) 1 % ophthalmic suspension, Place 1 drop into both eyes 2 times daily (Patient not taking: Reported on 2024), Disp: , Rfl:      vitamin B-Complex, Take 1 tablet by mouth daily, Disp: , Rfl:      ALLERGIES:  No Known Allergies     SOCIAL HISTORY:   Social History     Socioeconomic History     Marital status:      Spouse name: Quentin     Number of children: 3     Years of education: Not on file     Highest education level: Not on file   Occupational History     Occupation:      Employer: RETIRED   Tobacco Use     Smoking status: Former     Packs/day: 1.00     Years: 10.00     Additional pack years: 0.00     Total pack years: 10.00     Types: Cigarettes     Start date: 1994     Quit date: 2009     Years since quittin.5     Smokeless tobacco: Never     Tobacco comments:     On and off for a total of 10 years of smoking    Substance and Sexual Activity     Alcohol use: Yes     Comment: 1x/month     Drug use: No     Sexual activity: Not Currently     Partners: Male     Birth control/protection: Post-menopausal   Other Topics Concern     Parent/sibling w/ CABG, MI or angioplasty before 65F 55M? Not Asked   Social History Narrative    Lives in a house, has  3 children,       lost 1 child, retired      Social Determinants of Health     Financial Resource Strain:  "Not on file   Food Insecurity: Not on file   Transportation Needs: Not on file   Physical Activity: Sufficiently Active (2020)    Exercise Vital Sign      Days of Exercise per Week: 3 days      Minutes of Exercise per Session: 80 min   Stress: Stress Concern Present (2020)    [place] Islander [place] of Occupational Health - Occupational Stress Questionnaire      Feeling of Stress : To some extent   Social Connections: Not on file   Interpersonal Safety: Low Risk  (2024)    Interpersonal Safety      Do you feel physically and emotionally safe where you currently live?: Yes      Within the past 12 months, have you been hit, slapped, kicked or otherwise physically hurt by someone?: No      Within the past 12 months, have you been humiliated or emotionally abused in other ways by your partner or ex-partner?: No   Housing Stability: Not on file        FAMILY HISTORY:   Family History   Problem Relation Age of Onset     Breast Cancer Mother 30        lumpectomy and chemo; also \"mass in ovary\"     Colon Polyps Father      Osteoporosis Sister      Breast Cancer Sister 55     Leukemia Sister 52     Coronary Artery Disease Brother      Colon Polyps Brother      Coronary Artery Disease Early Onset Brother 50     Prostate Cancer Brother      Gastrointestinal Disease Son         intestinal transplant     Diabetes Maternal Aunt         multiple mat aunts     Ovarian Cancer Maternal Aunt 50         of ovarian cancer in 50s or 60s, unknown     Breast Cancer Paternal Aunt 36         of breast cancer recurrence in 60s     Bone Cancer Niece 19     Prostate Cancer Cousin 65     Breast Cancer Cousin 30        paternal cousin, BRCA1+ by report     Breast Cancer Cousin 40        paternal cousin, BRCA1+ by report     Breast Cancer Cousin 35        paternal cousin, BRCA1+ by report     Genetic Disorder Cousin         paternal male cousin, BRCA1+ by report     Ovarian Cancer Cousin         paternal cousin, BRCA1+ by report " "    Anesthesia Reaction No family hx of      Venous thrombosis No family hx of         EXAM:Vitals: /83   Pulse 102   Ht 1.575 m (5' 2\")   Wt 76.2 kg (168 lb)   BMI 30.73 kg/m        General appearance: Patient is alert and fully cooperative with history & exam.  No sign of distress is noted during the visit.     Psychiatric: Affect is pleasant & appropriate.  Patient appears motivated to improve health.     Respiratory: Breathing is regular & unlabored while sitting.     HEENT: Hearing is intact to spoken word.  Speech is clear.  No gross evidence of visual impairment that would impact ambulation.     Dermatologic: Skin is intact to both lower extremities without significant lesions, rash or abrasion.  No paronychia or evidence of soft tissue infection is noted.     Vascular: DP & PT pulses are intact & regular bilaterally.  No significant edema or varicosities noted.  CFT and skin temperature is normal to both lower extremities.     Neurologic: Lower extremity sensation is intact to light touch.  No evidence of weakness or contracture in the lower extremities.  No evidence of neuropathy.     Musculoskeletal: Patient is ambulatory without assistive device or brace.  Pain on palpation over the sinus tarsi's bilaterally.  Pain on palpation of the third metatarsal phalangeal joints bilaterally.  Pain with midfoot range of motion both feet.  Left fifth toe is in varus rotated position.    Radiographs:  foot xrays bilateral -  I personally reviewed the xrays.  Degenerative arthritic changes through the midfoot and ankle as well as the hindfoot.  No fractures are noted.  Plantar posterior heel spurs are noted bilaterally.     ASSESSMENT:    Pain in both feet  Sinus tarsi syndrome of both ankles  Arthritis of both feet  Hammertoe of left foot     Medical Decision Making/Plan:  Reviewed patient's chart in epic. Talked about arthritis and treatments including orthotics, injections, icing, NSAIDS, bracing, physical " therapy, compounding pain cream, or surgical intervention.    Talked about sinus tarsitis which is early arthritis to the sinus tarsi joint. Talked about treatments including orthotics, icing, compression, NSAIDs, injection, physical therapy with iontophoresis, immobilization, compounding pain cream, permanent ankle bracing, MRI to assess for need for fusion.     Reviewed and discussed causes of hammertoes with patient.  Explained that this can be caused by an overpowering of muscles or by the way we walk.  Discussed conservative treatments such as orthotics, pads, shoe gear.  Explained that sometimes the flexor tendons can be cut to try and straighten the toe and reduce rubbing. This is normally done in office and patient is weight bearing in postop she for 1-2 weeks.  We also discussed surgical intervention to remove the joint and possibly fuse the toe.  Normally patient has a pin sticking out of the toe for about 6 weeks and can not get the foot wet. Patient would have to be minimal weight bearing in cam boot.      Discussed that I think that she has arthritis going on.  Would recommend inserts in the shoe at this time and not going barefoot.  Recommend stiffer soled shoes.  She was given an order for topical pain cream to rub into the feet to help with pain.  We talked about physical therapy and possible injections to help with pain but she wants to hold off on those at this time.  We will get baseline x-rays today.      All questions were answered to patient's satisfaction and they will call further questions or concerns.        Patient risk factor: Patient is at low risk for infection.        Audelia Mars DPM, Podiatry/Foot and Ankle Surgery      Again, thank you for allowing me to participate in the care of your patient.        Sincerely,        Audelia Mars DPM, Podiatry/Foot and Ankle Surgery

## 2024-03-15 NOTE — NURSING NOTE
"Chief Complaint   Patient presents with    Consult     BL feet pain- lateral and medial aspect of the foot and also feels it in the balls of feet       Initial /83   Pulse 102   Ht 1.575 m (5' 2\")   Wt 76.2 kg (168 lb)   BMI 30.73 kg/m   Estimated body mass index is 30.73 kg/m  as calculated from the following:    Height as of this encounter: 1.575 m (5' 2\").    Weight as of this encounter: 76.2 kg (168 lb).  Medications and allergies reviewed.      Dia BARRIOS MA    "

## 2024-03-15 NOTE — PROGRESS NOTES
PATIENT HISTORY:  Dr. Miranda requested I see this patient for their foot issue.  Meme Butts is a 74 year old female who presents to clinic for pain to both feet.  Notes has been going on for years.  Worse a few months ago after she was walking around in [place] and was 10 out of 10 pain.  States she gets stiffness in the morning and it takes a while for her joints to get going.  She is tried soaking, elevating and ibuprofen.  Denies specific injury.  Wondering what is causing the pain and what to be done for it.    Review of Systems:  Patient denies fever, chills, rash, wound, numbness, weakness, heart burn, blood in stool, chest pain with activity, calf pain when walking, shortness of breath with activity, chronic cough, easy bleeding/bruising, swelling of ankles, excessive thirst, fatigue, depression, anxiety.  Patient admits to limping, stiffness..     PAST MEDICAL HISTORY:   Past Medical History:   Diagnosis Date    Adenomatous colon polyp 2011    tubular adenoma    At high risk for breast cancer 02/16/2017    35.4% lifetime risk by [employer] model    Epilepsy (H)     Fracture of metatarsal bone of left foot 07/2014    Fracture, radius 1990    and ulnar styloid fracture    GIB (gastrointestinal bleeding) 04/01/2011    Intractable chronic migraine without aura     Kidney stones 1970, 1975    during pregnancy    Malignant neoplasm of hepatic flexure (H)     Migraine     Osteoporosis 2016    T score -4.1    Pneumonia     Prediabetes         PAST SURGICAL HISTORY:   Past Surgical History:   Procedure Laterality Date    COLONOSCOPY  3/31/2011    Procedure:COLONOSCOPY; Surgeon:PACO DIETRICH; Location:[facility]    COLONOSCOPY  3/31/2011    Procedure:COMBINED COLONOSCOPY, REMOVE TUMOR/POLYP/LESION BY SNARE; Surgeon:PACO DIETRICH; Location:[facility]    COLONOSCOPY  2017    had polyps - path not available - repeat 3-5 yrs     COLONOSCOPY N/A 12/9/2022    Procedure: COLONOSCOPY, WITH HOT POLYPECTOMY;  Surgeon: Cristopher  "MD Eldon;  Location: [facility] OR    DAVINCI COLECTOMY Right 2/28/2023    Procedure: Robotic right hemicolectomy, appendectomy;  Surgeon: Lalit Cardenas MD;  Location:  OR    ESOPHAGOSCOPY, GASTROSCOPY, DUODENOSCOPY (EGD), COMBINED  3/31/2011    Procedure:COMBINED ESOPHAGOSCOPY, GASTROSCOPY, DUODENOSCOPY (EGD); Surgeon:PACO DIETRICH; Location: GI    HC BREATH HYDROGEN TEST  6/2/2011    Procedure:HYDROGEN BREATH TEST; Surgeon:MANDIE BRADY; Location: GI    HYSTERECTOMY  1994 or 1995    Partial hysterectomy 1995, urethral tube \"widened\" 1994    ORTHOPEDIC SURGERY      L wrist        MEDICATIONS:   Current Outpatient Medications:     bisacodyl (DULCOLAX) 5 MG EC tablet, Take 2 tablets at 3 pm the day before your procedure. If your procedure is before 11 am, take 2 additional tablets at 11 pm. If your procedure is after 11 am, take 2 additional tablets at 6 am. For additional instructions refer to your colonoscopy prep instructions., Disp: 4 tablet, Rfl: 0    CALCIUM 500 +D 500-400 MG-UNIT TABS, Take 1 tablet by mouth daily at 2 pm, Disp: 180 tablet, Rfl: 3    cholecalciferol (VITAMIN D) 1000 UNIT tablet, Take 2,000 Units by mouth daily at 2 pm, Disp: 90 tablet, Rfl: 3    cyclobenzaprine (FLEXERIL) 5 MG tablet, Take 1 tablet (5 mg) by mouth 2 times daily as needed for muscle spasms, Disp: 60 tablet, Rfl: 0    levETIRAcetam (KEPPRA) 750 MG tablet, PT REPORTS TAPERING DOWN: TAKE 1 & 1/2 TABS IN THE MORNING, AND 2 TABS IN THE EVENING. (Patient taking differently: Take 1,125 mg by mouth 2 times daily), Disp: 105 tablet, Rfl: 3    mometasone (NASONEX) 50 MCG/ACT nasal spray, Spray 2 sprays into both nostrils daily, Disp: 17 g, Rfl: 1    Multiple Vitamins-Minerals (CENTRUM SILVER) per tablet, Take 1 tablet by mouth every morning, Disp: , Rfl:     polyethylene glycol (GOLYTELY) 236 g suspension, The night before the exam at 6 pm drink an 8-ounce glass every 15 minutes until the jug is half empty. If " you arrive before 11 AM: Drink the other half of the Golytely jug at 11 PM night before procedure. If you arrive after 11 AM: Drink the other half of the Golytely jug at 6 AM day of procedure. For additional instructions refer to your colonoscopy prep instructions., Disp: 4000 mL, Rfl: 0    prednisoLONE acetate (PRED FORTE) 1 % ophthalmic suspension, Place 1 drop into both eyes 2 times daily (Patient not taking: Reported on 2024), Disp: , Rfl:     vitamin B-Complex, Take 1 tablet by mouth daily, Disp: , Rfl:      ALLERGIES:  No Known Allergies     SOCIAL HISTORY:   Social History     Socioeconomic History    Marital status:      Spouse name: Quentin    Number of children: 3    Years of education: Not on file    Highest education level: Not on file   Occupational History    Occupation:      Employer: RETIRED   Tobacco Use    Smoking status: Former     Packs/day: 1.00     Years: 10.00     Additional pack years: 0.00     Total pack years: 10.00     Types: Cigarettes     Start date: 1994     Quit date: 2009     Years since quittin.5    Smokeless tobacco: Never    Tobacco comments:     On and off for a total of 10 years of smoking    Substance and Sexual Activity    Alcohol use: Yes     Comment: 1x/month    Drug use: No    Sexual activity: Not Currently     Partners: Male     Birth control/protection: Post-menopausal   Other Topics Concern    Parent/sibling w/ CABG, MI or angioplasty before 65F 55M? Not Asked   Social History Narrative    Lives in a house, has  3 children,       lost 1 child, retired      Social Determinants of Health     Financial Resource Strain: Not on file   Food Insecurity: Not on file   Transportation Needs: Not on file   Physical Activity: Sufficiently Active (2020)    Exercise Vital Sign     Days of Exercise per Week: 3 days     Minutes of Exercise per Session: 80 min   Stress: Stress Concern Present (2020)    [place]er [place]  "of Occupational Health - Occupational Stress Questionnaire     Feeling of Stress : To some extent   Social Connections: Not on file   Interpersonal Safety: Low Risk  (2024)    Interpersonal Safety     Do you feel physically and emotionally safe where you currently live?: Yes     Within the past 12 months, have you been hit, slapped, kicked or otherwise physically hurt by someone?: No     Within the past 12 months, have you been humiliated or emotionally abused in other ways by your partner or ex-partner?: No   Housing Stability: Not on file        FAMILY HISTORY:   Family History   Problem Relation Age of Onset    Breast Cancer Mother 30        lumpectomy and chemo; also \"mass in ovary\"    Colon Polyps Father     Osteoporosis Sister     Breast Cancer Sister 55    Leukemia Sister 52    Coronary Artery Disease Brother     Colon Polyps Brother     Coronary Artery Disease Early Onset Brother 50    Prostate Cancer Brother     Gastrointestinal Disease Son         intestinal transplant    Diabetes Maternal Aunt         multiple mat aunts    Ovarian Cancer Maternal Aunt 50         of ovarian cancer in 50s or 60s, unknown    Breast Cancer Paternal Aunt 36         of breast cancer recurrence in 60s    Bone Cancer Niece 19    Prostate Cancer Cousin 65    Breast Cancer Cousin 30        paternal cousin, BRCA1+ by report    Breast Cancer Cousin 40        paternal cousin, BRCA1+ by report    Breast Cancer Cousin 35        paternal cousin, BRCA1+ by report    Genetic Disorder Cousin         paternal male cousin, BRCA1+ by report    Ovarian Cancer Cousin         paternal cousin, BRCA1+ by report    Anesthesia Reaction No family hx of     Venous thrombosis No family hx of         EXAM:Vitals: /83   Pulse 102   Ht 1.575 m (5' 2\")   Wt 76.2 kg (168 lb)   BMI 30.73 kg/m        General appearance: Patient is alert and fully cooperative with history & exam.  No sign of distress is noted during the visit.   "   Psychiatric: Affect is pleasant & appropriate.  Patient appears motivated to improve health.     Respiratory: Breathing is regular & unlabored while sitting.     HEENT: Hearing is intact to spoken word.  Speech is clear.  No gross evidence of visual impairment that would impact ambulation.     Dermatologic: Skin is intact to both lower extremities without significant lesions, rash or abrasion.  No paronychia or evidence of soft tissue infection is noted.     Vascular: DP & PT pulses are intact & regular bilaterally.  No significant edema or varicosities noted.  CFT and skin temperature is normal to both lower extremities.     Neurologic: Lower extremity sensation is intact to light touch.  No evidence of weakness or contracture in the lower extremities.  No evidence of neuropathy.     Musculoskeletal: Patient is ambulatory without assistive device or brace.  Pain on palpation over the sinus tarsi's bilaterally.  Pain on palpation of the third metatarsal phalangeal joints bilaterally.  Pain with midfoot range of motion both feet.  Left fifth toe is in varus rotated position.    Radiographs:  foot xrays bilateral -  I personally reviewed the xrays.  Degenerative arthritic changes through the midfoot and ankle as well as the hindfoot.  No fractures are noted.  Plantar posterior heel spurs are noted bilaterally.     ASSESSMENT:    Pain in both feet  Sinus tarsi syndrome of both ankles  Arthritis of both feet  Hammertoe of left foot     Medical Decision Making/Plan:  Reviewed patient's chart in epic. Talked about arthritis and treatments including orthotics, injections, icing, NSAIDS, bracing, physical therapy, compounding pain cream, or surgical intervention.    Talked about sinus tarsitis which is early arthritis to the sinus tarsi joint. Talked about treatments including orthotics, icing, compression, NSAIDs, injection, physical therapy with iontophoresis, immobilization, compounding pain cream, permanent ankle  bracing, MRI to assess for need for fusion.     Reviewed and discussed causes of hammertoes with patient.  Explained that this can be caused by an overpowering of muscles or by the way we walk.  Discussed conservative treatments such as orthotics, pads, shoe gear.  Explained that sometimes the flexor tendons can be cut to try and straighten the toe and reduce rubbing. This is normally done in office and patient is weight bearing in postop she for 1-2 weeks.  We also discussed surgical intervention to remove the joint and possibly fuse the toe.  Normally patient has a pin sticking out of the toe for about 6 weeks and can not get the foot wet. Patient would have to be minimal weight bearing in cam boot.      Discussed that I think that she has arthritis going on.  Would recommend inserts in the shoe at this time and not going barefoot.  Recommend stiffer soled shoes.  She was given an order for topical pain cream to rub into the feet to help with pain.  We talked about physical therapy and possible injections to help with pain but she wants to hold off on those at this time.  We will get baseline x-rays today.      All questions were answered to patient's satisfaction and they will call further questions or concerns.        Patient risk factor: Patient is at low risk for infection.        Audelia Mars DPM, Podiatry/Foot and Ankle Surgery

## 2024-03-18 ENCOUNTER — TELEPHONE (OUTPATIENT)
Dept: GASTROENTEROLOGY | Facility: CLINIC | Age: 74
End: 2024-03-18

## 2024-03-18 ENCOUNTER — LAB (OUTPATIENT)
Dept: LAB | Facility: CLINIC | Age: 74
End: 2024-03-18
Payer: COMMERCIAL

## 2024-03-18 ENCOUNTER — ALLIED HEALTH/NURSE VISIT (OUTPATIENT)
Dept: EDUCATION SERVICES | Facility: CLINIC | Age: 74
End: 2024-03-18
Attending: FAMILY MEDICINE
Payer: COMMERCIAL

## 2024-03-18 DIAGNOSIS — E11.9 TYPE 2 DIABETES MELLITUS WITHOUT COMPLICATION, WITHOUT LONG-TERM CURRENT USE OF INSULIN (H): Primary | ICD-10-CM

## 2024-03-18 LAB
ANION GAP SERPL CALCULATED.3IONS-SCNC: 11 MMOL/L (ref 7–15)
BUN SERPL-MCNC: 14.8 MG/DL (ref 8–23)
CALCIUM SERPL-MCNC: 9.4 MG/DL (ref 8.8–10.2)
CHLORIDE SERPL-SCNC: 104 MMOL/L (ref 98–107)
CHOLEST SERPL-MCNC: 211 MG/DL
CREAT SERPL-MCNC: 0.71 MG/DL (ref 0.51–0.95)
CREAT UR-MCNC: 76.2 MG/DL
DEPRECATED HCO3 PLAS-SCNC: 25 MMOL/L (ref 22–29)
EGFRCR SERPLBLD CKD-EPI 2021: 89 ML/MIN/1.73M2
FASTING STATUS PATIENT QL REPORTED: ABNORMAL
GLUCOSE SERPL-MCNC: 110 MG/DL (ref 70–99)
HBA1C MFR BLD: 6.3 %
HDLC SERPL-MCNC: 54 MG/DL
LDLC SERPL CALC-MCNC: 102 MG/DL
MICROALBUMIN UR-MCNC: <12 MG/L
MICROALBUMIN/CREAT UR: NORMAL MG/G{CREAT}
NONHDLC SERPL-MCNC: 157 MG/DL
POTASSIUM SERPL-SCNC: 4.2 MMOL/L (ref 3.4–5.3)
SODIUM SERPL-SCNC: 140 MMOL/L (ref 135–145)
TRIGL SERPL-MCNC: 273 MG/DL

## 2024-03-18 PROCEDURE — 83036 HEMOGLOBIN GLYCOSYLATED A1C: CPT | Performed by: FAMILY MEDICINE

## 2024-03-18 PROCEDURE — 99000 SPECIMEN HANDLING OFFICE-LAB: CPT | Performed by: PATHOLOGY

## 2024-03-18 PROCEDURE — G0108 DIAB MANAGE TRN  PER INDIV: HCPCS | Performed by: NUTRITIONIST

## 2024-03-18 PROCEDURE — 80061 LIPID PANEL: CPT | Performed by: PATHOLOGY

## 2024-03-18 PROCEDURE — 36415 COLL VENOUS BLD VENIPUNCTURE: CPT | Performed by: PATHOLOGY

## 2024-03-18 PROCEDURE — 80048 BASIC METABOLIC PNL TOTAL CA: CPT | Performed by: PATHOLOGY

## 2024-03-18 PROCEDURE — 82570 ASSAY OF URINE CREATININE: CPT | Performed by: FAMILY MEDICINE

## 2024-03-18 NOTE — PATIENT INSTRUCTIONS
Test glucose a few times per week.    Recommended times to test are fasting and two hours after a meal.    Goals:   fasting  <180 two hours after eating     Normal levels:  <100 fasting  <140 two hours after eating     Normal level for A1C <5.7  5.7-6.4 prediabetes   6.5 or higher is diabetes  Goal is to keep it under 7    Keep up with tracking your diet and increase intake of salads

## 2024-03-18 NOTE — PROGRESS NOTES
Diabetes Self-Management Education & Support    Meme Butts presents today for education related to Type 2 diabetes    Patient is being treated with:  Diet and Exercise  She is accompanied by self    Year of diagnosis: unknown  Referring provider:  Ruben      PATIENT CONCERNS/REASON FOR REFERRAL review      ASSESSMENT:    Taking Medication:     Current Diabetes Management per Patient:  Taking diabetes medications? no    Monitoring  Not currently checking    Patient's most recent   Lab Results   Component Value Date    A1C 6.5 09/12/2023    A1C 6.0 09/18/2020      Patient's A1C goal: <7.0    Got labs today and A1C is not back yet.     Activity: walking but off feet since a trip  Arthritis of the feet    Healthy Eating:     Logs her diet in My Fitness Pal   Keeps calories around 1320 per day  She has lost weight doing this    Latte with 2% milk and a biscotti or scone or toast and usually one egg scrambled or cottage cheese and a peach with few sunflower seeds  Lunch might be cheese and crackers or soup or egg salad sandwich  Dinner roasted veg, chicken, apple  Coffee in afternoon with something sweet like a piece of a brownie  Stops eating around 7pm, 8pm at the latest    Usually a couple glasses of wine when eating out but not at home    Needs to get more salads she says and also fresh fruit    Beverages water, occasional diet coke     Using cough drops lately     She is mindful of the carbs     Problem Solving:      Patient is at risk of hypoglycemia?: NO  Hospitalizations for hyper or hypoglycemia: No      EDUCATION and INSTRUCTION PROVIDED AT THIS VISIT:         Patient-stated goal written and given to Meme Butts.  Verbalized and demonstrated understanding of instructions.     PLAN:  See patient instructions  AVS printed and given to patient    FOLLOW-UP:    As needed     Time spent with patient at today's visit was 45 minutes.      Any diabetes medication dose changes were made via the CDE Protocol  and Collaborative Practice Agreement with [place] and  [facility].  A copy of this encounter was provided to patient's referring provider.

## 2024-03-18 NOTE — TELEPHONE ENCOUNTER
Caller:     Reason for Reschedule/Cancellation   (please be detailed, any staff messages or encounters to note?): ARSONIADIS OUT      Prior to reschedule please review:  Ordering Provider:     TEETEE HAYNES     Sedation Determined: MAC NEXT OPENING PT OK PER TE  Does patient have any ASC Exclusions, please identify?:       Notes on Cancelled Procedure:  Procedure: Lower Endoscopy [Colonoscopy]   Date: 3/20  Location: [facility] [place]; [address] [place] [address], [place] [address]  Surgeon: MARY      Rescheduled: Yes,   Procedure: Lower Endoscopy [Colonoscopy]    Date: 6/19   Location: [facility] [place]; [address] [place] [address], [place] [address]   Surgeon: MARY   Sedation Level Scheduled  MAC ,  Reason for Sedation Level OK PER PT   Instructions updated and sent: Y     Does patient need PAC or Pre -Op Rescheduled? : N       Did you cancel or rescheduled an EUS procedure? No.

## 2024-03-25 NOTE — PROGRESS NOTES
LUNG NODULE & INTERVENTIONAL PULMONARY CLINIC  [facility], [facility]     Meme Butts MRN# [id number]   Age: 74 year old YOB: 1950     Reason for Consultation: Pulmonary nodules    Requesting Physician: Maria Esther Miranda MD PhD  [address] [place] [address]       Assessment and Plan:    1. Scattered pulmonary nodules  Seen on 3/2023 CT Chest following right  hemicolectomy. Repeat CT Chest 3/2024 shows increase in upper and mid lung predominant centrilobular nodules and bronchiectasis, suggestive of indolent infection such as EDDA. This may be contributing to her cough, along with her post nasal drip.   --Plan on bronchoscopy with lavage to look for EDDA.   --Followup to be determined after bronchoscopy.     Shelby Hernandez MD  Interventional Pulmonology  Department of Pulmonary, Allergy, Critical Care and Sleep Medicine   [facility]           History:     Meme Butts is a 74 year old female with sig h/o for colon cancer s/p hemicolectomy 2/2023 who is here for lung nodule(s).    Has persistent dry cough every day for many years that is worse in the past year. Coughs throughout the day and can cough at night as well. Has post nasal drip daily, started nasonex a month ago with improvement but still has post nasal drip. Cough ups clear secretions. In the morning, secretions are thicker.    Can walk go a mile and up a flight of stairs.  Had pneumonia in her 40s, had to be hospitalized for a week but fully recovered. Lost weight after hemicolectomy but gained some back. Appetite is ok if not coughing. Has fatigue.     - Personal hx of cancer: colon cancer  - Tobacco hx: Quit smoking 15 years, smoked for 15-20years, 1ppd  - My interpretation of the images relevant for this visit includes: Increased centrilobular nodules   - My interpretation of the PFT's relevant for this visit includes: Normal pulmonary function 3/2024         Allergies:    No Known  Allergies       Medications:     Current Outpatient Medications   Medication Sig    bisacodyl (DULCOLAX) 5 MG EC tablet Take 2 tablets at 3 pm the day before your procedure. If your procedure is before 11 am, take 2 additional tablets at 11 pm. If your procedure is after 11 am, take 2 additional tablets at 6 am. For additional instructions refer to your colonoscopy prep instructions.    blood glucose (NO BRAND SPECIFIED) lancets standard Use to test blood sugar 1 time daily or as directed.    blood glucose (NO BRAND SPECIFIED) test strip Use to test blood sugar once per day or as directed.    blood glucose monitoring (NO BRAND SPECIFIED) meter device kit Use to test blood sugar once per day or as directed.    CALCIUM 500 +D 500-400 MG-UNIT TABS Take 1 tablet by mouth daily at 2 pm    cholecalciferol (VITAMIN D) 1000 UNIT tablet Take 2,000 Units by mouth daily at 2 pm    cyclobenzaprine (FLEXERIL) 5 MG tablet Take 1 tablet (5 mg) by mouth 2 times daily as needed for muscle spasms    diclofenac (VOLTAREN) 1 % topical gel Apply 2 g topically 4 times daily as needed for moderate pain    levETIRAcetam (KEPPRA) 750 MG tablet PT REPORTS TAPERING DOWN: TAKE 1 & 1/2 TABS IN THE MORNING, AND 2 TABS IN THE EVENING. (Patient taking differently: Take 1,125 mg by mouth 2 times daily)    mometasone (NASONEX) 50 MCG/ACT nasal spray Spray 2 sprays into both nostrils daily    Multiple Vitamins-Minerals (CENTRUM SILVER) per tablet Take 1 tablet by mouth every morning    polyethylene glycol (GOLYTELY) 236 g suspension The night before the exam at 6 pm drink an 8-ounce glass every 15 minutes until the jug is half empty. If you arrive before 11 AM: Drink the other half of the Golytely jug at 11 PM night before procedure. If you arrive after 11 AM: Drink the other half of the Golytely jug at 6 AM day of procedure. For additional instructions refer to your colonoscopy prep instructions.    prednisoLONE acetate (PRED FORTE) 1 % ophthalmic  "suspension Place 1 drop into both eyes 2 times daily (Patient not taking: Reported on 2/21/2024)    vitamin B-Complex Take 1 tablet by mouth daily     No current facility-administered medications for this visit.            Physical Exam:   /82 (BP Location: Right arm, Patient Position: Sitting, Cuff Size: Adult Regular)   Pulse 91   Temp 98  F (36.7  C) (Oral)   Resp 16   Ht 1.57 m (5' 1.81\")   Wt 76.5 kg (168 lb 11.2 oz)   SpO2 97%   BMI 31.04 kg/m    Wt Readings from Last 4 Encounters:   03/28/24 76.5 kg (168 lb 11.2 oz)   03/27/24 77.4 kg (170 lb 9.6 oz)   03/15/24 76.2 kg (168 lb)   02/21/24 76.5 kg (168 lb 11.2 oz)     General: Well appearing  Lungs: Nonlabored breathing  Neuro: Answering questions appropriately  Psych: Normal affect     Imaging/Lab Data   All laboratory and imaging data reviewed.           "

## 2024-03-26 NOTE — PROGRESS NOTES
Assessment & Plan       Diabetes mellitus, type 2 (H)  Her last A1c was reasonable at 6.3.  She has attended diabetic education and is we working her diet.  She is going to start doing glucose monitoring.  I did do a foot exam today which was normal.    Post-nasal drip  She has been using the Nasonex and this has improved some.  She has a appointment with ENT in July.  - mometasone (NASONEX) 50 MCG/ACT nasal spray; Spray 2 sprays into both nostrils daily    Hyperlipidemia LDL goal <100  Her LDL was 102 today, given that she is a diabetic I recommended that she start Lipitor and will recheck a lipid again in 2 months.  - atorvastatin (LIPITOR) 10 MG tablet; Take 1 tablet (10 mg) by mouth daily  - Lipid panel reflex to direct LDL Fasting; Future    Encounter for screening mammogram for breast cancer  Her mammogram is due and that was ordered.  - MA Screen Bilateral w/Paul; Future    Colon Cancer  She this was picked up on colonoscopy in December 2022 and she had resection of the right colon in March 2023.  She did not have chemo.  She is doing well.  She has a colonoscopy scheduled in June 2024.        Return in about 3 months (around 6/27/2024) for Routine preventive.    Time note (e4, 30'): The total of my time (on the date of service) for this service was 34 minutes, including discussion/face-to-face, chart review, interpretation not otherwise reported, documentation, and updating of the computerized record.      Steve Valentine is a 74 year old, presenting for the following health issues:  F/up diabetes     HPI  last seen 2/2024     Diabetes mellitus, type 2 (H)  Went to diabetic eduation.    No medication - will take bs 3x/wk  - had dietary instruction  A1C = 6.3  3/2024     Lipid     no statin     - 3/2024    Osteoporosis  She fractured her wrist last January 2023.  It was a fragility fracture she should get a DEXA.  We also discussed calcium - DXA scheduled Friday  - has been on fosamax in the  past.         Chronic cough  Not as harsh  - CXR showed mild interstitial and nodular opacities.  - CT scheduled tomorrow and then has appt with pulmonary -  voice is thready  - not same       Post-nasal drip  Using nasonex in AM and PM  - is a little better -  still using saline spray  - has ENT appt in July   - clariten using sparingly      Muscle spasm  No further symptoms  - has flexeril available      Pain in both feet  Saw podiatry and was given voltaren cream and needs to get insoles.      Colon cancer -   Had resection of right colon  3/2023 for colon cancer - no chemotherapy  but needs f/up colonoscopy Colonoscopy scheduled in June         Review of Systems  Constitutional, HEENT, cardiovascular, pulmonary, GI, , musculoskeletal, neuro, skin, endocrine and psych systems are negative, except as otherwise noted.      Objective    /73 (BP Location: Right arm, Patient Position: Sitting, Cuff Size: Adult Large)   Pulse 95   Wt 77.4 kg (170 lb 9.6 oz)   SpO2 96%   BMI 31.20 kg/m    There is no height or weight on file to calculate BMI.  Physical Exam   GENERAL: alert and no distress  NECK: no adenopathy, no asymmetry, masses, or scars  RESP: lungs clear to auscultation - no rales, rhonchi or wheezes  CV: regular rate and rhythm, normal S1 S2, no S3 or S4, no murmur, click or rub, no peripheral edema  MS: no gross musculoskeletal defects noted, no edema  SKIN: no suspicious lesions or rashes  NEURO: Normal strength and tone, mentation intact and speech normal  PSYCH: mentation appears normal, affect normal/bright  LYMPH: no cervical, supraclavicular, adenopathy  Diabetic foot exam: normal DP and PT pulses, no trophic changes or ulcerative lesions, normal sensory exam, and normal monofilament exam            Signed Electronically by: Maria Esther Miranda MD PhD

## 2024-03-27 ENCOUNTER — OFFICE VISIT (OUTPATIENT)
Dept: FAMILY MEDICINE | Facility: CLINIC | Age: 74
End: 2024-03-27
Payer: COMMERCIAL

## 2024-03-27 VITALS
SYSTOLIC BLOOD PRESSURE: 108 MMHG | DIASTOLIC BLOOD PRESSURE: 73 MMHG | OXYGEN SATURATION: 96 % | BODY MASS INDEX: 31.2 KG/M2 | HEART RATE: 95 BPM | WEIGHT: 170.6 LBS

## 2024-03-27 DIAGNOSIS — Z12.31 ENCOUNTER FOR SCREENING MAMMOGRAM FOR BREAST CANCER: ICD-10-CM

## 2024-03-27 DIAGNOSIS — R09.82 POST-NASAL DRIP: ICD-10-CM

## 2024-03-27 DIAGNOSIS — E78.5 HYPERLIPIDEMIA LDL GOAL <100: ICD-10-CM

## 2024-03-27 DIAGNOSIS — C18.9 MALIGNANT NEOPLASM OF COLON, UNSPECIFIED PART OF COLON (H): ICD-10-CM

## 2024-03-27 DIAGNOSIS — E11.9 TYPE 2 DIABETES MELLITUS WITHOUT COMPLICATION, WITHOUT LONG-TERM CURRENT USE OF INSULIN (H): Primary | ICD-10-CM

## 2024-03-27 PROCEDURE — 99214 OFFICE O/P EST MOD 30 MIN: CPT | Performed by: FAMILY MEDICINE

## 2024-03-27 RX ORDER — RESPIRATORY SYNCYTIAL VIRUS VACCINE 120MCG/0.5
0.5 KIT INTRAMUSCULAR ONCE
Qty: 1 EACH | Refills: 0 | Status: CANCELLED | OUTPATIENT
Start: 2024-03-27 | End: 2024-03-27

## 2024-03-27 RX ORDER — ATORVASTATIN CALCIUM 10 MG/1
10 TABLET, FILM COATED ORAL DAILY
Qty: 90 TABLET | Refills: 2 | Status: SHIPPED | OUTPATIENT
Start: 2024-03-27 | End: 2024-07-27

## 2024-03-27 RX ORDER — MOMETASONE FUROATE MONOHYDRATE 50 UG/1
2 SPRAY, METERED NASAL DAILY
Qty: 17 G | Refills: 1 | Status: SHIPPED | OUTPATIENT
Start: 2024-03-27

## 2024-03-27 ASSESSMENT — ENCOUNTER SYMPTOMS: COUGH: 1

## 2024-03-27 NOTE — PROGRESS NOTES
Subjective   Meme is a 74 year old, presenting for the following health issues:  Follow Up (1 month follow up), Cough (Pt reports cough persists, but has become less frequent and less severe. Mostly occurs in the morning, occasionally at night. Occasional mucus production in the morning, but mostly dry. No associated fever. ), and Results (Pt completed labs and imaging recenty)      3/27/2024     1:47 PM   Additional Questions   Roomed by VARUN     Via the [employer] questionnaire, the patient has reported the following services have been completed -Eye Exam, this information has been sent to the abstraction team.  Cough  This is a recurrent problem. The problem has been gradually improving. There has been no fever.   History of Present Illness       Reason for visit:  Follow   She is taking medications regularly.                     Objective    /73 (BP Location: Right arm, Patient Position: Sitting, Cuff Size: Adult Large)   Pulse 95   Wt 77.4 kg (170 lb 9.6 oz)   SpO2 96%   BMI 31.20 kg/m    Body mass index is 31.2 kg/m .  Physical Exam               Signed Electronically by: Maria Esther Miranda MD PhD

## 2024-03-28 ENCOUNTER — OFFICE VISIT (OUTPATIENT)
Dept: PULMONOLOGY | Facility: CLINIC | Age: 74
End: 2024-03-28
Attending: INTERNAL MEDICINE
Payer: COMMERCIAL

## 2024-03-28 ENCOUNTER — ANCILLARY PROCEDURE (OUTPATIENT)
Dept: CT IMAGING | Facility: CLINIC | Age: 74
End: 2024-03-28
Attending: FAMILY MEDICINE
Payer: COMMERCIAL

## 2024-03-28 ENCOUNTER — TELEPHONE (OUTPATIENT)
Dept: FAMILY MEDICINE | Facility: CLINIC | Age: 74
End: 2024-03-28

## 2024-03-28 ENCOUNTER — ONCOLOGY VISIT (OUTPATIENT)
Dept: PULMONOLOGY | Facility: CLINIC | Age: 74
End: 2024-03-28
Attending: FAMILY MEDICINE
Payer: COMMERCIAL

## 2024-03-28 ENCOUNTER — PRE VISIT (OUTPATIENT)
Dept: PULMONOLOGY | Facility: CLINIC | Age: 74
End: 2024-03-28

## 2024-03-28 VITALS
BODY MASS INDEX: 31.04 KG/M2 | OXYGEN SATURATION: 97 % | SYSTOLIC BLOOD PRESSURE: 126 MMHG | RESPIRATION RATE: 16 BRPM | TEMPERATURE: 98 F | HEIGHT: 62 IN | HEART RATE: 91 BPM | WEIGHT: 168.7 LBS | DIASTOLIC BLOOD PRESSURE: 82 MMHG

## 2024-03-28 DIAGNOSIS — R91.8 PULMONARY NODULES: ICD-10-CM

## 2024-03-28 PROCEDURE — G0463 HOSPITAL OUTPT CLINIC VISIT: HCPCS | Performed by: STUDENT IN AN ORGANIZED HEALTH CARE EDUCATION/TRAINING PROGRAM

## 2024-03-28 PROCEDURE — 94729 DIFFUSING CAPACITY: CPT | Performed by: INTERNAL MEDICINE

## 2024-03-28 PROCEDURE — 99204 OFFICE O/P NEW MOD 45 MIN: CPT | Performed by: STUDENT IN AN ORGANIZED HEALTH CARE EDUCATION/TRAINING PROGRAM

## 2024-03-28 PROCEDURE — 71250 CT THORAX DX C-: CPT | Performed by: RADIOLOGY

## 2024-03-28 PROCEDURE — 94375 RESPIRATORY FLOW VOLUME LOOP: CPT | Performed by: INTERNAL MEDICINE

## 2024-03-28 PROCEDURE — 94726 PLETHYSMOGRAPHY LUNG VOLUMES: CPT | Performed by: INTERNAL MEDICINE

## 2024-03-28 RX ORDER — LIDOCAINE 40 MG/G
CREAM TOPICAL
Status: CANCELLED | OUTPATIENT
Start: 2024-03-28

## 2024-03-28 ASSESSMENT — PAIN SCALES - GENERAL: PAINLEVEL: NO PAIN (0)

## 2024-03-28 NOTE — NURSING NOTE
"Oncology Rooming Note    March 28, 2024 9:35 AM   Meme Butts is a 74 year old female who presents for:    Chief Complaint   Patient presents with    Oncology Clinic Visit     Pulmonary nodules      Initial Vitals: /82 (BP Location: Right arm, Patient Position: Sitting, Cuff Size: Adult Regular)   Pulse 91   Temp 98  F (36.7  C) (Oral)   Resp 16   Ht 1.57 m (5' 1.81\")   Wt 76.5 kg (168 lb 11.2 oz)   SpO2 97%   BMI 31.04 kg/m   Estimated body mass index is 31.04 kg/m  as calculated from the following:    Height as of this encounter: 1.57 m (5' 1.81\").    Weight as of this encounter: 76.5 kg (168 lb 11.2 oz). Body surface area is 1.83 meters squared.  No Pain (0) Comment: Data Unavailable   No LMP recorded. Patient has had a hysterectomy.  Allergies reviewed: Yes  Medications reviewed: Yes    Medications: Medication refills not needed today.  Pharmacy name entered into [employer]:    [facility] [place], [place] - [address] [place] [address]/[facility] #[id number] [place], [place] - [address] [id number] [place], [place] - [address]    Frailty Screening:   Is the patient here for a new oncology consult visit in cancer care? 1. Yes. Over the past month, have you experienced difficulty or required a caregiver to assist with:   1. Balance, walking or general mobility (including any falls)? NO  2. Completion of self-care tasks such as bathing, dressing, toileting, grooming/hygiene?  NO  3. Concentration or memory that affects your daily life?  NO       Clinical concerns: none    Joanna Bingham"

## 2024-03-29 ENCOUNTER — ANCILLARY PROCEDURE (OUTPATIENT)
Dept: BONE DENSITY | Facility: CLINIC | Age: 74
End: 2024-03-29
Attending: FAMILY MEDICINE
Payer: COMMERCIAL

## 2024-03-29 ENCOUNTER — ANCILLARY PROCEDURE (OUTPATIENT)
Dept: MAMMOGRAPHY | Facility: CLINIC | Age: 74
End: 2024-03-29
Attending: FAMILY MEDICINE
Payer: COMMERCIAL

## 2024-03-29 DIAGNOSIS — Z12.31 ENCOUNTER FOR SCREENING MAMMOGRAM FOR BREAST CANCER: ICD-10-CM

## 2024-03-29 DIAGNOSIS — M81.0 SENILE OSTEOPOROSIS: ICD-10-CM

## 2024-03-29 PROCEDURE — 77080 DXA BONE DENSITY AXIAL: CPT

## 2024-03-29 PROCEDURE — 77063 BREAST TOMOSYNTHESIS BI: CPT | Mod: GC

## 2024-03-29 PROCEDURE — 77067 SCR MAMMO BI INCL CAD: CPT | Mod: GC

## 2024-03-31 LAB
DLCOUNC-%PRED-PRE: 91 %
DLCOUNC-PRE: 16.28 ML/MIN/MMHG
DLCOUNC-PRED: 17.82 ML/MIN/MMHG
ERV-%PRED-PRE: 19 %
ERV-PRE: 0.17 L
ERV-PRED: 0.87 L
EXPTIME-PRE: 6.58 SEC
FEF2575-%PRED-PRE: 79 %
FEF2575-PRE: 1.34 L/SEC
FEF2575-PRED: 1.68 L/SEC
FEFMAX-%PRED-PRE: 96 %
FEFMAX-PRE: 4.89 L/SEC
FEFMAX-PRED: 5.09 L/SEC
FEV1-%PRED-PRE: 99 %
FEV1-PRE: 1.97 L
FEV1FEV6-PRE: 72 %
FEV1FEV6-PRED: 79 %
FEV1FVC-PRE: 72 %
FEV1FVC-PRED: 78 %
FEV1SVC-PRE: 69 %
FEV1SVC-PRED: 70 %
FIFMAX-PRE: 4.19 L/SEC
FRCPLETH-%PRED-PRE: 103 %
FRCPLETH-PRE: 2.7 L
FRCPLETH-PRED: 2.6 L
FVC-%PRED-PRE: 107 %
FVC-PRE: 2.75 L
FVC-PRED: 2.56 L
IC-%PRED-PRE: 153 %
IC-PRE: 2.67 L
IC-PRED: 1.74 L
RVPLETH-%PRED-PRE: 125 %
RVPLETH-PRE: 2.53 L
RVPLETH-PRED: 2.02 L
TLCPLETH-%PRED-PRE: 116 %
TLCPLETH-PRE: 5.38 L
TLCPLETH-PRED: 4.6 L
VA-%PRED-PRE: 101 %
VA-PRE: 4.3 L
VC-%PRED-PRE: 99 %
VC-PRE: 2.84 L
VC-PRED: 2.84 L

## 2024-04-13 DIAGNOSIS — E11.9 TYPE 2 DIABETES MELLITUS WITHOUT COMPLICATION, WITHOUT LONG-TERM CURRENT USE OF INSULIN (H): ICD-10-CM

## 2024-04-16 NOTE — TELEPHONE ENCOUNTER
"FUTURE VISIT INFORMATION      FUTURE VISIT INFORMATION:  Date: 7/15/24  Time: 12pm  Location: [facility]  REFERRAL INFORMATION:  Referring provider:  Maria Esther Miranda MD PhD   Referring providers clinic:  [facility]   Reason for visit/diagnosis  Per Pt, dx post nasal drip, referral from PCP recs in epic     RECORDS REQUESTED FROM:       Clinic name Comments Records Status Imaging Status   [facility]  3/27/24, 2/21/24 - Ov Maria Esther Miranda MD PhD  Epic             \"Please notify/message CSS if patient completed outside imaging prior to scheduled appointment and/or any outside records that might have been missed at pre visit -Thank you\"  "

## 2024-04-22 RX ORDER — BLOOD-GLUCOSE METER
EACH MISCELLANEOUS
Qty: 1 KIT | Refills: 1 | Status: SHIPPED | OUTPATIENT
Start: 2024-04-22

## 2024-05-21 ENCOUNTER — HOSPITAL ENCOUNTER (OUTPATIENT)
Facility: CLINIC | Age: 74
Discharge: HOME | End: 2024-05-21
Attending: INTERNAL MEDICINE | Admitting: INTERNAL MEDICINE
Payer: COMMERCIAL

## 2024-05-21 ENCOUNTER — MYC MEDICAL ADVICE (OUTPATIENT)
Dept: FAMILY MEDICINE | Facility: CLINIC | Age: 74
End: 2024-05-21

## 2024-05-21 VITALS
DIASTOLIC BLOOD PRESSURE: 79 MMHG | SYSTOLIC BLOOD PRESSURE: 94 MMHG | RESPIRATION RATE: 11 BRPM | HEART RATE: 101 BPM | OXYGEN SATURATION: 93 %

## 2024-05-21 LAB
APPEARANCE FLD: ABNORMAL
BACTERIA BRONCH: ABNORMAL
BRONCHOSCOPY: NORMAL
CELL COUNT BODY FLUID SOURCE: ABNORMAL
COLOR FLD: ABNORMAL
GRAM STAIN RESULT: ABNORMAL
LYMPHOCYTES NFR FLD MANUAL: 12 %
MONOS+MACROS NFR FLD MANUAL: 32 %
NEUTS BAND NFR FLD MANUAL: 56 %
PATH REPORT.COMMENTS IMP SPEC: NORMAL
PATH REPORT.COMMENTS IMP SPEC: NORMAL
PATH REPORT.FINAL DX SPEC: NORMAL
PATH REPORT.GROSS SPEC: NORMAL
PATH REPORT.MICROSCOPIC SPEC OTHER STN: NORMAL
PATH REPORT.RELEVANT HX SPEC: NORMAL
WBC # FLD AUTO: 760 /UL

## 2024-05-21 PROCEDURE — 88108 CYTOPATH CONCENTRATE TECH: CPT | Mod: 26 | Performed by: PATHOLOGY

## 2024-05-21 PROCEDURE — 87116 MYCOBACTERIA CULTURE: CPT | Performed by: INTERNAL MEDICINE

## 2024-05-21 PROCEDURE — 87205 SMEAR GRAM STAIN: CPT | Performed by: INTERNAL MEDICINE

## 2024-05-21 PROCEDURE — 87206 SMEAR FLUORESCENT/ACID STAI: CPT | Performed by: INTERNAL MEDICINE

## 2024-05-21 PROCEDURE — 87305 ASPERGILLUS AG IA: CPT | Performed by: INTERNAL MEDICINE

## 2024-05-21 PROCEDURE — 88108 CYTOPATH CONCENTRATE TECH: CPT | Mod: TC | Performed by: INTERNAL MEDICINE

## 2024-05-21 PROCEDURE — 250N000011 HC RX IP 250 OP 636: Performed by: INTERNAL MEDICINE

## 2024-05-21 PROCEDURE — 87102 FUNGUS ISOLATION CULTURE: CPT | Performed by: INTERNAL MEDICINE

## 2024-05-21 PROCEDURE — 87188 SC STD MACROBROTH DIL METH: CPT | Mod: XU | Performed by: INTERNAL MEDICINE

## 2024-05-21 PROCEDURE — 250N000009 HC RX 250: Performed by: INTERNAL MEDICINE

## 2024-05-21 PROCEDURE — 89050 BODY FLUID CELL COUNT: CPT | Performed by: INTERNAL MEDICINE

## 2024-05-21 PROCEDURE — 88312 SPECIAL STAINS GROUP 1: CPT | Mod: 26 | Performed by: PATHOLOGY

## 2024-05-21 PROCEDURE — 31624 DX BRONCHOSCOPE/LAVAGE: CPT | Mod: GC | Performed by: INTERNAL MEDICINE

## 2024-05-21 PROCEDURE — 87798 DETECT AGENT NOS DNA AMP: CPT | Performed by: INTERNAL MEDICINE

## 2024-05-21 PROCEDURE — 87070 CULTURE OTHR SPECIMN AEROBIC: CPT | Performed by: INTERNAL MEDICINE

## 2024-05-21 PROCEDURE — 31624 DX BRONCHOSCOPE/LAVAGE: CPT | Performed by: INTERNAL MEDICINE

## 2024-05-21 RX ORDER — LIDOCAINE HYDROCHLORIDE 10 MG/ML
INJECTION, SOLUTION INFILTRATION; PERINEURAL PRN
Status: DISCONTINUED | OUTPATIENT
Start: 2024-05-21 | End: 2024-05-21 | Stop reason: HOSPADM

## 2024-05-21 RX ORDER — LIDOCAINE 40 MG/G
CREAM TOPICAL
Status: DISCONTINUED | OUTPATIENT
Start: 2024-05-21 | End: 2024-05-21 | Stop reason: HOSPADM

## 2024-05-21 RX ORDER — FENTANYL CITRATE 50 UG/ML
INJECTION, SOLUTION INTRAMUSCULAR; INTRAVENOUS PRN
Status: DISCONTINUED | OUTPATIENT
Start: 2024-05-21 | End: 2024-05-21 | Stop reason: HOSPADM

## 2024-05-21 RX ORDER — MAGNESIUM HYDROXIDE 1200 MG/15ML
LIQUID ORAL PRN
Status: DISCONTINUED | OUTPATIENT
Start: 2024-05-21 | End: 2024-05-21 | Stop reason: HOSPADM

## 2024-05-21 RX ORDER — LIDOCAINE HYDROCHLORIDE 40 MG/ML
INJECTION, SOLUTION RETROBULBAR PRN
Status: DISCONTINUED | OUTPATIENT
Start: 2024-05-21 | End: 2024-05-21 | Stop reason: HOSPADM

## 2024-05-21 ASSESSMENT — ACTIVITIES OF DAILY LIVING (ADL)
ADLS_ACUITY_SCORE: 39
ADLS_ACUITY_SCORE: 39

## 2024-05-21 NOTE — DISCHARGE INSTRUCTIONS
Discharge Instructions after Bronchoscopy    Activity  ___ You had medicine to relax and for pain. You may feel dizzy or sleepy.  For 24 hours:   Do not drive or use heavy equipment.   Do not make important decisions.   Do not drink any alcohol.    Diet  ___ When you can swallow easily, you may go back to your regular diet, medicines  and light exercise.    Follow-up  ___ We took small tissue or fluid samples to study. We will call you with the results in about 10 business days.    Call right away if you have:   Unusual chest pain   Temperature above 100.6  F (37.5  C)   Coughing that does not stop.    If you have severe pain, bleeding, or shortness of breath, go to an emergency room.    If you have questions, call:  Monday to Friday, 8 a.m. to 4:30 p.m.  Adult Pulmonology Clinic: [phone]    After hours:  Hospital: [phone] (Ask for the pulmonary fellow on call)

## 2024-05-21 NOTE — OR NURSING
Patient underwent bronchoscopy with BAL under conscious sedation. Tolerated fair. Specimens sent with RT. VSS on 2 L O2. Report given to endo recovery RN.

## 2024-05-22 LAB
GALACTOMANNAN AG BAL QL: NEGATIVE
GALACTOMANNAN AG SPEC IA-ACNC: 0.19

## 2024-05-23 LAB — SCANNED LAB RESULT: NORMAL

## 2024-05-25 LAB
BACTERIA BRONCH: ABNORMAL
BACTERIA BRONCH: ABNORMAL

## 2024-05-28 DIAGNOSIS — J15.1 PSEUDOMONAL PNEUMONIA (H): ICD-10-CM

## 2024-05-28 DIAGNOSIS — R91.8 PULMONARY NODULES: Primary | ICD-10-CM

## 2024-05-28 RX ORDER — LEVOFLOXACIN 750 MG/1
750 TABLET, FILM COATED ORAL DAILY
Qty: 10 TABLET | Refills: 0 | Status: SHIPPED | OUTPATIENT
Start: 2024-05-28 | End: 2024-06-07

## 2024-05-28 NOTE — PROGRESS NOTES
BAL + psuedomonos - Levofloxacin 750 mg daily x 10 days ordered.     Plan for 3 mo follow-up + CT w Beth Koroma PA-C.

## 2024-06-10 NOTE — TELEPHONE ENCOUNTER
Rescheduled procedure    Patient scheduled for Colonoscopy  on 6/19/24.    Arrival time: 1000. Procedure time 1100    [facility]; [address] [place] [address], 5[address] Floor, [place] [address]    Sedation type: MAC    Pre op exam needed? N/A    Pre-Assessment was completed for previously scheduled procedure. (See documentation below).  No new medical events or medications since last review.     Resent prep instructions via [employer].    RN spoke with Patient  and reviewed information. They have no further questions or concerns at this time.     Shelby Villa RN  Endoscopy Procedure Pre Assessment

## 2024-06-11 DIAGNOSIS — A31.0 MYCOBACTERIUM AVIUM INFECTION (H): Primary | ICD-10-CM

## 2024-06-13 NOTE — CONFIDENTIAL NOTE
DIAGNOSIS:   Mycobacterium avium infection    DATE RECEIVED:  06.18.2024    NOTES (Gather within 2 years) STATUS DETAILS   OFFICE NOTE from referring provider   Internal 06.11.2024 Shelby Hernandez MD    LABS (any labs) Internal    MEDICATION LIST Internal

## 2024-06-14 ENCOUNTER — TELEPHONE (OUTPATIENT)
Dept: INFECTIOUS DISEASES | Facility: CLINIC | Age: 74
End: 2024-06-14
Payer: COMMERCIAL

## 2024-06-14 NOTE — TELEPHONE ENCOUNTER
[facility] Call Center    Phone Message    May a detailed message be left on voicemail: yes     Reason for Call: Other: Pt call regarding her video appt. Per pt she have never done a virtual appt before and wonder if care team can reach out to her on what she need to do in order for her to attend the virtual appt. Please follow up with pt.      Action Taken: Other: ID    Travel Screening: Not Applicable     Date of Service:

## 2024-06-17 NOTE — PROGRESS NOTES
"   GENERAL [facility] CL[facility]           Assessment and Recommendations:   Problem List:    # EDDA pulmonary infection    Discussion:    Meme Butts is a 74 year old female with PMHx of colon cancer (adenocarcinoma of hepatic flexure of colon and family history of BRCA1) diagnosed by screening colonoscopy on 1/10/2023 s/p robotic right hemicolectomy and appendectomy on 2/28/2023 who was referred to our clinic by Dr. Shelby Hernandez (pulmonology) because lung nodule(s) and BAL culture positive for EDDA. She also has PMHx of migraines, seizures, osteoporosis, obesity, prediabetes (Hb A1C 6.3% from 3/18/24), history of GI bleed.     Patient states she has persistent cough started 2 years ago. She initially though it was due to sinus disease and post nasal drip. infection. She states that the cough would be persistent and would happen all day. It was mostly dry but she would have some greenish sputum at times. Cough was so often that it would keep her awake. She started claritin with some improvement. She also has SOB started around the same time of the cough. Activities that would lead to SOB included going up and down stairs. She still keeps herself active and cleans the house, gardens and does yard work. She denies weight loss.    Per my review, the nodules were first identified on CT chest from 12/29/22 which showed upper lobe micronodules and bronchiectasis (bilaterally). Follow up CT chest from 3/3/2023 showed same nodules in addition to at least one new 9 mm nodule in the left upper lobe. The most recent CT chest from 3/28/2024 showed: \"Centrilobular and tree-in-bud nodularity, slightly increased associated with bronchiectasis greatest in the right middle lobe, lingula and lower lobes; findings suggestive of atypical indolent infection such as nonclassic nontuberculous mycobacteria\". Patient underwent PFT study on 3/2024 and, per Dr. Hernandez's interpretation, it was normal. Bronchoscopy performed on 5/21/24. BAL studies " showed: cell count 760 with 56# neutrophils, 12% lymphocytes and 32% macrophages/monocytes. 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  Fungal culture is negative to date. Aspergillus galactomannan negative, Nocardia PCR negative, Nocardia culture negative. 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 Cytology was negative for malignancy and negative for fungal organisms or pneumocystis on GMS stain. Furthermore, the viral cytopathic effect is absent. Today's visit: Patient denies fever or chills. She states that she was started on nasal spray and inhaled steroids. Cough is about 50% better but not resolved. She still has the intermittent greenish sputum production. SOB is unchanged. She states she had cardiovascular work up without abnormality.     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 She will likely require treatment most likely with azithromycin + ethambutol + rifabutin or rifampin. However the final regimen will depend on susceptibilities which are still in process. She will likely not require amikacin because she does not have cavitary lesion.   I will plan to obtain baseline studies before initiation of treatment: ophthalmology referral, audiology referral, EKG, CBC, CMP, Hepatitis B, C and HIV serologies  Given past exposure to TB, I will obtain quantiferon. If positive it will translate latent TB because BAL does not have Mycobacterium tuberculosis. In that case, the rifabutin or rifampin would also treat latent TB  Our clinic pharmacist will co-manage with me and see the patient on a telephone visit to go over the medications in more details and possible interactions  Once I have all results above and also susceptibilities back we can determine initiation of treatment  I will send  message to pulmonology provider to initiate an  airway clearance measures  When the patient is started on treatment she will need to have monthly AFB sputum tests and CT chest every 3 months  When patient is started on treatment shew we will obtain labs (CBC and CMP) in 2 weeks and if stable we will space to every months.   She will also need monitoring with eye exam and audiogram while on treatment    Recommendations discussed with the patient.      Celia Robison MD  Date of Service:  06/18/24    On 06/18/24, I spent 80 min with chart review, patient visit, documentation and coordination of care.         History of Present Illness:   Meme Butts is a 74 year old female with PMHx of colon cancer (adenocarcinoma of hepatic flexure of colon and family history of BRCA1) diagnosed by screening colonoscopy on 1/10/2023 s/p robotic right hemicolectomy and appendectomy on 2/28/2023 who was referred to our clinic by Dr. Shelby Hernandez (pulmonology) because lung nodule(s) and BAL culture positive for EDDA. She also has PMHx of migraines, seizures, osteoporosis, obesity, prediabetes (Hb A1C 6.3% from 3/18/24), history of GI bleed.     Patient states she has persistent cough started 2 years ago. She initially though it was due to sinus disease and post nasal drip. infection. She states that the cough would be persistent and would happen all day. It was mostly dry but she would have some greenish sputum at times. Cough was so often that it would keep her awake. She started claritin with some improvement. She also has SOB started around the same time of the cough. Activities that would lead to SOB included going up and down stairs. She still keeps herself active and cleans the house, gardens and does yard work. She denies weight loss.    Per my review, the nodules were first identified on CT chest from 12/29/22 which showed upper lobe micronodules and bronchiectasis (bilaterally). Follow up CT chest from 3/3/2023 showed same nodules in addition to at least one new 9  "mm nodule in the left upper lobe. The most recent CT chest from 3/28/2024 showed: \"Centrilobular and tree-in-bud nodularity, slightly increased associated with bronchiectasis greatest in the right middle lobe, lingula and lower lobes; findings suggestive of atypical indolent infection such as nonclassic nontuberculous mycobacteria\". Patient underwent PFT study on 3/2024 and, per Dr. Hernandez's interpretation, it was normal. Bronchoscopy performed on 5/21/24. BAL studies showed: cell count 760 with 56# neutrophils, 12% lymphocytes and 32% macrophages/monocytes. 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  Fungal culture is negative to date. Aspergillus galactomannan negative, Nocardia PCR negative, Nocardia culture negative. 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 Cytology was negative for malignancy and negative for fungal organisms or pneumocystis on GMS stain. Furthermore, the viral cytopathic effect is absent.    Patient denies fever or chills. She states that she was started on nasal spray and inhaled steroids. Cough is about 50% better but not resolved. She still has the intermittent greenish sputum production. SOB is unchanged. She states she had cardiovascular work up without abnormality.          Review of Systems:     CONSTITUTIONAL:  No fevers or chills  INTEGUMENTARY/SKIN: NEGATIVE for worrisome rashes, moles or lesions  EYES: Negative for icterus, vision changes or irritation  ENT/MOUTH:  Negative for oral lesions and sore throat  RESPIRATORY:  See HPI  CARDIOVASCULAR:  Negative for chest pain, palpitations and  shortness of breath  GASTROINTESTINAL:  Negative for abdominal pain, nausea, vomiting, diarrhea and constipation  GENITOURINARY:  Negative for dysuria, hematuria, frequency and urgency  MUSCULOSKELETAL: Negative for joint pain, swelling, motion restriction, negative for musculoskeletal pain  NEURO:  Negative for headache, " "altered mental status, numbness or weakness  PSYCHIATRIC: Negative for changes in mood or affect  HEMATOLOGIC/LYMPHATIC: negative for lymphadenopathy or bleeding  ALLERGIC/IMMUNOLOGIC: Negative for allergic reaction   ENDOCRINE: Negative for temperature intolerance, skin/hair changes         Past Medical History:     Past Medical History:   Diagnosis Date    Adenomatous colon polyp     tubular adenoma    At high risk for breast cancer 2017    35.4% lifetime risk by Philip model    Epilepsy (H)     Fracture of metatarsal bone of left foot 2014    Fracture, radius     and ulnar styloid fracture    GIB (gastrointestinal bleeding) 2011    Intractable chronic migraine without aura     Kidney stones ,     during pregnancy    Malignant neoplasm of hepatic flexure (H)     Migraine     Osteoporosis 2016    T score -4.1    Pneumonia     Prediabetes         Surgery R writ after a fracture post fall - has hardware ()  Had cataract surgery in 2023      Allergies:       No Known Allergies          Family History:     Family History   Problem Relation Age of Onset    Breast Cancer Mother 30        lumpectomy and chemo; also \"mass in ovary\"    Colon Polyps Father     Osteoporosis Sister     Breast Cancer Sister 55    Leukemia Sister 52    Coronary Artery Disease Brother     Colon Polyps Brother     Coronary Artery Disease Early Onset Brother 50    Prostate Cancer Brother     Gastrointestinal Disease Son         intestinal transplant    Diabetes Maternal Aunt         multiple mat aunts    Ovarian Cancer Maternal Aunt 50         of ovarian cancer in 50s or 60s, unknown    Breast Cancer Paternal Aunt 36         of breast cancer recurrence in 60s    Bone Cancer Niece 19    Prostate Cancer Cousin 65    Breast Cancer Cousin 30        paternal cousin, BRCA1+ by report    Breast Cancer Cousin 40        paternal cousin, BRCA1+ by report    Breast Cancer Cousin 35        paternal cousin, " BRCA1+ by report    Genetic Disorder Cousin         paternal male cousin, BRCA1+ by report    Ovarian Cancer Cousin         paternal cousin, BRCA1+ by report    Anesthesia Reaction No family hx of     Venous thrombosis No family hx of              Social History:     Social History     Socioeconomic History    Marital status:      Spouse name: Quentin    Number of children: 3    Years of education: Not on file    Highest education level: Not on file   Occupational History    Occupation:      Employer: RETIRED   Tobacco Use    Smoking status: Former     Current packs/day: 0.00     Average packs/day: 1 pack/day for 15.0 years (15.0 ttl pk-yrs)     Types: Cigarettes     Start date: 1994     Quit date: 2009     Years since quittin.8    Smokeless tobacco: Never    Tobacco comments:     On and off for a total of 10 years of smoking    Substance and Sexual Activity    Alcohol use: Yes     Comment: 1x/month    Drug use: No    Sexual activity: Not Currently     Partners: Male     Birth control/protection: Post-menopausal   Other Topics Concern    Parent/sibling w/ CABG, MI or angioplasty before 65F 55M? Not Asked   Social History Narrative    Lives in a house, has  3 children,       lost 1 child, retired      Social Determinants of Health     Financial Resource Strain: Not on file   Food Insecurity: Not on file   Transportation Needs: Not on file   Physical Activity: Sufficiently Active (2020)    Exercise Vital Sign     Days of Exercise per Week: 3 days     Minutes of Exercise per Session: 80 min   Stress: Stress Concern Present (2020)    [employer] - Occupational Stress Questionnaire     Feeling of Stress : To some extent   Social Connections: Not on file   Interpersonal Safety: Low Risk  (2024)    Interpersonal Safety     Do you feel physically and emotionally safe where you currently live?: Yes     Within the past 12 months,  have you been hit, slapped, kicked or otherwise physically hurt by someone?: No     Within the past 12 months, have you been humiliated or emotionally abused in other ways by your partner or ex-partner?: No   Housing Stability: Not on file        HOME/ENVIRONMENT:   Liver with her daughter (48 years old)  Has another daughter 51 years old  Son passed - diverticulitis -> had surgery and went back with removal of small intestine - underwent small bowel transplant - 1 year after transplant he passed  Patient is     OCCUPATION:   Administration - retired    TRAVEL:   Travel across [place] 15 years ago  Originally from [place]    ANIMALS:   One Dog and one cat    TUBERCULOSIS:   Father and and brother were treated for TB 15 years ago. She states she had a quantiferon performed and it was negative.     TOBACCO/ALCOHOL/RECREATIONAL DRUGS:   Smoked 10 years (1 pack a day) - quit 15 years ago  Glass of wine once a week  No drugs         Physical Exam:     Exam:  GENERAL:  Well-developed, well-nourished, not in acute distress.   HEAD: Normocephalic and atraumatic  ENT:  No hearing impairment, oral mucous membranes moist  EYES:  Eyes grossly normal to inspection  LUNGS:  Intermittent cough during visit. Breathing normally in room air  NEUROLOGIC:  Mentation intact and speech normal  PSYCHIATRIC: Mood stable, mentation appears normal, affect normal

## 2024-06-18 ENCOUNTER — PRE VISIT (OUTPATIENT)
Dept: INFECTIOUS DISEASES | Facility: CLINIC | Age: 74
End: 2024-06-18
Payer: COMMERCIAL

## 2024-06-18 ENCOUNTER — VIRTUAL VISIT (OUTPATIENT)
Dept: INFECTIOUS DISEASES | Facility: CLINIC | Age: 74
End: 2024-06-18
Attending: STUDENT IN AN ORGANIZED HEALTH CARE EDUCATION/TRAINING PROGRAM
Payer: COMMERCIAL

## 2024-06-18 ENCOUNTER — ANESTHESIA EVENT (OUTPATIENT)
Dept: SURGERY | Facility: AMBULATORY SURGERY CENTER | Age: 74
End: 2024-06-18
Payer: COMMERCIAL

## 2024-06-18 ENCOUNTER — TELEPHONE (OUTPATIENT)
Dept: INFECTIOUS DISEASES | Facility: CLINIC | Age: 74
End: 2024-06-18
Payer: COMMERCIAL

## 2024-06-18 VITALS — WEIGHT: 167 LBS | HEIGHT: 62 IN | BODY MASS INDEX: 30.73 KG/M2

## 2024-06-18 DIAGNOSIS — Z11.59 ENCOUNTER FOR SCREENING FOR OTHER VIRAL DISEASES: ICD-10-CM

## 2024-06-18 DIAGNOSIS — A31.0 MYCOBACTERIUM AVIUM INFECTION (H): Primary | ICD-10-CM

## 2024-06-18 DIAGNOSIS — A31.0 MYCOBACTERIUM AVIUM INFECTION (H): ICD-10-CM

## 2024-06-18 LAB
BACTERIA BRONCH: NO GROWTH
BACTERIA BRONCH: NO GROWTH

## 2024-06-18 PROCEDURE — 99417 PROLNG OP E/M EACH 15 MIN: CPT | Mod: 95 | Performed by: INTERNAL MEDICINE

## 2024-06-18 PROCEDURE — 99215 OFFICE O/P EST HI 40 MIN: CPT | Mod: 95 | Performed by: INTERNAL MEDICINE

## 2024-06-18 PROCEDURE — 93005 ELECTROCARDIOGRAM TRACING: CPT | Mod: RTG

## 2024-06-18 ASSESSMENT — PAIN SCALES - GENERAL: PAINLEVEL: NO PAIN (0)

## 2024-06-18 NOTE — ANESTHESIA PREPROCEDURE EVALUATION
"Anesthesia Pre-Procedure Evaluation    Patient: Meme Butts   MRN: [id number] : 1950        Procedure : Procedure(s):  Colonoscopy          Past Medical History:   Diagnosis Date    Adenomatous colon polyp     tubular adenoma    At high risk for breast cancer 2017    35.4% lifetime risk by Philip model    Epilepsy (H)     Fracture of metatarsal bone of left foot 2014    Fracture, radius     and ulnar styloid fracture    GIB (gastrointestinal bleeding) 2011    Intractable chronic migraine without aura     Kidney stones ,     during pregnancy    Malignant neoplasm of hepatic flexure (H)     Migraine     Osteoporosis 2016    T score -4.1    Pneumonia     Prediabetes       Past Surgical History:   Procedure Laterality Date    BRONCHOSCOPY (RIGID OR FLEXIBLE), DIAGNOSTIC N/A 2024    Procedure: BRONCHOSCOPY, WITH BRONCHOALVEOLAR LAVAGE;  Surgeon: Syed Cesar MD;  Location: [facility]    COLONOSCOPY  3/31/2011    Procedure:COLONOSCOPY; Surgeon:PACO DIETRICH; Location:[facility]    COLONOSCOPY  3/31/2011    Procedure:COMBINED COLONOSCOPY, REMOVE TUMOR/POLYP/LESION BY SNARE; Surgeon:PACO DIETRICH; Location:[facility]    COLONOSCOPY      had polyps - path not available - repeat 3-5 yrs     COLONOSCOPY N/A 2022    Procedure: COLONOSCOPY, WITH HOT POLYPECTOMY;  Surgeon: Eldon Nciholas MD;  Location: [facility] [place] [facility]    DAVINCI COLECTOMY Right 2023    Procedure: Robotic right hemicolectomy, appendectomy;  Surgeon: Lalit Cardenas MD;  Location:  OR    ESOPHAGOSCOPY, GASTROSCOPY, DUODENOSCOPY (EGD), COMBINED  3/31/2011    Procedure:COMBINED ESOPHAGOSCOPY, GASTROSCOPY, DUODENOSCOPY (EGD); Surgeon:PACO DIETRICH; Location:[facility]    HC BREATH HYDROGEN TEST  2011    Procedure:HYDROGEN BREATH TEST; Surgeon:MANDIE BRADY; Location:[facility]    HYSTERECTOMY   or     Partial hysterectomy , urethral tube \"widened\"     ORTHOPEDIC SURGERY      L wrist    "   No Known Allergies   Social History     Tobacco Use    Smoking status: Former     Current packs/day: 0.00     Average packs/day: 1 pack/day for 15.0 years (15.0 ttl pk-yrs)     Types: Cigarettes     Start date: 1994     Quit date: 2009     Years since quittin.8    Smokeless tobacco: Never    Tobacco comments:     On and off for a total of 10 years of smoking    Substance Use Topics    Alcohol use: Yes     Comment: 1x/month      Wt Readings from Last 1 Encounters:   24 75.8 kg (167 lb)           Physical Exam    Airway        Mallampati: II   TM distance: > 3 FB   Neck ROM: full     Respiratory Devices and Support         Dental       (+) Minor Abnormalities - some fillings, tiny chips      Cardiovascular   cardiovascular exam normal          Pulmonary   pulmonary exam normal                OUTSIDE LABS:  CBC:   Lab Results   Component Value Date    WBC 7.1 2023    WBC 9.4 2023    HGB 12.7 2023    HGB 7.2 (L) 2023    HCT 38.8 2023    HCT 22.8 (L) 2023     2023     2023     BMP:   Lab Results   Component Value Date     2024     2023    POTASSIUM 4.2 2024    POTASSIUM 3.7 2023    CHLORIDE 104 2024    CHLORIDE 103 2023    CO2 25 2024    CO2 25 2023    BUN 14.8 2024    BUN 6.2 (L) 2023    CR 0.71 2024    CR 0.63 2023     (H) 2024     (H) 2023     COAGS:   Lab Results   Component Value Date    PTT 30 2011    INR 1.06 2011     POC:   Lab Results   Component Value Date     (H) 2011     HEPATIC:   Lab Results   Component Value Date    ALBUMIN 4.5 2023    PROTTOTAL 8.4 (H) 2023    ALT 22 2023    AST 29 2023    ALKPHOS 88 2023    BILITOTAL 0.4 2023     OTHER:   Lab Results   Component Value Date    LACT 1.7 2023    A1C 6.3 (H) 2024    THELMA 9.4 2024    PHOS  "3.3 03/06/2023    MAG 2.1 03/06/2023    LIPASE 127 04/15/2019    TSH 0.70 08/18/2021       Anesthesia Plan    ASA Status:  3    NPO Status:  NPO Appropriate    Anesthesia Type: MAC.     - Reason for MAC: straight local not clinically adequate   Induction: Intravenous, Propofol.   Maintenance: TIVA.        Consents    Anesthesia Plan(s) and associated risks, benefits, and realistic alternatives discussed. Questions answered and patient/representative(s) expressed understanding.     - Discussed: Risks, Benefits and Alternatives for BOTH SEDATION and the PROCEDURE were discussed     - Discussed with:  Patient      - Extended Intubation/Ventilatory Support Discussed: No.      - Patient is DNR/DNI Status: No     Use of blood products discussed: No .     Postoperative Care       PONV prophylaxis: Ondansetron (or other 5HT-3), Background Propofol Infusion     Comments:               Aly Liu MD    I have reviewed the pertinent notes and labs in the chart from the past 30 days and (re)examined the patient.  Any updates or changes from those notes are reflected in this note.              # Obesity: Estimated body mass index is 30.54 kg/m  as calculated from the following:    Height as of 6/18/24: 1.575 m (5' 2\").    Weight as of 6/18/24: 75.8 kg (167 lb).      "

## 2024-06-18 NOTE — NURSING NOTE
No other vitals to report today      Is the patient currently in the state of [place]? YES    Visit mode:VIDEO    If the visit is dropped, the patient can be reconnected by: VIDEO VISIT: Text to cell phone:   Telephone Information:   Mobile [phone]    and VIDEO VISIT: Send to e-mail at: [email or web address]    Will anyone else be joining the visit? NO  (If patient encounters technical issues they should call [phone])    How would you like to obtain your AVS? MyChart    Are changes needed to the allergy or medication list? No    Patient denies any changes since echeck-in completion and states all information entered during echeck-in remains accurate.    Are refills needed on medications prescribed by this physician?NA    Reason for visit: Consult    NICK DiazF

## 2024-06-18 NOTE — PROGRESS NOTES
Virtual Visit Details    Type of service:  Video Visit   Video Start Time:  12:00 pm  Video End Time: 1:00 pm    Originating Location (pt. Location): Home    Distant Location (provider location):  Off-site  Platform used for Video Visit: Ruben

## 2024-06-18 NOTE — LETTER
"6/18/2024       RE: Meme Butts  [address]  [facility] [id number]     Dear Colleague,    Thank you for referring your patient, Meme Butts, to the [facility] INFECTI[facility] DISEASE CLINIC [place] at [facility]. Please see a copy of my visit note below.       GENERAL ID CLINIC           Assessment and Recommendations:   Problem List:    # EDDA pulmonary infection    Discussion:    Meme Butts is a 74 year old female with PMHx of colon cancer (adenocarcinoma of hepatic flexure of colon and family history of BRCA1) diagnosed by screening colonoscopy on 1/10/2023 s/p robotic right hemicolectomy and appendectomy on 2/28/2023 who was referred to our clinic by Dr. Shelby Hernandez (pulmonology) because lung nodule(s) and BAL culture positive for EDDA. She also has PMHx of migraines, seizures, osteoporosis, obesity, prediabetes (Hb A1C 6.3% from 3/18/24), history of GI bleed.     Patient states she has persistent cough started 2 years ago. She initially though it was due to sinus disease and post nasal drip. infection. She states that the cough would be persistent and would happen all day. It was mostly dry but she would have some greenish sputum at times. Cough was so often that it would keep her awake. She started claritin with some improvement. She also has SOB started around the same time of the cough. Activities that would lead to SOB included going up and down stairs. She still keeps herself active and cleans the house, gardens and does yard work. She denies weight loss.    Per my review, the nodules were first identified on CT chest from 12/29/22 which showed upper lobe micronodules and bronchiectasis (bilaterally). Follow up CT chest from 3/3/2023 showed same nodules in addition to at least one new 9 mm nodule in the left upper lobe. The most recent CT chest from 3/28/2024 showed: \"Centrilobular and tree-in-bud nodularity, slightly increased " "associated with bronchiectasis greatest in the right middle lobe, lingula and lower lobes; findings suggestive of atypical indolent infection such as nonclassic nontuberculous mycobacteria\". Patient underwent PFT study on 3/2024 and, per Dr. Hernandez's interpretation, it was normal. Bronchoscopy performed on 5/21/24. BAL studies showed: cell count 760 with 56# neutrophils, 12% lymphocytes and 32% macrophages/monocytes. 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  Fungal culture is negative to date. Aspergillus galactomannan negative, Nocardia PCR negative, Nocardia culture negative. 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 Cytology was negative for malignancy and negative for fungal organisms or pneumocystis on GMS stain. Furthermore, the viral cytopathic effect is absent. Today's visit: Patient denies fever or chills. She states that she was started on nasal spray and inhaled steroids. Cough is about 50% better but not resolved. She still has the intermittent greenish sputum production. SOB is unchanged. She states she had cardiovascular work up without abnormality.     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 She will likely require treatment most likely with azithromycin + ethambutol + rifabutin or rifampin. However the final regimen will depend on susceptibilities which are still in process. She will likely not require amikacin because she does not have cavitary lesion.   I will plan to obtain baseline studies before initiation of treatment: ophthalmology referral, audiology referral, EKG, CBC, CMP, Hepatitis B, C and HIV serologies  Given past exposure to TB, I will obtain quantiferon. If positive it will translate latent TB because BAL does not have Mycobacterium tuberculosis. In that case, the rifabutin or rifampin would also treat " latent TB  Our clinic pharmacist will co-manage with me and see the patient on a telephone visit to go over the medications in more details and possible interactions  Once I have all results above and also susceptibilities back we can determine initiation of treatment  I will send  message to pulmonology provider to initiate an airway clearance measures  When the patient is started on treatment she will need to have monthly AFB sputum tests and CT chest every 3 months  When patient is started on treatment shew we will obtain labs (CBC and CMP) in 2 weeks and if stable we will space to every months.   She will also need monitoring with eye exam and audiogram while on treatment    Recommendations discussed with the patient.      Celia Robison MD  Date of Service:  06/18/24    On 06/18/24, I spent 80 min with chart review, patient visit, documentation and coordination of care.         History of Present Illness:   Meme Butts is a 74 year old female with PMHx of colon cancer (adenocarcinoma of hepatic flexure of colon and family history of BRCA1) diagnosed by screening colonoscopy on 1/10/2023 s/p robotic right hemicolectomy and appendectomy on 2/28/2023 who was referred to our clinic by Dr. Shelby Hernandez (pulmonology) because lung nodule(s) and BAL culture positive for EDDA. She also has PMHx of migraines, seizures, osteoporosis, obesity, prediabetes (Hb A1C 6.3% from 3/18/24), history of GI bleed.     Patient states she has persistent cough started 2 years ago. She initially though it was due to sinus disease and post nasal drip. infection. She states that the cough would be persistent and would happen all day. It was mostly dry but she would have some greenish sputum at times. Cough was so often that it would keep her awake. She started claritin with some improvement. She also has SOB started around the same time of the cough. Activities that would lead to SOB included going up and down stairs. She still  "keeps herself active and cleans the house, gardens and does yard work. She denies weight loss.    Per my review, the nodules were first identified on CT chest from 12/29/22 which showed upper lobe micronodules and bronchiectasis (bilaterally). Follow up CT chest from 3/3/2023 showed same nodules in addition to at least one new 9 mm nodule in the left upper lobe. The most recent CT chest from 3/28/2024 showed: \"Centrilobular and tree-in-bud nodularity, slightly increased associated with bronchiectasis greatest in the right middle lobe, lingula and lower lobes; findings suggestive of atypical indolent infection such as nonclassic nontuberculous mycobacteria\". Patient underwent PFT study on 3/2024 and, per Dr. Hernandez's interpretation, it was normal. Bronchoscopy performed on 5/21/24. BAL studies showed: cell count 760 with 56# neutrophils, 12% lymphocytes and 32% macrophages/monocytes. 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  Fungal culture is negative to date. Aspergillus galactomannan negative, Nocardia PCR negative, Nocardia culture negative. 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 Cytology was negative for malignancy and negative for fungal organisms or pneumocystis on GMS stain. Furthermore, the viral cytopathic effect is absent.    Patient denies fever or chills. She states that she was started on nasal spray and inhaled steroids. Cough is about 50% better but not resolved. She still has the intermittent greenish sputum production. SOB is unchanged. She states she had cardiovascular work up without abnormality.          Review of Systems:     CONSTITUTIONAL:  No fevers or chills  INTEGUMENTARY/SKIN: NEGATIVE for worrisome rashes, moles or lesions  EYES: Negative for icterus, vision changes or irritation  ENT/MOUTH:  Negative for oral lesions and sore throat  RESPIRATORY:  See HPI  CARDIOVASCULAR:  Negative for chest pain, palpitations " "and  shortness of breath  GASTROINTESTINAL:  Negative for abdominal pain, nausea, vomiting, diarrhea and constipation  GENITOURINARY:  Negative for dysuria, hematuria, frequency and urgency  MUSCULOSKELETAL: Negative for joint pain, swelling, motion restriction, negative for musculoskeletal pain  NEURO:  Negative for headache, altered mental status, numbness or weakness  PSYCHIATRIC: Negative for changes in mood or affect  HEMATOLOGIC/LYMPHATIC: negative for lymphadenopathy or bleeding  ALLERGIC/IMMUNOLOGIC: Negative for allergic reaction   ENDOCRINE: Negative for temperature intolerance, skin/hair changes         Past Medical History:     Past Medical History:   Diagnosis Date    Adenomatous colon polyp     tubular adenoma    At high risk for breast cancer 2017    35.4% lifetime risk by Philip model    Epilepsy (H)     Fracture of metatarsal bone of left foot 2014    Fracture, radius     and ulnar styloid fracture    GIB (gastrointestinal bleeding) 2011    Intractable chronic migraine without aura     Kidney stones ,     during pregnancy    Malignant neoplasm of hepatic flexure (H)     Migraine     Osteoporosis     T score -4.1    Pneumonia     Prediabetes         Surgery R writ after a fracture post fall - has hardware ()  Had cataract surgery in 2023      Allergies:       No Known Allergies          Family History:     Family History   Problem Relation Age of Onset    Breast Cancer Mother 30        lumpectomy and chemo; also \"mass in ovary\"    Colon Polyps Father     Osteoporosis Sister     Breast Cancer Sister 55    Leukemia Sister 52    Coronary Artery Disease Brother     Colon Polyps Brother     Coronary Artery Disease Early Onset Brother 50    Prostate Cancer Brother     Gastrointestinal Disease Son         intestinal transplant    Diabetes Maternal Aunt         multiple mat aunts    Ovarian Cancer Maternal Aunt 50         of ovarian cancer in 50s or 60s, " unknown    Breast Cancer Paternal Aunt 36         of breast cancer recurrence in 60s    Bone Cancer Niece 19    Prostate Cancer Cousin 65    Breast Cancer Cousin 30        paternal cousin, BRCA1+ by report    Breast Cancer Cousin 40        paternal cousin, BRCA1+ by report    Breast Cancer Cousin 35        paternal cousin, BRCA1+ by report    Genetic Disorder Cousin         paternal male cousin, BRCA1+ by report    Ovarian Cancer Cousin         paternal cousin, BRCA1+ by report    Anesthesia Reaction No family hx of     Venous thrombosis No family hx of              Social History:     Social History     Socioeconomic History    Marital status:      Spouse name: Quentin    Number of children: 3    Years of education: Not on file    Highest education level: Not on file   Occupational History    Occupation:      Employer: RETIRED   Tobacco Use    Smoking status: Former     Current packs/day: 0.00     Average packs/day: 1 pack/day for 15.0 years (15.0 ttl pk-yrs)     Types: Cigarettes     Start date: 1994     Quit date: 2009     Years since quittin.8    Smokeless tobacco: Never    Tobacco comments:     On and off for a total of 10 years of smoking    Substance and Sexual Activity    Alcohol use: Yes     Comment: 1x/month    Drug use: No    Sexual activity: Not Currently     Partners: Male     Birth control/protection: Post-menopausal   Other Topics Concern    Parent/sibling w/ CABG, MI or angioplasty before 65F 55M? Not Asked   Social History Narrative    Lives in a house, has  3 children,       lost 1 child, retired      Social Determinants of Health     Financial Resource Strain: Not on file   Food Insecurity: Not on file   Transportation Needs: Not on file   Physical Activity: Sufficiently Active (2020)    Exercise Vital Sign     Days of Exercise per Week: 3 days     Minutes of Exercise per Session: 80 min   Stress: Stress Concern Present (2020)     [facility] [place] [facility] - Occupational Stress Questionnaire     Feeling of Stress : To some extent   Social Connections: Not on file   Interpersonal Safety: Low Risk  (2/21/2024)    Interpersonal Safety     Do you feel physically and emotionally safe where you currently live?: Yes     Within the past 12 months, have you been hit, slapped, kicked or otherwise physically hurt by someone?: No     Within the past 12 months, have you been humiliated or emotionally abused in other ways by your partner or ex-partner?: No   Housing Stability: Not on file        HOME/ENVIRONMENT:   Liver with her daughter (48 years old)  Has another daughter 51 years old  Son passed - diverticulitis -> had surgery and went back with removal of small intestine - underwent small bowel transplant - 1 year after transplant he passed  Patient is     OCCUPATION:   Administration - retired    TRAVEL:   Travel across [place] 15 years ago  Originally from [place]    ANIMALS:   One Dog and one cat    TUBERCULOSIS:   Father and and brother were treated for TB 15 years ago. She states she had a quantiferon performed and it was negative.     TOBACCO/ALCOHOL/RECREATIONAL DRUGS:   Smoked 10 years (1 pack a day) - quit 15 years ago  Glass of wine once a week  No drugs         Physical Exam:     Exam:  GENERAL:  Well-developed, well-nourished, not in acute distress.   HEAD: Normocephalic and atraumatic  ENT:  No hearing impairment, oral mucous membranes moist  EYES:  Eyes grossly normal to inspection  LUNGS:  Intermittent cough during visit. Breathing normally in room air  NEUROLOGIC:  Mentation intact and speech normal  PSYCHIATRIC: Mood stable, mentation appears normal, affect normal         Virtual Visit Details    Type of service:  Video Visit   Video Start Time:  12:00 pm  Video End Time: 1:00 pm    Originating Location (pt. Location): Home    Distant Location (provider location):  Off-site  Platform used for Video Visit:  AmWell

## 2024-06-19 ENCOUNTER — TELEPHONE (OUTPATIENT)
Dept: PULMONOLOGY | Facility: CLINIC | Age: 74
End: 2024-06-19

## 2024-06-19 ENCOUNTER — ANESTHESIA (OUTPATIENT)
Dept: SURGERY | Facility: AMBULATORY SURGERY CENTER | Age: 74
End: 2024-06-19
Payer: COMMERCIAL

## 2024-06-19 ENCOUNTER — HOSPITAL ENCOUNTER (OUTPATIENT)
Facility: AMBULATORY SURGERY CENTER | Age: 74
Discharge: HOME | End: 2024-06-19
Attending: SURGERY
Payer: COMMERCIAL

## 2024-06-19 ENCOUNTER — TELEPHONE (OUTPATIENT)
Dept: INFECTIOUS DISEASES | Facility: CLINIC | Age: 74
End: 2024-06-19
Payer: COMMERCIAL

## 2024-06-19 ENCOUNTER — MYC MEDICAL ADVICE (OUTPATIENT)
Dept: PULMONOLOGY | Facility: CLINIC | Age: 74
End: 2024-06-19

## 2024-06-19 VITALS
OXYGEN SATURATION: 97 % | WEIGHT: 168 LBS | TEMPERATURE: 97.6 F | HEIGHT: 62 IN | RESPIRATION RATE: 18 BRPM | HEART RATE: 75 BPM | DIASTOLIC BLOOD PRESSURE: 52 MMHG | BODY MASS INDEX: 30.91 KG/M2 | SYSTOLIC BLOOD PRESSURE: 93 MMHG

## 2024-06-19 VITALS — HEART RATE: 75 BPM

## 2024-06-19 DIAGNOSIS — C18.3 MALIGNANT NEOPLASM OF HEPATIC FLEXURE (H): Primary | ICD-10-CM

## 2024-06-19 DIAGNOSIS — A31.0 MYCOBACTERIUM AVIUM INFECTION (H): Primary | ICD-10-CM

## 2024-06-19 LAB — COLONOSCOPY: NORMAL

## 2024-06-19 PROCEDURE — 99100 ANES PT EXTEME AGE<1 YR&>70: CPT | Performed by: STUDENT IN AN ORGANIZED HEALTH CARE EDUCATION/TRAINING PROGRAM

## 2024-06-19 PROCEDURE — 45378 DIAGNOSTIC COLONOSCOPY: CPT | Performed by: STUDENT IN AN ORGANIZED HEALTH CARE EDUCATION/TRAINING PROGRAM

## 2024-06-19 PROCEDURE — 45378 DIAGNOSTIC COLONOSCOPY: CPT | Mod: 52 | Performed by: SURGERY

## 2024-06-19 PROCEDURE — 99100 ANES PT EXTEME AGE<1 YR&>70: CPT

## 2024-06-19 PROCEDURE — 45378 DIAGNOSTIC COLONOSCOPY: CPT

## 2024-06-19 RX ORDER — PROPOFOL 10 MG/ML
INJECTION, EMULSION INTRAVENOUS CONTINUOUS PRN
Status: DISCONTINUED | OUTPATIENT
Start: 2024-06-19 | End: 2024-06-19

## 2024-06-19 RX ORDER — LIDOCAINE HYDROCHLORIDE 20 MG/ML
INJECTION, SOLUTION INFILTRATION; PERINEURAL PRN
Status: DISCONTINUED | OUTPATIENT
Start: 2024-06-19 | End: 2024-06-19

## 2024-06-19 RX ORDER — SODIUM CHLORIDE FOR INHALATION 0.9 %
3 VIAL, NEBULIZER (ML) INHALATION 2 TIMES DAILY
Qty: 540 ML | Refills: 3 | Status: SHIPPED | OUTPATIENT
Start: 2024-06-19 | End: 2025-06-19

## 2024-06-19 RX ORDER — ONDANSETRON 2 MG/ML
4 INJECTION INTRAMUSCULAR; INTRAVENOUS
Status: DISCONTINUED | OUTPATIENT
Start: 2024-06-19 | End: 2024-06-19 | Stop reason: HOSPADM

## 2024-06-19 RX ORDER — SODIUM CHLORIDE, SODIUM LACTATE, POTASSIUM CHLORIDE, CALCIUM CHLORIDE 600; 310; 30; 20 MG/100ML; MG/100ML; MG/100ML; MG/100ML
INJECTION, SOLUTION INTRAVENOUS CONTINUOUS PRN
Status: DISCONTINUED | OUTPATIENT
Start: 2024-06-19 | End: 2024-06-19

## 2024-06-19 RX ORDER — NALOXONE HYDROCHLORIDE 0.4 MG/ML
0.2 INJECTION, SOLUTION INTRAMUSCULAR; INTRAVENOUS; SUBCUTANEOUS
Status: DISCONTINUED | OUTPATIENT
Start: 2024-06-19 | End: 2024-06-20 | Stop reason: HOSPADM

## 2024-06-19 RX ORDER — FLUMAZENIL 0.1 MG/ML
0.2 INJECTION, SOLUTION INTRAVENOUS
Status: DISCONTINUED | OUTPATIENT
Start: 2024-06-19 | End: 2024-06-20 | Stop reason: HOSPADM

## 2024-06-19 RX ORDER — NALOXONE HYDROCHLORIDE 0.4 MG/ML
0.4 INJECTION, SOLUTION INTRAMUSCULAR; INTRAVENOUS; SUBCUTANEOUS
Status: DISCONTINUED | OUTPATIENT
Start: 2024-06-19 | End: 2024-06-20 | Stop reason: HOSPADM

## 2024-06-19 RX ORDER — ONDANSETRON 2 MG/ML
4 INJECTION INTRAMUSCULAR; INTRAVENOUS EVERY 6 HOURS PRN
Status: DISCONTINUED | OUTPATIENT
Start: 2024-06-19 | End: 2024-06-20 | Stop reason: HOSPADM

## 2024-06-19 RX ORDER — ONDANSETRON 4 MG/1
4 TABLET, ORALLY DISINTEGRATING ORAL EVERY 6 HOURS PRN
Status: DISCONTINUED | OUTPATIENT
Start: 2024-06-19 | End: 2024-06-20 | Stop reason: HOSPADM

## 2024-06-19 RX ORDER — PROCHLORPERAZINE MALEATE 5 MG
5 TABLET ORAL EVERY 6 HOURS PRN
Status: DISCONTINUED | OUTPATIENT
Start: 2024-06-19 | End: 2024-06-20 | Stop reason: HOSPADM

## 2024-06-19 RX ORDER — LIDOCAINE 40 MG/G
CREAM TOPICAL
Status: DISCONTINUED | OUTPATIENT
Start: 2024-06-19 | End: 2024-06-19 | Stop reason: HOSPADM

## 2024-06-19 RX ORDER — PROPOFOL 10 MG/ML
INJECTION, EMULSION INTRAVENOUS PRN
Status: DISCONTINUED | OUTPATIENT
Start: 2024-06-19 | End: 2024-06-19

## 2024-06-19 RX ORDER — SODIUM CHLORIDE, SODIUM LACTATE, POTASSIUM CHLORIDE, CALCIUM CHLORIDE 600; 310; 30; 20 MG/100ML; MG/100ML; MG/100ML; MG/100ML
INJECTION, SOLUTION INTRAVENOUS CONTINUOUS
Status: DISCONTINUED | OUTPATIENT
Start: 2024-06-19 | End: 2024-06-19 | Stop reason: HOSPADM

## 2024-06-19 RX ADMIN — PROPOFOL 50 MG: 10 INJECTION, EMULSION INTRAVENOUS at 11:35

## 2024-06-19 RX ADMIN — PROPOFOL 200 MCG/KG/MIN: 10 INJECTION, EMULSION INTRAVENOUS at 11:35

## 2024-06-19 RX ADMIN — LIDOCAINE HYDROCHLORIDE 80 MG: 20 INJECTION, SOLUTION INFILTRATION; PERINEURAL at 11:34

## 2024-06-19 RX ADMIN — PROPOFOL 10 MG: 10 INJECTION, EMULSION INTRAVENOUS at 11:41

## 2024-06-19 RX ADMIN — PROPOFOL 20 MG: 10 INJECTION, EMULSION INTRAVENOUS at 11:37

## 2024-06-19 RX ADMIN — SODIUM CHLORIDE, SODIUM LACTATE, POTASSIUM CHLORIDE, CALCIUM CHLORIDE: 600; 310; 30; 20 INJECTION, SOLUTION INTRAVENOUS at 11:33

## 2024-06-19 RX ADMIN — PROPOFOL 20 MG: 10 INJECTION, EMULSION INTRAVENOUS at 11:39

## 2024-06-19 NOTE — ANESTHESIA POSTPROCEDURE EVALUATION
Patient: Meme Butts    Procedure: Procedure(s):  Colonoscopy       Anesthesia Type:  MAC    Note:  Disposition: Outpatient   Postop Pain Control: Uneventful            Sign Out: Well controlled pain   PONV: No   Neuro/Psych: Uneventful            Sign Out: Acceptable/Baseline neuro status   Airway/Respiratory: Uneventful            Sign Out: Acceptable/Baseline resp. status   CV/Hemodynamics: Uneventful            Sign Out: Acceptable CV status; No obvious hypovolemia; No obvious fluid overload   Other NRE:    DID A NON-ROUTINE EVENT OCCUR?            Last vitals:  Vitals Value Taken Time   BP 93/52 06/19/24 1220   Temp 36.4  C (97.6  F) 06/19/24 1220   Pulse 75 06/19/24 1157   Resp 18 06/19/24 1220   SpO2 97 % 06/19/24 1220       Electronically Signed By: Aly Liu MD  June 19, 2024  1:32 PM

## 2024-06-19 NOTE — ANESTHESIA CARE TRANSFER NOTE
Patient: Meme Butts    Procedure: Procedure(s):  Colonoscopy       Diagnosis: Special screening for malignant neoplasms, colon [Z12.11]  Diagnosis Additional Information: No value filed.    Anesthesia Type:   MAC     Note:    Oropharynx: spontaneously breathing  Level of Consciousness: drowsy  Oxygen Supplementation: room air    Independent Airway: airway patency satisfactory and stable  Dentition: dentition unchanged  Vital Signs Stable: post-procedure vital signs reviewed and stable  Report to RN Given: handoff report given  Patient transferred to: Phase II    Handoff Report: Identifed the Patient, Identified the Reponsible Provider, Reviewed the pertinent medical history, Discussed the surgical course, Reviewed Intra-OP anesthesia mangement and issues during anesthesia, Set expectations for post-procedure period and Allowed opportunity for questions and acknowledgement of understanding      Vitals:  Vitals Value Taken Time   BP 97/49    Temp 97.6    Pulse 75    Resp     SpO2 97        Electronically Signed By: GWYN Knight CRNA  June 19, 2024  11:59 AM

## 2024-06-19 NOTE — TELEPHONE ENCOUNTER
STANLEY to call and schedule follow up appointment with VANDANA Koroma either virtual or in clinic. Also needs CT done 3 days prior to seeing VANDANA Krooma

## 2024-06-19 NOTE — TELEPHONE ENCOUNTER
EP called 6/19 to sched a lab and EKG appts per Dr. Robison. Patient said she'll call at a later time to sched these.     ----- Message from CELIA ABAD sent at 6/18/2024  1:12 PM CDT -----  Dear Sofia,    I ordered blood tests, EKG as well as ophthalmology and audiology referral. Can you help me scheduling these?    Thank you    Celia

## 2024-06-21 ENCOUNTER — TRANSFERRED RECORDS (OUTPATIENT)
Dept: MULTI SPECIALTY CLINIC | Facility: CLINIC | Age: 74
End: 2024-06-21

## 2024-06-21 ENCOUNTER — LAB (OUTPATIENT)
Dept: LAB | Facility: CLINIC | Age: 74
End: 2024-06-21
Attending: INTERNAL MEDICINE
Payer: COMMERCIAL

## 2024-06-21 ENCOUNTER — TELEPHONE (OUTPATIENT)
Dept: MULTI SPECIALTY CLINIC | Facility: CLINIC | Age: 74
End: 2024-06-21

## 2024-06-21 DIAGNOSIS — Z11.59 ENCOUNTER FOR SCREENING FOR OTHER VIRAL DISEASES: ICD-10-CM

## 2024-06-21 DIAGNOSIS — A31.0 MYCOBACTERIUM AVIUM INFECTION (H): ICD-10-CM

## 2024-06-21 DIAGNOSIS — E78.5 HYPERLIPIDEMIA LDL GOAL <100: ICD-10-CM

## 2024-06-21 LAB
ACID FAST STAIN (ARUP): ABNORMAL
ALBUMIN SERPL BCG-MCNC: 4 G/DL (ref 3.5–5.2)
ALP SERPL-CCNC: 113 U/L (ref 40–150)
ALT SERPL W P-5'-P-CCNC: 21 U/L (ref 0–50)
ANION GAP SERPL CALCULATED.3IONS-SCNC: 10 MMOL/L (ref 7–15)
AST SERPL W P-5'-P-CCNC: 26 U/L (ref 0–45)
ATRIAL RATE - MUSE: 68 BPM
BASOPHILS # BLD AUTO: 0.1 10E3/UL (ref 0–0.2)
BASOPHILS NFR BLD AUTO: 1 %
BILIRUB SERPL-MCNC: 0.2 MG/DL
BUN SERPL-MCNC: 13.9 MG/DL (ref 8–23)
CALCIUM SERPL-MCNC: 9.2 MG/DL (ref 8.8–10.2)
CHLORIDE SERPL-SCNC: 107 MMOL/L (ref 98–107)
CHOLEST SERPL-MCNC: 139 MG/DL
CREAT SERPL-MCNC: 0.7 MG/DL (ref 0.51–0.95)
DEPRECATED HCO3 PLAS-SCNC: 25 MMOL/L (ref 22–29)
DIASTOLIC BLOOD PRESSURE - MUSE: NORMAL MMHG
EGFRCR SERPLBLD CKD-EPI 2021: >90 ML/MIN/1.73M2
EOSINOPHIL # BLD AUTO: 0.1 10E3/UL (ref 0–0.7)
EOSINOPHIL NFR BLD AUTO: 1 %
ERYTHROCYTE [DISTWIDTH] IN BLOOD BY AUTOMATED COUNT: 12.9 % (ref 10–15)
FASTING STATUS PATIENT QL REPORTED: NO
GLUCOSE SERPL-MCNC: 105 MG/DL (ref 70–99)
HBV SURFACE AG SERPL QL IA: NONREACTIVE
HCT VFR BLD AUTO: 37.7 % (ref 35–47)
HCV AB SERPL QL IA: NONREACTIVE
HDLC SERPL-MCNC: 49 MG/DL
HGB BLD-MCNC: 12.3 G/DL (ref 11.7–15.7)
HIV 1+2 AB+HIV1 P24 AG SERPL QL IA: NONREACTIVE
IMM GRANULOCYTES # BLD: 0 10E3/UL
IMM GRANULOCYTES NFR BLD: 0 %
INTERPRETATION ECG - MUSE: NORMAL
LDLC SERPL CALC-MCNC: 42 MG/DL
LYMPHOCYTES # BLD AUTO: 1.9 10E3/UL (ref 0.8–5.3)
LYMPHOCYTES NFR BLD AUTO: 34 %
MCH RBC QN AUTO: 29.2 PG (ref 26.5–33)
MCHC RBC AUTO-ENTMCNC: 32.6 G/DL (ref 31.5–36.5)
MCV RBC AUTO: 90 FL (ref 78–100)
MONOCYTES # BLD AUTO: 0.5 10E3/UL (ref 0–1.3)
MONOCYTES NFR BLD AUTO: 9 %
NEUTROPHILS # BLD AUTO: 3 10E3/UL (ref 1.6–8.3)
NEUTROPHILS NFR BLD AUTO: 55 %
NONHDLC SERPL-MCNC: 90 MG/DL
NRBC # BLD AUTO: 0 10E3/UL
NRBC BLD AUTO-RTO: 0 /100
ORGANISM (ARUP): ABNORMAL
ORGANISM (ARUP): ABNORMAL
P AXIS - MUSE: 61 DEGREES
PLATELET # BLD AUTO: 269 10E3/UL (ref 150–450)
POTASSIUM SERPL-SCNC: 3.9 MMOL/L (ref 3.4–5.3)
PR INTERVAL - MUSE: 156 MS
PROT SERPL-MCNC: 7.2 G/DL (ref 6.4–8.3)
QRS DURATION - MUSE: 64 MS
QT - MUSE: 384 MS
QTC - MUSE: 408 MS
R AXIS - MUSE: 41 DEGREES
RBC # BLD AUTO: 4.21 10E6/UL (ref 3.8–5.2)
RETINOPATHY: NORMAL
SODIUM SERPL-SCNC: 142 MMOL/L (ref 135–145)
SYSTOLIC BLOOD PRESSURE - MUSE: NORMAL MMHG
T AXIS - MUSE: 33 DEGREES
TRIGL SERPL-MCNC: 242 MG/DL
VENTRICULAR RATE- MUSE: 68 BPM
WBC # BLD AUTO: 5.6 10E3/UL (ref 4–11)

## 2024-06-21 PROCEDURE — 86481 TB AG RESPONSE T-CELL SUSP: CPT | Performed by: INTERNAL MEDICINE

## 2024-06-21 PROCEDURE — 85025 COMPLETE CBC W/AUTO DIFF WBC: CPT | Performed by: PATHOLOGY

## 2024-06-21 PROCEDURE — 99000 SPECIMEN HANDLING OFFICE-LAB: CPT | Performed by: PATHOLOGY

## 2024-06-21 PROCEDURE — 87389 HIV-1 AG W/HIV-1&-2 AB AG IA: CPT | Performed by: INTERNAL MEDICINE

## 2024-06-21 PROCEDURE — 80053 COMPREHEN METABOLIC PANEL: CPT | Performed by: PATHOLOGY

## 2024-06-21 PROCEDURE — 86803 HEPATITIS C AB TEST: CPT | Performed by: INTERNAL MEDICINE

## 2024-06-21 PROCEDURE — 93010 ELECTROCARDIOGRAM REPORT: CPT | Performed by: INTERNAL MEDICINE

## 2024-06-21 PROCEDURE — 87340 HEPATITIS B SURFACE AG IA: CPT | Performed by: INTERNAL MEDICINE

## 2024-06-21 PROCEDURE — 80061 LIPID PANEL: CPT | Performed by: PATHOLOGY

## 2024-06-21 PROCEDURE — 36415 COLL VENOUS BLD VENIPUNCTURE: CPT | Performed by: PATHOLOGY

## 2024-06-21 NOTE — TELEPHONE ENCOUNTER
Labs from 6/21/24:    - CBC normal    - Kidney and liver function normal    - HIV negative    - Hep B surface antigen: negative    - Hepatitis C serology  in process    - Quantiferon negative    I sent the patient a message informing the result    EKG from 6/18 showed normal QTc (408).

## 2024-06-22 LAB
GAMMA INTERFERON BACKGROUND BLD IA-ACNC: 0.03 IU/ML
M TB IFN-G BLD-IMP: NEGATIVE
M TB IFN-G CD4+ BCKGRND COR BLD-ACNC: 9.97 IU/ML
MITOGEN IGNF BCKGRD COR BLD-ACNC: 0.07 IU/ML
MITOGEN IGNF BCKGRD COR BLD-ACNC: 0.08 IU/ML
QUANTIFERON MITOGEN: 10 IU/ML
QUANTIFERON NIL TUBE: 0.03 IU/ML
QUANTIFERON TB1 TUBE: 0.1 IU/ML
QUANTIFERON TB2 TUBE: 0.11

## 2024-06-24 ENCOUNTER — ALLIED HEALTH/NURSE VISIT (OUTPATIENT)
Dept: ENDOCRINOLOGY | Facility: CLINIC | Age: 74
End: 2024-06-24
Payer: COMMERCIAL

## 2024-06-24 DIAGNOSIS — A31.0 MYCOBACTERIUM AVIUM INFECTION (H): Primary | ICD-10-CM

## 2024-06-24 DIAGNOSIS — J47.9 BRONCHIECTASIS, UNCOMPLICATED (H): Primary | ICD-10-CM

## 2024-06-24 PROCEDURE — 99207 PR NO CHARGE NURSE ONLY: CPT

## 2024-06-24 NOTE — PROGRESS NOTES
Aerobika Teach    Nurse teaching given on Aerobika and the patient expresses understanding and acceptance of instructions. Patient provided with Aerobika device in clinic.     Buster Snyder RN 6/24/2024 9:11 AM

## 2024-06-25 ENCOUNTER — VIRTUAL VISIT (OUTPATIENT)
Dept: PHARMACY | Facility: CLINIC | Age: 74
End: 2024-06-25
Attending: INTERNAL MEDICINE
Payer: COMMERCIAL

## 2024-06-25 DIAGNOSIS — E78.5 HYPERLIPIDEMIA LDL GOAL <100: ICD-10-CM

## 2024-06-25 DIAGNOSIS — R56.9 SEIZURES (H): ICD-10-CM

## 2024-06-25 DIAGNOSIS — Z78.9 TAKES DIETARY SUPPLEMENTS: ICD-10-CM

## 2024-06-25 DIAGNOSIS — R09.82 POST-NASAL DRIP: ICD-10-CM

## 2024-06-25 DIAGNOSIS — A31.0 MYCOBACTERIUM AVIUM INFECTION (H): Primary | ICD-10-CM

## 2024-06-25 DIAGNOSIS — M81.0 OSTEOPOROSIS, UNSPECIFIED OSTEOPOROSIS TYPE, UNSPECIFIED PATHOLOGICAL FRACTURE PRESENCE: ICD-10-CM

## 2024-06-25 DIAGNOSIS — M62.838 MUSCLE SPASM: ICD-10-CM

## 2024-06-25 DIAGNOSIS — K59.00 CONSTIPATION, UNSPECIFIED CONSTIPATION TYPE: ICD-10-CM

## 2024-06-25 DIAGNOSIS — M19.90 ARTHRITIS: ICD-10-CM

## 2024-06-25 PROCEDURE — 99607 MTMS BY PHARM ADDL 15 MIN: CPT | Mod: 95 | Performed by: PHARMACIST

## 2024-06-25 PROCEDURE — 99605 MTMS BY PHARM NP 15 MIN: CPT | Mod: 95 | Performed by: PHARMACIST

## 2024-06-25 RX ORDER — CARBOXYMETHYLCELLULOSE SODIUM 5 MG/ML
1 SOLUTION/ DROPS OPHTHALMIC 3 TIMES DAILY PRN
COMMUNITY

## 2024-06-25 RX ORDER — LORATADINE 10 MG/1
10 TABLET ORAL DAILY
COMMUNITY

## 2024-06-25 RX ORDER — IBUPROFEN 400 MG/1
400 TABLET, FILM COATED ORAL EVERY 6 HOURS PRN
COMMUNITY

## 2024-06-25 NOTE — PROGRESS NOTES
Medication Therapy Management (MTM) Encounter    ASSESSMENT:                            Medication Adherence/Access: follow-up on nebulizer coverage to ensure she can use nebulizer to help with airway clearance     Mycobacterium avium intracellulare complex infection:  Discussed the antibiotic options for her planned treatment course. Option to take the antibiotics 3x per week instead of daily since she doesn't have cavitary disease. Dosing will depend on frequency. Will discuss with Dr. Robison.     Option to do a tapered antibiotic start to help with tolerability (start one antibiotic each week until on all three). She's interested in doing this. Reviewed side effects and drug interactions. Recommend starting rifampin instead of rifabutin due to limited drug interactions and cheaper cost, especially with Medicare insurance. Only significant drug interaction is with statin (see below).     Monitoring plan: check labs (CMP, CBC) 2 weeks after starting antibiotics and then monthly if normal. Monthly AFB sputum culture. CT chest every 3 months. Monitor for vision changes and tinnitus.     Hyperlipidemia   Rifampin can increase atorvastatin concentrations slightly when taken at the same time (increase Cmax by 2.9-fold and AUC by 1.1-fold) and reduce concentrations when  (can induce atorvastatin AUC by 80%; rifampin can induce rosuvastatin AUC by 60%). It's recommended to take atorvastatin at the same time as rifampin to prevent decrease concentrations. She does not want to change the statin. Could recheck lipid panel in 3 months and consider dose increase at that time if needed.     Epilepsy:  stable    Osteoporosis:   Could assess dietary calcium intake next visit. Follow primary care provider plan of care     Post-nasal drip:   Upcoming ENT appointment     Constipation:   stable    Arthritis:   stable    Muscle spasms:   stable    Supplements:   stable      PLAN:                            Check with Dr. Robison  on 3x per week antibiotic dosing vs. Daily dosing   Consider starting antibiotics in tapered start (ethambutol then azithromycin then rifampin)  Recheck lipid panel 3 months after starting rifampin  Reach out to your pulm team if you have trouble getting the nebulizer    Follow-up: 1 month     SUBJECTIVE/OBJECTIVE:                          Meme Butts is a 74 year old female seen for an initial visit. She was referred to me from Dr. Robison.     Reason for visit: start EDDA antibiotics.    Allergies/ADRs: Reviewed in chart  Past Medical History: Reviewed in chart  Tobacco: She reports that she quit smoking about 14 years ago. Her smoking use included cigarettes. She started smoking about 29 years ago. She has a 15 pack-year smoking history. She has never used smokeless tobacco.  Alcohol: wine once in awhile   Going on vacation in July until 7/15.    Medication Adherence/Access: 12  Takes meds 2 times daily   Doesn't like to take medications   States she's having an issue with nebulizer coverage but it's in the process for getting resolved.    Mycobacterium avium intracellulare complex infection:  No current medications. Planning to start triple therapy with azithromycin, ethambutol, and rifampin or rifabutin. No cavitation on CT imaging so not planning to start amikacin per Dr. Robison.     Planning to start:   Sodium chloride 0.9% neb 2 times daily - hasn't started yet  Aerobika - hasn't started yet    Has persistent cough, shortness of breath, and intermittent green sputum. Recently finished course of levofloxacin for pseudomonas growing on BAL from 5/21.     Eye exam: hx cataract surgery fall 2023. Just had a repeat eye exam. She'll have them fax results to clinic.   Audiology: upcoming test   EKG: Qtc 408 on 6/21/24 5/21/24 AFB culture:  Mycobacterium avium intracellulare complex       AFB VESNA (ARUP)     Amikacin 8 Susceptible     Clarithromycin 1 Susceptible     Comment:  See below 1     Linezolid 16  Intermediate     Moxifloxacin 1 Susceptible        Hyperlipidemia   atorvastatin 10 mg daily  Patient reports no significant myalgias or other side effects. Reports she was hesitant to start the statin and is happy to hold it if needed.      Recent Labs   Lab Test 06/21/24  1026 03/18/24  0955   CHOL 139 211*   HDL 49* 54   LDL 42 102*   TRIG 242* 273*       Epilepsy:  Levetiracetam 1,125 mg in the morning, 1,500 mg at night  No side effects. No recent seizures.    Osteoporosis:   Calcium carbonate/magnesium//Vitamin D 1 pill once daily   Patient is not experiencing side effects.  DEXA History: 3/29/24 - repeat in 2 years  History of taking alendronate for 4-5 years and tolerated well. Stopped for drug holiday 8/2021. Started Prolia 8/24/22 but no repeat doses per chart review.     Post-nasal drip:   Mometasone nasal spray as needed  Loratadine 10 mg once daily as needed  Ongoing symptoms. Going to start EDDA treatment.     Constipation:   Metamucil once per week  No current issues    Arthritis:   Diclofenac gel as needed  Ibuprofen occasionally   No current issues    Muscle spasms:   Cyclobenzaprine 5 mg 2 times daily as needed  Doesn't think she's ever tried this  No current issues    Supplements:   Multivitamin once daily   No reported issues at this time.     Today's Vitals: There were no vitals taken for this visit.  ----------------    I spent 42 minutes with this patient today. All changes were made via collaborative practice agreement with Dr. Robison and Dr. Miranda. A copy of the visit note was provided to the patient's provider(s).    A summary of these recommendations was sent via [employer].    Telemedicine Visit Details  Type of service:  Telephone visit  Start Time:  10:07 am  End Time:  10:47 am     Medication Therapy Recommendations  Hyperlipidemia LDL goal <100    Current Medication: atorvastatin (LIPITOR) 10 MG tablet   Rationale: Medication interaction - Dosage too low - Effectiveness   Recommendation:  Order Lab   Status: Accepted per CPA

## 2024-06-25 NOTE — LETTER
"Recommended To-Do List      Prepared on: Jun 25, 2024       You can get the best results from your medications by completing the items on this \"To-Do List.\"      Bring your To-Do List when you go to your doctor. And, share it with your family or caregivers.    My To-Do List:  What we talked about: What I should do:   Your medication dosage being too low    Get the following lab test(s): recheck cholesterol 3 months after starting rifampin           What we talked about: What I should do:                     "

## 2024-06-25 NOTE — LETTER
June 25, 2024  Meme Butts  [address]  [place] [address]    Dear Ms. Butts, CARLOS [facility] AND INFECTIOUS DISEASES [facility]     Thank you for talking with me on Jun 25, 2024 about your health and medications. As a follow-up to our conversation, I have included two documents:      Your Recommended To-Do List has steps you should take to get the best results from your medications.  Your Medication List will help you keep track of your medications and how to take them.    If you want to talk about these documents, please call FAITH KIM RPH at phone: [phone], Monday-Friday 8-4:30pm.    I look forward to working with you and your doctors to make sure your medications work well for you.    Sincerely,  FAITH KIM RPH  [facility] Pharmacist, [facility]

## 2024-06-25 NOTE — PATIENT INSTRUCTIONS
"Recommendations from today's MTM visit:                                                      Check with Dr. Robison on 3x per week antibiotic dosing vs. Daily dosing   Consider starting antibiotics in tapered start (ethambutol then azithromycin then rifampin)  Recheck lipid panel 3 months after starting rifampin  Reach out to your pulm team if you have trouble getting the nebulizer    Follow-up: 1 month     It was great speaking with you today.  I value your experience and would be very thankful for your time in providing feedback in our clinic survey. In the next few days, you may receive an email or text message from [employer] with a link to a survey related to your  clinical pharmacist.\"     To schedule another MTM appointment, please call the clinic directly or you may call the MTM scheduling line at [phone] or toll-free at [phone].     My Clinical Pharmacist's contact information:                                                      Please feel free to contact me with any questions or concerns you have.      Annamaria Whitley, PharmD, AAHIVP  Medication Therapy Management Pharmacist      "

## 2024-06-25 NOTE — Clinical Note
Hi! Looks like susceptibilities are back- linezolid is intermediate. Others are susceptible. Since no cavitary disease, what are your thoughts on 3 day per week dosing? I think patients sometimes tolerate daily dosing better but she doesn't like to take pills so could go either way. Would you be okay with a tapered start? (Stat one antibiotic each week until on all 3? Would start with ethambutol then azithromycin then rifampin. I think rifampin is better than rifabutin due to cost.   Let me know what you think,   Annamaria Whitley, PharmD, [facility] Medication Therapy Management Pharmacist

## 2024-06-25 NOTE — LETTER
_  Medication List        Prepared on: Jun 25, 2024     Bring your Medication List when you go to the doctor, hospital, or   emergency room. And, share it with your family or caregivers.     Note any changes to how you take your medications.  Cross out medications when you no longer use them.    Medication How I take it Why I use it Prescriber   atorvastatin (LIPITOR) 10 MG tablet Take 1 tablet (10 mg) by mouth daily Hyperlipidemia LDL Goal <100 Maria Esther Miranda MD PhD   blood glucose (NO BRAND SPECIFIED) lancets standard Use to test blood sugar 1 time daily or as directed. Type 2 diabetes mellitus without complication, without long-term current use of insulin (H) Maria Esther Miranda MD PhD   blood glucose (NO BRAND SPECIFIED) test strip Use to test blood sugar once per day or as directed. Type 2 diabetes mellitus without complication, without long-term current use of insulin (H) Maria Esther Miranda MD PhD           blood glucose monitoring (ONE TOUCH ULTRA 2) meter device kit USE TO TEST BLOOD SUGAR ONCE PER DAY OR AS DIRECTED. Use brand compatible with patients insurance and device Type 2 diabetes mellitus without complication, without long-term current use of insulin (H) Maria Esther Miranda MD PhD       calcium-magnesium-vitamin D 500-250-125 MG-MG-UNIT TABS Take 1 tablet by mouth daily  osteoporosis Patient Reported   carboxymethylcellulose (REFRESH PLUS) 0.5 % SOLN ophthalmic solution 1 drop 3 times daily as needed for dry eyes  Dry eyes Patient Reported   cyclobenzaprine (FLEXERIL) 5 MG tablet Take 1 tablet (5 mg) by mouth 2 times daily as needed for muscle spasms Muscle Spasm Maria Esther Miranda MD PhD   diclofenac (VOLTAREN) 1 % topical gel Apply 2 g topically 4 times daily as needed for moderate pain Pain in both feet; Sinus tarsi syndrome of both ankles; Arthritis of both feet; Hammertoe of left foot Audelia Mars DPM, Podiatry/Foot and Ankle Surgery   ibuprofen (ADVIL/MOTRIN) 400 MG tablet Take 400 mg  by mouth every 6 hours as needed for moderate pain  arthritis Patient Reported   levETIRAcetam (KEPPRA) 750 MG tablet PT REPORTS TAPERING DOWN: TAKE 1 & 1/2 TABS IN THE MORNING, AND 2 TABS IN THE EVENING. Nonintractable epilepsy without status epilepticus, unspecified epilepsy type (H) Maria Esther Miranda MD PhD   loratadine (CLARITIN) 10 MG tablet Take 10 mg by mouth daily  Post-nasal drip Patient Reported   mometasone (NASONEX) 50 MCG/ACT nasal spray Spray 2 sprays into both nostrils daily Post-Nasal Drip Maria Esther Miranda MD PhD   Multiple Vitamins-Minerals (CENTRUM SILVER) per tablet Take 1 tablet by mouth every morning Abnormal Results of Liver Function Studies; Osteoporosis; Fatigue; Post-Menopausal; Obesity; Family History of Diabetes Mellitus Patient Reported   psyllium (METAMUCIL) 58.6 % packet Take 1 packet by mouth daily as needed for constipation  Constipation  Patient Reported   sodium chloride 0.9 % neb solution Take 3 mLs by nebulization 2 times daily Mycobacterium avium infection (H) Shelby Hernandez MD         Add new medications, over-the-counter drugs, herbals, vitamins, or  minerals in the blank rows below.    Medication How I take it Why I use it Prescriber                                      Allergies:      No Known Allergies        Side effects I have had:      No Known Side Effects        Other Information:              My notes and questions:

## 2024-07-15 ENCOUNTER — OFFICE VISIT (OUTPATIENT)
Dept: OTOLARYNGOLOGY | Facility: CLINIC | Age: 74
End: 2024-07-15
Attending: OTOLARYNGOLOGY
Payer: COMMERCIAL

## 2024-07-15 ENCOUNTER — PRE VISIT (OUTPATIENT)
Dept: OTOLARYNGOLOGY | Facility: CLINIC | Age: 74
End: 2024-07-15

## 2024-07-15 VITALS
HEIGHT: 62 IN | BODY MASS INDEX: 31.43 KG/M2 | DIASTOLIC BLOOD PRESSURE: 77 MMHG | HEART RATE: 91 BPM | WEIGHT: 170.8 LBS | OXYGEN SATURATION: 94 % | SYSTOLIC BLOOD PRESSURE: 125 MMHG

## 2024-07-15 DIAGNOSIS — R09.82 POST-NASAL DRIP: ICD-10-CM

## 2024-07-15 PROCEDURE — 99202 OFFICE O/P NEW SF 15 MIN: CPT | Performed by: OTOLARYNGOLOGY

## 2024-07-15 ASSESSMENT — PAIN SCALES - GENERAL: PAINLEVEL: NO PAIN (0)

## 2024-07-15 NOTE — PATIENT INSTRUCTIONS
You were seen in the ENT Clinic today by Dr. Jain. If you have any questions or concerns after your appointment, please contact us (see below)       2.   The following recommendations have been made based upon your appointment today:   - complete a CT sinus      3.   We will call you with the results of the CT scan and a plan moving forward           How to Contact Us:  Send a [employer] message to your provider. Our team will respond to you via [employer]. Occasionally, we will need to call you to get further information.  For urgent matters (Monday-Friday), call the ENT Clinic: [phone] and speak with a call center team member - they will route your call appropriately.   If you'd like to speak directly with a nurse, please find our contact information below. We do our best to check voicemail frequently throughout the day, and will work to call you back within 1-2 days. For urgent matters, please use the general clinic phone numbers listed above.     Arelis MIMS RN  Direct: [phone]  Dolores JENSEN LPN  Direct: [phone]         [facility]  Department of Otolaryngology

## 2024-07-15 NOTE — PROGRESS NOTES
[facility] EAR NOSE AND THROAT CLINIC [address]  [address] [facility] [address]  Phone: [phone]  Fax: [phone]    Patient:  Meme Butts, Date of birth 1950  Date of Visit:  07/15/2024  Referring Provider Maria Esther Miranda      Assessment & Plan      (R09.82) Post-nasal drip  Comment: chronic, not debilitating and may or may not be contributing to underlying cough.   Plan: CT Sinus w/o Contrast, I will let her know the result.         Continue conservative management          20 minutes spent by me on the date of the encounter doing chart review, history and exam, documentation and further activities per the note       Eliza Jain MD       Otolaryngology Adult Consultation    HPI: Meme Butts is a 74 year old female seen today in the Otolaryngology Clinic in consultation from Maria Esther Miranda for a history of persistent cough, thought to be related to PND. Cough was severe, led to bloody nose, SOB. Cough has improved. Always carries a tissue. No imaging of sinuses yet. No allergy testing. Late 50's is when she started having the PND and some sore throat issues. Gets headache and nose bleeds from flonase, tried nasonex. Does help some. Was diagnosed with a pulmonary issue, will be going on extended antibiotics for that.     Current Outpatient Medications   Medication Sig Dispense Refill    atorvastatin (LIPITOR) 10 MG tablet Take 1 tablet (10 mg) by mouth daily 90 tablet 2    blood glucose (NO BRAND SPECIFIED) lancets standard Use to test blood sugar 1 time daily or as directed. 50 each 5    blood glucose (NO BRAND SPECIFIED) test strip Use to test blood sugar once per day or as directed. 50 strip 5    blood glucose monitoring (ONE TOUCH ULTRA 2) meter device kit USE TO TEST BLOOD SUGAR ONCE PER DAY OR AS DIRECTED. Use brand compatible with patients insurance and device 1 kit 1    calcium-magnesium-vitamin D 500-250-125 MG-MG-UNIT TABS Take 1 tablet  by mouth daily      carboxymethylcellulose (REFRESH PLUS) 0.5 % SOLN ophthalmic solution 1 drop 3 times daily as needed for dry eyes      cyclobenzaprine (FLEXERIL) 5 MG tablet Take 1 tablet (5 mg) by mouth 2 times daily as needed for muscle spasms 60 tablet 0    diclofenac (VOLTAREN) 1 % topical gel Apply 2 g topically 4 times daily as needed for moderate pain 100 g 2    ibuprofen (ADVIL/MOTRIN) 400 MG tablet Take 400 mg by mouth every 6 hours as needed for moderate pain      levETIRAcetam (KEPPRA) 750 MG tablet PT REPORTS TAPERING DOWN: TAKE 1 & 1/2 TABS IN THE MORNING, AND 2 TABS IN THE EVENING. (Patient taking differently: Take 1,125 mg by mouth 2 times daily) 105 tablet 3    loratadine (CLARITIN) 10 MG tablet Take 10 mg by mouth daily      mometasone (NASONEX) 50 MCG/ACT nasal spray Spray 2 sprays into both nostrils daily 17 g 1    Multiple Vitamins-Minerals (CENTRUM SILVER) per tablet Take 1 tablet by mouth every morning      psyllium (METAMUCIL) 58.6 % packet Take 1 packet by mouth daily as needed for constipation      sodium chloride 0.9 % neb solution Take 3 mLs by nebulization 2 times daily 540 mL 3     No current facility-administered medications for this visit.        No Known Allergies    Past Medical History:   Diagnosis Date    Adenomatous colon polyp 2011    tubular adenoma    At high risk for breast cancer 02/16/2017    35.4% lifetime risk by Philip model    Epilepsy (H)     Fracture of metatarsal bone of left foot 07/2014    Fracture, radius 1990    and ulnar styloid fracture    GIB (gastrointestinal bleeding) 04/01/2011    Intractable chronic migraine without aura     Kidney stones 1970, 1975    during pregnancy    Malignant neoplasm of hepatic flexure (H)     Migraine     Osteoporosis 2016    T score -4.1    Pneumonia     Prediabetes        Social History     Occupational History    Occupation:      Employer: RETIRED   Tobacco Use    Smoking status: Former     Current  packs/day: 0.00     Average packs/day: 1 pack/day for 15.0 years (15.0 ttl pk-yrs)     Types: Cigarettes     Start date: 1994     Quit date: 2009     Years since quittin.9    Smokeless tobacco: Never    Tobacco comments:     On and off for a total of 10 years of smoking    Substance and Sexual Activity    Alcohol use: Yes     Comment: 1x/month    Drug use: No    Sexual activity: Not Currently     Partners: Male     Birth control/protection: Post-menopausal        Review of Systems      7/15/2024    11:24 AM   [facility] ROS   Cardiopulmonary Cough        14 point ROS neg other than the symptoms noted above.    Physical Exam:    General Assessment   The patient is alert, oriented and in no acute distress.   Head/Face/Scalp  Grossly normal.   Ears  Normal canals, auricles and tympanic membranes.   Nose  External nose is straight, skin is normal. Intranasal exam (anterior rhinoscopy) reveals normal moist mucosa, turbinate tissue without edema, erythema or crusting.  Septum mainly in the midline.    Mouth  Oral cavity shows healthy mucosa with out ulceration, masses or other lesions involving the tongue, palate, buccal mucosa, floor of mouth or gingiva.  Dentition in good repair.  Oropharynx with normal tonsils, posterior wall and base of tongue.  Neck  No significant adenopathy, thyroid or salivary gland abnormality.         SNOT20: Sino-Nasal Outcome Test (SNOT - 22)  1. Need to Blow Nose: (P) Severe  2. Nasal Blockage: (P) Very mild  3. Sneezing: (P) Moderate  4. Runny Nose: (P) Severe  5. Cough: (P) Severe  6. Post-nasal discharge: (P) Severe  7. Thick nasal discharge: (P) Moderate  8. Ear fullness: (P) None  9. Dizziness: (P) None  10. Ear Pain: (P) None  11. Facial pain/pressure: (P) Mild or slight  12. Decreased Sense of Smell/Taste: (P) None  13. Difficulty falling asleep: (P) Moderate  14. Wake up at night: (P) Moderate  15. Lack of a good night's sleep: (P) Moderate  16. Wake up tired: (P) Mild or  slight  17. Fatigue: (P) Very mild  18. Reduced Productivity: (P) Very mild  19. Reduced Concentration: (P) Very mild  20. Frustrated/restless/irritable: (P) Very mild  21. Sad: (P) None  22. Embarrassed: (P) Very mild  Total Score: (P) 41    Imaging:    No results found for this or any previous visit.

## 2024-07-15 NOTE — LETTER
7/15/2024       RE: Meme Butts  [address]  [facility] [facility] [address]     Dear Colleague,    Thank you for referring your patient, Meme Butts, to the [facility] EAR NOSE AND THROAT CLINIC [place] [facility]. Please see a copy of my visit note below.      [facility] EAR NOSE AND THROAT CLINIC [place] [address] [facility]  4TH FLOOR  [place] [address]  Phone: [phone]  Fax: [phone]    Patient:  Meme Butts, Date of birth 1950  Date of Visit:  07/15/2024  Referring Provider Maria Esther Miranda      Assessment & Plan     (R09.82) Post-nasal drip  Comment: chronic, not debilitating and may or may not be contributing to underlying cough.   Plan: CT Sinus w/o Contrast, I will let her know the result.         Continue conservative management          20 minutes spent by me on the date of the encounter doing chart review, history and exam, documentation and further activities per the note       Eliza Jain MD       Otolaryngology Adult Consultation    HPI: Meme Butts is a 74 year old female seen today in the Otolaryngology Clinic in consultation from Maria Esther Miranda for a history of persistent cough, thought to be related to PND. Cough was severe, led to bloody nose, SOB. Cough has improved. Always carries a tissue. No imaging of sinuses yet. No allergy testing. Late 50's is when she started having the PND and some sore throat issues. Gets headache and nose bleeds from flonase, tried nasonex. Does help some. Was diagnosed with a pulmonary issue, will be going on extended antibiotics for that.     Current Outpatient Medications   Medication Sig Dispense Refill     atorvastatin (LIPITOR) 10 MG tablet Take 1 tablet (10 mg) by mouth daily 90 tablet 2     blood glucose (NO BRAND SPECIFIED) lancets standard Use to test blood sugar 1 time daily or as directed. 50 each 5     blood glucose (NO BRAND  SPECIFIED) test strip Use to test blood sugar once per day or as directed. 50 strip 5     blood glucose monitoring (ONE TOUCH ULTRA 2) meter device kit USE TO TEST BLOOD SUGAR ONCE PER DAY OR AS DIRECTED. Use brand compatible with patients insurance and device 1 kit 1     calcium-magnesium-vitamin D 500-250-125 MG-MG-UNIT TABS Take 1 tablet by mouth daily       carboxymethylcellulose (REFRESH PLUS) 0.5 % SOLN ophthalmic solution 1 drop 3 times daily as needed for dry eyes       cyclobenzaprine (FLEXERIL) 5 MG tablet Take 1 tablet (5 mg) by mouth 2 times daily as needed for muscle spasms 60 tablet 0     diclofenac (VOLTAREN) 1 % topical gel Apply 2 g topically 4 times daily as needed for moderate pain 100 g 2     ibuprofen (ADVIL/MOTRIN) 400 MG tablet Take 400 mg by mouth every 6 hours as needed for moderate pain       levETIRAcetam (KEPPRA) 750 MG tablet PT REPORTS TAPERING DOWN: TAKE 1 & 1/2 TABS IN THE MORNING, AND 2 TABS IN THE EVENING. (Patient taking differently: Take 1,125 mg by mouth 2 times daily) 105 tablet 3     loratadine (CLARITIN) 10 MG tablet Take 10 mg by mouth daily       mometasone (NASONEX) 50 MCG/ACT nasal spray Spray 2 sprays into both nostrils daily 17 g 1     Multiple Vitamins-Minerals (CENTRUM SILVER) per tablet Take 1 tablet by mouth every morning       psyllium (METAMUCIL) 58.6 % packet Take 1 packet by mouth daily as needed for constipation       sodium chloride 0.9 % neb solution Take 3 mLs by nebulization 2 times daily 540 mL 3     No current facility-administered medications for this visit.        No Known Allergies    Past Medical History:   Diagnosis Date     Adenomatous colon polyp 2011    tubular adenoma     At high risk for breast cancer 02/16/2017    35.4% lifetime risk by Philip model     Epilepsy (H)      Fracture of metatarsal bone of left foot 07/2014     Fracture, radius 1990    and ulnar styloid fracture     GIB (gastrointestinal bleeding) 04/01/2011     Intractable chronic  migraine without aura      Kidney stones ,     during pregnancy     Malignant neoplasm of hepatic flexure (H)      Migraine      Osteoporosis 2016    T score -4.1     Pneumonia      Prediabetes        Social History     Occupational History     Occupation:      Employer: RETIRED   Tobacco Use     Smoking status: Former     Current packs/day: 0.00     Average packs/day: 1 pack/day for 15.0 years (15.0 ttl pk-yrs)     Types: Cigarettes     Start date: 1994     Quit date: 2009     Years since quittin.9     Smokeless tobacco: Never     Tobacco comments:     On and off for a total of 10 years of smoking    Substance and Sexual Activity     Alcohol use: Yes     Comment: 1x/month     Drug use: No     Sexual activity: Not Currently     Partners: Male     Birth control/protection: Post-menopausal        Review of Systems      7/15/2024    11:24 AM    ENT ROS   Cardiopulmonary Cough        14 point ROS neg other than the symptoms noted above.    Physical Exam:    General Assessment   The patient is alert, oriented and in no acute distress.   Head/Face/Scalp  Grossly normal.   Ears  Normal canals, auricles and tympanic membranes.   Nose  External nose is straight, skin is normal. Intranasal exam (anterior rhinoscopy) reveals normal moist mucosa, turbinate tissue without edema, erythema or crusting.  Septum mainly in the midline.    Mouth  Oral cavity shows healthy mucosa with out ulceration, masses or other lesions involving the tongue, palate, buccal mucosa, floor of mouth or gingiva.  Dentition in good repair.  Oropharynx with normal tonsils, posterior wall and base of tongue.  Neck  No significant adenopathy, thyroid or salivary gland abnormality.         SNOT20: Sino-Nasal Outcome Test (SNOT - 22)  1. Need to Blow Nose: (P) Severe  2. Nasal Blockage: (P) Very mild  3. Sneezing: (P) Moderate  4. Runny Nose: (P) Severe  5. Cough: (P) Severe  6. Post-nasal discharge: (P)  Severe  7. Thick nasal discharge: (P) Moderate  8. Ear fullness: (P) None  9. Dizziness: (P) None  10. Ear Pain: (P) None  11. Facial pain/pressure: (P) Mild or slight  12. Decreased Sense of Smell/Taste: (P) None  13. Difficulty falling asleep: (P) Moderate  14. Wake up at night: (P) Moderate  15. Lack of a good night's sleep: (P) Moderate  16. Wake up tired: (P) Mild or slight  17. Fatigue: (P) Very mild  18. Reduced Productivity: (P) Very mild  19. Reduced Concentration: (P) Very mild  20. Frustrated/restless/irritable: (P) Very mild  21. Sad: (P) None  22. Embarrassed: (P) Very mild  Total Score: (P) 41    Imaging:    No results found for this or any previous visit.                  Again, thank you for allowing me to participate in the care of your patient.      Sincerely,    Eliza Jain MD

## 2024-07-16 ENCOUNTER — OFFICE VISIT (OUTPATIENT)
Dept: AUDIOLOGY | Facility: CLINIC | Age: 74
End: 2024-07-16
Attending: INTERNAL MEDICINE
Payer: COMMERCIAL

## 2024-07-16 DIAGNOSIS — Z79.899 ENCOUNTER FOR MONITORING OTOTOXIC DRUG THERAPY: ICD-10-CM

## 2024-07-16 DIAGNOSIS — H93.8X3 SENSATION OF FULLNESS IN BOTH EARS: ICD-10-CM

## 2024-07-16 DIAGNOSIS — Z51.81 ENCOUNTER FOR MONITORING OTOTOXIC DRUG THERAPY: ICD-10-CM

## 2024-07-16 DIAGNOSIS — H90.3 SENSORINEURAL HEARING LOSS (SNHL) OF BOTH EARS: Primary | ICD-10-CM

## 2024-07-16 DIAGNOSIS — A31.0 MYCOBACTERIUM AVIUM INFECTION (H): ICD-10-CM

## 2024-07-16 PROCEDURE — 92550 TYMPANOMETRY & REFLEX THRESH: CPT | Mod: 52 | Performed by: AUDIOLOGIST

## 2024-07-16 PROCEDURE — 92557 COMPREHENSIVE HEARING TEST: CPT | Performed by: AUDIOLOGIST

## 2024-07-16 NOTE — PROGRESS NOTES
AUDIOLOGY REPORT    SUBJECTIVE:  Meme Butts is a 74 year old female who was seen in the Audiology Clinic at the [facility] for audiologic evaluation, referred by Celia Lala MD Ph.D. 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 She endorsed bilateral aural fullness, but denied hearing loss, noise exposure history, tinnitus, dizziness, otalgia, otorrhea, or recent illness.     OBJECTIVE:  Abuse Screening:  Do you feel unsafe at home or work/school? No  Do you feel threatened by someone? No  Does anyone try to keep you from having contact with others, or doing things outside of your home? No  Physical signs of abuse present? No    Otoscopic exam indicated ears are clear of cerumen, bilaterally.     Pure Tone Thresholds assessed using conventional audiometry with good, reliability from 250-8000 Hz bilaterally using insert earphones and circumaural headphones.     RIGHT:  normal sloping to moderate sensorineural hearing loss for 250-8000 Hz    LEFT:   normal sloping to moderate sensorineural hearing loss for 250-8000 Hz    High frequency audiometry from 9,000-20,000 Hz was performed and responses were present to 14,000 Hz in the right ear, and to 11,200 Hz in the left ear, which is within aged norms. These results represent baseline audiogram findings.    Distortion product otoacoustic emissions were performed from 3463-1307 Hz and were present and repeatable for 9980-9508 Hz in the right ear, and for 1600 and 6487-9127 Hz in the left ear.      Tympanogram:    RIGHT: normal eardrum mobility    LEFT:  normal eardrum mobility    Reflexes for 1000 Hz reported by stimulus ear):  RIGHT: Ipsilateral: present at normal levels  RIGHT: Contralateral: could not be assessed due to equipment issues  LEFT:    Ipsilateral: present at normal levels  LEFT:   Contralateral: could not be assessed due to equipment issues    Speech Reception Threshold:    RIGHT: 30 dB HL    LEFT:   35 dB HL  Word Recognition Score:     RIGHT: 100% at 70 dB HL using NU-6 recorded word list.    LEFT:   100% at 75 dB HL using NU-6 recorded word list.      ASSESSMENT:   Normal, sloping to moderate sensorineural hearing loss was found today on baseline audiogram. The grade of the patient's hearing loss today on the CTCAE4.03 Scale is 0, bilaterally (baseline assessment). Today s results were discussed with the patient in detail.     PLAN:  Patient was counseled regarding hearing loss and impact on communication. Patient is a potential candidate for binaural amplification, once hearing thresholds are known to be stable. It is recommended that the patient return for testing based on physician protocol and recommendation. Ms. Butts expressed verbal understanding of this information and plan. Please call this clinic with questions regarding these results or recommendations.        Chelsea Barton, [facility]  [place] Licensed Audiologist [id number]

## 2024-07-25 ENCOUNTER — TELEPHONE (OUTPATIENT)
Dept: PHARMACY | Facility: CLINIC | Age: 74
End: 2024-07-25
Payer: COMMERCIAL

## 2024-07-25 DIAGNOSIS — A31.0 MYCOBACTERIUM AVIUM INFECTION (H): Primary | ICD-10-CM

## 2024-07-25 RX ORDER — AZITHROMYCIN 250 MG/1
250 TABLET, FILM COATED ORAL DAILY
Qty: 30 TABLET | Refills: 11 | Status: SHIPPED | OUTPATIENT
Start: 2024-07-25 | End: 2024-09-24

## 2024-07-25 RX ORDER — RIFAMPIN 300 MG/1
600 CAPSULE ORAL DAILY
Qty: 60 CAPSULE | Refills: 11 | Status: SHIPPED | OUTPATIENT
Start: 2024-07-25 | End: 2024-09-24

## 2024-07-25 RX ORDER — ETHAMBUTOL HYDROCHLORIDE 400 MG/1
1200 TABLET, FILM COATED ORAL DAILY
Qty: 90 TABLET | Refills: 11 | Status: SHIPPED | OUTPATIENT
Start: 2024-07-25 | End: 2024-09-24

## 2024-07-25 SDOH — HEALTH STABILITY: PHYSICAL HEALTH
ON AVERAGE, HOW MANY DAYS PER WEEK DO YOU ENGAGE IN MODERATE TO STRENUOUS EXERCISE (LIKE A BRISK WALK)?: PATIENT DECLINED

## 2024-07-25 SDOH — HEALTH STABILITY: PHYSICAL HEALTH: ON AVERAGE, HOW MANY MINUTES DO YOU ENGAGE IN EXERCISE AT THIS LEVEL?: 20 MIN

## 2024-07-25 ASSESSMENT — SOCIAL DETERMINANTS OF HEALTH (SDOH): HOW OFTEN DO YOU GET TOGETHER WITH FRIENDS OR RELATIVES?: THREE TIMES A WEEK

## 2024-07-25 NOTE — PROGRESS NOTES
Last physical 8/2022     She is PM [id number]  --she is s/p hysterectomy for cervical pathology in 1995.  No vaginal itching, discharge or burning.  She is not sexually active.     Mammogram  10/2020 - previous bx on right breast - benign - 10 or more yrs ago      Hx of colon polyps - FHx of polyps  - had tubular adenoma in 2011;  Last colonoscopy  was 2017  Told repeat in 3 yrs if adenomatous (no path report seen)  Or 5 yrs.  due      Hx of osteoporosis - on fosamax  3-4 yrs  - then on a holiday since last spring.    DEXA 4/2021 - no calcium pills and vit d   Drinks milk, cottage cheese, greens  - takes multivit,  Vit D gummies   2000IU/day      Hx diabetes  seen 2/2024       Diabetes mellitus, type 2 (H)  Currently patient is not on any medication has really not paid much attention to her diabetes as she has had multiple other medical problems this past year.  Her last A1c was reasonable in September.  She is not on medication.  She does not check her blood sugars.  We talked about going back to diabetic education for refresher.  I encouraged her to find her glucometer and start doing blood sugars fasting and come back in 4 weeks.  Will also check her urine for protein.  She will need an eye exam at some point.  - Lipid panel reflex to direct LDL Non-fasting; Future  - BASIC METABOLIC PANEL; Future  - Hemoglobin A1c; Future  - Albumin Random Urine Quantitative with Creat Ratio; Future  - Adult Diabetes Education  Referral; Future  Genetic testing negative for 12 genes on [facility] Panel through [facility], 11/25/2016. Negative for mutations in BRCA1, BRCA2, BRIP1, EPCAM, MLH1, MSH2, MSH6, PALB2, PMS2, PTEN, RAD51D and TP53. One variant of unknown significance found in RAD51C gene

## 2024-07-25 NOTE — TELEPHONE ENCOUNTER
Called to follow-up on starting antibiotics for NTM infection. No answer.     Will send the following antibiotics to the pharmacy:     Start week 1: Rifampin 600 mg once daily   Start week 2: Azithromycin 250 mg one daily   Start week 3: Ethambutol 1,200 mg (~15 mg/kg) once daily    Monitoring:   CMP and CBC every 2 weeks. Can space out to monthly if stable.   Monthly AFB sputum test   Repeat audiology exam in 3 months   Repeat eye exam every 3 months  CT chest every 3 months     Medication therapy management follow-up in 2 weeks  Infectious disease follow-up in 4 weeks    Ordered meds and labs through [facility].     Annamaria Whitley, PharmD, AAHIVP  Medication Therapy Management Pharmacist

## 2024-07-26 ENCOUNTER — TELEPHONE (OUTPATIENT)
Dept: PHARMACY | Facility: CLINIC | Age: 74
End: 2024-07-26
Payer: COMMERCIAL

## 2024-07-26 NOTE — TELEPHONE ENCOUNTER
SVETLANA ANGEL 7/26.  1. Sched a follow up with Annamaria for the week of August 6. (2 weeks).  2. Sched a follow up with Dr. Robison for the week of August 19. (4 weeks).       Annamaria Whitley, [facility] [id number] hours ago (11:22 AM)     [facility], please schedule infectious disease follow-up with Dr. Robison in 4 weeks and medication therapy management follow-up in 2 weeks. Thank you!

## 2024-07-27 ENCOUNTER — LAB (OUTPATIENT)
Dept: LAB | Facility: CLINIC | Age: 74
End: 2024-07-27
Payer: COMMERCIAL

## 2024-07-27 ENCOUNTER — OFFICE VISIT (OUTPATIENT)
Dept: FAMILY MEDICINE | Facility: CLINIC | Age: 74
End: 2024-07-27
Payer: COMMERCIAL

## 2024-07-27 VITALS
BODY MASS INDEX: 31.06 KG/M2 | DIASTOLIC BLOOD PRESSURE: 77 MMHG | OXYGEN SATURATION: 95 % | HEART RATE: 93 BPM | SYSTOLIC BLOOD PRESSURE: 116 MMHG | WEIGHT: 169.8 LBS

## 2024-07-27 DIAGNOSIS — A31.0 MYCOBACTERIUM AVIUM INFECTION (H): ICD-10-CM

## 2024-07-27 DIAGNOSIS — E11.9 TYPE 2 DIABETES MELLITUS WITHOUT COMPLICATION, WITHOUT LONG-TERM CURRENT USE OF INSULIN (H): ICD-10-CM

## 2024-07-27 DIAGNOSIS — Z00.00 ENCOUNTER FOR MEDICARE ANNUAL WELLNESS EXAM: ICD-10-CM

## 2024-07-27 DIAGNOSIS — F17.211 CIGARETTE NICOTINE DEPENDENCE IN REMISSION: ICD-10-CM

## 2024-07-27 DIAGNOSIS — E78.5 HYPERLIPIDEMIA LDL GOAL <100: ICD-10-CM

## 2024-07-27 DIAGNOSIS — C18.3 MALIGNANT NEOPLASM OF HEPATIC FLEXURE (H): ICD-10-CM

## 2024-07-27 DIAGNOSIS — G40.909 NONINTRACTABLE EPILEPSY WITHOUT STATUS EPILEPTICUS, UNSPECIFIED EPILEPSY TYPE (H): ICD-10-CM

## 2024-07-27 DIAGNOSIS — M81.0 SENILE OSTEOPOROSIS: ICD-10-CM

## 2024-07-27 DIAGNOSIS — R91.8 PULMONARY NODULES: ICD-10-CM

## 2024-07-27 DIAGNOSIS — Z00.00 MEDICARE ANNUAL WELLNESS VISIT, SUBSEQUENT: Primary | ICD-10-CM

## 2024-07-27 LAB
CREAT UR-MCNC: 102 MG/DL
HBA1C MFR BLD: 6.4 %
MICROALBUMIN UR-MCNC: <12 MG/L
MICROALBUMIN/CREAT UR: NORMAL MG/G{CREAT}

## 2024-07-27 PROCEDURE — 82043 UR ALBUMIN QUANTITATIVE: CPT | Performed by: FAMILY MEDICINE

## 2024-07-27 PROCEDURE — 99000 SPECIMEN HANDLING OFFICE-LAB: CPT | Performed by: PATHOLOGY

## 2024-07-27 PROCEDURE — 83036 HEMOGLOBIN GLYCOSYLATED A1C: CPT | Performed by: FAMILY MEDICINE

## 2024-07-27 PROCEDURE — 36415 COLL VENOUS BLD VENIPUNCTURE: CPT | Performed by: PATHOLOGY

## 2024-07-27 PROCEDURE — G0439 PPPS, SUBSEQ VISIT: HCPCS | Performed by: FAMILY MEDICINE

## 2024-07-27 ASSESSMENT — ENCOUNTER SYMPTOMS: FATIGUE: 1

## 2024-07-27 NOTE — PATIENT INSTRUCTIONS
See me in 1 month to discuss osteoporosis  Blood work today  Urine sample today  See me in 6 months for diabetes   May need Prevnar 20 - I will check with ID           Patient Education   Preventive Care Advice   This is general advice given by our system to help you stay healthy. However, your care team may have specific advice just for you. Please talk to your care team about your preventive care needs.  Nutrition  Eat 5 or more servings of fruits and vegetables each day.  Try wheat bread, brown rice and whole grain pasta (instead of white bread, rice, and pasta).  Get enough calcium and vitamin D. Check the label on foods and aim for 100% of the RDA (recommended daily allowance).  Lifestyle  Exercise at least 150 minutes each week  (30 minutes a day, 5 days a week).  Do muscle strengthening activities 2 days a week. These help control your weight and prevent disease.  No smoking.  Wear sunscreen to prevent skin cancer.  Have a dental exam and cleaning every 6 months.  Yearly exams  See your health care team every year to talk about:  Any changes in your health.  Any medicines your care team has prescribed.  Preventive care, family planning, and ways to prevent chronic diseases.  Shots (vaccines)   HPV shots (up to age 26), if you've never had them before.  Hepatitis B shots (up to age 59), if you've never had them before.  COVID-19 shot: Get this shot when it's due.  Flu shot: Get a flu shot every year.  Tetanus shot: Get a tetanus shot every 10 years.  Pneumococcal, hepatitis A, and RSV shots: Ask your care team if you need these based on your risk.  Shingles shot (for age 50 and up)  General health tests  Diabetes screening:  Starting at age 35, Get screened for diabetes at least every 3 years.  If you are younger than age 35, ask your care team if you should be screened for diabetes.  Cholesterol test: At age 39, start having a cholesterol test every 5 years, or more often if advised.  Bone density scan  (DEXA): At age 50, ask your care team if you should have this scan for osteoporosis (brittle bones).  Hepatitis C: Get tested at least once in your life.  STIs (sexually transmitted infections)  Before age 24: Ask your care team if you should be screened for STIs.  After age 24: Get screened for STIs if you're at risk. You are at risk for STIs (including HIV) if:  You are sexually active with more than one person.  You don't use condoms every time.  You or a partner was diagnosed with a sexually transmitted infection.  If you are at risk for HIV, ask about PrEP medicine to prevent HIV.  Get tested for HIV at least once in your life, whether you are at risk for HIV or not.  Cancer screening tests  Cervical cancer screening: If you have a cervix, begin getting regular cervical cancer screening tests starting at age 21.  Breast cancer scan (mammogram): If you've ever had breasts, begin having regular mammograms starting at age 40. This is a scan to check for breast cancer.  Colon cancer screening: It is important to start screening for colon cancer at age 45.  Have a colonoscopy test every 10 years (or more often if you're at risk) Or, ask your provider about stool tests like a FIT test every year or Cologuard test every 3 years.  To learn more about your testing options, visit:   .  For help making a decision, visit:   https://bit.ly/nv77070.  Prostate cancer screening test: If you have a prostate, ask your care team if a prostate cancer screening test (PSA) at age 55 is right for you.  Lung cancer screening: If you are a current or former smoker ages 50 to 80, ask your care team if ongoing lung cancer screenings are right for you.  For informational purposes only. Not to replace the advice of your health care provider. Copyright   2023 [facility]. All rights reserved. Clinically reviewed by the [facility] Transitions Program. [employer] [id number] - REV 01/24.  Learning About Being Active as an Older  Adult  Why is being active important as you get older?     Being active is one of the best things you can do for your health. And it's never too late to start. Being active--or getting active, if you aren't already--has definite benefits. It can:  Give you more energy,  Keep your mind sharp.  Improve balance to reduce your risk of falls.  Help you manage chronic illness with fewer medicines.  No matter how old you are, how fit you are, or what health problems you have, there is a form of activity that will work for you. And the more physical activity you can do, the better your overall health will be.  What kinds of activity can help you stay healthy?  Being more active will make your daily activities easier. Physical activity includes planned exercise and things you do in daily life. There are four types of activity:  Aerobic.  Doing aerobic activity makes your heart and lungs strong.  Includes walking, dancing, and gardening.  Aim for at least 2  hours spread throughout the week.  It improves your energy and can help you sleep better.  Muscle-strengthening.  This type of activity can help maintain muscle and strengthen bones.  Includes climbing stairs, using resistance bands, and lifting or carrying heavy loads.  Aim for at least twice a week.  It can help protect the knees and other joints.  Stretching.  Stretching gives you better range of motion in joints and muscles.  Includes upper arm stretches, calf stretches, and gentle yoga.  Aim for at least twice a week, preferably after your muscles are warmed up from other activities.  It can help you function better in daily life.  Balancing.  This helps you stay coordinated and have good posture.  Includes heel-to-toe walking, sun chi, and certain types of yoga.  Aim for at least 3 days a week.  It can reduce your risk of falling.  Even if you have a hard time meeting the recommendations, it's better to be more active than less active. All activity done in each  category counts toward your weekly total. You'd be surprised how daily things like carrying groceries, keeping up with grandchildren, and taking the stairs can add up.  What keeps you from being active?  If you've had a hard time being more active, you're not alone. Maybe you remember being able to do more. Or maybe you've never thought of yourself as being active. It's frustrating when you can't do the things you want. Being more active can help. What's holding you back?  Getting started.  Have a goal, but break it into easy tasks. Small steps build into big accomplishments.  Staying motivated.  If you feel like skipping your activity, remember your goal. Maybe you want to move better and stay independent. Every activity gets you one step closer.  Not feeling your best.  Start with 5 minutes of an activity you enjoy. Prove to yourself you can do it. As you get comfortable, increase your time.  You may not be where you want to be. But you're in the process of getting there. Everyone starts somewhere.  How can you find safe ways to stay active?  Talk with your doctor about any physical challenges you're facing. Make a plan with your doctor if you have a health problem or aren't sure how to get started with activity.  If you're already active, ask your doctor if there is anything you should change to stay safe as your body and health change.  If you tend to feel dizzy after you take medicine, avoid activity at that time. Try being active before you take your medicine. This will reduce your risk of falls.  If you plan to be active at home, make sure to clear your space before you get started. Remove things like TV cords, coffee tables, and throw rugs. It's safest to have plenty of space to move freely.  The key to getting more active is to take it slow and steady. Try to improve only a little bit at a time. Pick just one area to improve on at first. And if an activity hurts, stop and talk to your doctor.  Where can you  "learn more?  Go to https://www.[employer].net/patiented  Enter P600 in the search box to learn more about \"Learning About Being Active as an Older Adult.\"  Current as of: June 5, 2023               Content Version: 14.0    6739-9317 [employer].   Care instructions adapted under license by your healthcare professional. If you have questions about a medical condition or this instruction, always ask your healthcare professional. [employer] disclaims any warranty or liability for your use of this information.      Nutrition for Older Adults: Care Instructions  Overview     Good nutrition is important at any age. But it is especially important for older adults. Eating healthy foods helps keep your body strong. And it can help lower your risk for disease.  As you get older, your body needs more of certain nutrients. These include vitamin B12, calcium, and vitamin D. But it may be harder for you to get these and other important nutrients. This could be for many reasons. You may not feel as hungry as you used to. Or you could have problems with your teeth or mouth that make it hard to chew. Or you may not enjoy planning and preparing meals, especially if you live alone.  Talk with your doctor if you want help getting the most nutrition from what you eat. They may have you work with a dietitian to help you plan meals.  Follow-up care is a key part of your treatment and safety. Be sure to make and go to all appointments, and call your doctor if you are having problems. It's also a good idea to know your test results and keep a list of the medicines you take.  How can you care for yourself at home?  To stay healthy  Eat a variety of foods. The more you vary the foods you eat, the more vitamins, minerals, and other nutrients you get.  Ask your doctor if you should take a multivitamin. Choose one with about 100% of the daily value (DV) for vitamins and minerals. Do not take more than 100% of the daily " value for any vitamin or mineral unless your doctor tells you to. Talk with your doctor if you are not sure which multivitamin is right for you.  Try to eat lots of fruits and vegetables. Fresh or frozen vegetables and fruits are healthy choices. Choose canned vegetables that have no salt added and fruits that are canned in their own juice or light syrup.  Include foods that are high in vitamin B12 in your diet. Good choices are fortified breakfast cereal, nonfat or low-fat milk and other dairy products, meat, poultry, fish, and eggs.  Get enough calcium and vitamin D. Good choices include nonfat or low-fat milk, cheese, and yogurt. Other good options are tofu, orange juice with added calcium, and some leafy green vegetables, such as norma greens and kale. If you don't use milk products, talk to your doctor about calcium and vitamin D supplements.  Try to eat protein foods every day. Good choices include lean meat, fish, poultry, eggs, and cheese. Other good options are cooked beans, peanut butter, and nuts and seeds.  Choose whole grains for half of the grains you eat. Look for 100% whole wheat bread, whole-grain cereals, brown rice, and other whole grains.  If you have constipation  Eat high-fiber foods every day if you can. These include fruits, vegetables, cooked dried beans, and whole grains.  Drink plenty of fluids. If you have kidney, heart, or liver disease and have to limit fluids, talk with your doctor before you increase the amount of fluids you drink.  Ask your doctor if stool softeners may help keep your bowels regular.  If you have mouth problems that make chewing hard  Pick canned or cooked fruits and vegetables. These are often softer.  Chop or shred meat, poultry, and fish. Add sauce or gravy to the meat to help keep it moist.  Pick other protein foods that are soft. These include cheese, peanut butter, cooked beans, cottage cheese, and eggs.  If you have trouble shopping for yourself  Ask a local  "food store to deliver groceries to your home.  Contact your local area agency on aging and ask about resources that can help.  Ask a family member or neighbor to help you.  If you have trouble preparing meals  Try easier cooking methods such as using a slow cooker or microwave oven.  Let the grocery store do some of the work for you. Look for precut, washed, and ready-to-eat foods.  Take part in group meal programs. You can find these through senior citizen programs.  Have meals brought to your home. Your community may offer programs that deliver meals, such as Meals on Wheels. Or you could use an online meal delivery service.  If you are able, take a cooking class.  If your appetite is poor  Try to eat meals on a regular schedule. It may help to eat smaller meals more often throughout the day.  If you can, eat some meals with other people. You could ask family or friends to eat with you. Or you could take part in group meal programs offered in your community.  Ask your doctor if your medicines could cause appetite or taste problems. If so, ask about changing medicines.  Add spices and herbs to increase the flavor of food.  If you think you are depressed, ask your doctor for help. Depression can affect your appetite. And it can make it hard to do everyday activities like grocery shopping and making meals. Treatment can help.  When should you call for help?  Watch closely for changes in your health, and be sure to contact your doctor if you have any problems.  Where can you learn more?  Go to https://www.healthMixbook.net/patiented  Enter L643 in the search box to learn more about \"Nutrition for Older Adults: Care Instructions.\"  Current as of: September 25, 2023               Content Version: 14.0    0488-0788 [employer].   Care instructions adapted under license by your healthcare professional. If you have questions about a medical condition or this instruction, always ask your healthcare professional. " [employer] disclaims any warranty or liability for your use of this information.      Preventing Falls: Care Instructions  Injuries and health problems such as trouble walking or poor eyesight can increase your risk of falling. So can some medicines. But there are things you can do to help prevent falls. You can exercise to get stronger. You can also arrange your home to make it safer.    Talk to your doctor about the medicines you take. Ask if any of them increase the risk of falls and whether they can be changed or stopped.   Try to exercise regularly. It can help improve your strength and balance. This can help lower your risk of falling.     Practice fall safety and prevention.    Wear low-heeled shoes that fit well and give your feet good support. Talk to your doctor if you have foot problems that make this hard.  Carry a cellphone or wear a medical alert device that you can use to call for help.  Use stepladders instead of chairs to reach high objects. Don't climb if you're at risk for falls. Ask for help, if needed.  Wear the correct eyeglasses, if you need them.    Make your home safer.    Remove rugs, cords, clutter, and furniture from walkways.  Keep your house well lit. Use night-lights in hallways and bathrooms.  Install and use sturdy handrails on stairways.  Wear nonskid footwear, even inside. Don't walk barefoot or in socks without shoes.    Be safe outside.    Use handrails, curb cuts, and ramps whenever possible.  Keep your hands free by using a shoulder bag or backpack.  Try to walk in well-lit areas. Watch out for uneven ground, changes in pavement, and debris.  Be careful in the winter. Walk on the grass or gravel when sidewalks are slippery. Use de-icer on steps and walkways. Add non-slip devices to shoes.    Put grab bars and nonskid mats in your shower or tub and near the toilet. Try to use a shower chair or bath bench when bathing.   Get into a tub or shower by putting in your  "weaker leg first. Get out with your strong side first. Have a phone or medical alert device in the bathroom with you.   Where can you learn more?  Go to https://www.[employer].net/patiented  Enter G117 in the search box to learn more about \"Preventing Falls: Care Instructions.\"  Current as of: July 17, 2023               Content Version: 14.0    9253-3165 [employer].   Care instructions adapted under license by your healthcare professional. If you have questions about a medical condition or this instruction, always ask your healthcare professional. [employer] disclaims any warranty or liability for your use of this information.      Hearing Loss: Care Instructions  Overview     Hearing loss is a sudden or slow decrease in how well you hear. It can range from slight to profound. Permanent hearing loss can occur with aging. It also can happen when you are exposed long-term to loud noise. Examples include listening to loud music, riding motorcycles, or being around other loud machines.  Hearing loss can affect your work and home life. It can make you feel lonely or depressed. You may feel that you have lost your independence. But hearing aids and other devices can help you hear better and feel connected to others.  Follow-up care is a key part of your treatment and safety. Be sure to make and go to all appointments, and call your doctor if you are having problems. It's also a good idea to know your test results and keep a list of the medicines you take.  How can you care for yourself at home?  Avoid loud noises whenever possible. This helps keep your hearing from getting worse.  Always wear hearing protection around loud noises.  Wear a hearing aid as directed.  A professional can help you pick a hearing aid that will work best for you.  You can also get hearing aids over the counter for mild to moderate hearing loss.  Have hearing tests as your doctor suggests. They can show whether your " "hearing has changed. Your hearing aid may need to be adjusted.  Use other devices as needed. These may include:  Telephone amplifiers and hearing aids that can connect to a television, stereo, radio, or microphone.  Devices that use lights or vibrations. These alert you to the doorbell, a ringing telephone, or a baby monitor.  Television closed-captioning. This shows the words at the bottom of the screen. Most new TVs can do this.  TTY (text telephone). This lets you type messages back and forth on the telephone instead of talking or listening. These devices are also called TDD. When messages are typed on the keyboard, they are sent over the phone line to a receiving TTY. The message is shown on a monitor.  Use text messaging, social media, and email if it is hard for you to communicate by telephone.  Try to learn a listening technique called speechreading. It is not lipreading. You pay attention to people's gestures, expressions, posture, and tone of voice. These clues can help you understand what a person is saying. Face the person you are talking to, and have them face you. Make sure the lighting is good. You need to see the other person's face clearly.  Think about counseling if you need help to adjust to your hearing loss.  When should you call for help?  Watch closely for changes in your health, and be sure to contact your doctor if:    You think your hearing is getting worse.     You have new symptoms, such as dizziness or nausea.   Where can you learn more?  Go to https://www.[employer].net/patiented  Enter R798 in the search box to learn more about \"Hearing Loss: Care Instructions.\"  Current as of: September 27, 2023               Content Version: 14.0    1312-3252 [employer].   Care instructions adapted under license by your healthcare professional. If you have questions about a medical condition or this instruction, always ask your healthcare professional. [employer] disclaims " any warranty or liability for your use of this information.      Learning About Being Active as an Older Adult  Why is being active important as you get older?     Being active is one of the best things you can do for your health. And it's never too late to start. Being active--or getting active, if you aren't already--has definite benefits. It can:  Give you more energy,  Keep your mind sharp.  Improve balance to reduce your risk of falls.  Help you manage chronic illness with fewer medicines.  No matter how old you are, how fit you are, or what health problems you have, there is a form of activity that will work for you. And the more physical activity you can do, the better your overall health will be.  What kinds of activity can help you stay healthy?  Being more active will make your daily activities easier. Physical activity includes planned exercise and things you do in daily life. There are four types of activity:  Aerobic.  Doing aerobic activity makes your heart and lungs strong.  Includes walking, dancing, and gardening.  Aim for at least 2  hours spread throughout the week.  It improves your energy and can help you sleep better.  Muscle-strengthening.  This type of activity can help maintain muscle and strengthen bones.  Includes climbing stairs, using resistance bands, and lifting or carrying heavy loads.  Aim for at least twice a week.  It can help protect the knees and other joints.  Stretching.  Stretching gives you better range of motion in joints and muscles.  Includes upper arm stretches, calf stretches, and gentle yoga.  Aim for at least twice a week, preferably after your muscles are warmed up from other activities.  It can help you function better in daily life.  Balancing.  This helps you stay coordinated and have good posture.  Includes heel-to-toe walking, sun chi, and certain types of yoga.  Aim for at least 3 days a week.  It can reduce your risk of falling.  Even if you have a hard time  meeting the recommendations, it's better to be more active than less active. All activity done in each category counts toward your weekly total. You'd be surprised how daily things like carrying groceries, keeping up with grandchildren, and taking the stairs can add up.  What keeps you from being active?  If you've had a hard time being more active, you're not alone. Maybe you remember being able to do more. Or maybe you've never thought of yourself as being active. It's frustrating when you can't do the things you want. Being more active can help. What's holding you back?  Getting started.  Have a goal, but break it into easy tasks. Small steps build into big accomplishments.  Staying motivated.  If you feel like skipping your activity, remember your goal. Maybe you want to move better and stay independent. Every activity gets you one step closer.  Not feeling your best.  Start with 5 minutes of an activity you enjoy. Prove to yourself you can do it. As you get comfortable, increase your time.  You may not be where you want to be. But you're in the process of getting there. Everyone starts somewhere.  How can you find safe ways to stay active?  Talk with your doctor about any physical challenges you're facing. Make a plan with your doctor if you have a health problem or aren't sure how to get started with activity.  If you're already active, ask your doctor if there is anything you should change to stay safe as your body and health change.  If you tend to feel dizzy after you take medicine, avoid activity at that time. Try being active before you take your medicine. This will reduce your risk of falls.  If you plan to be active at home, make sure to clear your space before you get started. Remove things like TV cords, coffee tables, and throw rugs. It's safest to have plenty of space to move freely.  The key to getting more active is to take it slow and steady. Try to improve only a little bit at a time. Pick just  "one area to improve on at first. And if an activity hurts, stop and talk to your doctor.  Where can you learn more?  Go to https://www.[employer].net/patiented  Enter P600 in the search box to learn more about \"Learning About Being Active as an Older Adult.\"  Current as of: June 5, 2023               Content Version: 14.0    2280-6382 [employer].   Care instructions adapted under license by your healthcare professional. If you have questions about a medical condition or this instruction, always ask your healthcare professional. [employer] disclaims any warranty or liability for your use of this information.         "

## 2024-07-27 NOTE — PROGRESS NOTES
Preventive Care Visit  [facility]  Maria Esther Miranda MD PhD, Family Medicine  Jul 27, 2024      Assessment & Plan     Type 2 diabetes mellitus without complication, without long-term current use of insulin (H)  No medication - takes blood sugar sporadically - went to diabetic education - had an eye exam - will get blood work - will need a foot exam next visit  - Hemoglobin A1c  - Albumin Random Urine Quantitative with Creat Ratio    Hyperlipidemia LDL goal <100  Recently started atorvastatin and has been having leg weakness - has stopped her daily walking - could relate to the statin -- will stop it and see if resolves    Pulmonary nodules  Seen on 3/2023 CT Chest following right hemicolectomy. Repeat CT Chest 3/2024 shows increase in upper and mid lung predominant centrilobular nodules and bronchiectasis, suggestive of indolent infection such as EDDA. had bronchoscopy and has a mycobacterium infection followed by pulmonary     Medicare annual wellness visit, subsequent  Immunizations UTD  - may need prevnar  20- check with ID - mammogram done - colonoscopy recent - DXA is due     Nonintractable epilepsy without status epilepticus, unspecified epilepsy type (H)  On Keppra - no recent seizures - [place] [facility] [place]    Senile osteoporosis  Was on alendronate 4-5 yrs - then started prolia - got a shot in 2022 and not told to return in 6 months so never had another shot - her recent DXA 3/2024 shows OP but shows some gain- told to make appt to discuss - going back on prolia     Mycobacterium avium infection (H)  Recent culture + she is on 3 antibiotics for 1 yr - had multiple tests prior to starting - followed by ID    Malignant neoplasm of hepatic flexure (H)  Has hx of adenomatous polyp - had malignant neoplasm of hepatic flexure on colonoscopy and had surgical resection 3/2023 - had recent colonoscopy 6/2024 - needs repeat in 3 yrs     Cigarette nicotine dependence in  "remission  Quit more than 15 yr ago         Patient has been advised of split billing requirements and indicates understanding: Yes        BMI  Estimated body mass index is 31.06 kg/m  as calculated from the following:    Height as of 7/15/24: 1.575 m (5' 2\").    Weight as of this encounter: 77 kg (169 lb 12.8 oz).       Counseling  Appropriate preventive services were addressed with this patient via screening, questionnaire, or discussion as appropriate for fall prevention, nutrition, physical activity, Tobacco-use cessation, weight loss and cognition.  Checklist reviewing preventive services available has been given to the patient.  Reviewed patient's diet, addressing concerns and/or questions.   The patient was instructed to see the dentist every 6 months.   The patient was provided with written information regarding signs of hearing loss.       See Patient Instructions    Return in about 3 months (around 10/27/2024).    Steve Valentine is a 74 year old, presenting for the following:  Physical, Recheck Medication (Pt starting year long course of antibiotics for lung infection. Unable to use nebulizer solution because insurance will not cover nebulizer.), and Fatigue (Pt reports pain and weakness in legs, worrying that legs will give out while walking)        7/27/2024    10:10 AM   Additional Questions   Roomed by VARUN LAROSE         Via the Health Maintenance questionnaire, the patient has reported the following services have been completed -Eye Exam: [facility] 2024-06-21, this information has been sent to the abstraction team.  Health Care Directive  Patient does not have a Health Care Directive or Living Will: Discussed advance care planning with patient; information given to patient to review.    Fatigue  Associated symptoms include fatigue.     She is PM [id number]  --she is s/p partial hysterectomy for cervical pathology in 1995.  No vaginal itching, discharge or burning.  She is not " sexually active.     Mammogram  3/2024- previous bx on right breast - benign - 10 or more yrs ago      Hx of colon polyps - FHx of polyps  - had tubular adenoma in 2011; then had Dx of colon cancer 3/2023 -    Hx of colon cancer 2023 - had resection of colon on right side  2/2023 - no chemo  - had a colonoscopy in 6/2024 -  next time due in 3 yrs.      Hx of osteoporosis - on fosamax  3-4 yrs  - no meds now    DEXA 3/2024   Lumbar spine (L2-L4)  T-score -2.7 , BMD 0.875 g/cm2.      Left Femoral  neck  T-score -2.6 , BMD 0.679g/cm2.  Left Total hip  T-score -2.1 , BMD 0.745 g/cm2.     Right Femoral neck  T-score -2.4, BMD  0.704 g/cm2.  Right Total hip  T-score -1.8 , BMD  0.777 g/cm2.      Interval change  Bone density compared to the previous study (2021) has changed at mean total bilateral hips by +0.1%.     Bone density compared to the baseline study (2016) has changed at [place] spine (L1-L4)  by +21.3% , at the right total hip by +4.7%, and at the mean total bilateral hips by +4.1%.Ref 3  Percent changes not mentioned or within remaining regions are insignificant    Had prolia in 8/2022 - one time and not told to continue so never came back    calcium citrate - 1/day.     Vitamin D   Drinks milk, cottage cheese, greens  - takes multivit,  Vit D gummies   2000IU/day      Hx diabetes  seen 2/2024   Not on medication - checking bs 1x/wk -  does it fasting  - low 100's  and 2 hrs after dinner  around 170's   Eye exam  6/2024  -   Trys to watch diet - trys to exercise  - normally walks a few miles/day   A1C= 6.3  went to diabetic education in March    Hyperlipidemia - just started lipitor 2 months ago  - having leg weakness feeling - like they might buckle        Genetic testing negative for 12 genes on [facility] Panel through [facility], 11/25/2016. Negative for mutations in BRCA1, BRCA2, BRIP1, EPCAM, MLH1, MSH2, MSH6, PALB2, PMS2, PTEN, RAD51D and TP53. One variant of unknown significance found in RAD51C gene      Nodules on lung -Seen on 3/2023 CT Chest following right hemicolectomy. Repeat CT Chest 3/2024 shows increase in upper and mid lung predominant centrilobular nodules and bronchiectasis, suggestive of indolent infection such as EDDA.  saw pulmonology and had bronchoscopy in 5/2024  and lavage + for  Mycobacterium gilmar infection  - now antibiotics for 1 yr  ethambutol, rifampin and azithromycin  - Had eye exam and hearing and had blood work followed by ID     Seizures -  started at age 16, sees neurology at [facility].  On Keppra long time - no seizures for 25 yrs.            7/25/2024   General Health   How would you rate your overall physical health? Good   Feel stress (tense, anxious, or unable to sleep) Not at all            7/25/2024   Nutrition   Diet: Regular (no restrictions)            7/25/2024   Exercise   Days per week of moderate/strenous exercise Patient declined   Average minutes spent exercising at this level 20 min            7/25/2024   Social Factors   Frequency of gathering with friends or relatives Three times a week   Worry food won't last until get money to buy more No   Food not last or not have enough money for food? No   Do you have housing? (Housing is defined as stable permanent housing and does not include staying ouside in a car, in a tent, in an abandoned building, in an overnight shelter, or couch-surfing.) Yes   Are you worried about losing your housing? No   Lack of transportation? No   Unable to get utilities (heat,electricity)? No            7/25/2024   Fall Risk   Fallen 2 or more times in the past year? No   Trouble with walking or balance? Yes              7/25/2024   Activities of Daily Living- Home Safety   Needs help with the following daily activites None of the above   Safety concerns in the home None of the above            7/25/2024   Dental   Dentist two times every year? (!) NO            7/25/2024   Hearing Screening   Hearing concerns? (!) I NEED TO ASK PEOPLE TO SPEAK UP  OR REPEAT THEMSELVES.            2024   Driving Risk Screening   Patient/family members have concerns about driving No            2024   General Alertness/Fatigue Screening   Have you been more tired than usual lately? No            2024   Urinary Incontinence Screening   Bothered by leaking urine in past 6 months No                 Today's PHQ-2 Score:       2024    11:35 AM   PHQ-2 (  [employer])   Q1: Little interest or pleasure in doing things 0   Q2: Feeling down, depressed or hopeless 0   PHQ-2 Score 0         2024   Substance Use   Alcohol more than 3/day or more than 7/wk No   Do you have a current opioid prescription? No   How severe/bad is pain from 1 to 10? 3/10   Do you use any other substances recreationally? No        Social History     Tobacco Use    Smoking status: Former     Current packs/day: 0.00     Average packs/day: 1 pack/day for 15.0 years (15.0 ttl pk-yrs)     Types: Cigarettes     Start date: 1994     Quit date: 2009     Years since quittin.9    Smokeless tobacco: Never    Tobacco comments:     On and off for a total of 10 years of smoking    Substance Use Topics    Alcohol use: Yes     Comment: 1x/month    Drug use: No           2024   LAST FHS-7 RESULTS   1st degree relative breast or ovarian cancer Yes   Any relative bilateral breast cancer Unknown   Any male have breast cancer No   Any ONE woman have BOTH breast AND ovarian cancer Yes   Any woman with breast cancer before 50yrs Yes   2 or more relatives with breast AND/OR ovarian cancer Yes   2 or more relatives with breast AND/OR bowel cancer Yes    Had genetic testing in the past        Mammogram Screening - Mammogram every 1-2 years updated in Health Maintenance based on mutual decision making    ASCVD Risk   The 10-year ASCVD risk score (Pepe HILL, et al., 2019) is: 19%    Values used to calculate the score:      Age: 74 years      Sex: Female      Is Non-   American: No      Diabetic: Yes      Tobacco smoker: No      Systolic Blood Pressure: 116 mmHg      Is BP treated: No      HDL Cholesterol: 49 mg/dL      Total Cholesterol: 139 mg/dL            Reviewed and updated as needed this visit by Provider                      Current providers sharing in care for this patient include:  Patient Care Team:  Maria Esther Miranda MD PhD as PCP - General (Family Practice)  Henry Akhtar DPM as MD (Podiatrist Primary Podiatric Medicine)  Maria Esther Miranda MD PhD as Assigned PCP  Eliza Jain MD as MD (Otolaryngology)  Jessica Tinsley RD as Diabetes Educator (Nutrition)  Audelia Mars DPM, Podiatry/Foot and Ankle Surgery as Assigned Musculoskeletal Provider  Shelby Hernandez MD as Assigned Pulmonology Provider  Alejandra Garcia AuD as Audiologist (Audiology)  Annamaria Whitley RPH as Pharmacist (Pharmacist Ambulatory Care)  Celia Lala MD as Assigned Infectious Disease Provider  Eliza Jain MD as Assigned Surgical Provider  Annamaria Whitley RPH as Assigned [facility] Pharmacist    The following health maintenance items are reviewed in Epic and correct as of today:  Health Maintenance   Topic Date Due    EYE EXAM  Never done    RSV VACCINE (Pregnancy & 60+) (1 - 1-dose 60+ series) Never done    MEDICARE ANNUAL WELLNESS VISIT  08/17/2023    COVID-19 Vaccine (7 - 2023-24 season) 05/02/2024    INFLUENZA VACCINE (1) 09/01/2024    A1C  09/18/2024    ANNUAL REVIEW OF HM ORDERS  02/21/2025    MICROALBUMIN  03/18/2025    DIABETIC FOOT EXAM  03/27/2025    LUNG CANCER SCREENING  03/28/2025    BMP  06/21/2025    LIPID  06/21/2025    FALL RISK ASSESSMENT  07/27/2025    DEXA  03/29/2026    MAMMO SCREENING  03/29/2026    COLORECTAL CANCER SCREENING  06/19/2027    ADVANCE CARE PLANNING  08/17/2027    DTAP/TDAP/TD IMMUNIZATION (2 - Td or Tdap) 02/12/2028    HEPATITIS C SCREENING  Completed    PHQ-2 (once per calendar year)  Completed    Pneumococcal Vaccine: 65+  "Years  Completed    ZOSTER IMMUNIZATION  Completed    IPV IMMUNIZATION  Aged Out    HPV IMMUNIZATION  Aged Out    MENINGITIS IMMUNIZATION  Aged Out    RSV MONOCLONAL ANTIBODY  Aged Out         Review of Systems  Constitutional, HEENT, cardiovascular, pulmonary, GI, , musculoskeletal, neuro, skin, endocrine and psych systems are negative, except as otherwise noted.     Objective    Exam  /77 (BP Location: Right arm, Patient Position: Sitting, Cuff Size: Adult Large)   Pulse 93   Wt 77 kg (169 lb 12.8 oz)   SpO2 95%   BMI 31.06 kg/m     Estimated body mass index is 31.06 kg/m  as calculated from the following:    Height as of 7/15/24: 1.575 m (5' 2\").    Weight as of this encounter: 77 kg (169 lb 12.8 oz).    Physical Exam  GENERAL: alert and no distress  EYES: Eyes grossly normal to inspection, PERRL and conjunctivae and sclerae normal  HENT: ear canals and TM's normal, nose and mouth without ulcers or lesions  NECK: no adenopathy, no asymmetry, masses, or scars  RESP: lungs clear to auscultation - no rales, rhonchi or wheezes  BREAST: normal without masses, tenderness or nipple discharge and no palpable axillary masses or adenopathy  CV: regular rate and rhythm, normal S1 S2, no S3 or S4, no murmur, click or rub, no peripheral edema  ABDOMEN: soft, nontender, no hepatosplenomegaly, no masses and bowel sounds normal  MS: no gross musculoskeletal defects noted, no edema  SKIN: no suspicious lesions or rashes  NEURO: Normal strength and tone, mentation intact and speech normal  PSYCH: mentation appears normal, affect normal/bright  LYMPH: no cervical, supraclavicular, axillary, adenopathy         No data to display                       Signed Electronically by: Maria Esther Miranda MD PhD    "

## 2024-07-29 ENCOUNTER — ANCILLARY PROCEDURE (OUTPATIENT)
Dept: CT IMAGING | Facility: CLINIC | Age: 74
End: 2024-07-29
Attending: OTOLARYNGOLOGY
Payer: COMMERCIAL

## 2024-07-29 DIAGNOSIS — Z23 ENCOUNTER FOR IMMUNIZATION: Primary | ICD-10-CM

## 2024-07-29 DIAGNOSIS — R09.82 POST-NASAL DRIP: ICD-10-CM

## 2024-07-29 PROCEDURE — 70486 CT MAXILLOFACIAL W/O DYE: CPT | Performed by: RADIOLOGY

## 2024-07-29 NOTE — PROGRESS NOTES
7-29-24  ID is OK with pt getting prevnar 20 - I sent a message to her and ordered it   Maria Esther Miranda MD, PhD'

## 2024-07-31 ENCOUNTER — TELEPHONE (OUTPATIENT)
Dept: OTOLARYNGOLOGY | Facility: CLINIC | Age: 74
End: 2024-07-31
Payer: COMMERCIAL

## 2024-07-31 NOTE — TELEPHONE ENCOUNTER
"Writer called ans spoke to pt regarding CT results. Writer stated that Dr. Jain has reviewed pt sinus CT and stated that the \"sinus CT looks good, but it does look like at some point you may have had a chronic sinus infection. There is no blockage, but there is some trace mucous in your sinuses. Since you are doing better, I will hold off on additional treatment at this time unless you notice increased drainage and cough\". Writer encouraged pt to reach back out with any questions or concerns.      Arelis Bella RN   "

## 2024-08-01 ENCOUNTER — TELEPHONE (OUTPATIENT)
Dept: INFECTIOUS DISEASES | Facility: CLINIC | Age: 74
End: 2024-08-01
Payer: COMMERCIAL

## 2024-08-12 ENCOUNTER — LAB (OUTPATIENT)
Dept: LAB | Facility: CLINIC | Age: 74
End: 2024-08-12
Attending: INTERNAL MEDICINE
Payer: COMMERCIAL

## 2024-08-12 DIAGNOSIS — A31.0 MYCOBACTERIUM AVIUM INFECTION (H): ICD-10-CM

## 2024-08-12 LAB
ALBUMIN SERPL BCG-MCNC: 4.1 G/DL (ref 3.5–5.2)
ALP SERPL-CCNC: 112 U/L (ref 40–150)
ALT SERPL W P-5'-P-CCNC: 16 U/L (ref 0–50)
ANION GAP SERPL CALCULATED.3IONS-SCNC: 12 MMOL/L (ref 7–15)
AST SERPL W P-5'-P-CCNC: 23 U/L (ref 0–45)
BASOPHILS # BLD AUTO: 0 10E3/UL (ref 0–0.2)
BASOPHILS NFR BLD AUTO: 1 %
BILIRUB SERPL-MCNC: 0.3 MG/DL
BUN SERPL-MCNC: 14.9 MG/DL (ref 8–23)
CALCIUM SERPL-MCNC: 9.5 MG/DL (ref 8.8–10.4)
CHLORIDE SERPL-SCNC: 105 MMOL/L (ref 98–107)
CREAT SERPL-MCNC: 0.64 MG/DL (ref 0.51–0.95)
EGFRCR SERPLBLD CKD-EPI 2021: >90 ML/MIN/1.73M2
EOSINOPHIL # BLD AUTO: 0.1 10E3/UL (ref 0–0.7)
EOSINOPHIL NFR BLD AUTO: 2 %
ERYTHROCYTE [DISTWIDTH] IN BLOOD BY AUTOMATED COUNT: 12.6 % (ref 10–15)
GLUCOSE SERPL-MCNC: 103 MG/DL (ref 70–99)
HCO3 SERPL-SCNC: 21 MMOL/L (ref 22–29)
HCT VFR BLD AUTO: 37.1 % (ref 35–47)
HGB BLD-MCNC: 12.4 G/DL (ref 11.7–15.7)
IMM GRANULOCYTES # BLD: 0 10E3/UL
IMM GRANULOCYTES NFR BLD: 0 %
LYMPHOCYTES # BLD AUTO: 1.7 10E3/UL (ref 0.8–5.3)
LYMPHOCYTES NFR BLD AUTO: 30 %
MCH RBC QN AUTO: 29.7 PG (ref 26.5–33)
MCHC RBC AUTO-ENTMCNC: 33.4 G/DL (ref 31.5–36.5)
MCV RBC AUTO: 89 FL (ref 78–100)
MONOCYTES # BLD AUTO: 0.6 10E3/UL (ref 0–1.3)
MONOCYTES NFR BLD AUTO: 10 %
NEUTROPHILS # BLD AUTO: 3.4 10E3/UL (ref 1.6–8.3)
NEUTROPHILS NFR BLD AUTO: 57 %
NRBC # BLD AUTO: 0 10E3/UL
NRBC BLD AUTO-RTO: 0 /100
PLATELET # BLD AUTO: 267 10E3/UL (ref 150–450)
POTASSIUM SERPL-SCNC: 4.4 MMOL/L (ref 3.4–5.3)
PROT SERPL-MCNC: 7.6 G/DL (ref 6.4–8.3)
RBC # BLD AUTO: 4.17 10E6/UL (ref 3.8–5.2)
SODIUM SERPL-SCNC: 138 MMOL/L (ref 135–145)
WBC # BLD AUTO: 5.9 10E3/UL (ref 4–11)

## 2024-08-12 PROCEDURE — 85025 COMPLETE CBC W/AUTO DIFF WBC: CPT | Performed by: PATHOLOGY

## 2024-08-12 PROCEDURE — 80053 COMPREHEN METABOLIC PANEL: CPT | Performed by: PATHOLOGY

## 2024-08-12 PROCEDURE — 36415 COLL VENOUS BLD VENIPUNCTURE: CPT | Performed by: PATHOLOGY

## 2024-08-13 ENCOUNTER — VIRTUAL VISIT (OUTPATIENT)
Dept: PHARMACY | Facility: CLINIC | Age: 74
End: 2024-08-13
Payer: COMMERCIAL

## 2024-08-13 DIAGNOSIS — E78.5 HYPERLIPIDEMIA LDL GOAL <100: ICD-10-CM

## 2024-08-13 DIAGNOSIS — A31.0 MYCOBACTERIUM AVIUM INFECTION (H): Primary | ICD-10-CM

## 2024-08-13 DIAGNOSIS — M81.0 OSTEOPOROSIS, UNSPECIFIED OSTEOPOROSIS TYPE, UNSPECIFIED PATHOLOGICAL FRACTURE PRESENCE: ICD-10-CM

## 2024-08-13 PROCEDURE — 99606 MTMS BY PHARM EST 15 MIN: CPT | Mod: 93 | Performed by: PHARMACIST

## 2024-08-13 NOTE — PATIENT INSTRUCTIONS
"Recommendations from today's MTM visit:                                                      Continue rifampin, azithromycin, and ethambutol once daily     Monitoring plan:   CMP and CBC every 2 weeks. Can space out to monthly if stable. - next due 8/26  Monthly sputum test - due 1st week of September  Repeat audiology exam in 3 months - due 11/2024  Repeat eye exam every 3 months - due 11/2024  CT chest every 3 months - discuss timing with Dr. Robison    Follow-up: 6 weeks with medication therapy management     It was great speaking with you today.  I value your experience and would be very thankful for your time in providing feedback in our clinic survey. In the next few days, you may receive an email or text message from [email or web address] with a link to a survey related to your  clinical pharmacist.\"     To schedule another MTM appointment, please call the clinic directly or you may call the MTM scheduling line at [phone] or toll-free at [phone].     My Clinical Pharmacist's contact information:                                                      Please feel free to contact me with any questions or concerns you have.      Annamaria Whitley, PharmD, AAHIVP  Medication Therapy Management Pharmacist      "

## 2024-08-13 NOTE — PROGRESS NOTES
Medication Therapy Management (MTM) Encounter    ASSESSMENT:                            Medication Adherence/Access: No issues identified    Mycobacterium avium intracellulare complex infection:  Coughing/respiratory symptoms improved after finishing treatment for pseudomonas and before starting NTM antibiotics. Tolerating antibiotics well so far. Risk for tinnitus/ototoxicity from azithromycin is greater with long-term use. Since she just started 2 weeks ago, will monitor symptoms for now.  Labs stable. Discussed monitoring plan.     Hyperlipidemia   LDL significantly improved with short trial of statin and lifestyle changes. Could consider trying a different statin instead due to history of diabetes but she prefers to hold off for now to reduce pill burden. Recheck lipid panel in 1 year.    Bone Health   Osteoporosis:   She's getting at least 1,000 mg of calcium per day through supplements and diet. Okay to continue current calcium supplementation since she prefers to not increase pill burden.    PLAN:                            Continue rifampin, azithromycin, and ethambutol once daily     Monitoring plan:   CMP and CBC every 2 weeks. Can space out to monthly if stable. - next due 8/26  Monthly sputum test - due 1st week of September  Repeat audiology exam in 3 months - due 11/2024  Repeat eye exam every 3 months - due 11/2024  CT chest every 3 months - discuss timing with Dr. Robison    Follow-up: 6 weeks with medication therapy management     SUBJECTIVE/OBJECTIVE:                          Meme Butts is a 74 year old female called for a follow-up visit.       Reason for visit: NTM follow-up.    Allergies/ADRs: Reviewed in chart  Past Medical History: Reviewed in chart  Tobacco: She reports that she quit smoking about 15 years ago. Her smoking use included cigarettes. She started smoking about 30 years ago. She has a 15 pack-year smoking history. She has never used smokeless tobacco.  Alcohol: wine once in  "awhile   Medication Adherence/Access: Takes meds 2 times daily   Doesn't like to take medications   States she's having an issue with nebulizer coverage but it's in the process for getting resolved.    Mycobacterium avium intracellulare complex infection:  Rifampin 600 mg once daily - started 7/27  Azithromycin 250 mg once daily - started 8/3  Ethambutol 1,200 mg (15.6 mg/kg) once daily - started 8/10    Prior to starting antibiotics, had persistent cough, shortness of breath, and intermittent green sputum. She finished course of levofloxacin for pseudomonas and then went to [place] for a trip. Her coughing resolved and shortness of breath improved after finishing levofloxacin (before starting NTM antibiotics). No current cough/sputum production.     Has orange urine and heard some ringing in her ears a few times which she described as \"whooshing\". This sometimes occurred prior to starting azithromycin as well. No other side effects.      Eye exam: hx cataract surgery fall 2023. Had baseline eye exam prior to starting antibiotics at outside clinic. No acute concerns per patient.  Audiology: 7/16 - Normal, sloping to moderate sensorineural hearing loss was found today on baseline audiogram. The grade of the patient's hearing loss today on the CTCAE4.03 Scale is 0, bilaterally (baseline assessment).   EKG: Qtc 408 on 6/21/24  CT chest: no cavitation     5/21/24 AFB culture:         Mycobacterium avium intracellulare complex           AFB VESNA ([facility])       Amikacin 8 Susceptible       Clarithromycin 1 Susceptible       Comment:   See below 1       Linezolid 16 Intermediate       Moxifloxacin 1 Susceptible          Hyperlipidemia   Stopped due to muscle weakness symptoms. This resolved with stopping atorvastatin. She's walking more and going to the gym for circuit training 3 days per week now. Reports also eating healthier lately.     The 10-year ASCVD risk score (Pepe HILL, et al., 2019) is: 19%    Values used to " calculate the score:      Age: 74 years      Sex: Female      Is Non- : No      Diabetic: Yes      Tobacco smoker: No      Systolic Blood Pressure: 116 mmHg      Is BP treated: No      HDL Cholesterol: 49 mg/dL      Total Cholesterol: 139 mg/dL      Bone Health   Osteoporosis:   Calcium carbonate/magnesium//Vitamin D 1 pill once daily (contains 500 mg calcium carbonate)  Centrum silver MVI (contains 300 mg calcium carbonate)    Patient is not experiencing side effects. Dietary calcium intake: around 700 mg per day  DEXA History: 3/29/24 - repeat in 2 years  History of taking alendronate for 4-5 years and tolerated well. Stopped for drug holiday 8/2021. Started Prolia 8/24/22 but no repeat doses per chart review.        Today's Vitals: There were no vitals taken for this visit.  ----------------    I spent 21 minutes with this patient today. All changes were made via collaborative practice agreement with Dr. Robison. A copy of the visit note was provided to the patient's provider(s).    A summary of these recommendations was sent via [employer].    Telemedicine Visit Details  Type of service:  Telephone visit  Start Time: 9:03 AM  End Time: 9:24 AM     Medication Therapy Recommendations  No medication therapy recommendations to display

## 2024-08-26 NOTE — PROGRESS NOTES
Meme is a  74 year old female post menopausal] [GR3, P3] that presents today for osteoporosis follow up patient was last seen 7/2024. Was on alendronate 4-5 yrs - then started prolia - got a shot in 2022 and not told to return in 6 months so never had another shot - currently not on any bone meds    Hx diabetes  seen 2/2024   Not on medication - not checking bs  - watching diet and exercising     Eye exam  6/2024  -   Trys to watch diet - trys to exercise  - normally walks a few miles/day   A1C= 6.4 from  7/2024  went to diabetic education in March     Hyperlipidemia - just started lipitor 2 months ago  - was having leg weakness feeling - like they might buckle   - took her off of lipitor and legs much better.  Doesn't want to try another statin    Lung   infection - on antibiotics for about a year - managed by ID   - no cough - feeling better      Referring Physician:Ruben CHARLTON     Have you ever had a bone density test? Yes  Where = [facility]  When = 3/2024  Lumbar spine (L2-L4)  T-score -2.7 , BMD 0.875 g/cm2.      Left Femoral  neck  T-score -2.6 , BMD 0.679g/cm2.  Left Total hip  T-score -2.1 , BMD 0.745 g/cm2.     Right Femoral neck  T-score -2.4, BMD  0.704 g/cm2.  Right Total hip  T-score -1.8 , BMD  0.777 g/cm2.      Interval change  Bone density compared to the previous study (2021) has changed at mean total bilateral hips by +0.1%.    Have you received any x-ray dye or contrast in the last ten days? No  How many servings of dairy products do you consume per day? 2 Type: milk; cheese, cottage cheese   Do you take a multi-vitamin daily? Yes has calcium   Do you take a vitamin D supplement? No  Do you take a calcium supplement daily?  Calcium carbonate 600mg  - takes 1/day   Do you take a supplement containing strontium? No  Are you exposed to natural sunlight at least 20 minutes three times a week? Yes    Social History   reports that she quit smoking about 15 years ago. Her smoking use included  cigarettes. She started smoking about 30 years ago. She has a 15 pack-year smoking history. She has never used smokeless tobacco. She reports current alcohol use. She reports that she does not use drugs.  Do you smoke cigarettes? Reformed smoker   Do you exercise? Yes. Details: elliptical 30 min 3x/wk  - circiut training 3x/wk   Do you drink alcohol? Yes. Details: 1-2 drinks/wk     Medication History  Have you used any of the following medications?   Actonel (Risedronate): No   Aredia (Pamidronate): No   Boniva (Ibindronate): No   Didronil (Etidronate): No   Evista (Raloxifene): No   Fosamax (Alendronate): No   Forteo (Parathyroid hormone) injections: No   HCTZ (Thiazide): No   Calcitonin nasal spray: No   Reclast or Zometa (Zolendronate): No   Prolia (Denosumab): No    Current Outpatient Medications   Medication Sig Dispense Refill    azithromycin (ZITHROMAX) 250 MG tablet Take 1 tablet (250 mg) by mouth daily Start week 2 30 tablet 11    blood glucose (NO BRAND SPECIFIED) lancets standard Use to test blood sugar 1 time daily or as directed. 50 each 5    blood glucose (NO BRAND SPECIFIED) test strip Use to test blood sugar once per day or as directed. 50 strip 5    blood glucose monitoring (ONE TOUCH ULTRA 2) meter device kit USE TO TEST BLOOD SUGAR ONCE PER DAY OR AS DIRECTED. Use brand compatible with patients insurance and device 1 kit 1    calcium-magnesium-vitamin D 500-250-125 MG-MG-UNIT TABS Take 1 tablet by mouth daily      carboxymethylcellulose (REFRESH PLUS) 0.5 % SOLN ophthalmic solution 1 drop 3 times daily as needed for dry eyes      diclofenac (VOLTAREN) 1 % topical gel Apply 2 g topically 4 times daily as needed for moderate pain 100 g 2    ethambutol (MYAMBUTOL) 400 MG tablet Take 3 tablets (1,200 mg) by mouth daily Start week 3 90 tablet 11    ibuprofen (ADVIL/MOTRIN) 400 MG tablet Take 400 mg by mouth every 6 hours as needed for moderate pain      levETIRAcetam (KEPPRA) 750 MG tablet PT REPORTS  "TAPERING DOWN: TAKE 1 & 1/2 TABS IN THE MORNING, AND 2 TABS IN THE EVENING. (Patient taking differently: Take 1,125 mg by mouth 2 times daily) 105 tablet 3    loratadine (CLARITIN) 10 MG tablet Take 10 mg by mouth daily      mometasone (NASONEX) 50 MCG/ACT nasal spray Spray 2 sprays into both nostrils daily 17 g 1    Multiple Vitamins-Minerals (CENTRUM SILVER) per tablet Take 1 tablet by mouth every morning      psyllium (METAMUCIL) 58.6 % packet Take 1 packet by mouth daily as needed for constipation      rifampin (RIFADIN) 300 MG capsule Take 2 capsules (600 mg) by mouth daily Start week 1 60 capsule 11    sodium chloride 0.9 % neb solution Take 3 mLs by nebulization 2 times daily 540 mL 3        No Known Allergies    Past Medical History    Family History   Problem Relation Age of Onset    Breast Cancer Mother 30        lumpectomy and chemo; also \"mass in ovary\"    Colon Polyps Father     Osteoporosis Sister     Breast Cancer Sister 55    Leukemia Sister 52    Coronary Artery Disease Brother     Colon Polyps Brother     Coronary Artery Disease Early Onset Brother 50    Prostate Cancer Brother     Gastrointestinal Disease Son         intestinal transplant    Diabetes Maternal Aunt         multiple mat aunts    Ovarian Cancer Maternal Aunt 50         of ovarian cancer in 50s or 60s, unknown    Breast Cancer Paternal Aunt 36         of breast cancer recurrence in 60s    Bone Cancer Niece 19    Prostate Cancer Cousin 65    Breast Cancer Cousin 30        paternal cousin, BRCA1+ by report    Breast Cancer Cousin 40        paternal cousin, BRCA1+ by report    Breast Cancer Cousin 35        paternal cousin, BRCA1+ by report    Genetic Disorder Cousin         paternal male cousin, BRCA1+ by report    Ovarian Cancer Cousin         paternal cousin, BRCA1+ by report    Anesthesia Reaction No family hx of     Venous thrombosis No family hx of        ROS:  General: none  Head/Eyes: none  Ears/Nose/Throat: " none  Cardiovascular: none  Respiratory: none  Gastrointestinal: none  Breast: none  Genitourinary: none  Sexual Function: none  Musculoskeletal: none  Skin: none  Neurological: none  Mental Health: anxiety  Endocrine: none    Clinic Measurements  Vitals: /76 (BP Location: Right arm, Patient Position: Sitting, Cuff Size: Adult Regular)   Pulse 74   Wt 76.9 kg (169 lb 9.6 oz)   SpO2 97%   BMI 31.02 kg/m    BMI= Body mass index is 31.02 kg/m .    Physical exam  Constitutional: Well appearing woman in no acute distress.   Psychological: appropriate mood.  Neck: No thyroidmegaly.    Cardiovascular: regular rate and rhythm, normal S1 and S2, no murmurs, rubs or gallops, peripheral pulses full and symmetric   Respiratory: clear to auscultation, no wheezes or crackles, normal breath sounds.  Musculoskeletal: good range of motion, no edema, and motor strength is equal in the upper and lower extremities    Spine: Straight, not tender, Flexion good, Extension good, Lateral movement good, Rotational movement good  Skin: no concerning lesions, no jaundice.  Neurological: cranial nerves intact, normal strength, reflexes at patella and biceps normal, normal gait, no tremor.     LAB  Vertebra; Fracture Assessment: NA  Dexa Scan: 3/2024     FRAX Assessment Tool: [N/A,has OP   Risk Factors: T scores, age PM  Keppra     ASSESSMENT:  This is a 74-year-old postmenopausal female who has osteoporosis by T-scores.  She also has a low Z-score and will look for a secondary cause of osteoporosis.  She currently is not on any medication.  We reviewed calcium and vitamin D.  She is on antibiotics for lung infection and will be on this for almost a year.  We talked about different bone medications and I would suggest either Tymlos or Forteo but more likely Evenity.  I want to check first with her infectious disease doc and also with Pharm.D. to see if there is any interaction.  I will then get back to her.  Have not yet written the  order for Evenity.  Today will get several blood tests to look for a secondary cause.    Her diabetes is reasonably controlled.  She is not on medications.  She currently is not doing daily glucose monitoring given that she has had so many other things going on.  She did have an eye exam.  Will continue to follow her and see her in 3 months and get an A1c at that time.    Her cholesterol had improved on atorvastatin but she was getting symptoms which have resolved off the medication.  She wants to wait to start a statin.    PLAN:  Blood work today  Patient should take 1200mg of calcium/day in divided doses and vitamin D3 1000IU/day.  Dietary calcium is best    I will check on evenity if OK with your antibiotics. If you don't hear from me in 3 wks send a my chart message.  We need to get approval from your insurance.      See me in 3 months for diabetes      Maria Esther Miranda MD, PhD    Time note (e4, 30'): The total of my time (on the date of service) for this service was 35 minutes, including discussion/face-to-face, chart review, interpretation not otherwise reported, documentation, and updating of the computerized record.      Answers submitted by the patient for this visit:  General Questionnaire (Submitted on 8/28/2024)  Chief Complaint: Chronic problems general questions HPI Form  What is the reason for your visit today? : folliw  How many servings of fruits and vegetables do you eat daily?: 2-3  On average, how many sweetened beverages do you drink each day (Examples: soda, juice, sweet tea, etc.  Do NOT count diet or artificially sweetened beverages)?: 2  How many minutes a day do you exercise enough to make your heart beat faster?: 9 or less  How many days a week do you exercise enough to make your heart beat faster?: 3 or less  How many days per week do you miss taking your medication?: 0

## 2024-08-27 ENCOUNTER — VIRTUAL VISIT (OUTPATIENT)
Dept: PULMONOLOGY | Facility: CLINIC | Age: 74
End: 2024-08-27
Payer: COMMERCIAL

## 2024-08-27 DIAGNOSIS — J47.9 BRONCHIECTASIS WITHOUT COMPLICATION (H): Primary | ICD-10-CM

## 2024-08-27 PROCEDURE — 99214 OFFICE O/P EST MOD 30 MIN: CPT | Mod: 95 | Performed by: PHYSICIAN ASSISTANT

## 2024-08-27 ASSESSMENT — PAIN SCALES - GENERAL: PAINLEVEL: NO PAIN (0)

## 2024-08-27 NOTE — LETTER
8/27/2024      Meme Butts  [address]  [facility] [address]      Dear Colleague,    Thank you for referring your patient, Meme Butts, to the [facility] [place]. Please see a copy of my visit note below.    Virtual Visit Details    Type of service:  Video Visit   Video Start Time:  10:04 AM  Video End Time:10:19 AM    Originating Location (pt. Location): Home    Distant Location (provider location):  Off-site  Platform used for Video Visit: [facility]    LUNG NODULE & INTERVENTIONAL PULMONARY CLINIC  [facility]     Meme Butts MRN# [id number]   Age: 74 year old YOB: 1950     Reason for Consultation: Pulmonary nodules    Requesting Physician: Maria Esther Miranda MD PhD  [address] [place] [address]       Assessment and Plan:    1. Scattered pulmonary nodules / EDDA / Pseudomonas on BAL  Seen on 3/2023 CT Chest following right  hemicolectomy. Repeat CT Chest 3/2024 showed increase in upper and mid lung predominant centrilobular nodules and bronchiectasis, suggestive of indolent infection such as EDDA. Seen with Dr. Hernandez 3/2024, ordered BAL which was completed 5/21/2024. Cultures + EDDA and Pseudomonas. Was treated with levaquin and referred to ID. Started antibiotics for EDDA ~ June 2024. Cough has since resolved.   --Continue mgmt per ID    2. Bronchiectasis  Secondary to EDDA as above. Nebulizer with 0.9% saline BID PRN + aerobika and mucinex if recurrent cough.     6 month follow up, if doing well then follow up as needed    Beth Koroma PA-C  Interventional Pulmonology  Department of Pulmonary, Allergy, Critical Care and Sleep Medicine   [facility]           History:     eMme Butts is a 74 year old female with sig h/o for colon cancer s/p hemicolectomy 2/2023 who is here for bronchiectasis and lung nodules    Cough significantly improved after antibiotics for pseudomonas. More recently the  cough is resolved on treatment for EDDA. She tried to get a nebulizer but there was issues with coverage. No fevers, chills, night sweats. Scheduled for a CT chest tomorrow and follow up with ID shortly thereafter.     Can walk go a mile and up a flight of stairs without dyspnea. Goes to the gym 3x per week, does circuit training.  Had pneumonia in her 40s, had to be hospitalized for a week but fully recovered. Lost weight after hemicolectomy but gained some back. Appetite is ok if not coughing. Has fatigue.     - Personal hx of cancer: colon cancer  - Tobacco hx: Quit smoking 15 years, smoked for 15-20years, 1ppd  - My interpretation of the images relevant for this visit includes: Increased centrilobular nodules   - My interpretation of the PFT's relevant for this visit includes: Normal pulmonary function 3/2024         Allergies:    No Known Allergies       Medications:     Current Outpatient Medications   Medication Sig Dispense Refill     azithromycin (ZITHROMAX) 250 MG tablet Take 1 tablet (250 mg) by mouth daily Start week 2 30 tablet 11     blood glucose (NO BRAND SPECIFIED) lancets standard Use to test blood sugar 1 time daily or as directed. 50 each 5     blood glucose (NO BRAND SPECIFIED) test strip Use to test blood sugar once per day or as directed. 50 strip 5     blood glucose monitoring (ONE TOUCH ULTRA 2) meter device kit USE TO TEST BLOOD SUGAR ONCE PER DAY OR AS DIRECTED. Use brand compatible with patients insurance and device 1 kit 1     calcium-magnesium-vitamin D 500-250-125 MG-MG-UNIT TABS Take 1 tablet by mouth daily       carboxymethylcellulose (REFRESH PLUS) 0.5 % SOLN ophthalmic solution 1 drop 3 times daily as needed for dry eyes       diclofenac (VOLTAREN) 1 % topical gel Apply 2 g topically 4 times daily as needed for moderate pain 100 g 2     ethambutol (MYAMBUTOL) 400 MG tablet Take 3 tablets (1,200 mg) by mouth daily Start week 3 90 tablet 11     ibuprofen (ADVIL/MOTRIN) 400 MG tablet  Take 400 mg by mouth every 6 hours as needed for moderate pain       levETIRAcetam (KEPPRA) 750 MG tablet PT REPORTS TAPERING DOWN: TAKE 1 & 1/2 TABS IN THE MORNING, AND 2 TABS IN THE EVENING. (Patient taking differently: Take 1,125 mg by mouth 2 times daily.) 105 tablet 3     loratadine (CLARITIN) 10 MG tablet Take 10 mg by mouth daily       mometasone (NASONEX) 50 MCG/ACT nasal spray Spray 2 sprays into both nostrils daily 17 g 1     Multiple Vitamins-Minerals (CENTRUM SILVER) per tablet Take 1 tablet by mouth every morning       psyllium (METAMUCIL) 58.6 % packet Take 1 packet by mouth daily as needed for constipation       rifampin (RIFADIN) 300 MG capsule Take 2 capsules (600 mg) by mouth daily Start week 1 60 capsule 11     sodium chloride 0.9 % neb solution Take 3 mLs by nebulization 2 times daily 540 mL 3     No current facility-administered medications for this visit.            Physical Exam:   There were no vitals taken for this visit.  Wt Readings from Last 4 Encounters:   07/27/24 77 kg (169 lb 12.8 oz)   07/15/24 77.5 kg (170 lb 12.8 oz)   06/19/24 76.2 kg (168 lb)   06/18/24 75.8 kg (167 lb)     General: Well appearing  Lungs: Nonlabored breathing  Neuro: Answering questions appropriately  Psych: Normal affect     Imaging/Lab Data   All laboratory and imaging data reviewed.           Again, thank you for allowing me to participate in the care of your patient.        Sincerely,        Beth Koroma PA-C

## 2024-08-27 NOTE — NURSING NOTE
Current patient location: [address]  [facility] [address]    Is the patient currently in the state of [place]? YES    Visit mode:VIDEO    If the visit is dropped, the patient can be reconnected by: VIDEO VISIT: Text to cell phone:   Telephone Information:   Mobile [phone]       Will anyone else be joining the visit? NO  (If patient encounters technical issues they should call [phone]    How would you like to obtain your AVS? MyChart    Are changes needed to the allergy or medication list? Pt stated no changes to allergies and Pt stated no med changes    Are refills needed on medications prescribed by this physician? NO    Rooming Documentation:  Questionnaire(s) completed      Reason for visit: RECHECK    Tyler BOCANEGRA

## 2024-08-27 NOTE — PATIENT INSTRUCTIONS
Call to schedule your nebulizer  at [facility]; [phone]   Saline nebulizer twice daily followed by joceline.

## 2024-08-27 NOTE — PROGRESS NOTES
Virtual Visit Details    Type of service:  Video Visit   Video Start Time:  10:04 AM  Video End Time:10:19 AM    Originating Location (pt. Location): Home    Distant Location (provider location):  Off-site  Platform used for Video Visit: [facility]    LUNG NODULE & INTERVENTIONAL PULMONARY CLINIC  [facility]     Meme Butts MRN# [id number]   Age: 74 year old YOB: 1950     Reason for Consultation: Pulmonary nodules    Requesting Physician: Maria Esther Miranda MD PhD  [address] [place] [address]       Assessment and Plan:    1. Scattered pulmonary nodules / EDDA / Pseudomonas on BAL  Seen on 3/2023 CT Chest following right  hemicolectomy. Repeat CT Chest 3/2024 showed increase in upper and mid lung predominant centrilobular nodules and bronchiectasis, suggestive of indolent infection such as EDDA. Seen with Dr. Hernandez 3/2024, ordered BAL which was completed 5/21/2024. Cultures + EDDA and Pseudomonas. Was treated with levaquin and referred to ID. Started antibiotics for EDDA ~ June 2024. Cough has since resolved.   --Continue mgmt per ID    2. Bronchiectasis  Secondary to EDDA as above. Nebulizer with 0.9% saline BID PRN + aerobika and mucinex if recurrent cough.     6 month follow up, if doing well then follow up as needed    Beth Koroma PA-C  Interventional Pulmonology  Department of Pulmonary, Allergy, Critical Care and Sleep Medicine   [facility]    Based on diagnosis of bronchiectasis, I (Beth Koroma PA-C) am requesting a nebulizer machine and supplies as this will benefit the patient in the management of bronchiectasis and prevention of respiratory infections.          History:     Meme Butts is a 74 year old female with sig h/o for colon cancer s/p hemicolectomy 2/2023 who is here for bronchiectasis and lung nodules    Cough significantly improved after antibiotics for pseudomonas. More recently the cough is resolved on treatment for  EDDA. She tried to get a nebulizer but there was issues with coverage. No fevers, chills, night sweats. Scheduled for a CT chest tomorrow and follow up with ID shortly thereafter.     Can walk go a mile and up a flight of stairs without dyspnea. Goes to the gym 3x per week, does circuit training.  Had pneumonia in her 40s, had to be hospitalized for a week but fully recovered. Lost weight after hemicolectomy but gained some back. Appetite is ok if not coughing. Has fatigue.     - Personal hx of cancer: colon cancer  - Tobacco hx: Quit smoking 15 years, smoked for 15-20years, 1ppd  - My interpretation of the images relevant for this visit includes: Increased centrilobular nodules   - My interpretation of the PFT's relevant for this visit includes: Normal pulmonary function 3/2024         Allergies:    No Known Allergies       Medications:     Current Outpatient Medications   Medication Sig Dispense Refill    azithromycin (ZITHROMAX) 250 MG tablet Take 1 tablet (250 mg) by mouth daily Start week 2 30 tablet 11    blood glucose (NO BRAND SPECIFIED) lancets standard Use to test blood sugar 1 time daily or as directed. 50 each 5    blood glucose (NO BRAND SPECIFIED) test strip Use to test blood sugar once per day or as directed. 50 strip 5    blood glucose monitoring (ONE TOUCH ULTRA 2) meter device kit USE TO TEST BLOOD SUGAR ONCE PER DAY OR AS DIRECTED. Use brand compatible with patients insurance and device 1 kit 1    calcium-magnesium-vitamin D 500-250-125 MG-MG-UNIT TABS Take 1 tablet by mouth daily      carboxymethylcellulose (REFRESH PLUS) 0.5 % SOLN ophthalmic solution 1 drop 3 times daily as needed for dry eyes      diclofenac (VOLTAREN) 1 % topical gel Apply 2 g topically 4 times daily as needed for moderate pain 100 g 2    ethambutol (MYAMBUTOL) 400 MG tablet Take 3 tablets (1,200 mg) by mouth daily Start week 3 90 tablet 11    ibuprofen (ADVIL/MOTRIN) 400 MG tablet Take 400 mg by mouth every 6 hours as needed  for moderate pain      levETIRAcetam (KEPPRA) 750 MG tablet PT REPORTS TAPERING DOWN: TAKE 1 & 1/2 TABS IN THE MORNING, AND 2 TABS IN THE EVENING. (Patient taking differently: Take 1,125 mg by mouth 2 times daily.) 105 tablet 3    loratadine (CLARITIN) 10 MG tablet Take 10 mg by mouth daily      mometasone (NASONEX) 50 MCG/ACT nasal spray Spray 2 sprays into both nostrils daily 17 g 1    Multiple Vitamins-Minerals (CENTRUM SILVER) per tablet Take 1 tablet by mouth every morning      psyllium (METAMUCIL) 58.6 % packet Take 1 packet by mouth daily as needed for constipation      rifampin (RIFADIN) 300 MG capsule Take 2 capsules (600 mg) by mouth daily Start week 1 60 capsule 11    sodium chloride 0.9 % neb solution Take 3 mLs by nebulization 2 times daily 540 mL 3     No current facility-administered medications for this visit.            Physical Exam:   There were no vitals taken for this visit.  Wt Readings from Last 4 Encounters:   07/27/24 77 kg (169 lb 12.8 oz)   07/15/24 77.5 kg (170 lb 12.8 oz)   06/19/24 76.2 kg (168 lb)   06/18/24 75.8 kg (167 lb)     General: Well appearing  Lungs: Nonlabored breathing  Neuro: Answering questions appropriately  Psych: Normal affect     Imaging/Lab Data   All laboratory and imaging data reviewed.

## 2024-08-28 ENCOUNTER — ANCILLARY PROCEDURE (OUTPATIENT)
Dept: CT IMAGING | Facility: CLINIC | Age: 74
End: 2024-08-28
Attending: STUDENT IN AN ORGANIZED HEALTH CARE EDUCATION/TRAINING PROGRAM
Payer: COMMERCIAL

## 2024-08-28 ENCOUNTER — LAB (OUTPATIENT)
Dept: LAB | Facility: CLINIC | Age: 74
End: 2024-08-28
Payer: COMMERCIAL

## 2024-08-28 ENCOUNTER — OFFICE VISIT (OUTPATIENT)
Dept: FAMILY MEDICINE | Facility: CLINIC | Age: 74
End: 2024-08-28
Payer: COMMERCIAL

## 2024-08-28 VITALS
WEIGHT: 169.6 LBS | DIASTOLIC BLOOD PRESSURE: 76 MMHG | BODY MASS INDEX: 31.02 KG/M2 | OXYGEN SATURATION: 97 % | HEART RATE: 74 BPM | SYSTOLIC BLOOD PRESSURE: 122 MMHG

## 2024-08-28 DIAGNOSIS — M19.072 ARTHRITIS OF BOTH FEET: ICD-10-CM

## 2024-08-28 DIAGNOSIS — R91.8 PULMONARY NODULES: ICD-10-CM

## 2024-08-28 DIAGNOSIS — A31.0 MYCOBACTERIUM AVIUM INFECTION (H): ICD-10-CM

## 2024-08-28 DIAGNOSIS — M20.42 HAMMERTOE OF LEFT FOOT: ICD-10-CM

## 2024-08-28 DIAGNOSIS — M79.672 PAIN IN BOTH FEET: ICD-10-CM

## 2024-08-28 DIAGNOSIS — M81.0 SENILE OSTEOPOROSIS: ICD-10-CM

## 2024-08-28 DIAGNOSIS — M25.571 SINUS TARSI SYNDROME OF BOTH ANKLES: ICD-10-CM

## 2024-08-28 DIAGNOSIS — M25.572 SINUS TARSI SYNDROME OF BOTH ANKLES: ICD-10-CM

## 2024-08-28 DIAGNOSIS — M81.0 SENILE OSTEOPOROSIS: Primary | ICD-10-CM

## 2024-08-28 DIAGNOSIS — M79.671 PAIN IN BOTH FEET: ICD-10-CM

## 2024-08-28 DIAGNOSIS — M19.071 ARTHRITIS OF BOTH FEET: ICD-10-CM

## 2024-08-28 LAB
ALBUMIN SERPL BCG-MCNC: 4.3 G/DL (ref 3.5–5.2)
ALP SERPL-CCNC: 119 U/L (ref 40–150)
ALT SERPL W P-5'-P-CCNC: 33 U/L (ref 0–50)
ANION GAP SERPL CALCULATED.3IONS-SCNC: 12 MMOL/L (ref 7–15)
AST SERPL W P-5'-P-CCNC: 29 U/L (ref 0–45)
BASOPHILS # BLD AUTO: 0.1 10E3/UL (ref 0–0.2)
BASOPHILS NFR BLD AUTO: 1 %
BILIRUB SERPL-MCNC: 0.4 MG/DL
BUN SERPL-MCNC: 15.4 MG/DL (ref 8–23)
CALCIUM SERPL-MCNC: 9.9 MG/DL (ref 8.8–10.4)
CHLORIDE SERPL-SCNC: 102 MMOL/L (ref 98–107)
CREAT SERPL-MCNC: 0.69 MG/DL (ref 0.51–0.95)
EGFRCR SERPLBLD CKD-EPI 2021: >90 ML/MIN/1.73M2
EOSINOPHIL # BLD AUTO: 0.1 10E3/UL (ref 0–0.7)
EOSINOPHIL NFR BLD AUTO: 2 %
ERYTHROCYTE [DISTWIDTH] IN BLOOD BY AUTOMATED COUNT: 12.7 % (ref 10–15)
GLUCOSE SERPL-MCNC: 90 MG/DL (ref 70–99)
HCO3 SERPL-SCNC: 25 MMOL/L (ref 22–29)
HCT VFR BLD AUTO: 40.2 % (ref 35–47)
HGB BLD-MCNC: 13.2 G/DL (ref 11.7–15.7)
IMM GRANULOCYTES # BLD: 0 10E3/UL
IMM GRANULOCYTES NFR BLD: 0 %
LYMPHOCYTES # BLD AUTO: 1.7 10E3/UL (ref 0.8–5.3)
LYMPHOCYTES NFR BLD AUTO: 31 %
MAGNESIUM SERPL-MCNC: 2.3 MG/DL (ref 1.7–2.3)
MCH RBC QN AUTO: 29.7 PG (ref 26.5–33)
MCHC RBC AUTO-ENTMCNC: 32.8 G/DL (ref 31.5–36.5)
MCV RBC AUTO: 90 FL (ref 78–100)
MONOCYTES # BLD AUTO: 0.5 10E3/UL (ref 0–1.3)
MONOCYTES NFR BLD AUTO: 10 %
NEUTROPHILS # BLD AUTO: 3.1 10E3/UL (ref 1.6–8.3)
NEUTROPHILS NFR BLD AUTO: 56 %
NRBC # BLD AUTO: 0 10E3/UL
NRBC BLD AUTO-RTO: 0 /100
PHOSPHATE SERPL-MCNC: 3.7 MG/DL (ref 2.5–4.5)
PLATELET # BLD AUTO: 255 10E3/UL (ref 150–450)
POTASSIUM SERPL-SCNC: 4.1 MMOL/L (ref 3.4–5.3)
PROT SERPL-MCNC: 8 G/DL (ref 6.4–8.3)
PTH-INTACT SERPL-MCNC: 34 PG/ML (ref 15–65)
RBC # BLD AUTO: 4.45 10E6/UL (ref 3.8–5.2)
SODIUM SERPL-SCNC: 139 MMOL/L (ref 135–145)
VIT D+METAB SERPL-MCNC: 42 NG/ML (ref 20–50)
WBC # BLD AUTO: 5.5 10E3/UL (ref 4–11)

## 2024-08-28 PROCEDURE — 80053 COMPREHEN METABOLIC PANEL: CPT | Performed by: PATHOLOGY

## 2024-08-28 PROCEDURE — 36415 COLL VENOUS BLD VENIPUNCTURE: CPT | Performed by: PATHOLOGY

## 2024-08-28 PROCEDURE — 85025 COMPLETE CBC W/AUTO DIFF WBC: CPT | Performed by: PATHOLOGY

## 2024-08-28 PROCEDURE — 83970 ASSAY OF PARATHORMONE: CPT | Performed by: PATHOLOGY

## 2024-08-28 PROCEDURE — 84080 ASSAY ALKALINE PHOSPHATASES: CPT | Mod: 90 | Performed by: PATHOLOGY

## 2024-08-28 PROCEDURE — 71250 CT THORAX DX C-: CPT | Performed by: RADIOLOGY

## 2024-08-28 PROCEDURE — 82306 VITAMIN D 25 HYDROXY: CPT | Performed by: FAMILY MEDICINE

## 2024-08-28 PROCEDURE — 83735 ASSAY OF MAGNESIUM: CPT | Performed by: PATHOLOGY

## 2024-08-28 PROCEDURE — 84100 ASSAY OF PHOSPHORUS: CPT | Performed by: PATHOLOGY

## 2024-08-28 PROCEDURE — 99214 OFFICE O/P EST MOD 30 MIN: CPT | Performed by: FAMILY MEDICINE

## 2024-08-28 PROCEDURE — 99000 SPECIMEN HANDLING OFFICE-LAB: CPT | Performed by: PATHOLOGY

## 2024-08-28 NOTE — PROGRESS NOTES
Meme is a 74 year old that presents in clinic today for the following:     Chief Complaint   Patient presents with    Follow Up     1 month follow up            8/28/2024    12:14 PM   Additional Questions   Roomed by MR     Screenings from encounters over the past 10 days    No data recorded       Madeline Tamez EMT at 12:17 PM on 8/28/2024    Answers submitted by the patient for this visit:  General Questionnaire (Submitted on 8/28/2024)  Chief Complaint: Chronic problems general questions HPI Form  What is the reason for your visit today? : folliw  How many servings of fruits and vegetables do you eat daily?: 2-3  On average, how many sweetened beverages do you drink each day (Examples: soda, juice, sweet tea, etc.  Do NOT count diet or artificially sweetened beverages)?: 2  How many minutes a day do you exercise enough to make your heart beat faster?: 9 or less  How many days a week do you exercise enough to make your heart beat faster?: 3 or less  How many days per week do you miss taking your medication?: 0

## 2024-08-28 NOTE — PATIENT INSTRUCTIONS
Blood work today  Patient should take 1200mg of calcium/day in divided doses and vitamin D3 1000IU/day.  Dietary calcium is best    I will check on evenity if OK with your antibiotics. If you don't hear from me in 3 wks send a my chart message.  We need to get approval from your insurance.      See me in 3 months for diabetes

## 2024-08-29 DIAGNOSIS — M81.0 SENILE OSTEOPOROSIS: Primary | ICD-10-CM

## 2024-08-29 DIAGNOSIS — Z92.29 HX DRUG THERAPY: ICD-10-CM

## 2024-08-29 LAB — ALP BONE SERPL-MCNC: 21.4 UG/L

## 2024-08-30 NOTE — PROGRESS NOTES
8-29-24 evenity was ordered.  Maria Esther Miranda MD, PhD  9-19-24 brynity was aproved - called pt and notified her - last calcium was normal end of August - I will see her in 4 months  Maria Esther Miranda MD, PhD.

## 2024-09-16 ENCOUNTER — LAB (OUTPATIENT)
Dept: LAB | Facility: CLINIC | Age: 74
End: 2024-09-16
Payer: COMMERCIAL

## 2024-09-16 DIAGNOSIS — A31.0 MYCOBACTERIUM AVIUM INFECTION (H): ICD-10-CM

## 2024-09-16 LAB
ALBUMIN SERPL BCG-MCNC: 4.1 G/DL (ref 3.5–5.2)
ALP SERPL-CCNC: 102 U/L (ref 40–150)
ALT SERPL W P-5'-P-CCNC: 36 U/L (ref 0–50)
ANION GAP SERPL CALCULATED.3IONS-SCNC: 14 MMOL/L (ref 7–15)
AST SERPL W P-5'-P-CCNC: 34 U/L (ref 0–45)
BASOPHILS # BLD AUTO: 0.1 10E3/UL (ref 0–0.2)
BASOPHILS NFR BLD AUTO: 1 %
BILIRUB SERPL-MCNC: 0.4 MG/DL
BUN SERPL-MCNC: 15.6 MG/DL (ref 8–23)
CALCIUM SERPL-MCNC: 9.8 MG/DL (ref 8.8–10.4)
CHLORIDE SERPL-SCNC: 102 MMOL/L (ref 98–107)
CREAT SERPL-MCNC: 0.61 MG/DL (ref 0.51–0.95)
EGFRCR SERPLBLD CKD-EPI 2021: >90 ML/MIN/1.73M2
EOSINOPHIL # BLD AUTO: 0.1 10E3/UL (ref 0–0.7)
EOSINOPHIL NFR BLD AUTO: 1 %
ERYTHROCYTE [DISTWIDTH] IN BLOOD BY AUTOMATED COUNT: 12.7 % (ref 10–15)
GLUCOSE SERPL-MCNC: 103 MG/DL (ref 70–99)
HCO3 SERPL-SCNC: 22 MMOL/L (ref 22–29)
HCT VFR BLD AUTO: 38 % (ref 35–47)
HGB BLD-MCNC: 12.8 G/DL (ref 11.7–15.7)
IMM GRANULOCYTES # BLD: 0 10E3/UL
IMM GRANULOCYTES NFR BLD: 0 %
LYMPHOCYTES # BLD AUTO: 1.5 10E3/UL (ref 0.8–5.3)
LYMPHOCYTES NFR BLD AUTO: 23 %
MCH RBC QN AUTO: 29.8 PG (ref 26.5–33)
MCHC RBC AUTO-ENTMCNC: 33.7 G/DL (ref 31.5–36.5)
MCV RBC AUTO: 89 FL (ref 78–100)
MONOCYTES # BLD AUTO: 0.6 10E3/UL (ref 0–1.3)
MONOCYTES NFR BLD AUTO: 10 %
NEUTROPHILS # BLD AUTO: 4 10E3/UL (ref 1.6–8.3)
NEUTROPHILS NFR BLD AUTO: 65 %
NRBC # BLD AUTO: 0 10E3/UL
NRBC BLD AUTO-RTO: 0 /100
PLATELET # BLD AUTO: 238 10E3/UL (ref 150–450)
POTASSIUM SERPL-SCNC: 4.1 MMOL/L (ref 3.4–5.3)
PROT SERPL-MCNC: 7.6 G/DL (ref 6.4–8.3)
RBC # BLD AUTO: 4.29 10E6/UL (ref 3.8–5.2)
SODIUM SERPL-SCNC: 138 MMOL/L (ref 135–145)
WBC # BLD AUTO: 6.3 10E3/UL (ref 4–11)

## 2024-09-16 PROCEDURE — 36415 COLL VENOUS BLD VENIPUNCTURE: CPT | Performed by: PATHOLOGY

## 2024-09-16 PROCEDURE — 80053 COMPREHEN METABOLIC PANEL: CPT | Performed by: PATHOLOGY

## 2024-09-16 PROCEDURE — 85025 COMPLETE CBC W/AUTO DIFF WBC: CPT | Performed by: PATHOLOGY

## 2024-09-16 NOTE — PROGRESS NOTES
"   [facility]           Assessment and Recommendations:   Problem List:    # EDDA pulmonary infection     Discussion:     Meme Butts is a 74 year old female with PMHx of colon cancer (adenocarcinoma of hepatic flexure of colon and family history of BRCA1) diagnosed by screening colonoscopy on 1/10/2023 s/p robotic right hemicolectomy and appendectomy on 2/28/2023 who was referred to our clinic by Dr. Shelby Hernandez (pulmonology) because lung nodule(s) and BAL culture positive for EDDA. She also has PMHx of migraines, seizures, osteoporosis, obesity, prediabetes (Hb A1C 6.3% from 3/18/24), history of GI bleed. Patient states she has persistent cough started 2 years ago. She initially though it was due to sinus disease and post nasal drip. infection. She states that the cough would be persistent and would happen all day. It was mostly dry but she would have some greenish sputum at times. Cough was so often that it would keep her awake. She started claritin with some improvement. She also has SOB started around the same time of the cough. Activities that would lead to SOB included going up and down stairs. She still keeps herself active and cleans the house, gardens and does yard work. She denies weight loss.     Per my review, the nodules were first identified on CT chest from 12/29/22 which showed upper lobe micronodules and bronchiectasis (bilaterally). Follow up CT chest from 3/3/2023 showed same nodules in addition to at least one new 9 mm nodule in the left upper lobe. The most recent CT chest from 3/28/2024 showed: \"Centrilobular and tree-in-bud nodularity, slightly increased associated with bronchiectasis greatest in the right middle lobe, lingula and lower lobes; findings suggestive of atypical indolent infection such as nonclassic nontuberculous mycobacteria\". Patient underwent PFT study on 3/2024 and, per Dr. Hernandez's interpretation, it was normal. Bronchoscopy performed on 5/21/24. BAL studies " "showed: cell count 760 with 56# neutrophils, 12% lymphocytes and 32% macrophages/monocytes. 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  Fungal culture is negative to date. Aspergillus galactomannan negative, Nocardia PCR negative, Nocardia culture negative. 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 Cytology was negative for malignancy and negative for fungal organisms or pneumocystis on GMS stain. Furthermore, the viral cytopathic effect is absent.  Given presence of EDDA on BAL as well as clinical and radiologic criteria, we started EDDA treatment with Rifampin 600 mg once daily - started 7/27; Azithromycin 250 mg once daily - started 8/3 and Ethambutol 1,200 mg (15.6 mg/kg) once daily - started 8/10.    Today's visit: She is tolerating the medications well. CBC and CMP from 8/28 and 9/16 are normal. She tells me she that her cough resolved after initiation of EDDA treatment. She is due for AFB sputum (plan to do monthly until negative), however she is not producing any sputum. She had her baseline audiology exam on 7/16 (before starting treatment) and it showed \"Normal, sloping to moderate sensorineural hearing loss\". She will need another audiogram in September and then every 3 months. She also had ophthalmology exam outside clinic at baseline (before initiation of treatment) and it was normal. She will need ophthalmology visit for visual acuity and color discrimination exam every months and fundoscopic exam every 3 months. She will need a CT chest 3 months from initiation of EDDA treatment (November 2024). I noticed that a low dose CT chest was ordered and performed on 8/28 (ordered by Pulmonologist) and it showed \"scattered nodular densities, some of which are cavitary with right upper lobe, left upper lobe end especially right middle lobe and lingula unchanged\". However this CT was done soon after initiation of treatment to determine if " "she is having radiologic improvement. Noted that it reports that there are small cavitations (not described on previous CT but image very similar). The patient would not be a candidate for aminoglycoside given her baseline hearing impairment. IN addition, patient states that her pulmonologist prescribed neb but it was not covered by insurance. She has a flutter valve, but she would like more specific guidance on  form and frequency of usage       Recommendations:     Continue azithromycin + ethambutol +  rifampin for EDDA treatment (started on 7/27). Duration will be 1 year from first negative AFB sputum after treatment.         - I noticed that a low dose CT chest was ordered and performed on 8/28 (ordered by Pulmonologist) and it showed \"scattered nodular densities, some of which are cavitary with right upper lobe, left upper lobe end especially right middle lobe and lingula unchanged\". However this CT was done soon after initiation of treatment to determine if she is having radiologic improvement. Noted that it reports that there are small cavitations (not described on previous CT but image very similar). Nevertheless patient is most likely not a good candidate for aminoglycoside given her baseline hearing impairment.      Patient is due for audiology exam and I will place a referral. She will need one this month and then every 3 months. I paced the referral    Patient is due for ophthalmology exam. She will need ophthalmology visit for visual acuity and color discrimination exam every month and fundoscopic exam every 3 months. I paced the referral    She will need a CT chest 3 months from initiation of EDDA treatment (end of November 2024). I ordered CT chest    Our clinic pharmacist will co-manage with me     I will send  message to pulmonology provider to address the patient's questions    She will need monthly labs (CBC and CMP) - next around October 16. Placed standing orders    Patient had an EKG today and it " "showed a normal QTc (431)    She is due for AFB sputum. She tells me she is not coughing anymore. I still ordered and asked her to  sterile cups in the labs in case she produces any sputum. I also ordered induced sputum in the meantime.         Recommendations discussed with the patient      Celia Robison MD  Date of Service:  09/18/24    On 09/18/24, I spent 50 min with chart review, patient visit, documentation and coordination of care.      ADDENDUM (10/4/24):    Audiogram (10/1/24): \"Mild to moderate mid to high frequency sensorineural hearing loss was found today. Compared to patient's previous audiogram dated 7/16/2024 hearing has remained stable bilaterally. The grade of the patient's hearing loss today on the CTCAE4.03 Scale: Grade 0 bilaterally (no significant changes)\".     Hearing findings from 7/16/24 are baseline and were found before initiation of treatment. Treatment started on 7/27/24. Hearing test from 10/1/24 (above) is unchanged from 7/16/24.           History of Present Illness:   Meme Butts is a 74 year old female with PMHx of colon cancer (adenocarcinoma of hepatic flexure of colon and family history of BRCA1) diagnosed by screening colonoscopy on 1/10/2023 s/p robotic right hemicolectomy and appendectomy on 2/28/2023 who was referred to our clinic by Dr. Shelby Hernandez (pulmonology) because lung nodule(s) and BAL culture positive for EDDA. She also has PMHx of migraines, seizures, osteoporosis, obesity, prediabetes (Hb A1C 6.3% from 3/18/24), history of GI bleed.     Patient states she has persistent cough started 2 years ago. She initially though it was due to sinus disease and post nasal drip. infection. She states that the cough would be persistent and would happen all day. It was mostly dry but she would have some greenish sputum at times. Cough was so often that it would keep her awake. She started claritin with some improvement. She also has SOB started around the same " "time of the cough. Activities that would lead to SOB included going up and down stairs. She still keeps herself active and cleans the house, gardens and does yard work. She denies weight loss.     Per my review, the nodules were first identified on CT chest from 12/29/22 which showed upper lobe micronodules and bronchiectasis (bilaterally). Follow up CT chest from 3/3/2023 showed same nodules in addition to at least one new 9 mm nodule in the left upper lobe. The most recent CT chest from 3/28/2024 showed: \"Centrilobular and tree-in-bud nodularity, slightly increased associated with bronchiectasis greatest in the right middle lobe, lingula and lower lobes; findings suggestive of atypical indolent infection such as nonclassic nontuberculous mycobacteria\". Patient underwent PFT study on 3/2024 and, per Dr. Hernandez's interpretation, it was normal. Bronchoscopy performed on 5/21/24. BAL studies showed: cell count 760 with 56# neutrophils, 12% lymphocytes and 32% macrophages/monocytes. 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  Fungal culture is negative to date. Aspergillus galactomannan negative, Nocardia PCR negative, Nocardia culture negative. 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 Cytology was negative for malignancy and negative for fungal organisms or pneumocystis on GMS stain. Furthermore, the viral cytopathic effect is absent. Patient did not have fever or chills but she has persistent cough with intermittent intermittent greenish sputum production.    Labs from 6/21/24: CBC normal, Kidney and liver function normal, HIV negative, Hep B surface antigen: negative, Hepatitis C serology: negative Quantiferon negative, EKG from 6/18 showed normal QTc (408).     Given presence of EDDA on BAL as well as clinical and radiologic criteria, we started EDDA treatment with Rifampin 600 mg once daily - started 7/27; Azithromycin 250 mg once daily - " "started 8/3 and Ethambutol 1,200 mg (15.6 mg/kg) once daily - started 8/10.    Today's visit:    She is tolerating the medications well. CBC and CMP from 8/28 and 9/16 are normal. She tells me she that her cough resolved after initiation of EDDA treatment. She is due for AFB sputum (plan to do monthly until negative), however she is not producing any sputum. She had her baseline audiology exam on 7/16 (before starting treatment) and it showed \"Normal, sloping to moderate sensorineural hearing loss\". She will need another audiogram in September and then every 3 months. She also had ophthalmology exam outside clinic at baseline (before initiation of treatment) and it was normal. She will need ophthalmology visit for visual acuity and color discrimination exam every months and fundoscopic exam every 3 months. She will need a CT chest 3 months from initiation of EDDA treatment (November 2024). I noticed that a low dose CT chest was ordered and performed on 8/28 (ordered by Pulmonologist) and it showed \"scattered nodular densities, some of which are cavitary with right upper lobe, left upper lobe end especially right middle lobe and lingula unchanged\". However this CT was done soon after initiation of treatment to determine if she is having radiologic improvement. Noted that it reports that there are small cavitations (not described on previous CT but image very similar). The patient would not be a candidate for aminoglycoside given her baseline hearing impairment. In addition, patient states that her pulmonologist prescribed neb but it was not covered by insurance. She has a flutter valve, but she would like more specific guidance on  form and frequency of usage             Review of Systems:     CONSTITUTIONAL:  No fevers or chills  INTEGUMENTARY/SKIN: NEGATIVE for worrisome rashes, moles or lesions  EYES: Negative for icterus, vision changes or irritation  ENT/MOUTH:  Cough improved  CARDIOVASCULAR:  Negative for chest " "pain, palpitations and  shortness of breath  GASTROINTESTINAL:  Negative for abdominal pain, nausea, vomiting, diarrhea and constipation  GENITOURINARY:  Negative for dysuria, hematuria, frequency and urgency  MUSCULOSKELETAL: Negative for joint pain, swelling, motion restriction, negative for musculoskeletal pain  NEURO:  Negative for headache, altered mental status, numbness or weakness  PSYCHIATRIC: Negative for changes in mood or affect  HEMATOLOGIC/LYMPHATIC: negative for lymphadenopathy or bleeding  ALLERGIC/IMMUNOLOGIC: Negative for allergic reaction   ENDOCRINE: Negative for temperature intolerance, skin/hair changes        Past Medical History:     Past Medical History:   Diagnosis Date    Adenomatous colon polyp     tubular adenoma    At high risk for breast cancer 2017    35.4% lifetime risk by Philip model    Epilepsy (H)     Fracture of metatarsal bone of left foot 2014    Fracture, radius     and ulnar styloid fracture    GIB (gastrointestinal bleeding) 2011    Intractable chronic migraine without aura     Kidney stones ,     during pregnancy    Malignant neoplasm of hepatic flexure (H)     Migraine     Osteoporosis     T score -4.1    Pneumonia     Prediabetes         Surgery R writ after a fracture post fall - has hardware ()  Had cataract surgery in 2023      Allergies:       No Known Allergies          Family History:     Family History   Problem Relation Age of Onset    Breast Cancer Mother 30        lumpectomy and chemo; also \"mass in ovary\"    Colon Polyps Father     Osteoporosis Sister     Breast Cancer Sister 55    Leukemia Sister 52    Coronary Artery Disease Brother     Colon Polyps Brother     Coronary Artery Disease Early Onset Brother 50    Prostate Cancer Brother     Gastrointestinal Disease Son         intestinal transplant    Diabetes Maternal Aunt         multiple mat aunts    Ovarian Cancer Maternal Aunt 50         of ovarian cancer " in 50s or 60s, unknown    Breast Cancer Paternal Aunt 36         of breast cancer recurrence in 60s    Bone Cancer Niece 19    Prostate Cancer Cousin 65    Breast Cancer Cousin 30        paternal cousin, BRCA1+ by report    Breast Cancer Cousin 40        paternal cousin, BRCA1+ by report    Breast Cancer Cousin 35        paternal cousin, BRCA1+ by report    Genetic Disorder Cousin         paternal male cousin, BRCA1+ by report    Ovarian Cancer Cousin         paternal cousin, BRCA1+ by report    Anesthesia Reaction No family hx of     Venous thrombosis No family hx of              Social History:     Social History     Socioeconomic History    Marital status:      Spouse name: Quentin    Number of children: 3    Years of education: Not on file    Highest education level: Not on file   Occupational History    Occupation:      Employer: RETIRED   Tobacco Use    Smoking status: Former     Current packs/day: 0.00     Average packs/day: 1 pack/day for 15.0 years (15.0 ttl pk-yrs)     Types: Cigarettes     Start date: 1994     Quit date: 2009     Years since quitting: 15.0    Smokeless tobacco: Never    Tobacco comments:     On and off for a total of 10 years of smoking    Vaping Use    Vaping status: Never Used   Substance and Sexual Activity    Alcohol use: Yes     Comment: 1x/month    Drug use: No    Sexual activity: Not Currently     Partners: Male     Birth control/protection: Post-menopausal   Other Topics Concern    Parent/sibling w/ CABG, MI or angioplasty before 65F 55M? Not Asked   Social History Narrative    Lives in a house, has  3 children,       lost 1 child, retired      Social Determinants of Health     Financial Resource Strain: Low Risk  (2024)    Financial Resource Strain     Within the past 12 months, have you or your family members you live with been unable to get utilities (heat, electricity) when it was really needed?: No   Food Insecurity:  Low Risk  (7/25/2024)    Food Insecurity     Within the past 12 months, did you worry that your food would run out before you got money to buy more?: No     Within the past 12 months, did the food you bought just not last and you didn t have money to get more?: No   Transportation Needs: Low Risk  (7/25/2024)    Transportation Needs     Within the past 12 months, has lack of transportation kept you from medical appointments, getting your medicines, non-medical meetings or appointments, work, or from getting things that you need?: No   Physical Activity: Unknown (7/25/2024)    Exercise Vital Sign     Days of Exercise per Week: Patient declined     Minutes of Exercise per Session: 20 min   Stress: No Stress Concern Present (7/25/2024)    [employer] [place] [employer] - Occupational Stress Questionnaire     Feeling of Stress : Not at all   Social Connections: Unknown (7/25/2024)    Social Connection and Isolation Panel [NHANES]     Frequency of Communication with Friends and Family: Not on file     Frequency of Social Gatherings with Friends and Family: Three times a week     Attends [employer] Services: Not on file     Active Member of Clubs or Organizations: Not on file     Attends Club or Organization Meetings: Not on file     Marital Status: Not on file   Interpersonal Safety: Low Risk  (8/28/2024)    Interpersonal Safety     Do you feel physically and emotionally safe where you currently live?: Yes     Within the past 12 months, have you been hit, slapped, kicked or otherwise physically hurt by someone?: No     Within the past 12 months, have you been humiliated or emotionally abused in other ways by your partner or ex-partner?: No   Housing Stability: Low Risk  (7/25/2024)    Housing Stability     Do you have housing? : Yes     Are you worried about losing your housing?: No        HOME/ENVIRONMENT:   Liver with her daughter (48 years old)  Has another daughter 51 years old  Son passed - diverticulitis  "-> had surgery and went back with removal of small intestine - underwent small bowel transplant - 1 year after transplant he passed  Patient is      OCCUPATION:   Administration - retired     TRAVEL:   Travel across [place] 15 years ago  Originally from [place]     ANIMALS:   One Dog and one cat     TUBERCULOSIS:   Father and and brother were treated for TB 15 years ago. She states she had a quantiferon performed and it was negative.      TOBACCO/ALCOHOL/RECREATIONAL DRUGS:   Smoked 10 years (1 pack a day) - quit 15 years ago  Glass of wine once a week  No drugs         Physical Exam:   /65 (BP Location: Left arm, Cuff Size: Adult Regular)   Pulse 90   Temp 98.2  F (36.8  C) (Oral)   Ht 1.575 m (5' 2\")   Wt 77.2 kg (170 lb 4 oz)   SpO2 97%   BMI 31.14 kg/m       Exam:  GENERAL:  Well-developed, well-nourished, not in acute distress.   HEAD: Normocephalic and atraumatic  ENT:  No hearing impairment, oropharynx clear, oral mucous membranes moist  EYES:  Eyes grossly normal to inspection  LUNGS:  Clear to auscultation - no rales, rhonchi or wheezes  CARDIOVASCULAR:  Regular rate and rhythm, normal S1 S2, no murmur  ABDOMEN:  Soft, non-tender  EXT: Extremities warm and without edema.  MS: No gross musculoskeletal defects noted, no edema  SKIN:  No acute rashes or suspicious lesions  NEUROLOGIC:  Grossly nonfocal. Normal strength and tone, mentation intact and speech normal  PSYCHIATRIC: Mood stable, mentation appears normal, affect normal           "

## 2024-09-18 ENCOUNTER — OFFICE VISIT (OUTPATIENT)
Dept: INFECTIOUS DISEASES | Facility: CLINIC | Age: 74
End: 2024-09-18
Attending: INTERNAL MEDICINE
Payer: COMMERCIAL

## 2024-09-18 ENCOUNTER — LAB (OUTPATIENT)
Dept: LAB | Facility: CLINIC | Age: 74
End: 2024-09-18
Payer: COMMERCIAL

## 2024-09-18 VITALS
BODY MASS INDEX: 31.33 KG/M2 | WEIGHT: 170.25 LBS | DIASTOLIC BLOOD PRESSURE: 65 MMHG | HEIGHT: 62 IN | SYSTOLIC BLOOD PRESSURE: 100 MMHG | HEART RATE: 90 BPM | TEMPERATURE: 98.2 F | OXYGEN SATURATION: 97 %

## 2024-09-18 DIAGNOSIS — A31.0 MAI (MYCOBACTERIUM AVIUM-INTRACELLULARE) (H): Primary | ICD-10-CM

## 2024-09-18 DIAGNOSIS — A31.0 MAI (MYCOBACTERIUM AVIUM-INTRACELLULARE) (H): ICD-10-CM

## 2024-09-18 PROCEDURE — 99215 OFFICE O/P EST HI 40 MIN: CPT | Performed by: INTERNAL MEDICINE

## 2024-09-18 PROCEDURE — G0463 HOSPITAL OUTPT CLINIC VISIT: HCPCS | Performed by: INTERNAL MEDICINE

## 2024-09-18 ASSESSMENT — PAIN SCALES - GENERAL: PAINLEVEL: NO PAIN (0)

## 2024-09-18 NOTE — NURSING NOTE
12 lead EKG obtained per Dr. Robison orders. First name, last name and  verified prior to procedure. EKG completed without complications or questions. Results given to provider.     Susan Rutledge CMA  2024 2:24 PM

## 2024-09-18 NOTE — LETTER
"9/18/2024       RE: Meme Butts  [address]  [facility] [address]     Dear Colleague,    Thank you for referring your patient, Meme Butts, to the [facility] [place] [facility]. Please see a copy of my visit note below.       GENERAL ID CLINIC           Assessment and Recommendations:   Problem List:    # EDDA pulmonary infection     Discussion:     Meme Butts is a 74 year old female with PMHx of colon cancer (adenocarcinoma of hepatic flexure of colon and family history of BRCA1) diagnosed by screening colonoscopy on 1/10/2023 s/p robotic right hemicolectomy and appendectomy on 2/28/2023 who was referred to our clinic by Dr. Shelby Hernandez (pulmonology) because lung nodule(s) and BAL culture positive for EDDA. She also has PMHx of migraines, seizures, osteoporosis, obesity, prediabetes (Hb A1C 6.3% from 3/18/24), history of GI bleed. Patient states she has persistent cough started 2 years ago. She initially though it was due to sinus disease and post nasal drip. infection. She states that the cough would be persistent and would happen all day. It was mostly dry but she would have some greenish sputum at times. Cough was so often that it would keep her awake. She started claritin with some improvement. She also has SOB started around the same time of the cough. Activities that would lead to SOB included going up and down stairs. She still keeps herself active and cleans the house, gardens and does yard work. She denies weight loss.     Per my review, the nodules were first identified on CT chest from 12/29/22 which showed upper lobe micronodules and bronchiectasis (bilaterally). Follow up CT chest from 3/3/2023 showed same nodules in addition to at least one new 9 mm nodule in the left upper lobe. The most recent CT chest from 3/28/2024 showed: \"Centrilobular and tree-in-bud nodularity, slightly increased " "associated with bronchiectasis greatest in the right middle lobe, lingula and lower lobes; findings suggestive of atypical indolent infection such as nonclassic nontuberculous mycobacteria\". Patient underwent PFT study on 3/2024 and, per Dr. Hernandez's interpretation, it was normal. Bronchoscopy performed on 5/21/24. BAL studies showed: cell count 760 with 56# neutrophils, 12% lymphocytes and 32% macrophages/monocytes. 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  Fungal culture is negative to date. Aspergillus galactomannan negative, Nocardia PCR negative, Nocardia culture negative. 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 Cytology was negative for malignancy and negative for fungal organisms or pneumocystis on GMS stain. Furthermore, the viral cytopathic effect is absent.  Given presence of EDDA on BAL as well as clinical and radiologic criteria, we started EDDA treatment with Rifampin 600 mg once daily - started 7/27; Azithromycin 250 mg once daily - started 8/3 and Ethambutol 1,200 mg (15.6 mg/kg) once daily - started 8/10.    Today's visit: She is tolerating the medications well. CBC and CMP from 8/28 and 9/16 are normal. She tells me she that her cough resolved after initiation of EDDA treatment. She is due for AFB sputum (plan to do monthly until negative), however she is not producing any sputum. She had her baseline audiology exam on 7/16 (before starting treatment) and it showed \"Normal, sloping to moderate sensorineural hearing loss\". She will need another audiogram in September and then every 3 months. She also had ophthalmology exam outside clinic at baseline (before initiation of treatment) and it was normal. She will need ophthalmology visit for visual acuity and color discrimination exam every months and fundoscopic exam every 3 months. She will need a CT chest 3 months from initiation of EDDA treatment (November 2024). I noticed " "that a low dose CT chest was ordered and performed on 8/28 (ordered by Pulmonologist) and it showed \"scattered nodular densities, some of which are cavitary with right upper lobe, left upper lobe end especially right middle lobe and lingula unchanged\". However this CT was done soon after initiation of treatment to determine if she is having radiologic improvement. Noted that it reports that there are small cavitations (not described on previous CT but image very similar). The patient would not be a candidate for aminoglycoside given her baseline hearing impairment. IN addition, patient states that her pulmonologist prescribed neb but it was not covered by insurance. She has a flutter valve, but she would like more specific guidance on  form and frequency of usage       Recommendations:     Continue azithromycin + ethambutol +  rifampin for EDDA treatment (started on 7/27). Duration will be 1 year from first negative AFB sputum after treatment.         - I noticed that a low dose CT chest was ordered and performed on 8/28 (ordered by Pulmonologist) and it showed \"scattered nodular densities, some of which are cavitary with right upper lobe, left upper lobe end especially right middle lobe and lingula unchanged\". However this CT was done soon after initiation of treatment to determine if she is having radiologic improvement. Noted that it reports that there are small cavitations (not described on previous CT but image very similar). Nevertheless patient is most likely not a good candidate for aminoglycoside given her baseline hearing impairment.      Patient is due for audiology exam and I will place a referral. She will need one this month and then every 3 months. I paced the referral    Patient is due for ophthalmology exam. She will need ophthalmology visit for visual acuity and color discrimination exam every month and fundoscopic exam every 3 months. I paced the referral    She will need a CT chest 3 months from " initiation of EDDA treatment (end of November 2024). I ordered CT chest    Our clinic pharmacist will co-manage with me     I will send  message to pulmonology provider to address the patient's questions    She will need monthly labs (CBC and CMP) - next around October 16. Placed standing orders    Patient had an EKG today and it showed a normal QTc (431)        Recommendations discussed with the patient      Celia Robison MD  Date of Service:  09/18/24    On 09/18/24, I spent 50 min with chart review, patient visit, documentation and coordination of care.           History of Present Illness:   Meme Butts is a 74 year old female with PMHx of colon cancer (adenocarcinoma of hepatic flexure of colon and family history of BRCA1) diagnosed by screening colonoscopy on 1/10/2023 s/p robotic right hemicolectomy and appendectomy on 2/28/2023 who was referred to our clinic by Dr. Shelby Hernandez (pulmonology) because lung nodule(s) and BAL culture positive for EDDA. She also has PMHx of migraines, seizures, osteoporosis, obesity, prediabetes (Hb A1C 6.3% from 3/18/24), history of GI bleed.     Patient states she has persistent cough started 2 years ago. She initially though it was due to sinus disease and post nasal drip. infection. She states that the cough would be persistent and would happen all day. It was mostly dry but she would have some greenish sputum at times. Cough was so often that it would keep her awake. She started claritin with some improvement. She also has SOB started around the same time of the cough. Activities that would lead to SOB included going up and down stairs. She still keeps herself active and cleans the house, gardens and does yard work. She denies weight loss.     Per my review, the nodules were first identified on CT chest from 12/29/22 which showed upper lobe micronodules and bronchiectasis (bilaterally). Follow up CT chest from 3/3/2023 showed same nodules in addition to at least one  "new 9 mm nodule in the left upper lobe. The most recent CT chest from 3/28/2024 showed: \"Centrilobular and tree-in-bud nodularity, slightly increased associated with bronchiectasis greatest in the right middle lobe, lingula and lower lobes; findings suggestive of atypical indolent infection such as nonclassic nontuberculous mycobacteria\". Patient underwent PFT study on 3/2024 and, per Dr. Hernandez's interpretation, it was normal. Bronchoscopy performed on 5/21/24. BAL studies showed: cell count 760 with 56# neutrophils, 12% lymphocytes and 32% macrophages/monocytes. 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  Fungal culture is negative to date. Aspergillus galactomannan negative, Nocardia PCR negative, Nocardia culture negative. 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 Cytology was negative for malignancy and negative for fungal organisms or pneumocystis on GMS stain. Furthermore, the viral cytopathic effect is absent. Patient did not have fever or chills but she has persistent cough with intermittent intermittent greenish sputum production.    Labs from 6/21/24: CBC normal, Kidney and liver function normal, HIV negative, Hep B surface antigen: negative, Hepatitis C serology: negative Quantiferon negative, EKG from 6/18 showed normal QTc (408).     Given presence of EDDA on BAL as well as clinical and radiologic criteria, we started EDDA treatment with Rifampin 600 mg once daily - started 7/27; Azithromycin 250 mg once daily - started 8/3 and Ethambutol 1,200 mg (15.6 mg/kg) once daily - started 8/10.    Today's visit:    She is tolerating the medications well. CBC and CMP from 8/28 and 9/16 are normal. She tells me she that her cough resolved after initiation of EDDA treatment. She is due for AFB sputum (plan to do monthly until negative), however she is not producing any sputum. She had her baseline audiology exam on 7/16 (before starting " "treatment) and it showed \"Normal, sloping to moderate sensorineural hearing loss\". She will need another audiogram in September and then every 3 months. She also had ophthalmology exam outside clinic at baseline (before initiation of treatment) and it was normal. She will need ophthalmology visit for visual acuity and color discrimination exam every months and fundoscopic exam every 3 months. She will need a CT chest 3 months from initiation of EDDA treatment (November 2024). I noticed that a low dose CT chest was ordered and performed on 8/28 (ordered by Pulmonologist) and it showed \"scattered nodular densities, some of which are cavitary with right upper lobe, left upper lobe end especially right middle lobe and lingula unchanged\". However this CT was done soon after initiation of treatment to determine if she is having radiologic improvement. Noted that it reports that there are small cavitations (not described on previous CT but image very similar). The patient would not be a candidate for aminoglycoside given her baseline hearing impairment. In addition, patient states that her pulmonologist prescribed neb but it was not covered by insurance. She has a flutter valve, but she would like more specific guidance on  form and frequency of usage             Review of Systems:     CONSTITUTIONAL:  No fevers or chills  INTEGUMENTARY/SKIN: NEGATIVE for worrisome rashes, moles or lesions  EYES: Negative for icterus, vision changes or irritation  ENT/MOUTH:  Cough improved  CARDIOVASCULAR:  Negative for chest pain, palpitations and  shortness of breath  GASTROINTESTINAL:  Negative for abdominal pain, nausea, vomiting, diarrhea and constipation  GENITOURINARY:  Negative for dysuria, hematuria, frequency and urgency  MUSCULOSKELETAL: Negative for joint pain, swelling, motion restriction, negative for musculoskeletal pain  NEURO:  Negative for headache, altered mental status, numbness or weakness  PSYCHIATRIC: Negative for " "changes in mood or affect  HEMATOLOGIC/LYMPHATIC: negative for lymphadenopathy or bleeding  ALLERGIC/IMMUNOLOGIC: Negative for allergic reaction   ENDOCRINE: Negative for temperature intolerance, skin/hair changes        Past Medical History:     Past Medical History:   Diagnosis Date     Adenomatous colon polyp     tubular adenoma     At high risk for breast cancer 2017    35.4% lifetime risk by Philip model     Epilepsy (H)      Fracture of metatarsal bone of left foot 2014     Fracture, radius     and ulnar styloid fracture     GIB (gastrointestinal bleeding) 2011     Intractable chronic migraine without aura      Kidney stones ,     during pregnancy     Malignant neoplasm of hepatic flexure (H)      Migraine      Osteoporosis 2016    T score -4.1     Pneumonia      Prediabetes         Surgery R writ after a fracture post fall - has hardware ()  Had cataract surgery in 2023      Allergies:       No Known Allergies          Family History:     Family History   Problem Relation Age of Onset     Breast Cancer Mother 30        lumpectomy and chemo; also \"mass in ovary\"     Colon Polyps Father      Osteoporosis Sister      Breast Cancer Sister 55     Leukemia Sister 52     Coronary Artery Disease Brother      Colon Polyps Brother      Coronary Artery Disease Early Onset Brother 50     Prostate Cancer Brother      Gastrointestinal Disease Son         intestinal transplant     Diabetes Maternal Aunt         multiple mat aunts     Ovarian Cancer Maternal Aunt 50         of ovarian cancer in 50s or 60s, unknown     Breast Cancer Paternal Aunt 36         of breast cancer recurrence in 60s     Bone Cancer Niece 19     Prostate Cancer Cousin 65     Breast Cancer Cousin 30        paternal cousin, BRCA1+ by report     Breast Cancer Cousin 40        paternal cousin, BRCA1+ by report     Breast Cancer Cousin 35        paternal cousin, BRCA1+ by report     Genetic Disorder Cousin  "        paternal male cousin, BRCA1+ by report     Ovarian Cancer Cousin         paternal cousin, BRCA1+ by report     Anesthesia Reaction No family hx of      Venous thrombosis No family hx of              Social History:     Social History     Socioeconomic History     Marital status:      Spouse name: Quentin     Number of children: 3     Years of education: Not on file     Highest education level: Not on file   Occupational History     Occupation:      Employer: RETIRED   Tobacco Use     Smoking status: Former     Current packs/day: 0.00     Average packs/day: 1 pack/day for 15.0 years (15.0 ttl pk-yrs)     Types: Cigarettes     Start date: 8/17/1994     Quit date: 8/17/2009     Years since quitting: 15.0     Smokeless tobacco: Never     Tobacco comments:     On and off for a total of 10 years of smoking    Vaping Use     Vaping status: Never Used   Substance and Sexual Activity     Alcohol use: Yes     Comment: 1x/month     Drug use: No     Sexual activity: Not Currently     Partners: Male     Birth control/protection: Post-menopausal   Other Topics Concern     Parent/sibling w/ CABG, MI or angioplasty before 65F 55M? Not Asked   Social History Narrative    Lives in a house, has  3 children,       lost 1 child, retired      Social Determinants of Health     Financial Resource Strain: Low Risk  (7/25/2024)    Financial Resource Strain      Within the past 12 months, have you or your family members you live with been unable to get utilities (heat, electricity) when it was really needed?: No   Food Insecurity: Low Risk  (7/25/2024)    Food Insecurity      Within the past 12 months, did you worry that your food would run out before you got money to buy more?: No      Within the past 12 months, did the food you bought just not last and you didn t have money to get more?: No   Transportation Needs: Low Risk  (7/25/2024)    Transportation Needs      Within the past 12 months, has  lack of transportation kept you from medical appointments, getting your medicines, non-medical meetings or appointments, work, or from getting things that you need?: No   Physical Activity: Unknown (7/25/2024)    Exercise Vital Sign      Days of Exercise per Week: Patient declined      Minutes of Exercise per Session: 20 min   Stress: No Stress Concern Present (7/25/2024)    [employer] - Occupational Stress Questionnaire      Feeling of Stress : Not at all   Social Connections: Unknown (7/25/2024)    Social Connection and Isolation Panel [NHANES]      Frequency of Communication with Friends and Family: Not on file      Frequency of Social Gatherings with Friends and Family: Three times a week      Attends Zoroastrian Services: Not on file      Active Member of Clubs or Organizations: Not on file      Attends Club or Organization Meetings: Not on file      Marital Status: Not on file   Interpersonal Safety: Low Risk  (8/28/2024)    Interpersonal Safety      Do you feel physically and emotionally safe where you currently live?: Yes      Within the past 12 months, have you been hit, slapped, kicked or otherwise physically hurt by someone?: No      Within the past 12 months, have you been humiliated or emotionally abused in other ways by your partner or ex-partner?: No   Housing Stability: Low Risk  (7/25/2024)    Housing Stability      Do you have housing? : Yes      Are you worried about losing your housing?: No        HOME/ENVIRONMENT:   Liver with her daughter (48 years old)  Has another daughter 51 years old  Son passed - diverticulitis -> had surgery and went back with removal of small intestine - underwent small bowel transplant - 1 year after transplant he passed  Patient is      OCCUPATION:   Administration - retired     TRAVEL:   Travel across [place] 15 years ago  Originally from [place]     ANIMALS:   One Dog and one cat     TUBERCULOSIS:   Father and and brother were  "treated for TB 15 years ago. She states she had a quantiferon performed and it was negative.      TOBACCO/ALCOHOL/RECREATIONAL DRUGS:   Smoked 10 years (1 pack a day) - quit 15 years ago  Glass of wine once a week  No drugs         Physical Exam:   /65 (BP Location: Left arm, Cuff Size: Adult Regular)   Pulse 90   Temp 98.2  F (36.8  C) (Oral)   Ht 1.575 m (5' 2\")   Wt 77.2 kg (170 lb 4 oz)   SpO2 97%   BMI 31.14 kg/m       Exam:  GENERAL:  Well-developed, well-nourished, not in acute distress.   HEAD: Normocephalic and atraumatic  ENT:  No hearing impairment, oropharynx clear, oral mucous membranes moist  EYES:  Eyes grossly normal to inspection  LUNGS:  Clear to auscultation - no rales, rhonchi or wheezes  CARDIOVASCULAR:  Regular rate and rhythm, normal S1 S2, no murmur  ABDOMEN:  Soft, non-tender  EXT: Extremities warm and without edema.  MS: No gross musculoskeletal defects noted, no edema  SKIN:  No acute rashes or suspicious lesions  NEUROLOGIC:  Grossly nonfocal. Normal strength and tone, mentation intact and speech normal  PSYCHIATRIC: Mood stable, mentation appears normal, affect normal               Again, thank you for allowing me to participate in the care of your patient.      Sincerely,    SRINI ABAD MD    "

## 2024-09-18 NOTE — NURSING NOTE
"Chief Complaint   Patient presents with    RECHECK     4 week follow up      /65 (BP Location: Left arm, Cuff Size: Adult Regular)   Pulse 90   Temp 98.2  F (36.8  C) (Oral)   Ht 1.575 m (5' 2\")   Wt 77.2 kg (170 lb 4 oz)   SpO2 97%   BMI 31.14 kg/m    Alejandra Orozco, CMA on 9/18/2024 at 1:28 PM    "

## 2024-09-19 ENCOUNTER — TELEPHONE (OUTPATIENT)
Dept: OPHTHALMOLOGY | Facility: CLINIC | Age: 74
End: 2024-09-19
Payer: COMMERCIAL

## 2024-09-19 LAB
ATRIAL RATE - MUSE: 79 BPM
DIASTOLIC BLOOD PRESSURE - MUSE: NORMAL MMHG
INTERPRETATION ECG - MUSE: NORMAL
P AXIS - MUSE: 65 DEGREES
PR INTERVAL - MUSE: 160 MS
QRS DURATION - MUSE: 66 MS
QT - MUSE: 376 MS
QTC - MUSE: 431 MS
R AXIS - MUSE: 46 DEGREES
SYSTOLIC BLOOD PRESSURE - MUSE: NORMAL MMHG
T AXIS - MUSE: 39 DEGREES
VENTRICULAR RATE- MUSE: 79 BPM

## 2024-09-19 NOTE — TELEPHONE ENCOUNTER
[facility] Call Center    Phone Message    May a detailed message be left on voicemail: yes     Reason for Call: Appointment Intake    Referring Provider Name: Celia Lala MD  Diagnosis and/or Symptoms: Patient on EDDA treatment (including ethambutol) and will need visual acuity/color discrimination exam every month and fundoscopic exam every 3 months while on treatment.  Requesting 1-2 weeks.    Action Taken: Message routed to:  [facility] Ophthalmology    Travel Screening: Not Applicable     Date of Service:

## 2024-09-20 ENCOUNTER — MYC MEDICAL ADVICE (OUTPATIENT)
Dept: PULMONOLOGY | Facility: CLINIC | Age: 74
End: 2024-09-20
Payer: COMMERCIAL

## 2024-09-20 ENCOUNTER — TELEPHONE (OUTPATIENT)
Dept: INFECTIOUS DISEASES | Facility: CLINIC | Age: 74
End: 2024-09-20
Payer: COMMERCIAL

## 2024-09-20 NOTE — TELEPHONE ENCOUNTER
Scheduled exam in open new time slot with Dr. Jarvis on October 2nd at 8 AM at [facility] location.    Pt aware of date/time/location/duration/hospital based clinic/non-humana insurance.    Addi Jewell RN 1:04 PM 09/20/24

## 2024-09-20 NOTE — TELEPHONE ENCOUNTER
EP called 9/20 to sched labs for after Oct 16, CT for end of Nov, and next avail follow up after CT with Dr. Robison- all per provider.       ----- Message from CELIA ABAD sent at 9/20/2024  9:34 AM CDT -----  Just one more thing Sofia (nico)    Can you schedule a follow up with me in my first availability after the CT chest?    Thank you    Celia  ----- Message -----  From: Celia Abad MD  Sent: 9/19/2024   5:15 PM CDT  To: [id number]    Dear Sofia,    Can you schedule blood labs for October (the 16th or later that month) and CT chest for end of November?    Also can you help her schedule audiogram and ophthalmology visit?    Thank you    Celia

## 2024-09-24 ENCOUNTER — VIRTUAL VISIT (OUTPATIENT)
Dept: PHARMACY | Facility: CLINIC | Age: 74
End: 2024-09-24
Payer: COMMERCIAL

## 2024-09-24 ENCOUNTER — TELEPHONE (OUTPATIENT)
Dept: INFECTIOUS DISEASES | Facility: CLINIC | Age: 74
End: 2024-09-24
Payer: COMMERCIAL

## 2024-09-24 DIAGNOSIS — A31.0 MYCOBACTERIUM AVIUM INFECTION (H): Primary | ICD-10-CM

## 2024-09-24 PROCEDURE — 99606 MTMS BY PHARM EST 15 MIN: CPT | Mod: 93 | Performed by: PHARMACIST

## 2024-09-24 PROCEDURE — 99607 MTMS BY PHARM ADDL 15 MIN: CPT | Mod: 93 | Performed by: PHARMACIST

## 2024-09-24 RX ORDER — RIFAMPIN 300 MG/1
600 CAPSULE ORAL DAILY
Qty: 60 CAPSULE | Refills: 11 | Status: SHIPPED | OUTPATIENT
Start: 2024-09-24

## 2024-09-24 RX ORDER — ETHAMBUTOL HYDROCHLORIDE 400 MG/1
1200 TABLET, FILM COATED ORAL DAILY
Qty: 90 TABLET | Refills: 11 | Status: SHIPPED | OUTPATIENT
Start: 2024-09-24

## 2024-09-24 RX ORDER — AZITHROMYCIN 250 MG/1
250 TABLET, FILM COATED ORAL DAILY
Qty: 30 TABLET | Refills: 11 | Status: SHIPPED | OUTPATIENT
Start: 2024-09-24

## 2024-09-24 NOTE — PROGRESS NOTES
"Medication Therapy Management (MTM) Encounter    ASSESSMENT:                            Medication Adherence/Access: checked with our pharmacy and rifampin is not on backorder. She'd like to try switching the antibiotic prescriptions to [facility] in [place] ([facility]).    Mycobacterium avium intracellulare complex infection:  Tolerating antibiotics well and respiratory symptoms have improved. Due for sputum culture. She's interested in induced sputum. Has eye and hearing exams scheduled alone with a lab appointment in 4 weeks and CT chest in November.     PLAN:                            Resend antibiotic prescriptions to [facility] in [facility] to help patient schedule induced sputum test     Follow-up: 4 weeks with medication therapy management, 3 months with infectious disease    SUBJECTIVE/OBJECTIVE:                          Meme Butts is a 74 year old female seen for a follow-up visit.       Reason for visit: antibio.    Allergies/ADRs: Reviewed in chart  Past Medical History: Reviewed in chart  Tobacco: She reports that she quit smoking about 15 years ago. Her smoking use included cigarettes. She started smoking about 30 years ago. She has a 15 pack-year smoking history. She has never used smokeless tobacco.  Alcohol: wine once in awhile   Medication Adherence/Access: has trouble getting rifampin on time from the [facility] pharmacy. Only has 1 day supply left. Doesn't have trouble refilling ethambutol or azithromycin     Mycobacterium avium intracellulare complex infection:  Rifampin 600 mg once daily - started 7/27  Azithromycin 250 mg once daily - started 8/3  Ethambutol 1,200 mg (15.6 mg/kg) once daily - started 8/10     She's feeling well and no side effects. She's not coughing up any sputum. Occasional \"whooshing\" sound in ears but she also had this at baseline.      Eye exam: hx cataract surgery fall 2023. Had baseline eye exam prior to starting antibiotics at outside clinic. No acute " concerns per patient.  Audiology: 7/16 - Normal, sloping to moderate sensorineural hearing loss was found today on baseline audiogram. The grade of the patient's hearing loss today on the CTCAE4.03 Scale is 0, bilaterally (baseline assessment). Needs in 1 month and then every 3 months.   EKG: Qtc 431 on 9/18/24  CT chest: some scattered cavitary nodules on 8/28/24 5/21/24 AFB culture:              Mycobacterium avium intracellulare complex           AFB VESNA (ARUP)       Amikacin 8 Susceptible       Clarithromycin 1 Susceptible       Comment:   See below 1       Linezolid 16 Intermediate       Moxifloxacin 1 Susceptible          Today's Vitals: There were no vitals taken for this visit.  ----------------      I spent 14 minutes with this patient today. All changes were made via collaborative practice agreement with Dr. Robison. A copy of the visit note was provided to the patient's provider(s).    A summary of these recommendations was sent via Xi3.      Telemedicine Visit Details  The patient's medications can be safely assessed via a telemedicine encounter.  Type of service:  Telephone visit  Originating Location (pt. Location): Home    Distant Location (provider location):  Off-site  Start Time: 9:05 AM  End Time: 9:19 AM     Medication Therapy Recommendations  Mycobacterium avium infection    Current Medication: rifampin (RIFADIN) 300 MG capsule   Rationale: Medication product not available - Adherence - Adherence   Recommendation: Provide Adherence Intervention   Status: Resolved Med Access Issue

## 2024-09-24 NOTE — PATIENT INSTRUCTIONS
"Recommendations from today's MTM visit:                                                      Resend antibiotic prescriptions to [facility] in [facility] to help patient schedule induced sputum test     [facility], [place] - [address] Phone: [phone]   Fax: [phone]        Follow-up: 4 weeks with medication therapy management, 3 months with infectious disease    It was great speaking with you today.  I value your experience and would be very thankful for your time in providing feedback in our clinic survey. In the next few days, you may receive an email or text message from [employer] with a link to a survey related to your  clinical pharmacist.\"     To schedule another MTM appointment, please call the clinic directly or you may call the MTM scheduling line at [phone] or toll-free at [phone].     My Clinical Pharmacist's contact information:                                                      Please feel free to contact me with any questions or concerns you have.      Ananmaria Whitley, PharmD, AAHIVP  Medication Therapy Management Pharmacist      "

## 2024-09-24 NOTE — Clinical Note
Hi - patient is interested in scheduling induced sputum test since she is still not coughing anything up. Can someone please help her schedule this? It was ordered at Dr. Robison's last appointment. Thank you

## 2024-09-24 NOTE — CONFIDENTIAL NOTE
"Forwarding patient's message as copied below to ordering provider, Dr. Robison    \"I m unsure if I am reaching out to the right clinic to schedule a sputum test. I have been trying to cough but am unable to get anything out. So I need to schedule a visit with you to assist my in obtaining a sample. I am on several antibiotics and they have to have a sample in order to monitor my progress. Am I taking to the right clinic? I m grateful for any assistance\"    Beth Koroma PA-C  Interventional and General Pulmonology  Department of Pulmonary, Allergy, Critical Care and Sleep Medicine   [facility]    "

## 2024-09-24 NOTE — TELEPHONE ENCOUNTER
EP called 9/24 to sched a sputum induction per Dr. Robison.       ----- Message from Chloe ALDRICH sent at 9/24/2024  2:29 PM CDT -----    ----- Message -----  From: Celia Lala MD  Sent: 9/24/2024   1:00 PM CDT  To: Beth Koroma PA-C; #    Dear Nurse Team,    Can you help me schedule an induced sputum for this patient?    I placed an order last week, but it was not scheduled yet    Thank you    Celia  ----- Message -----  From: Beth Koroma PA-C  Sent: 9/24/2024  10:27 AM CDT  To: Celia Lala MD    Hi Dr. Robison  Patient sent me a mychart message which I forwarded to you and copied below.   I'm unsure if I am reaching out to the right clinic to schedule a sputum test. I have been trying to cough but am unable to get anything out. So I need to schedule a visit with you to assist my in obtaining a sample. I am on several antibiotics and they have to have a sample in order to monitor my progress. Am I taking to the right clinic? I'm grateful for any assistance     Can you please assist? Thank you!  Beth Koroma PA-C   ----- Message -----  From: Celia Lala MD  Sent: 9/20/2024   9:30 AM CDT  To: Shelby Hernandez MD; Beth Koroma PA-C    Thank you!  ----- Message -----  From: Beth Koroma PA-C  Sent: 9/20/2024   8:27 AM CDT  To: Celia Lala MD; #      Hi Dr. oRbison,  Thank you for letting us know. I'll reach out to the patient today.    Beth Koroma PA-C  Interventional and General Pulmonology  Department of Pulmonary, Allergy, Critical Care and Sleep Medicine   [facility]  ----- Message -----  From: Celia Lala MD  Sent: 9/19/2024   5:22 PM CDT  To: Shelby Hernandez MD; Beth Koroma PA-C    Dear all,    This is Celia Robison from ID clinic. We started EDDA treatment in end of July. Meme tells me that the nebulizer is  not covered by insurance. Can you provider her guidance on  how to obtain the nebulizer? Also, she would like some instructions on how to use the nebulizer.     In addition, she would like more specific guidance on  form and frequency of flutter valve usage.    Can you help me with this?    Thank you    Celia

## 2024-09-25 ENCOUNTER — OFFICE VISIT (OUTPATIENT)
Dept: PULMONOLOGY | Facility: CLINIC | Age: 74
End: 2024-09-25
Payer: COMMERCIAL

## 2024-09-25 ENCOUNTER — ALLIED HEALTH/NURSE VISIT (OUTPATIENT)
Dept: INTERNAL MEDICINE | Facility: CLINIC | Age: 74
End: 2024-09-25
Payer: COMMERCIAL

## 2024-09-25 DIAGNOSIS — M81.0 OSTEOPOROSIS, UNSPECIFIED OSTEOPOROSIS TYPE, UNSPECIFIED PATHOLOGICAL FRACTURE PRESENCE: Primary | ICD-10-CM

## 2024-09-25 DIAGNOSIS — A31.0 MYCOBACTERIUM AVIUM INFECTION (H): Primary | ICD-10-CM

## 2024-09-25 PROCEDURE — 87206 SMEAR FLUORESCENT/ACID STAI: CPT | Mod: 90 | Performed by: PATHOLOGY

## 2024-09-25 PROCEDURE — 96372 THER/PROPH/DIAG INJ SC/IM: CPT

## 2024-09-25 PROCEDURE — 94640 AIRWAY INHALATION TREATMENT: CPT | Performed by: INTERNAL MEDICINE

## 2024-09-25 PROCEDURE — 99207 PR NO CHARGE LOS: CPT

## 2024-09-25 PROCEDURE — 87116 MYCOBACTERIA CULTURE: CPT | Mod: 90 | Performed by: PATHOLOGY

## 2024-09-25 PROCEDURE — 99000 SPECIMEN HANDLING OFFICE-LAB: CPT | Performed by: PATHOLOGY

## 2024-09-25 PROCEDURE — 99207 PR NO CHARGE NURSE ONLY: CPT

## 2024-09-25 NOTE — PROGRESS NOTES
This patient comes into clinic  today at the request of SRINI Holman for a sputum induction.    Patient was first given 2.5 mg of albuterol nebulizer, after which 5mL 10% hypertonic saline was administered via nebulizer.      Pre-treatment: SpO2= 97% HR= 88 BBS= clear  Post-treatment: SpO2= 98%  HR= 93 BBS= clear    Patient was successful in producing a sample.    No adverse side effects noted by the patient.    This service provided today was under the direct supervision of Dr. Devries, who was available if needed.

## 2024-09-25 NOTE — PROGRESS NOTES
Meme Butts received the first Evenity injection today in clinic at the request of Dr Miranda. The injection was given under the supervision of Dr Gaming if assistance was needed. The patient does not report a history of adverse reactions associated with medication/injection administration. The injection sites were cleaned with alcohol prep wipes. The injections were given without incident--see MAR list for administration details. The patient was advised to remain in fourth floor lobby of the [facility] for fifteen minutes after the injection in case of an adverse reaction.     Evenity injections are administered every 30 days. The patient's next anticipated Evenity injection is 10/28/24. The patient was provided an injection schedule and was instructed to call the Primary Care Scheduling Number to schedule their next Evenity injection.     Clinic Administered Medication Documentation      Injectable Medication Documentation    Is there an active order (written within the past 365 days, with administrations remaining, not ) in the chart? Yes.     Patient was given  Evenity . Prior to medication administration, verified patient's identity using patient s name and date of birth. Please see MAR and medication order for additional information. Patient instructed to remain in clinic for 15 minutes and report any adverse reaction to staff immediately.    Vial/Syringe: Syringe  Was this medication supplied by the patient? No  Is this a medication the patient will need to receive again? Yes. Verified that the patient has refills remaining in their prescription.       Jose Melo, EMT 11:12 AM on 2024

## 2024-09-26 ENCOUNTER — TELEPHONE (OUTPATIENT)
Dept: OPHTHALMOLOGY | Facility: CLINIC | Age: 74
End: 2024-09-26
Payer: COMMERCIAL

## 2024-09-26 NOTE — TELEPHONE ENCOUNTER
Provider change request for appt next week with Dr. Jarvis.    Triage team will reach out to review rescheduling options.    Addi Jewell RN 2:16 PM 09/26/24

## 2024-09-27 ENCOUNTER — TELEPHONE (OUTPATIENT)
Dept: OPHTHALMOLOGY | Facility: CLINIC | Age: 74
End: 2024-09-27
Payer: COMMERCIAL

## 2024-09-27 LAB
ACID FAST STAIN (ARUP): NORMAL
ACID FAST STAIN (ARUP): NORMAL

## 2024-09-30 ENCOUNTER — TELEPHONE (OUTPATIENT)
Dept: OPHTHALMOLOGY | Facility: CLINIC | Age: 74
End: 2024-09-30
Payer: COMMERCIAL

## 2024-10-01 ENCOUNTER — MYC REFILL (OUTPATIENT)
Dept: PHARMACY | Facility: CLINIC | Age: 74
End: 2024-10-01

## 2024-10-01 ENCOUNTER — OFFICE VISIT (OUTPATIENT)
Dept: AUDIOLOGY | Facility: CLINIC | Age: 74
End: 2024-10-01
Payer: COMMERCIAL

## 2024-10-01 ENCOUNTER — TELEPHONE (OUTPATIENT)
Dept: OPHTHALMOLOGY | Facility: CLINIC | Age: 74
End: 2024-10-01

## 2024-10-01 DIAGNOSIS — A31.0 MAI (MYCOBACTERIUM AVIUM-INTRACELLULARE) (H): ICD-10-CM

## 2024-10-01 DIAGNOSIS — Z79.899 ENCOUNTER FOR MONITORING OTOTOXIC DRUG THERAPY: ICD-10-CM

## 2024-10-01 DIAGNOSIS — H90.3 SENSORINEURAL HEARING LOSS (SNHL) OF BOTH EARS: Primary | ICD-10-CM

## 2024-10-01 DIAGNOSIS — Z51.81 ENCOUNTER FOR MONITORING OTOTOXIC DRUG THERAPY: ICD-10-CM

## 2024-10-01 DIAGNOSIS — A31.0 MYCOBACTERIUM AVIUM INFECTION (H): ICD-10-CM

## 2024-10-01 PROCEDURE — 92588 EVOKED AUDITORY TST COMPLETE: CPT | Performed by: AUDIOLOGIST

## 2024-10-01 PROCEDURE — 92550 TYMPANOMETRY & REFLEX THRESH: CPT | Performed by: AUDIOLOGIST

## 2024-10-01 PROCEDURE — 92557 COMPREHENSIVE HEARING TEST: CPT | Performed by: AUDIOLOGIST

## 2024-10-01 RX ORDER — ETHAMBUTOL HYDROCHLORIDE 400 MG/1
1200 TABLET, FILM COATED ORAL DAILY
Qty: 90 TABLET | Refills: 11 | OUTPATIENT
Start: 2024-10-01

## 2024-10-01 RX ORDER — AZITHROMYCIN 250 MG/1
250 TABLET, FILM COATED ORAL DAILY
Qty: 30 TABLET | Refills: 11 | OUTPATIENT
Start: 2024-10-01

## 2024-10-01 RX ORDER — RIFAMPIN 300 MG/1
600 CAPSULE ORAL DAILY
Qty: 60 CAPSULE | Refills: 11 | OUTPATIENT
Start: 2024-10-01

## 2024-10-01 NOTE — TELEPHONE ENCOUNTER
Sent MyC to patient- appears these were all filled a week ago. Please deny if patient has received them.    Ammy Jordan PharmD, [facility]  Medication Therapy Management Provider  Phone: [phone]  [email or web address]

## 2024-10-01 NOTE — PROGRESS NOTES
"AUDIOLOGY REPORT    SUBJECTIVE:  Meme Butts is a 74 year old female who was seen in the Audiology Clinic at the [facility] for audiologic evaluation, referred by Celia Lala MD. The patient has been seen previously on 7/16/2024 for baseline assessment and results indicated mid to high frequency sensorineural hearing loss that was mild to moderate in the right ear and mild to moderately severe in the left ear. 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 She returns today for follow up and reports that she has noticed muffled hearing more since she was last tested. She is unsure if she has actually had a change in hearing or if she is more aware of it. She reports occasional tinnitus lasting up to a minute. She describes it as a \"whooshing\" sound that is not in time with her heartbeat. It is mostly noticeable in quiet and happens more in the left ear. The patient reports occasional ear pressure attributed to sinus problems and allergies, but she does not have ear pain. She reports that she does not have a history of ear problems or ear surgery. The patient was unaccompanied to today's appointment.    OBJECTIVE:  Otoscopic exam indicated ears are clear of cerumen bilaterally     Pure Tone Thresholds assessed using conventional audiometry with good, reliability from 250-8000 Hz bilaterally using insert earphones and circumaural headphones     RIGHT:  normal hearing sensitivity through 500 Hz sloping to  mild to moderate  sensorineural hearing loss    LEFT:   mild sloping to  moderate  sensorineural hearing loss with normal hearing sensitivity at 250-500 and 3000 Hz    High frequency audiometry from 9,000-20,000 Hz was performed and responses were present through 11.2 kHz bilaterally, which is within aged norms. These results are stable compared to baseline " evaluation.    Distortion product otoacoustic emissions were performed from 500-03083 Hz and were absent with the exception of 4820-1538 Hz in the right ear and 2000 Hz in the left ear.    Tympanogram:    RIGHT: normal eardrum mobility    LEFT:   normal eardrum mobility    Reflexes (reported by stimulus ear):  RIGHT: Ipsilateral: present at normal levels  RIGHT: Contralateral: present at normal levels  LEFT:   Ipsilateral: present at normal levels  LEFT:   Contralateral: present at normal levels    Speech Reception Threshold:    RIGHT: 35 dB HL    LEFT:   35 dB HL    Word Recognition Score:     RIGHT: 96% at 75 dB HL using NU-6 recorded word list.    LEFT:   96% at 75 dB HL using NU-6 recorded word list.      ASSESSMENT:   Mild to moderate mid to high frequency sensorineural hearing loss was found today. Compared to patient's previous audiogram dated 7/16/2024, hearing has remained stable bilaterally. The grade of the patient's hearing loss today on the CTCAE4.03 Scale: Grade 0 bilaterally (no significant changes)    Today s results were discussed with the patient in detail.     PLAN:  Patient was counseled regarding hearing loss and impact on communication. It is recommended that the patient return for testing based on physician protocol and recommendation.  Please call this clinic with questions regarding these results or recommendations.      Chelsea Arriaza, CCC-A  [place] Licensed Audiologist #[id number]  10/1/2024

## 2024-10-23 ENCOUNTER — LAB (OUTPATIENT)
Dept: LAB | Facility: CLINIC | Age: 74
End: 2024-10-23
Payer: COMMERCIAL

## 2024-10-23 DIAGNOSIS — A31.0 MAI (MYCOBACTERIUM AVIUM-INTRACELLULARE) (H): ICD-10-CM

## 2024-10-23 DIAGNOSIS — A31.0 MYCOBACTERIUM AVIUM INFECTION (H): ICD-10-CM

## 2024-10-23 DIAGNOSIS — E78.5 HYPERLIPIDEMIA LDL GOAL <100: ICD-10-CM

## 2024-10-23 LAB
ALBUMIN SERPL BCG-MCNC: 4.3 G/DL (ref 3.5–5.2)
ALP SERPL-CCNC: 137 U/L (ref 40–150)
ALT SERPL W P-5'-P-CCNC: 32 U/L (ref 0–50)
ANION GAP SERPL CALCULATED.3IONS-SCNC: 13 MMOL/L (ref 7–15)
AST SERPL W P-5'-P-CCNC: 31 U/L (ref 0–45)
BASOPHILS # BLD AUTO: 0 10E3/UL (ref 0–0.2)
BASOPHILS NFR BLD AUTO: 1 %
BILIRUB SERPL-MCNC: 0.5 MG/DL
BUN SERPL-MCNC: 13.1 MG/DL (ref 8–23)
CALCIUM SERPL-MCNC: 9.4 MG/DL (ref 8.8–10.4)
CHLORIDE SERPL-SCNC: 103 MMOL/L (ref 98–107)
CHOLEST SERPL-MCNC: 213 MG/DL
CREAT SERPL-MCNC: 0.73 MG/DL (ref 0.51–0.95)
EGFRCR SERPLBLD CKD-EPI 2021: 86 ML/MIN/1.73M2
EOSINOPHIL # BLD AUTO: 0.1 10E3/UL (ref 0–0.7)
EOSINOPHIL NFR BLD AUTO: 1 %
ERYTHROCYTE [DISTWIDTH] IN BLOOD BY AUTOMATED COUNT: 12.7 % (ref 10–15)
FASTING STATUS PATIENT QL REPORTED: YES
FASTING STATUS PATIENT QL REPORTED: YES
GLUCOSE SERPL-MCNC: 126 MG/DL (ref 70–99)
HCO3 SERPL-SCNC: 25 MMOL/L (ref 22–29)
HCT VFR BLD AUTO: 40.3 % (ref 35–47)
HDLC SERPL-MCNC: 69 MG/DL
HGB BLD-MCNC: 13.6 G/DL (ref 11.7–15.7)
IMM GRANULOCYTES # BLD: 0 10E3/UL
IMM GRANULOCYTES NFR BLD: 0 %
LDLC SERPL CALC-MCNC: 108 MG/DL
LYMPHOCYTES # BLD AUTO: 1.3 10E3/UL (ref 0.8–5.3)
LYMPHOCYTES NFR BLD AUTO: 28 %
MCH RBC QN AUTO: 30.6 PG (ref 26.5–33)
MCHC RBC AUTO-ENTMCNC: 33.7 G/DL (ref 31.5–36.5)
MCV RBC AUTO: 91 FL (ref 78–100)
MONOCYTES # BLD AUTO: 0.6 10E3/UL (ref 0–1.3)
MONOCYTES NFR BLD AUTO: 12 %
NEUTROPHILS # BLD AUTO: 2.7 10E3/UL (ref 1.6–8.3)
NEUTROPHILS NFR BLD AUTO: 57 %
NONHDLC SERPL-MCNC: 144 MG/DL
NRBC # BLD AUTO: 0 10E3/UL
NRBC BLD AUTO-RTO: 0 /100
PLATELET # BLD AUTO: 264 10E3/UL (ref 150–450)
POTASSIUM SERPL-SCNC: 4.5 MMOL/L (ref 3.4–5.3)
PROT SERPL-MCNC: 7.9 G/DL (ref 6.4–8.3)
RBC # BLD AUTO: 4.45 10E6/UL (ref 3.8–5.2)
SODIUM SERPL-SCNC: 141 MMOL/L (ref 135–145)
TRIGL SERPL-MCNC: 180 MG/DL
WBC # BLD AUTO: 4.6 10E3/UL (ref 4–11)

## 2024-10-23 PROCEDURE — 36415 COLL VENOUS BLD VENIPUNCTURE: CPT | Performed by: PATHOLOGY

## 2024-10-23 PROCEDURE — 80053 COMPREHEN METABOLIC PANEL: CPT | Performed by: PATHOLOGY

## 2024-10-23 PROCEDURE — 85025 COMPLETE CBC W/AUTO DIFF WBC: CPT | Performed by: PATHOLOGY

## 2024-10-23 PROCEDURE — 80061 LIPID PANEL: CPT | Performed by: PATHOLOGY

## 2024-10-25 ENCOUNTER — OFFICE VISIT (OUTPATIENT)
Dept: OPHTHALMOLOGY | Facility: CLINIC | Age: 74
End: 2024-10-25
Payer: COMMERCIAL

## 2024-10-25 DIAGNOSIS — Z79.899 HIGH RISK MEDICATION USE: Primary | ICD-10-CM

## 2024-10-25 DIAGNOSIS — H26.493 BILATERAL POSTERIOR CAPSULAR OPACIFICATION: ICD-10-CM

## 2024-10-25 DIAGNOSIS — H52.223 REGULAR ASTIGMATISM OF BOTH EYES: ICD-10-CM

## 2024-10-25 DIAGNOSIS — Z96.1 PSEUDOPHAKIA OF BOTH EYES: ICD-10-CM

## 2024-10-25 DIAGNOSIS — H52.4 PRESBYOPIA: ICD-10-CM

## 2024-10-25 PROCEDURE — 92004 COMPRE OPH EXAM NEW PT 1/>: CPT

## 2024-10-25 PROCEDURE — G0463 HOSPITAL OUTPT CLINIC VISIT: HCPCS

## 2024-10-25 RX ORDER — CYCLOBENZAPRINE HCL 5 MG
TABLET ORAL
COMMUNITY
Start: 2024-10-23

## 2024-10-25 ASSESSMENT — CONF VISUAL FIELD
OD_NORMAL: 1
OS_SUPERIOR_TEMPORAL_RESTRICTION: 0
OD_SUPERIOR_TEMPORAL_RESTRICTION: 0
OS_SUPERIOR_NASAL_RESTRICTION: 0
OD_INFERIOR_NASAL_RESTRICTION: 0
OS_NORMAL: 1
OD_SUPERIOR_NASAL_RESTRICTION: 0
OD_INFERIOR_TEMPORAL_RESTRICTION: 0
OS_INFERIOR_TEMPORAL_RESTRICTION: 0
OS_INFERIOR_NASAL_RESTRICTION: 0
METHOD: COUNTING FINGERS

## 2024-10-25 ASSESSMENT — SLIT LAMP EXAM - LIDS
COMMENTS: NORMAL
COMMENTS: NORMAL

## 2024-10-25 ASSESSMENT — EXTERNAL EXAM - LEFT EYE: OS_EXAM: NORMAL

## 2024-10-25 ASSESSMENT — VISUAL ACUITY
OS_SC: 20/20
OD_SC: 20/20
OD_SC+: -3
OS_SC+: -1
METHOD: SNELLEN - LINEAR

## 2024-10-25 ASSESSMENT — TONOMETRY
OS_IOP_MMHG: 12
OD_IOP_MMHG: 11
IOP_METHOD: TONOPEN

## 2024-10-25 ASSESSMENT — CUP TO DISC RATIO
OS_RATIO: 0.2
OD_RATIO: 0.2

## 2024-10-25 ASSESSMENT — EXTERNAL EXAM - RIGHT EYE: OD_EXAM: NORMAL

## 2024-10-25 NOTE — NURSING NOTE
Chief Complaints and History of Present Illnesses   Patient presents with    COMPREHENSIVE EYE EXAM     Pt on EDDA tx.     Chief Complaint(s) and History of Present Illness(es)       COMPREHENSIVE EYE EXAM              Associated symptoms: dryness (OS), fatigue and floaters (OS).  Negative for headache    Treatments tried: artificial tears    Comments: Pt on EDDA tx.              Comments    She had cataract surgery last year w/ Dr Adamson in [place]. She notes blurry vision in her left eye. Pt notes gooey eyes but denies discharge. It feels like something is in the nasal corner of her, like a lash turning in. And she sometimes has a sensation of swollen eyes. She notes when she's tired her eyes droop down and she can see her lashes. This happens towards the end of the evening.     She does not check her BS. She takes Refresh PRN.     Lab Results       Component                Value               Date                       A1C                      6.4                 07/27/2024                 A1C                      6.3                 03/18/2024                 A1C                      6.5                 09/12/2023                 A1C                      6.6                 02/13/2023                 A1C                      6.1                 08/18/2022

## 2024-10-25 NOTE — PROGRESS NOTES
History  HPI       COMPREHENSIVE EYE EXAM    Associated symptoms include dryness (OS), fatigue and floaters (OS).  Negative for headache.  Treatments tried include artificial tears. Additional comments: Pt on EDDA ahn.             Comments    She had cataract surgery last year w/ Dr Adamson in [place]. She notes blurry vision in her left eye. Pt notes gooey eyes but denies discharge. It feels like something is in the nasal corner of her, like a lash turning in. And she sometimes has a sensation of swollen eyes. She notes when she's tired her eyes droop down and she can see her lashes. This happens towards the end of the evening.     She does not check her BS. She takes Refresh PRN.     Lab Results       Component                Value               Date                       A1C                      6.4                 07/27/2024                 A1C                      6.3                 03/18/2024                 A1C                      6.5                 09/12/2023                 A1C                      6.6                 02/13/2023                 A1C                      6.1                 08/18/2022                                      Last edited by Maryuri Chow on 10/25/2024 12:22 PM.          Assessment/Plan  (Z79.899) High risk medication use  (primary encounter diagnosis)  Plan:   -Patient is using ethambutol  -Requires visual acuity/color discrimination exam every month and fundoscopic exam every 3 months while on treatment.  -DFE WNL each eye  -See Plans Below.     (H26.493) Bilateral posterior capsular opacification  Plan:   -VA 20/20 each eye  -No visual complaints at this time.   -Discussed consider YAG each eye if struggling with vision.  -Monitor.    (Z96.1) Pseudophakia of both eyes  Plan:   -Good Post-operative Appearance  -CE 2023 w/ Dr Adamson in [place]   -Monitor.    (H52.223) Regular astigmatism of both eyes, (H52.4) Presbyopia  Plan:   -Happy with OTC Cheaters  -Monitor.    Return to  clinic in 3 months for VA, IOP, Color Vision, Dilate    Complete documentation of historical and exam elements from today's encounter can be found in the full encounter summary report (not reduplicated in this progress note). I personally obtained the chief complaint(s) and history of present illness. I confirmed and edited as necessary the review of systems, past medical/surgical history, family history, social history, and examination findings as document by others; and I examined the patient myself. I personally reviewed the relevant tests, images, and reports as documented above. I formulated and edited as necessary the assessment and plan and discussed the findings and management plan with the patient and family.    Mulugeta Srinivasan, OD

## 2024-10-25 NOTE — PATIENT INSTRUCTIONS
The affects of the dilating drops last for 4- 6 hours.  You will be more sensitive to light and vision will be blurry up close.  Do not drive if you do not feel comfortable.  Mydriatic sunglasses were given if needed.

## 2024-10-28 ENCOUNTER — ALLIED HEALTH/NURSE VISIT (OUTPATIENT)
Dept: INTERNAL MEDICINE | Facility: CLINIC | Age: 74
End: 2024-10-28
Payer: COMMERCIAL

## 2024-10-28 DIAGNOSIS — M81.0 OSTEOPOROSIS, UNSPECIFIED OSTEOPOROSIS TYPE, UNSPECIFIED PATHOLOGICAL FRACTURE PRESENCE: Primary | ICD-10-CM

## 2024-10-28 PROCEDURE — 96372 THER/PROPH/DIAG INJ SC/IM: CPT | Performed by: INTERNAL MEDICINE

## 2024-10-28 PROCEDURE — 99207 PR NO CHARGE NURSE ONLY: CPT

## 2024-10-28 NOTE — PROGRESS NOTES
Left message for patient that order is in place for her to have a mammogram  And also advised her to schedule her annual appointment with Dr. Nicholson.  Per Dr. Nicholson verbal orders   Meme Butts received the Evenity injection today in clinic at the request of Dr. Miranda. The injection was given under the supervision of Dr. Gaming if assistance was needed. The patient does not report a history of adverse reactions associated with medication/injection administration. The injection sites were cleaned with alcohol prep wipes. The injections were given without incident--see MAR list for administration details. The patient was advised to remain in fourth floor lobby of the [facility] for fifteen minutes after the injection in case of an adverse reaction.     Evenity injections are administered every 30 days. The patient's next anticipated Evenity injection is 2024. The patient was provided an injection schedule and was instructed to call the Primary Care Scheduling Number to schedule their next Evenity injection.     Clinic Administered Medication Documentation      Injectable Medication Documentation    Is there an active order (written within the past 365 days, with administrations remaining, not ) in the chart? Yes.     Patient was given  Evenity . Prior to medication administration, verified patient's identity using patient s name and date of birth. Please see MAR and medication order for additional information. Patient instructed to remain in clinic for 15 minutes and report any adverse reaction to staff immediately.    Vial/Syringe: Syringe  Was this medication supplied by the patient? No  Is this a medication the patient will need to receive again? No - not necessary to check for refills remaining.

## 2024-10-28 NOTE — PATIENT INSTRUCTIONS
Evenity     It was a pleasure seeing you in the clinic today. You received an injection of Evenity on 10/28/2024. Evenity injections are administered every 30 days.     Your last Evenity lab work was completed on 10/23/2024. Evenity lab work must be completed every 3 months.     Your last Provider visit was on 8/28/2024.  Patients need to follow up with their ordering provider every 6 months into therapy.     Next Steps:     Your next Evenity injection is due for 11/28/2024.     Your next Evenity lab work must be completed by 01/23/2025. Please call [phone], to schedule a lab appointment.    - Lab work needed: Calcium and Creatinine     Your next provider visit must be completed by 02/28/2024.

## 2024-10-30 ENCOUNTER — VIRTUAL VISIT (OUTPATIENT)
Dept: PHARMACY | Facility: CLINIC | Age: 74
End: 2024-10-30
Payer: COMMERCIAL

## 2024-10-30 DIAGNOSIS — A31.0 MYCOBACTERIUM AVIUM INFECTION (H): Primary | ICD-10-CM

## 2024-10-30 DIAGNOSIS — E78.5 HYPERLIPIDEMIA LDL GOAL <100: ICD-10-CM

## 2024-10-30 PROCEDURE — 99606 MTMS BY PHARM EST 15 MIN: CPT | Mod: 93 | Performed by: PHARMACIST

## 2024-10-30 PROCEDURE — 99607 MTMS BY PHARM ADDL 15 MIN: CPT | Mod: 93 | Performed by: PHARMACIST

## 2024-10-30 NOTE — PROGRESS NOTES
Medication Therapy Management (MTM) Encounter    ASSESSMENT:                            Medication Adherence/Access: No issues identified.    Mycobacterium avium intracellulare complex infection:  Tolerating antibiotics well. Labs stable. CT chest scheduled end of November. Audiology/ophtho follow-ups are scheduled. Has been on triple antibiotic treatment for almost 3 months. Would like to see if she can get next induced sputum end of November as well.     Hyperlipidemia   Statin recommended due to ASCVD risk >7.5%. discussed options of trying rosuvastatin or ezetimibe next. Rifampin induces rosuvastatin and pravastatin but not ezetimibe. She'd like to try low dose rosuvastatin to see how she tolerates it. Discussed this may not be very effective since rifampin induces it but reasonable to have a cautious approach due to previous side effects. Will discuss with primary care provider.     PLAN:                            Continue antibiotics   Labs and CT chest in 1 month   Check with Dr. Robison on induced sputum in 1 month   Check with primary care provider on starting rosuvastatin 5 mg once daily and rechecking lipid panel in 3 months     Follow-up: 1 month with infectious disease, 2 months medication therapy management     SUBJECTIVE/OBJECTIVE:                          Meme Butts is a 74 year old female seen for a follow-up visit.       Reason for visit: EDDA follow-up.    Allergies/ADRs: Reviewed in chart  Past Medical History: Reviewed in chart  Tobacco: She reports that she quit smoking about 15 years ago. Her smoking use included cigarettes. She started smoking about 30 years ago. She has a 15 pack-year smoking history. She has never used smokeless tobacco.  Alcohol: glass of wine once in awhile   Medication Adherence/Access:   No issues    Mycobacterium avium intracellulare complex infection:  Rifampin 600 mg once daily - started 7/27  Azithromycin 250 mg once daily - started 8/3  Ethambutol 1,200 mg (15.6  "mg/kg) once daily - started 8/10     She's feeling well and no side effects. She's not coughing up any sputum. Last AFB culture had to be induced. Occasional \"whooshing\" sound in ears but she also had this at baseline.      AFB sputum: positive in September  Eye exam: hx cataract surgery fall 2023. Last appointment 10/25 and no s/sx ethambutol toxicity. Follow-up scheduled 1/30.   Audiology:last 10/1- on change in baseline hearing loss. Due again in January.   EKG: Qtc 431 on 9/18/24  CT chest: some scattered cavitary nodules on 8/28/24 5/21/24 AFB culture:              Mycobacterium avium intracellulare complex           AFB VESNA ([facility])       Amikacin 8 Susceptible       Clarithromycin 1 Susceptible       Comment:   See below 1       Linezolid 16 Intermediate       Moxifloxacin 1 Susceptible          Hyperlipidemia   no current medications    Previous trial: atorvastatin 10 mg/day (muscle weakness), diet changes  The 10-year ASCVD risk score (Pepe DK, et al., 2019) is: 15.3%    Values used to calculate the score:      Age: 74 years      Sex: Female      Is Non- : No      Diabetic: Yes      Tobacco smoker: No      Systolic Blood Pressure: 100 mmHg      Is BP treated: No      HDL Cholesterol: 69 mg/dL      Total Cholesterol: 213 mg/dL    Today's Vitals: There were no vitals taken for this visit.  ----------------      I spent 12 minutes with this patient today. All changes were made via collaborative practice agreement with Dr. Robison. A copy of the visit note was provided to the patient's provider(s).    A summary of these recommendations was sent via [employer].      Telemedicine Visit Details  The patient's medications can be safely assessed via a telemedicine encounter.  Type of service:  Telephone visit  Originating Location (pt. Location): Home    Distant Location (provider location):  On-site  Start Time:  9:05 am  End Time: 9:17 AM     Medication Therapy " Recommendations  Hyperlipidemia LDL goal <100   1 Rationale: Untreated condition - Needs additional medication therapy - Indication   Recommendation: Start Medication - Rosuvastatin Calcium 5 MG Cpsp   Status: Contact Provider - Awaiting Response   Identified Date: 10/30/2024

## 2024-10-30 NOTE — PATIENT INSTRUCTIONS
"Recommendations from today's MTM visit:                                                      Continue antibiotics   Labs and CT chest in 1 month   Check with Dr. Robison on induced sputum in 1 month   Check with primary care provider on starting rosuvastatin 5 mg once daily and rechecking lipid panel in 3 months     Follow-up: 1 month with infectious disease, 2 months medication therapy management     It was great speaking with you today.  I value your experience and would be very thankful for your time in providing feedback in our clinic survey. In the next few days, you may receive an email or text message from [employer] with a link to a survey related to your  clinical pharmacist.\"     To schedule another MTM appointment, please call the clinic directly or you may call the MTM scheduling line at [phone] or toll-free at [phone].     My Clinical Pharmacist's contact information:                                                      Please feel free to contact me with any questions or concerns you have.      Annamaria Whitley, PharmD, AAHIVP  Medication Therapy Management Pharmacist      "

## 2024-11-01 DIAGNOSIS — E78.5 HYPERLIPIDEMIA LDL GOAL <100: Primary | ICD-10-CM

## 2024-11-01 RX ORDER — ROSUVASTATIN CALCIUM 5 MG/1
5 TABLET, COATED ORAL DAILY
Qty: 30 TABLET | Refills: 3 | Status: SHIPPED | OUTPATIENT
Start: 2024-11-01

## 2024-11-05 LAB
ACID FAST STAIN (ARUP): ABNORMAL
ORGANISM (ARUP): ABNORMAL

## 2024-11-07 ENCOUNTER — DOCUMENTATION ONLY (OUTPATIENT)
Dept: MULTI SPECIALTY CLINIC | Facility: CLINIC | Age: 74
End: 2024-11-07
Payer: COMMERCIAL

## 2024-11-07 DIAGNOSIS — Z22.39 MYCOBACTERIUM AVIUM INTRACELLULARE COLONIZATION: Primary | ICD-10-CM

## 2024-11-07 DIAGNOSIS — A31.0 MYCOBACTERIUM AVIUM COMPLEX (H): ICD-10-CM

## 2024-11-07 NOTE — PROGRESS NOTES
Labs 10/23/24:      - WBC: 4.6, Hb: 13.6, Plat: 264    - Na: 141, K: 4.5    - Cr: 0.73, BUN: 13.1    - LFTs normal    Lipid panel ordered  and managed by PCP    - AFB sputum:           Stain: negative          Culture: positive for EDDA sensitive to amikacin, clarithromycin, linezolid and moxifloxacin      New AFB sputum will be needed. I will order and communicate with the patient. Patient will also need new labs.

## 2024-11-08 ENCOUNTER — TELEPHONE (OUTPATIENT)
Dept: INFECTIOUS DISEASES | Facility: CLINIC | Age: 74
End: 2024-11-08
Payer: COMMERCIAL

## 2024-11-08 NOTE — TELEPHONE ENCOUNTER
EP called 11/8 to let pt know that she's sched for a lab on 11/25 after her CT (Dr. Robison). She was okay with it.

## 2024-11-25 ENCOUNTER — ANCILLARY PROCEDURE (OUTPATIENT)
Dept: CT IMAGING | Facility: CLINIC | Age: 74
End: 2024-11-25
Attending: INTERNAL MEDICINE
Payer: COMMERCIAL

## 2024-11-25 ENCOUNTER — LAB (OUTPATIENT)
Dept: LAB | Facility: CLINIC | Age: 74
End: 2024-11-25
Payer: COMMERCIAL

## 2024-11-25 ENCOUNTER — DOCUMENTATION ONLY (OUTPATIENT)
Dept: MULTI SPECIALTY CLINIC | Facility: CLINIC | Age: 74
End: 2024-11-25

## 2024-11-25 DIAGNOSIS — A31.0 MAI (MYCOBACTERIUM AVIUM-INTRACELLULARE) (H): ICD-10-CM

## 2024-11-25 DIAGNOSIS — A31.0 MYCOBACTERIUM AVIUM COMPLEX (H): ICD-10-CM

## 2024-11-25 LAB
ALBUMIN SERPL BCG-MCNC: 4.2 G/DL (ref 3.5–5.2)
ALP SERPL-CCNC: 120 U/L (ref 40–150)
ALT SERPL W P-5'-P-CCNC: 36 U/L (ref 0–50)
ANION GAP SERPL CALCULATED.3IONS-SCNC: 12 MMOL/L (ref 7–15)
AST SERPL W P-5'-P-CCNC: 33 U/L (ref 0–45)
BASOPHILS # BLD AUTO: 0.1 10E3/UL (ref 0–0.2)
BASOPHILS NFR BLD AUTO: 1 %
BILIRUB DIRECT SERPL-MCNC: <0.2 MG/DL (ref 0–0.3)
BILIRUB SERPL-MCNC: 0.5 MG/DL
BUN SERPL-MCNC: 11.9 MG/DL (ref 8–23)
CALCIUM SERPL-MCNC: 9.6 MG/DL (ref 8.8–10.4)
CHLORIDE SERPL-SCNC: 103 MMOL/L (ref 98–107)
CREAT SERPL-MCNC: 0.53 MG/DL (ref 0.51–0.95)
EGFRCR SERPLBLD CKD-EPI 2021: >90 ML/MIN/1.73M2
EOSINOPHIL # BLD AUTO: 0.1 10E3/UL (ref 0–0.7)
EOSINOPHIL NFR BLD AUTO: 1 %
ERYTHROCYTE [DISTWIDTH] IN BLOOD BY AUTOMATED COUNT: 12.2 % (ref 10–15)
GLUCOSE SERPL-MCNC: 104 MG/DL (ref 70–99)
HCO3 SERPL-SCNC: 24 MMOL/L (ref 22–29)
HCT VFR BLD AUTO: 38 % (ref 35–47)
HGB BLD-MCNC: 13 G/DL (ref 11.7–15.7)
IMM GRANULOCYTES # BLD: 0 10E3/UL
IMM GRANULOCYTES NFR BLD: 1 %
LYMPHOCYTES # BLD AUTO: 1.6 10E3/UL (ref 0.8–5.3)
LYMPHOCYTES NFR BLD AUTO: 28 %
MCH RBC QN AUTO: 31.1 PG (ref 26.5–33)
MCHC RBC AUTO-ENTMCNC: 34.2 G/DL (ref 31.5–36.5)
MCV RBC AUTO: 91 FL (ref 78–100)
MONOCYTES # BLD AUTO: 0.6 10E3/UL (ref 0–1.3)
MONOCYTES NFR BLD AUTO: 11 %
NEUTROPHILS # BLD AUTO: 3.3 10E3/UL (ref 1.6–8.3)
NEUTROPHILS NFR BLD AUTO: 59 %
NRBC # BLD AUTO: 0 10E3/UL
NRBC BLD AUTO-RTO: 0 /100
PLATELET # BLD AUTO: 272 10E3/UL (ref 150–450)
POTASSIUM SERPL-SCNC: 4.2 MMOL/L (ref 3.4–5.3)
PROT SERPL-MCNC: 7.6 G/DL (ref 6.4–8.3)
RBC # BLD AUTO: 4.18 10E6/UL (ref 3.8–5.2)
SODIUM SERPL-SCNC: 139 MMOL/L (ref 135–145)
WBC # BLD AUTO: 5.6 10E3/UL (ref 4–11)

## 2024-11-25 PROCEDURE — 99000 SPECIMEN HANDLING OFFICE-LAB: CPT | Performed by: PATHOLOGY

## 2024-11-25 PROCEDURE — 71250 CT THORAX DX C-: CPT | Performed by: RADIOLOGY

## 2024-11-25 PROCEDURE — 80053 COMPREHEN METABOLIC PANEL: CPT | Performed by: PATHOLOGY

## 2024-11-25 PROCEDURE — 85025 COMPLETE CBC W/AUTO DIFF WBC: CPT | Performed by: PATHOLOGY

## 2024-11-25 PROCEDURE — 87206 SMEAR FLUORESCENT/ACID STAI: CPT | Mod: 90 | Performed by: PATHOLOGY

## 2024-11-25 PROCEDURE — 82248 BILIRUBIN DIRECT: CPT | Performed by: PATHOLOGY

## 2024-11-25 PROCEDURE — 87116 MYCOBACTERIA CULTURE: CPT | Mod: 90 | Performed by: PATHOLOGY

## 2024-11-25 PROCEDURE — 36415 COLL VENOUS BLD VENIPUNCTURE: CPT | Performed by: PATHOLOGY

## 2024-11-25 NOTE — PROGRESS NOTES
"Labs from today:    - CBC normal  - Kidney and liver function normal  - AFB sputum in process    CT chest from today:  \"Oval-shaped nodule in the medial right apex slightly increased in short axis thickness an more uniformly dense measuring 11 x 4 mm. Other nodular densities in the right upper lobe appear similar as well as in the left upper lobe including a cavitary subpleural nodule. Right middle lobe focal bronchiectasis and consolidation is grossly unchanged. Other more inferior lingular bronchiectasis and atelectasis/consolidation however is increased. Lower lobes appear spared. Incidental unchanged 1-2 mm left lower lobe nodule (4/243). Major central airways are nicely patent\".    I will discuss her case with my colleagues to determine if additional of amikacin would be reasonable. Noted that she had baseline hearing impairment so perhaps inhaled amikacin would be preferential if needed.     I sent the patient a message explaining this      ADDENDUM (11/26/24):    After discussion with my colleagues we decided to continue the current regimen since we have three active drugs in the regimen and also given the fact that the patient's symptoms are improving.     Sputum still not clear per AFB from 9/25 (but this was just one month after initiation of treatment). New AFB sputum obtained on 11/25/24 and in process.   "

## 2024-11-26 LAB
ACID FAST STAIN (ARUP): NORMAL
ACID FAST STAIN (ARUP): NORMAL

## 2024-12-02 NOTE — PROGRESS NOTES
"   [facility]           Assessment and Recommendations:   Problem List:    # EDDA pulmonary infection     Discussion:     Meme Butts is a 74 year old female with PMHx of colon cancer (adenocarcinoma of hepatic flexure of colon and family history of BRCA1) diagnosed by screening colonoscopy on 1/10/2023 s/p robotic right hemicolectomy and appendectomy on 2/28/2023 who was referred to our clinic by Dr. Shelby Hernandez (pulmonology) because lung nodule(s) and BAL culture positive for EDDA. She also has PMHx of migraines, seizures, osteoporosis, obesity, prediabetes (Hb A1C 6.3% from 3/18/24), history of GI bleed. Patient states she has persistent cough started 2 years ago. She initially though it was due to sinus disease and post nasal drip. infection. She states that the cough would be persistent and would happen all day. It was mostly dry but she would have some greenish sputum at times. Cough was so often that it would keep her awake. She started claritin with some improvement. She also has SOB started around the same time of the cough. Activities that would lead to SOB included going up and down stairs. She still keeps herself active and cleans the house, gardens and does yard work. She denies weight loss.     Per my review, the nodules were first identified on CT chest from 12/29/22 which showed upper lobe micronodules and bronchiectasis (bilaterally). Follow up CT chest from 3/3/2023 showed same nodules in addition to at least one new 9 mm nodule in the left upper lobe. The most recent CT chest from 3/28/2024 showed: \"Centrilobular and tree-in-bud nodularity, slightly increased associated with bronchiectasis greatest in the right middle lobe, lingula and lower lobes; findings suggestive of atypical indolent infection such as nonclassic nontuberculous mycobacteria\". Patient underwent PFT study on 3/2024 and, per Dr. Hernandez's interpretation, it was normal. Bronchoscopy performed on 5/21/24. BAL studies " "showed: cell count 760 with 56# neutrophils, 12% lymphocytes and 32% macrophages/monocytes. 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  Fungal culture is negative to date. Aspergillus galactomannan negative, Nocardia PCR negative, Nocardia culture negative. 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 Cytology was negative for malignancy and negative for fungal organisms or pneumocystis on GMS stain. Furthermore, the viral cytopathic effect is absent.  Given presence of EDDA on BAL as well as clinical and radiologic criteria, we started EDDA treatment with Rifampin 600 mg once daily - started 7/27; Azithromycin 250 mg once daily - started 8/3 and Ethambutol 1,200 mg (15.6 mg/kg) once daily - started 8/10.     Today's visit: She is tolerating the medications well. CBC and CMP from 8/28, 9/16, 10/23 and 11/25 are normal. She tells me she that her cough resolved after initiation of EDDA treatment. AFB from 9/25 (less than two months of initiation of full treatment) was still positive. Repeat AFB sputum from 11/25 is so far negative. Baseline audiogram from 7/16 (before treatment was started) showed some abnormal hearing. She does not notice decreased hearing. Follow up audiogram after treatment was done on 10/1 and it is unchanged. She had ophthalmology exam outside clinic at baseline (before initiation of treatment) and it was normal. Ophthalmology visit after treatment done on 10/25 and without concerning findings. She is using flutter valve. CT chest from 11/25: \"Oval-shaped nodule in the medial right apex slightly increased in short axis thickness an more uniformly dense measuring 11 x 4 mm. Other nodular densities in the right upper lobe appear similar as well as in the left upper lobe including a cavitary subpleural nodule. Right middle lobe focal bronchiectasis and consolidation is grossly unchanged. Other more inferior lingular " "bronchiectasis and atelectasis/consolidation however is increased. Lower lobes appear spared. Incidental unchanged 1-2 mm left lower lobe nodule (4/243). Major central airways are nicely patent\". Patient states that she had a cold at the time she had the CT and she was having productive cough. Now the respiratory symptoms are resolved. She continues to improve clinically.            Recommendations:     Continue azithromycin + ethambutol +  rifampin for EDDA treatment (started on 7/27). Duration will be 1 year from first negative AFB sputum after treatment.          - Patient is most like not a good candidate for aminoglycoside given her baseline hearing impairment.       Patient will be due for next  audiology exam in January and I will place a referral (needs to be placed every time). She will need one every 3 months.     Patient is followed by ophthalmology (Dr. Srinivasan). She will need continued ophthalmology visits for visual acuity and color discrimination exam every month and fundoscopic exam every 3 months     She will need a CT chest 3 months from initiation of EDDA treatment (end of Feb 2025). Already scheduled for Feb 26     Our clinic pharmacist will continue to co-manage with me     Continued follow up with pulmonology     She will need monthly labs (CBC and CMP). Placed standing orders. She already scheduled the December, Jan and Feb labs     Patient had an EKG on 9/18 and it showed a normal QTc (431)     AFB sputum stain from 11/25 is negative. Culture is negative to date    Next follow up with me in March          Recommendations discussed with the patient.      Celia Robison MD  Date of Service:12/04/24    On 12/04/24, I spent 50 min with chart review, patient visit, documentation and coordination of care.         History of Present Illness:   Meme Butts is a 74 year old female with PMHx of colon cancer (adenocarcinoma of hepatic flexure of colon and family history of BRCA1) diagnosed by " "screening colonoscopy on 1/10/2023 s/p robotic right hemicolectomy and appendectomy on 2/28/2023 who was referred to our clinic by Dr. Shelby Hernandez (pulmonology) because lung nodule(s) and BAL culture positive for EDDA. She also has PMHx of migraines, seizures, osteoporosis, obesity, prediabetes (Hb A1C 6.3% from 3/18/24), history of GI bleed.     Patient states she has persistent cough started 2 years ago. She initially though it was due to sinus disease and post nasal drip. infection. She states that the cough would be persistent and would happen all day. It was mostly dry but she would have some greenish sputum at times. Cough was so often that it would keep her awake. She started claritin with some improvement. She also has SOB started around the same time of the cough. Activities that would lead to SOB included going up and down stairs. She still keeps herself active and cleans the house, gardens and does yard work. She denies weight loss.     Per my review, the nodules were first identified on CT chest from 12/29/22 which showed upper lobe micronodules and bronchiectasis (bilaterally). Follow up CT chest from 3/3/2023 showed same nodules in addition to at least one new 9 mm nodule in the left upper lobe. The most recent CT chest from 3/28/2024 showed: \"Centrilobular and tree-in-bud nodularity, slightly increased associated with bronchiectasis greatest in the right middle lobe, lingula and lower lobes; findings suggestive of atypical indolent infection such as nonclassic nontuberculous mycobacteria\". Patient underwent PFT study on 3/2024 and, per Dr. Hernandez's interpretation, it was normal. Bronchoscopy performed on 5/21/24. BAL studies showed: cell count 760 with 56# neutrophils, 12% lymphocytes and 32% macrophages/monocytes. 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  Fungal culture is negative to date. Aspergillus galactomannan negative, Nocardia PCR negative, Nocardia culture " "negative. AFB culture positive for Mycobacterium avium intracellulare pan-susceptible except for being intermediate to linezolid. Cytology was negative for malignancy and negative for fungal organisms or pneumocystis on GMS stain. Furthermore, the viral cytopathic effect is absent. Patient did not have fever or chills but she has persistent cough with intermittent greenish sputum production. She was prescribed 10 days of levofloxacin by her pulmonologist.     Labs from 6/21/24: CBC normal, Kidney and liver function normal, HIV negative, Hep B surface antigen: negative, Hepatitis C serology: negative Quantiferon negative, EKG from 6/18 showed normal QTc (408). Given presence of EDDA on BAL as well as clinical and radiologic criteria, we started EDDA treatment with Rifampin 600 mg once daily - started 7/27; Azithromycin 250 mg once daily - started 8/3 and Ethambutol 1,200 mg (15.6 mg/kg) once daily - started 8/10.     Today's visit:     She is tolerating the medications well. CBC and CMP from 8/28, 9/16, 10/23 and 11/25 are normal. She tells me she that her cough resolved after initiation of EDDA treatment. AFB from 9/25 (less than two months of initiation of full treatment) was still positive. Repeat AFB sputum from 11/25 is so far negative. Baseline audiogram from 7/16 (before treatment was started) showed some abnormal hearing. She does not report decreased hearing. Follow up audiogram after treatment was done on 10/1 and it is unchanged. She had ophthalmology exam outside clinic at baseline (before initiation of treatment) and it was normal. Ophthalmology visit after treatment done on 10/25 and without concerning findings. She is using flutter valve. CT chest from 11/25: \"Oval-shaped nodule in the medial right apex slightly increased in short axis thickness an more uniformly dense measuring 11 x 4 mm. Other nodular densities in the right upper lobe appear similar as well as in the left upper lobe including a cavitary " "subpleural nodule. Right middle lobe focal bronchiectasis and consolidation is grossly unchanged. Other more inferior lingular bronchiectasis and atelectasis/consolidation however is increased. Lower lobes appear spared. Incidental unchanged 1-2 mm left lower lobe nodule (4/243). Major central airways are nicely patent\". Patient states that she had a cold at the time she had the CT and she was having productive cough. Now the respiratory symptoms are resolved. She continues to improve clinically.             Review of Systems:     CONSTITUTIONAL:  No fevers or chills  INTEGUMENTARY/SKIN: NEGATIVE for worrisome rashes, moles or lesions  EYES: Negative for icterus, vision changes or irritation  ENT/MOUTH:  Cough improved  CARDIOVASCULAR:  Negative for chest pain, palpitations and  shortness of breath  GASTROINTESTINAL:  Negative for abdominal pain, nausea, vomiting, diarrhea and constipation  GENITOURINARY:  Negative for dysuria, hematuria, frequency and urgency  MUSCULOSKELETAL: Negative for joint pain, swelling, motion restriction, negative for musculoskeletal pain  NEURO:  Negative for headache, altered mental status, numbness or weakness  PSYCHIATRIC: Negative for changes in mood or affect  HEMATOLOGIC/LYMPHATIC: negative for lymphadenopathy or bleeding  ALLERGIC/IMMUNOLOGIC: Negative for allergic reaction   ENDOCRINE: Negative for temperature intolerance, skin/hair changes         Past Medical History:     Past Medical History:   Diagnosis Date    Adenomatous colon polyp 2011    tubular adenoma    At high risk for breast cancer 02/16/2017    35.4% lifetime risk by Philip model    Epilepsy (H)     Fracture of metatarsal bone of left foot 07/2014    Fracture, radius 1990    and ulnar styloid fracture    GIB (gastrointestinal bleeding) 04/01/2011    Intractable chronic migraine without aura     Kidney stones 1970, 1975    during pregnancy    Malignant neoplasm of hepatic flexure (H)     Migraine     Osteoporosis 2016 " "   T score -4.1    Pneumonia     Prediabetes           Surgery R writ after a fracture post fall - has hardware ()  Had cataract surgery in 2023         Allergies:       No Known Allergies          Family History:     Family History   Problem Relation Age of Onset    Breast Cancer Mother 30        lumpectomy and chemo; also \"mass in ovary\"    Colon Polyps Father     Coronary Artery Disease Brother     Colon Polyps Brother     Coronary Artery Disease Early Onset Brother 50    Prostate Cancer Brother     Osteoporosis Sister     Breast Cancer Sister 55    Leukemia Sister 52    Gastrointestinal Disease Son         intestinal transplant    Diabetes Maternal Aunt         multiple mat aunts    Ovarian Cancer Maternal Aunt 50         of ovarian cancer in 50s or 60s, unknown    Breast Cancer Paternal Aunt 36         of breast cancer recurrence in 60s    Bone Cancer Niece 19    Prostate Cancer Cousin 65    Breast Cancer Cousin 30        paternal cousin, BRCA1+ by report    Breast Cancer Cousin 40        paternal cousin, BRCA1+ by report    Breast Cancer Cousin 35        paternal cousin, BRCA1+ by report    Genetic Disorder Cousin         paternal male cousin, BRCA1+ by report    Ovarian Cancer Cousin         paternal cousin, BRCA1+ by report    Anesthesia Reaction No family hx of     Venous thrombosis No family hx of     Glaucoma No family hx of     Macular Degeneration No family hx of     Eye Surgery No family hx of     Retinal detachment No family hx of              Social History:     Social History     Socioeconomic History    Marital status:      Spouse name: Quentin    Number of children: 3    Years of education: Not on file    Highest education level: Not on file   Occupational History    Occupation:      Employer: RETIRED   Tobacco Use    Smoking status: Former     Current packs/day: 0.00     Average packs/day: 1 pack/day for 15.0 years (15.0 ttl pk-yrs)     Types: " Cigarettes     Start date: 8/17/1994     Quit date: 8/17/2009     Years since quitting: 15.3    Smokeless tobacco: Never    Tobacco comments:     On and off for a total of 10 years of smoking    Vaping Use    Vaping status: Never Used   Substance and Sexual Activity    Alcohol use: Yes     Comment: 1x/month    Drug use: No    Sexual activity: Not Currently     Partners: Male     Birth control/protection: Post-menopausal   Other Topics Concern    Parent/sibling w/ CABG, MI or angioplasty before 65F 55M? Not Asked   Social History Narrative    Lives in a house, has  3 children,       lost 1 child, retired      Social Drivers of Health     Financial Resource Strain: Low Risk  (7/25/2024)    Financial Resource Strain     Within the past 12 months, have you or your family members you live with been unable to get utilities (heat, electricity) when it was really needed?: No   Food Insecurity: Low Risk  (7/25/2024)    Food Insecurity     Within the past 12 months, did you worry that your food would run out before you got money to buy more?: No     Within the past 12 months, did the food you bought just not last and you didn t have money to get more?: No   Transportation Needs: Low Risk  (7/25/2024)    Transportation Needs     Within the past 12 months, has lack of transportation kept you from medical appointments, getting your medicines, non-medical meetings or appointments, work, or from getting things that you need?: No   Physical Activity: Unknown (7/25/2024)    Exercise Vital Sign     Days of Exercise per Week: Patient declined     Minutes of Exercise per Session: 20 min   Stress: No Stress Concern Present (7/25/2024)    [employer] - Occupational Stress Questionnaire     Feeling of Stress : Not at all   Social Connections: Unknown (7/25/2024)    Social Connection and Isolation Panel [NHANES]     Frequency of Communication with Friends and Family: Not on file     Frequency of Social  Gatherings with Friends and Family: Three times a week     Attends [employer] Services: Not on file     Active Member of Clubs or Organizations: Not on file     Attends Club or Organization Meetings: Not on file     Marital Status: Not on file   Interpersonal Safety: Low Risk  (8/28/2024)    Interpersonal Safety     Do you feel physically and emotionally safe where you currently live?: Yes     Within the past 12 months, have you been hit, slapped, kicked or otherwise physically hurt by someone?: No     Within the past 12 months, have you been humiliated or emotionally abused in other ways by your partner or ex-partner?: No   Housing Stability: Low Risk  (7/25/2024)    Housing Stability     Do you have housing? : Yes     Are you worried about losing your housing?: No      HOME/ENVIRONMENT:   Liver with her daughter (48 years old)  Has another daughter 51 years old  Son passed - diverticulitis -> had surgery and went back with removal of small intestine - underwent small bowel transplant - 1 year after transplant he passed  Patient is      OCCUPATION:   Administration - retired     TRAVEL:   Travel across [place] 15 years ago  Originally from [place]     ANIMALS:   One Dog and one cat     TUBERCULOSIS:   Father and and brother were treated for TB 15 years ago. She states she had a quantiferon performed and it was negative.      TOBACCO/ALCOHOL/RECREATIONAL DRUGS:   Smoked 10 years (1 pack a day) - quit 15 years ago  Glass of wine once a week  No drugs         Physical Exam:   There were no vitals taken for this visit.     Exam:  GENERAL:  Well-developed, well-nourished, not in acute distress.   HEAD: Normocephalic and atraumatic  ENT:  No hearing impairment, oropharynx clear, oral mucous membranes moist  EYES:  Eyes grossly normal to inspection  LUNGS:  Clear to auscultation - no rales, rhonchi or wheezes  CARDIOVASCULAR:  Regular rate and rhythm, normal S1 S2, no murmur  ABDOMEN:  Soft, non-tender  EXT:  Extremities warm and without edema.  MS: No gross musculoskeletal defects noted, no edema  SKIN:  No acute rashes or suspicious lesions  NEUROLOGIC:  Grossly nonfocal. Normal strength and tone, mentation intact and speech normal  PSYCHIATRIC: Mood stable, mentation appears normal, affect normal

## 2024-12-04 ENCOUNTER — OFFICE VISIT (OUTPATIENT)
Dept: INFECTIOUS DISEASES | Facility: CLINIC | Age: 74
End: 2024-12-04
Attending: INTERNAL MEDICINE
Payer: COMMERCIAL

## 2024-12-04 VITALS
SYSTOLIC BLOOD PRESSURE: 124 MMHG | BODY MASS INDEX: 31.1 KG/M2 | HEIGHT: 62 IN | WEIGHT: 169 LBS | HEART RATE: 98 BPM | TEMPERATURE: 97.9 F | DIASTOLIC BLOOD PRESSURE: 79 MMHG

## 2024-12-04 DIAGNOSIS — A31.0 MAI (MYCOBACTERIUM AVIUM-INTRACELLULARE) (H): Primary | ICD-10-CM

## 2024-12-04 PROCEDURE — G0463 HOSPITAL OUTPT CLINIC VISIT: HCPCS | Performed by: INTERNAL MEDICINE

## 2024-12-04 ASSESSMENT — PAIN SCALES - GENERAL: PAINLEVEL_OUTOF10: NO PAIN (0)

## 2024-12-04 NOTE — NURSING NOTE
"Chief Complaint   Patient presents with    Follow Up     /79   Pulse 98   Temp 97.9  F (36.6  C) (Oral)   Ht 1.575 m (5' 2\")   Wt 76.7 kg (169 lb)   BMI 30.91 kg/m    Isatu Conde MA on 12/4/2024 at 11:54 AM    "

## 2024-12-04 NOTE — LETTER
"12/4/2024       RE: Meme Butts  [address]  [facility] [id number]     Dear Colleague,    Thank you for referring your patient, Meme Butts, to the [facility] [place] at [facility]. Please see a copy of my visit note below.       GENERAL ID CLINIC           Assessment and Recommendations:   Problem List:    # EDDA pulmonary infection     Discussion:     Meme Butts is a 74 year old female with PMHx of colon cancer (adenocarcinoma of hepatic flexure of colon and family history of BRCA1) diagnosed by screening colonoscopy on 1/10/2023 s/p robotic right hemicolectomy and appendectomy on 2/28/2023 who was referred to our clinic by Dr. Shelby Hernandez (pulmonology) because lung nodule(s) and BAL culture positive for EDDA. She also has PMHx of migraines, seizures, osteoporosis, obesity, prediabetes (Hb A1C 6.3% from 3/18/24), history of GI bleed. Patient states she has persistent cough started 2 years ago. She initially though it was due to sinus disease and post nasal drip. infection. She states that the cough would be persistent and would happen all day. It was mostly dry but she would have some greenish sputum at times. Cough was so often that it would keep her awake. She started claritin with some improvement. She also has SOB started around the same time of the cough. Activities that would lead to SOB included going up and down stairs. She still keeps herself active and cleans the house, gardens and does yard work. She denies weight loss.     Per my review, the nodules were first identified on CT chest from 12/29/22 which showed upper lobe micronodules and bronchiectasis (bilaterally). Follow up CT chest from 3/3/2023 showed same nodules in addition to at least one new 9 mm nodule in the left upper lobe. The most recent CT chest from 3/28/2024 showed: \"Centrilobular and tree-in-bud nodularity, slightly increased " "associated with bronchiectasis greatest in the right middle lobe, lingula and lower lobes; findings suggestive of atypical indolent infection such as nonclassic nontuberculous mycobacteria\". Patient underwent PFT study on 3/2024 and, per Dr. Hernandez's interpretation, it was normal. Bronchoscopy performed on 5/21/24. BAL studies showed: cell count 760 with 56# neutrophils, 12% lymphocytes and 32% macrophages/monocytes. 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  Fungal culture is negative to date. Aspergillus galactomannan negative, Nocardia PCR negative, Nocardia culture negative. 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 Cytology was negative for malignancy and negative for fungal organisms or pneumocystis on GMS stain. Furthermore, the viral cytopathic effect is absent.  Given presence of EDDA on BAL as well as clinical and radiologic criteria, we started EDDA treatment with Rifampin 600 mg once daily - started 7/27; Azithromycin 250 mg once daily - started 8/3 and Ethambutol 1,200 mg (15.6 mg/kg) once daily - started 8/10.     Today's visit: She is tolerating the medications well. CBC and CMP from 8/28, 9/16, 10/23 and 11/25 are normal. She tells me she that her cough resolved after initiation of EDDA treatment. AFB from 9/25 (less than two months of initiation of full treatment) was still positive. Repeat AFB sputum from 11/25 is so far negative. Baseline audiogram from 7/16 (before treatment was started) showed some abnormal hearing. She does not notice decreased hearing. Follow up audiogram after treatment was done on 10/1 and it is unchanged. She had ophthalmology exam outside clinic at baseline (before initiation of treatment) and it was normal. Ophthalmology visit after treatment done on 10/25 and without concerning findings. She is using flutter valve. CT chest from 11/25: \"Oval-shaped nodule in the medial right apex slightly increased " "in short axis thickness an more uniformly dense measuring 11 x 4 mm. Other nodular densities in the right upper lobe appear similar as well as in the left upper lobe including a cavitary subpleural nodule. Right middle lobe focal bronchiectasis and consolidation is grossly unchanged. Other more inferior lingular bronchiectasis and atelectasis/consolidation however is increased. Lower lobes appear spared. Incidental unchanged 1-2 mm left lower lobe nodule (4/243). Major central airways are nicely patent\". Patient states that she had a cold at the time she had the CT and she was having productive cough. Now the respiratory symptoms are resolved. She continues to improve clinically.            Recommendations:     Continue azithromycin + ethambutol +  rifampin for EDDA treatment (started on 7/27). Duration will be 1 year from first negative AFB sputum after treatment.          - Patient is most like not a good candidate for aminoglycoside given her baseline hearing impairment.       Patient will be due for next  audiology exam in January and I will place a referral (needs to be placed every time). She will need one every 3 months.     Patient is followed by ophthalmology (Dr. Srinivasan). She will need continued ophthalmology visits for visual acuity and color discrimination exam every month and fundoscopic exam every 3 months     She will need a CT chest 3 months from initiation of EDDA treatment (end of Feb 2025). Already scheduled for Feb 26     Our clinic pharmacist will continue to co-manage with me     Continued follow up with pulmonology     She will need monthly labs (CBC and CMP). Placed standing orders. She already scheduled the December, Jan and Feb labs     Patient had an EKG on 9/18 and it showed a normal QTc (431)     AFB sputum stain from 11/25 is negative. Culture is negative to date    Next follow up with me in March          Recommendations discussed with the patient.      Celia Robison MD  Date of " "Service:12/04/24    On 12/04/24, I spent 50 min with chart review, patient visit, documentation and coordination of care.         History of Present Illness:   Meme Butts is a 74 year old female with PMHx of colon cancer (adenocarcinoma of hepatic flexure of colon and family history of BRCA1) diagnosed by screening colonoscopy on 1/10/2023 s/p robotic right hemicolectomy and appendectomy on 2/28/2023 who was referred to our clinic by Dr. Shelby Hernandez (pulmonology) because lung nodule(s) and BAL culture positive for EDDA. She also has PMHx of migraines, seizures, osteoporosis, obesity, prediabetes (Hb A1C 6.3% from 3/18/24), history of GI bleed.     Patient states she has persistent cough started 2 years ago. She initially though it was due to sinus disease and post nasal drip. infection. She states that the cough would be persistent and would happen all day. It was mostly dry but she would have some greenish sputum at times. Cough was so often that it would keep her awake. She started claritin with some improvement. She also has SOB started around the same time of the cough. Activities that would lead to SOB included going up and down stairs. She still keeps herself active and cleans the house, gardens and does yard work. She denies weight loss.     Per my review, the nodules were first identified on CT chest from 12/29/22 which showed upper lobe micronodules and bronchiectasis (bilaterally). Follow up CT chest from 3/3/2023 showed same nodules in addition to at least one new 9 mm nodule in the left upper lobe. The most recent CT chest from 3/28/2024 showed: \"Centrilobular and tree-in-bud nodularity, slightly increased associated with bronchiectasis greatest in the right middle lobe, lingula and lower lobes; findings suggestive of atypical indolent infection such as nonclassic nontuberculous mycobacteria\". Patient underwent PFT study on 3/2024 and, per Dr. Hernandez's interpretation, it was normal. Bronchoscopy " performed on 5/21/24. BAL studies showed: cell count 760 with 56# neutrophils, 12% lymphocytes and 32% macrophages/monocytes. 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  Fungal culture is negative to date. Aspergillus galactomannan negative, Nocardia PCR negative, Nocardia culture negative. AFB culture positive for Mycobacterium avium intracellulare pan-susceptible except for being intermediate to linezolid. Cytology was negative for malignancy and negative for fungal organisms or pneumocystis on GMS stain. Furthermore, the viral cytopathic effect is absent. Patient did not have fever or chills but she has persistent cough with intermittent greenish sputum production. She was prescribed 10 days of levofloxacin by her pulmonologist.     Labs from 6/21/24: CBC normal, Kidney and liver function normal, HIV negative, Hep B surface antigen: negative, Hepatitis C serology: negative Quantiferon negative, EKG from 6/18 showed normal QTc (408). Given presence of EDDA on BAL as well as clinical and radiologic criteria, we started EDDA treatment with Rifampin 600 mg once daily - started 7/27; Azithromycin 250 mg once daily - started 8/3 and Ethambutol 1,200 mg (15.6 mg/kg) once daily - started 8/10.     Today's visit:     She is tolerating the medications well. CBC and CMP from 8/28, 9/16, 10/23 and 11/25 are normal. She tells me she that her cough resolved after initiation of EDDA treatment. AFB from 9/25 (less than two months of initiation of full treatment) was still positive. Repeat AFB sputum from 11/25 is so far negative. Baseline audiogram from 7/16 (before treatment was started) showed some abnormal hearing. She does not report decreased hearing. Follow up audiogram after treatment was done on 10/1 and it is unchanged. She had ophthalmology exam outside clinic at baseline (before initiation of treatment) and it was normal. Ophthalmology visit after treatment done on 10/25 and without concerning  "findings. She is using flutter valve. CT chest from 11/25: \"Oval-shaped nodule in the medial right apex slightly increased in short axis thickness an more uniformly dense measuring 11 x 4 mm. Other nodular densities in the right upper lobe appear similar as well as in the left upper lobe including a cavitary subpleural nodule. Right middle lobe focal bronchiectasis and consolidation is grossly unchanged. Other more inferior lingular bronchiectasis and atelectasis/consolidation however is increased. Lower lobes appear spared. Incidental unchanged 1-2 mm left lower lobe nodule (4/243). Major central airways are nicely patent\". Patient states that she had a cold at the time she had the CT and she was having productive cough. Now the respiratory symptoms are resolved. She continues to improve clinically.             Review of Systems:     CONSTITUTIONAL:  No fevers or chills  INTEGUMENTARY/SKIN: NEGATIVE for worrisome rashes, moles or lesions  EYES: Negative for icterus, vision changes or irritation  ENT/MOUTH:  Cough improved  CARDIOVASCULAR:  Negative for chest pain, palpitations and  shortness of breath  GASTROINTESTINAL:  Negative for abdominal pain, nausea, vomiting, diarrhea and constipation  GENITOURINARY:  Negative for dysuria, hematuria, frequency and urgency  MUSCULOSKELETAL: Negative for joint pain, swelling, motion restriction, negative for musculoskeletal pain  NEURO:  Negative for headache, altered mental status, numbness or weakness  PSYCHIATRIC: Negative for changes in mood or affect  HEMATOLOGIC/LYMPHATIC: negative for lymphadenopathy or bleeding  ALLERGIC/IMMUNOLOGIC: Negative for allergic reaction   ENDOCRINE: Negative for temperature intolerance, skin/hair changes         Past Medical History:     Past Medical History:   Diagnosis Date     Adenomatous colon polyp 2011    tubular adenoma     At high risk for breast cancer 02/16/2017    35.4% lifetime risk by Philip model     Epilepsy (H)      Fracture " "of metatarsal bone of left foot 2014     Fracture, radius     and ulnar styloid fracture     GIB (gastrointestinal bleeding) 2011     Intractable chronic migraine without aura      Kidney stones ,     during pregnancy     Malignant neoplasm of hepatic flexure (H)      Migraine      Osteoporosis 2016    T score -4.1     Pneumonia      Prediabetes           Surgery R writ after a fracture post fall - has hardware ()  Had cataract surgery in 2023         Allergies:       No Known Allergies          Family History:     Family History   Problem Relation Age of Onset     Breast Cancer Mother 30        lumpectomy and chemo; also \"mass in ovary\"     Colon Polyps Father      Coronary Artery Disease Brother      Colon Polyps Brother      Coronary Artery Disease Early Onset Brother 50     Prostate Cancer Brother      Osteoporosis Sister      Breast Cancer Sister 55     Leukemia Sister 52     Gastrointestinal Disease Son         intestinal transplant     Diabetes Maternal Aunt         multiple mat aunts     Ovarian Cancer Maternal Aunt 50         of ovarian cancer in 50s or 60s, unknown     Breast Cancer Paternal Aunt 36         of breast cancer recurrence in 60s     Bone Cancer Niece 19     Prostate Cancer Cousin 65     Breast Cancer Cousin 30        paternal cousin, BRCA1+ by report     Breast Cancer Cousin 40        paternal cousin, BRCA1+ by report     Breast Cancer Cousin 35        paternal cousin, BRCA1+ by report     Genetic Disorder Cousin         paternal male cousin, BRCA1+ by report     Ovarian Cancer Cousin         paternal cousin, BRCA1+ by report     Anesthesia Reaction No family hx of      Venous thrombosis No family hx of      Glaucoma No family hx of      Macular Degeneration No family hx of      Eye Surgery No family hx of      Retinal detachment No family hx of              Social History:     Social History     Socioeconomic History     Marital status:      " Spouse name: Quentin     Number of children: 3     Years of education: Not on file     Highest education level: Not on file   Occupational History     Occupation:      Employer: RETIRED   Tobacco Use     Smoking status: Former     Current packs/day: 0.00     Average packs/day: 1 pack/day for 15.0 years (15.0 ttl pk-yrs)     Types: Cigarettes     Start date: 8/17/1994     Quit date: 8/17/2009     Years since quitting: 15.3     Smokeless tobacco: Never     Tobacco comments:     On and off for a total of 10 years of smoking    Vaping Use     Vaping status: Never Used   Substance and Sexual Activity     Alcohol use: Yes     Comment: 1x/month     Drug use: No     Sexual activity: Not Currently     Partners: Male     Birth control/protection: Post-menopausal   Other Topics Concern     Parent/sibling w/ CABG, MI or angioplasty before 65F 55M? Not Asked   Social History Narrative    Lives in a house, has  3 children,       lost 1 child, retired      Social Drivers of Health     Financial Resource Strain: Low Risk  (7/25/2024)    Financial Resource Strain      Within the past 12 months, have you or your family members you live with been unable to get utilities (heat, electricity) when it was really needed?: No   Food Insecurity: Low Risk  (7/25/2024)    Food Insecurity      Within the past 12 months, did you worry that your food would run out before you got money to buy more?: No      Within the past 12 months, did the food you bought just not last and you didn t have money to get more?: No   Transportation Needs: Low Risk  (7/25/2024)    Transportation Needs      Within the past 12 months, has lack of transportation kept you from medical appointments, getting your medicines, non-medical meetings or appointments, work, or from getting things that you need?: No   Physical Activity: Unknown (7/25/2024)    Exercise Vital Sign      Days of Exercise per Week: Patient declined      Minutes of Exercise  per Session: 20 min   Stress: No Stress Concern Present (7/25/2024)    [place] [employer] - Occupational Stress Questionnaire      Feeling of Stress : Not at all   Social Connections: Unknown (7/25/2024)    Social Connection and Isolation Panel [NHANES]      Frequency of Communication with Friends and Family: Not on file      Frequency of Social Gatherings with Friends and Family: Three times a week      Attends Evangelical Services: Not on file      Active Member of Clubs or Organizations: Not on file      Attends Club or Organization Meetings: Not on file      Marital Status: Not on file   Interpersonal Safety: Low Risk  (8/28/2024)    Interpersonal Safety      Do you feel physically and emotionally safe where you currently live?: Yes      Within the past 12 months, have you been hit, slapped, kicked or otherwise physically hurt by someone?: No      Within the past 12 months, have you been humiliated or emotionally abused in other ways by your partner or ex-partner?: No   Housing Stability: Low Risk  (7/25/2024)    Housing Stability      Do you have housing? : Yes      Are you worried about losing your housing?: No      HOME/ENVIRONMENT:   Liver with her daughter (48 years old)  Has another daughter 51 years old  Son passed - diverticulitis -> had surgery and went back with removal of small intestine - underwent small bowel transplant - 1 year after transplant he passed  Patient is      OCCUPATION:   Administration - retired     TRAVEL:   Travel across [place] 15 years ago  Originally from [place]     ANIMALS:   One Dog and one cat     TUBERCULOSIS:   Father and and brother were treated for TB 15 years ago. She states she had a quantiferon performed and it was negative.      TOBACCO/ALCOHOL/RECREATIONAL DRUGS:   Smoked 10 years (1 pack a day) - quit 15 years ago  Glass of wine once a week  No drugs         Physical Exam:   There were no vitals taken for this visit.     Exam:  GENERAL:   Well-developed, well-nourished, not in acute distress.   HEAD: Normocephalic and atraumatic  ENT:  No hearing impairment, oropharynx clear, oral mucous membranes moist  EYES:  Eyes grossly normal to inspection  LUNGS:  Clear to auscultation - no rales, rhonchi or wheezes  CARDIOVASCULAR:  Regular rate and rhythm, normal S1 S2, no murmur  ABDOMEN:  Soft, non-tender  EXT: Extremities warm and without edema.  MS: No gross musculoskeletal defects noted, no edema  SKIN:  No acute rashes or suspicious lesions  NEUROLOGIC:  Grossly nonfocal. Normal strength and tone, mentation intact and speech normal  PSYCHIATRIC: Mood stable, mentation appears normal, affect normal           Again, thank you for allowing me to participate in the care of your patient.      Sincerely,    SRINI ABAD MD

## 2024-12-05 ENCOUNTER — TELEPHONE (OUTPATIENT)
Dept: INFECTIOUS DISEASES | Facility: CLINIC | Age: 74
End: 2024-12-05
Payer: COMMERCIAL

## 2024-12-05 NOTE — TELEPHONE ENCOUNTER
EP called 12/5 to sched a 3 month follow up with Dr. Robison in March via in-person per provider.       ----- Message from CELIA ABAD sent at 12/5/2024 11:50 AM CST -----  Dear all,    Can you schedule a follow up with me in March on a Wednesday in person?    Thank you    Celia

## 2024-12-30 ENCOUNTER — ALLIED HEALTH/NURSE VISIT (OUTPATIENT)
Dept: INTERNAL MEDICINE | Facility: CLINIC | Age: 74
End: 2024-12-30
Payer: COMMERCIAL

## 2024-12-30 DIAGNOSIS — M81.0 OSTEOPOROSIS, UNSPECIFIED OSTEOPOROSIS TYPE, UNSPECIFIED PATHOLOGICAL FRACTURE PRESENCE: Primary | ICD-10-CM

## 2024-12-30 PROCEDURE — 96372 THER/PROPH/DIAG INJ SC/IM: CPT | Performed by: INTERNAL MEDICINE

## 2024-12-30 PROCEDURE — 99207 PR NO CHARGE NURSE ONLY: CPT

## 2024-12-30 NOTE — PROGRESS NOTES
Meme Butts received the Evenity (romosozumab) injection in the clinic today at the request of Dr. Miranda. The injection was given under the supervision of Dr. Arvizu if assistance was needed. The patient does not report a history of adverse reactions associated with injection administration. The injection site was cleaned with an alcohol prep wipe. The injection was given without incident--see injection list for administration details. No swelling or redness was observed at the site of injection after the injection was given. The patient was advised to remain in fourth floor lobby of the [facility] for fifteen minutes after the injection in case of an adverse reaction.      Evenity injections are administered every 30 days. The patient's next anticipated Evenity injection is 25. The patient was provided with an injection schedule.    Clinic Administered Medication Documentation      Injectable Medication Documentation    Is there an active order (written within the past 365 days, with administrations remaining, not ) in the chart? Yes.   Vial/Syringe: Syringe  Was this medication supplied by the patient? No  Is this a medication the patient will need to receive again? Yes. Verified that the patient has refills remaining in their prescription.     Sh[place], [place]   Primary Care Clinic

## 2024-12-30 NOTE — PATIENT INSTRUCTIONS
Evenity      It was a pleasure seeing you in the clinic today. You received an injection of Evenity on 12/30/24. Evenity injections are administered every 30 days.      Your last Evenity lab work was completed on 11/25/24. Evenity lab work must be completed every 3 months.      Your last Provider visit was on 08/28/24.  Patients need to follow up with their ordering provider every 6 months into therapy.      Next Steps:      Your next Evenity injection and visit with your provider, Dr. Miranda, is scheduled for 01/29/25.

## 2025-01-08 ENCOUNTER — VIRTUAL VISIT (OUTPATIENT)
Dept: PHARMACY | Facility: CLINIC | Age: 75
End: 2025-01-08
Payer: COMMERCIAL

## 2025-01-08 DIAGNOSIS — E78.5 HYPERLIPIDEMIA LDL GOAL <100: ICD-10-CM

## 2025-01-08 DIAGNOSIS — M19.90 ARTHRITIS: ICD-10-CM

## 2025-01-08 DIAGNOSIS — R56.9 SEIZURES (H): ICD-10-CM

## 2025-01-08 DIAGNOSIS — M81.0 OSTEOPOROSIS, UNSPECIFIED OSTEOPOROSIS TYPE, UNSPECIFIED PATHOLOGICAL FRACTURE PRESENCE: ICD-10-CM

## 2025-01-08 DIAGNOSIS — A31.0 MYCOBACTERIUM AVIUM INFECTION (H): Primary | ICD-10-CM

## 2025-01-08 DIAGNOSIS — R09.82 POST-NASAL DRIP: ICD-10-CM

## 2025-01-08 DIAGNOSIS — E11.9 TYPE 2 DIABETES MELLITUS WITHOUT COMPLICATION, WITHOUT LONG-TERM CURRENT USE OF INSULIN (H): ICD-10-CM

## 2025-01-08 DIAGNOSIS — Z78.9 TAKES DIETARY SUPPLEMENTS: ICD-10-CM

## 2025-01-08 PROCEDURE — 99605 MTMS BY PHARM NP 15 MIN: CPT | Mod: 93 | Performed by: PHARMACIST

## 2025-01-08 NOTE — LETTER
_  Medication List        Prepared on: Jan 8, 2025     Bring your Medication List when you go to the doctor, hospital, or   emergency room. And, share it with your family or caregivers.     Note any changes to how you take your medications.  Cross out medications when you no longer use them.    Medication How I take it Why I use it Prescriber   azithromycin (ZITHROMAX) 250 MG tablet Take 1 tablet (250 mg) by mouth daily. Start week 2 Mycobacterium avium infection (H) Celia Lala MD   blood glucose (NO BRAND SPECIFIED) lancets standard Use to test blood sugar 1 time daily or as directed. Type 2 diabetes mellitus without complication, without long-term current use of insulin (H) Maria Esther Miranda MD PhD   blood glucose (NO BRAND SPECIFIED) test strip Use to test blood sugar once per day or as directed. Type 2 diabetes mellitus without complication, without long-term current use of insulin (H) Maria Esther Miranda MD PhD         blood glucose monitoring (ONE TOUCH ULTRA 2) meter device kit USE TO TEST BLOOD SUGAR ONCE PER DAY OR AS DIRECTED. Use brand compatible with patients insurance and device Type 2 diabetes mellitus without complication, without long-term current use of insulin (H) Maria Esther Miranda MD PhD   calcium-magnesium-vitamin D 500-250-125 MG-MG-UNIT TABS Take 1 tablet by mouth daily  osteoporosis Patient Reported   carboxymethylcellulose (REFRESH PLUS) 0.5 % SOLN ophthalmic solution 1 drop 3 times daily as needed for dry eyes  Dry eyes Patient Reported   diclofenac (VOLTAREN) 1 % topical gel Apply 2 g topically 4 times daily as needed for moderate pain. Pain in both feet; Sinus tarsi syndrome of both ankles; Arthritis of both feet; Hammertoe of left foot Maria Esther Miranda MD PhD   ethambutol (MYAMBUTOL) 400 MG tablet Take 3 tablets (1,200 mg) by mouth daily. Start week 3 Mycobacterium avium infection (H) Celia Lala MD   ibuprofen (ADVIL/MOTRIN) 400 MG tablet Take  400 mg by mouth every 6 hours as needed for moderate pain  Arthritis  Patient Reported   levETIRAcetam (KEPPRA) 750 MG tablet PT REPORTS TAPERING DOWN: TAKE 1 & 1/2 TABS IN THE MORNING, AND 2 TABS IN THE EVENING. Nonintractable epilepsy without status epilepticus, unspecified epilepsy type (H) Maria Esther Miranda MD PhD   loratadine (CLARITIN) 10 MG tablet Take 10 mg by mouth daily  allergies Patient Reported   mometasone (NASONEX) 50 MCG/ACT nasal spray Spray 2 sprays into both nostrils daily Post-Nasal Drip Maria Esther Miranda MD PhD   Multiple Vitamins-Minerals (CENTRUM SILVER) per tablet Take 1 tablet by mouth every morning Abnormal Results of Liver Function Studies; Osteoporosis; Fatigue; Post-Menopausal; Obesity; Family History of Diabetes Mellitus Patient Reported   psyllium (METAMUCIL) 58.6 % packet Take 1 packet by mouth daily as needed for constipation  Constipation  Patient Reported   rifampin (RIFADIN) 300 MG capsule Take 2 capsules (600 mg) by mouth daily. Start week 1 Mycobacterium avium infection (H) Celia Lala MD   romosozumab-aqqg (EVENITY) injection 210 mg   Senile Osteoporosis; Hx Drug Therapy Maria Esther Miranda MD PhD   rosuvastatin (CRESTOR) 5 MG tablet Take 1 tablet (5 mg) by mouth daily. Hyperlipidemia LDL Goal <100 Maria Esther Miranda MD PhD   sodium chloride 0.9 % neb solution Take 3 mLs by nebulization 2 times daily Mycobacterium avium infection (H) Shelby Hernandez MD         Add new medications, over-the-counter drugs, herbals, vitamins, or  minerals in the blank rows below.    Medication How I take it Why I use it Prescriber                                      Allergies:      No Known Allergies        Side effects I have had:      No Known Side Effects        Other Information:              My notes and questions:

## 2025-01-08 NOTE — PROGRESS NOTES
Medication Therapy Management (MTM) Encounter    ASSESSMENT:                            Medication Adherence/Access: No issues identified.    Mycobacterium avium intracellulare complex infection:  Stable. Continue to monitor culture. If it finalizes as negative, continue treatment for 12 months from 11/2024. All follow-ups for antibiotic  monitoring are scheduled. Discussed taking ethambutol 3 tablets at the same time rather than 1 tablet 3 times daily.     Hyperlipidemia   Recheck lipid panel 6-12 weeks after starting statin. Lipid panel ordered and has upcoming lab appointments    Diabetes   Stable. Upcoming primary care provider appointment. Recheck A1c annually     Epilepsy:  stable     Osteoporosis:   stable     Post-nasal drip/allergies:   Stable    Arthritis:   stable     Supplements:   stable    PLAN:                            If sputum cultures from 11/2024 remain negative, continue antibiotics until 11/2025.   Take ethambutol 3 tablets (1,200 mg) all together once daily instead of 1 tablet 3 times daily   Recheck cholesterol at upcoming lab appointment- already scheduled    Follow-up: 2 months with infectious disease, 4 months with medication therapy management     SUBJECTIVE/OBJECTIVE:                          Meme Butts is a 74 year old female seen for a follow-up visit.       Reason for visit: NTM follow-up.    Allergies/ADRs: Reviewed in chart  Past Medical History: Reviewed in chart  Tobacco: She reports that she quit smoking about 15 years ago. Her smoking use included cigarettes. She started smoking about 30 years ago. She has a 15 pack-year smoking history. She has never used smokeless tobacco.  Alcohol: occasional glass of wine  Medication Adherence/Access: no issues reported.    Mycobacterium avium intracellulare complex infection:  Rifampin 600 mg once daily - started 7/27  Azithromycin 250 mg once daily - started 8/3  Ethambutol 1,200 mg (15.6 mg/kg) once daily (has been taking 1 tablet 3  "times daily) - started 8/10     She's feeling well and no side effects. She's not coughing up any sputum. Last AFB culture had to be induced. Occasional \"whooshing\" sound in ears but she also had this at baseline. No new hearing or vision changes.      AFB sputum: negative for November so for  Eye exam: hx cataract surgery fall 2023. Stable. Follow-up scheduled 1/30.   Audiology: baseline hearing loss. Follow-up scheduled for February  EKG: Qtc 431 on 9/18/24  CT chest: some scattered cavitary nodules on 8/28/24 5/21/24 AFB culture:              Mycobacterium avium intracellulare complex           AFB VESNA (ARUP)       Amikacin 8 Susceptible       Clarithromycin 1 Susceptible       Comment:   See below 1       Linezolid 16 Intermediate       Moxifloxacin 1 Susceptible          Hyperlipidemia   Rosuvastatin 5 mg once daily   No side effects   The 10-year ASCVD risk score (Pepe HILL, et al., 2019) is: 22.4%    Values used to calculate the score:      Age: 74 years      Sex: Female      Is Non- : No      Diabetic: Yes      Tobacco smoker: No      Systolic Blood Pressure: 124 mmHg      Is BP treated: No      HDL Cholesterol: 69 mg/dL      Total Cholesterol: 213 mg/dL    Previous trials:   Atorvastatin 10 mg daily - muscle cramps     Recent Labs   Lab Test 10/23/24  0949 06/21/24  1026   CHOL 213* 139   HDL 69 49*   * 42   TRIG 180* 242*     Diabetes   No current medications. Manages with diet/lifestyle.      Eye exam is up to date  Foot exam is up to date    Hemoglobin A1C   Date Value Ref Range Status   07/27/2024 6.4 (H) <5.7 % Final     Comment:     Normal <5.7%   Prediabetes 5.7-6.4%    Diabetes 6.5% or higher     Note: Adopted from ADA consensus guidelines.   09/18/2020 6.0 (H) 0 - 5.6 % Final     Comment:     Normal <5.7% Prediabetes 5.7-6.4%  Diabetes 6.5% or higher - adopted from ADA   consensus guidelines.           Epilepsy:  Levetiracetam 1,125 mg 2 times daily   No side " effects. No recent seizures. Following with outside neurology at [facility].     Osteoporosis:   Enevity 210 every 30 days   Calcium carbonate/magnesium//Vitamin D 1 pill once daily   Some arthralgia after Evevity injections.  DEXA History: 3/29/24 - repeat in 2 years  History of taking alendronate for 4-5 years and tolerated well. Stopped for drug holiday 8/2021. Started Prolia 8/24/22 but no repeat doses per chart review. Started Enevity 9/25.      Post-nasal drip/allergies:   Mometasone nasal spray as needed  Loratadine 10 mg once daily as needed  Refresh eye drops as needed   No current symptoms or side effects      Arthritis:   Diclofenac gel as needed  Ibuprofen occasionally   Some additional arthralgia after recent Enevity injection. No current issues or side effects      Supplements:   Multivitamin once daily   Metamucil as needed  No reported issues at this time.     Today's Vitals: There were no vitals taken for this visit.  ----------------    I spent 10 minutes with this patient today. All changes were made via collaborative practice agreement with Dr. Robison.     A summary of these recommendations was sent via [employer].      Telemedicine Visit Details  The patient's medications can be safely assessed via a telemedicine encounter.  Type of service:  Telephone visit  Originating Location (pt. Location): Home    Distant Location (provider location):  On-site  Start Time: 9:05 AM  End Time:  9:15 am     Medication Therapy Recommendations  Hyperlipidemia LDL goal <100   1 Rationale: Untreated condition - Needs additional medication therapy - Indication   Recommendation: Start Medication - Rosuvastatin Calcium 5 MG Cpsp   Status: Accepted per Provider   Identified Date: 10/30/2024 Completed Date: 1/8/2025         Mycobacterium avium infection (H)   1 Current Medication: ethambutol (MYAMBUTOL) 400 MG tablet   Current Medication Sig: Take 3 tablets (1,200 mg) by mouth daily. Start week 3   Rationale: Does not understand  instructions - Adherence - Adherence   Recommendation: Provide Education   Status: Patient Agreed - Adherence/Education   Identified Date: 1/8/2025 Completed Date: 1/8/2025

## 2025-01-08 NOTE — LETTER
January 8, 2025  Meme Butts  [address]  [place] [address]    Dear Ms. Butts, [facility] AND INFECTIOUS DISEASES [facility]     Thank you for talking with me on Jan 8, 2025 about your health and medications. As a follow-up to our conversation, I have included two documents:      Your Recommended To-Do List has steps you should take to get the best results from your medications.  Your Medication List will help you keep track of your medications and how to take them.    If you want to talk about these documents, please call FAITH KIM RPH at phone: [phone], Monday-Friday 8-4:30pm.    I look forward to working with you and your doctors to make sure your medications work well for you.    Sincerely,  FAITH KIM RPH  [facility] Pharmacist, [facility]   Pt is calling because he needs a refill on ranolazine 500 mg.     Pharmacy is  [facility]    [phone]  (wife) Raghav Washington

## 2025-01-08 NOTE — PATIENT INSTRUCTIONS
"Recommendations from today's MTM visit:                                                      If sputum cultures from 11/2024 remain negative, continue antibiotics until 11/2025.   Take ethambutol 3 tablets (1,200 mg) all together once daily instead of 1 tablet 3 times daily   Recheck cholesterol at upcoming lab appointment- already scheduled    Follow-up: 2 months with infectious disease, 4 months with medication therapy management     It was great speaking with you today.  I value your experience and would be very thankful for your time in providing feedback in our clinic survey. In the next few days, you may receive an email or text message from [employer] with a link to a survey related to your  clinical pharmacist.\"     To schedule another MTM appointment, please call the clinic directly or you may call the MTM scheduling line at [phone] or toll-free at [phone].     My Clinical Pharmacist's contact information:                                                      Please feel free to contact me with any questions or concerns you have.      Annamaria Whitley, PharmD, AAHIVP  Medication Therapy Management Pharmacist      "

## 2025-01-08 NOTE — LETTER
"Recommended To-Do List      Prepared on: Jan 8, 2025       You can get the best results from your medications by completing the items on this \"To-Do List.\"      Bring your To-Do List when you go to your doctor. And, share it with your family or caregivers.    My To-Do List:    What we talked about: What I should do:   The importance of taking your medication as intended    Take ethambutol 3 tablets (1,200 mg) all together once daily instead of 1 tablet 3 times daily               "

## 2025-01-20 LAB
ACID FAST STAIN (ARUP): NORMAL

## 2025-01-22 DIAGNOSIS — M79.672 PAIN IN BOTH FEET: ICD-10-CM

## 2025-01-22 DIAGNOSIS — M19.071 ARTHRITIS OF BOTH FEET: ICD-10-CM

## 2025-01-22 DIAGNOSIS — M25.572 SINUS TARSI SYNDROME OF BOTH ANKLES: ICD-10-CM

## 2025-01-22 DIAGNOSIS — M20.42 HAMMERTOE OF LEFT FOOT: ICD-10-CM

## 2025-01-22 DIAGNOSIS — M19.072 ARTHRITIS OF BOTH FEET: ICD-10-CM

## 2025-01-22 DIAGNOSIS — M25.571 SINUS TARSI SYNDROME OF BOTH ANKLES: ICD-10-CM

## 2025-01-22 DIAGNOSIS — M79.671 PAIN IN BOTH FEET: ICD-10-CM

## 2025-01-22 NOTE — TELEPHONE ENCOUNTER
Diclofenac 1% topical gel - 2 gm topically 4 times daily as needed for pain  Last Clinic Visit: 08/28/2024

## 2025-01-23 NOTE — Clinical Note
Hi Dr. Miranda-I had an appointment with Meme today. She's interested in trying rosuvastatin 5 mg/day and recheck lipids in 3 month (rifampin induces all the statins but she prefers conservative approach). Would you be okay with this?I can order if so. Thanks! Annamaria Whitley, PharmD, AAHIVP Medication Therapy Management Pharmacist  
Last induced AFB culture positive 9/25. She'd like to get next done end of November. Would you be okay with that time frame? 
Port check showing a port not accessed correctly from the floor. Jaimes needle was removed and a new jaimes needle was used to access the port in IR. Blood was able to be aspirated and IR port check showed patent port. Port was flushed and left accessed.
Successful removal of 200cc clear yellow ascites, complete drainage. Sent for analysis

## 2025-01-27 ENCOUNTER — TELEPHONE (OUTPATIENT)
Dept: AUDIOLOGY | Facility: CLINIC | Age: 75
End: 2025-01-27
Payer: COMMERCIAL

## 2025-01-27 NOTE — TELEPHONE ENCOUNTER
LVM for patient regarding a scheduling error. Patient needed scheduled for a 60 minute appointment. Provided new appointment details for a same day earlier start time on 2/27/25 to allow for a 60 minute Hearing Test. Provided direct number for scheduling conflicts.    Also, sent patient a [facility] message.

## 2025-01-28 ENCOUNTER — TELEPHONE (OUTPATIENT)
Dept: AUDIOLOGY | Facility: CLINIC | Age: 75
End: 2025-01-28
Payer: COMMERCIAL

## 2025-01-28 NOTE — TELEPHONE ENCOUNTER
Spoke with patient confirming earlier same day appointment on 2/27/25 will work for patient moved to 8am with provider. Patient [facility] appointment will [facility]

## 2025-01-29 ENCOUNTER — OFFICE VISIT (OUTPATIENT)
Dept: FAMILY MEDICINE | Facility: CLINIC | Age: 75
End: 2025-01-29
Payer: COMMERCIAL

## 2025-01-29 ENCOUNTER — LAB (OUTPATIENT)
Dept: LAB | Facility: CLINIC | Age: 75
End: 2025-01-29
Payer: COMMERCIAL

## 2025-01-29 VITALS
BODY MASS INDEX: 31.6 KG/M2 | DIASTOLIC BLOOD PRESSURE: 78 MMHG | HEIGHT: 62 IN | WEIGHT: 171.7 LBS | RESPIRATION RATE: 16 BRPM | SYSTOLIC BLOOD PRESSURE: 122 MMHG | TEMPERATURE: 98.1 F | HEART RATE: 85 BPM | OXYGEN SATURATION: 97 %

## 2025-01-29 DIAGNOSIS — E78.5 HYPERLIPIDEMIA LDL GOAL <100: ICD-10-CM

## 2025-01-29 DIAGNOSIS — A31.0 MAI (MYCOBACTERIUM AVIUM-INTRACELLULARE) (H): ICD-10-CM

## 2025-01-29 DIAGNOSIS — E11.9 TYPE 2 DIABETES MELLITUS WITHOUT COMPLICATION, WITHOUT LONG-TERM CURRENT USE OF INSULIN (H): ICD-10-CM

## 2025-01-29 DIAGNOSIS — E11.9 TYPE 2 DIABETES MELLITUS WITHOUT COMPLICATION, WITHOUT LONG-TERM CURRENT USE OF INSULIN (H): Primary | ICD-10-CM

## 2025-01-29 DIAGNOSIS — M81.0 SENILE OSTEOPOROSIS: ICD-10-CM

## 2025-01-29 LAB
ALBUMIN SERPL BCG-MCNC: 4.2 G/DL (ref 3.5–5.2)
ALP SERPL-CCNC: 122 U/L (ref 40–150)
ALT SERPL W P-5'-P-CCNC: 42 U/L (ref 0–50)
ANION GAP SERPL CALCULATED.3IONS-SCNC: 9 MMOL/L (ref 7–15)
AST SERPL W P-5'-P-CCNC: 34 U/L (ref 0–45)
BASOPHILS # BLD AUTO: 0 10E3/UL (ref 0–0.2)
BASOPHILS NFR BLD AUTO: 1 %
BILIRUB SERPL-MCNC: 0.4 MG/DL
BUN SERPL-MCNC: 14.4 MG/DL (ref 8–23)
CALCIUM SERPL-MCNC: 10 MG/DL (ref 8.8–10.4)
CHLORIDE SERPL-SCNC: 103 MMOL/L (ref 98–107)
CHOLEST SERPL-MCNC: 179 MG/DL
CREAT SERPL-MCNC: 0.62 MG/DL (ref 0.51–0.95)
EGFRCR SERPLBLD CKD-EPI 2021: >90 ML/MIN/1.73M2
EOSINOPHIL # BLD AUTO: 0 10E3/UL (ref 0–0.7)
EOSINOPHIL NFR BLD AUTO: 1 %
ERYTHROCYTE [DISTWIDTH] IN BLOOD BY AUTOMATED COUNT: 12 % (ref 10–15)
EST. AVERAGE GLUCOSE BLD GHB EST-MCNC: 143 MG/DL
FASTING STATUS PATIENT QL REPORTED: NO
FASTING STATUS PATIENT QL REPORTED: NO
GLUCOSE SERPL-MCNC: 112 MG/DL (ref 70–99)
HBA1C MFR BLD: 6.6 %
HCO3 SERPL-SCNC: 25 MMOL/L (ref 22–29)
HCT VFR BLD AUTO: 39.9 % (ref 35–47)
HDLC SERPL-MCNC: 71 MG/DL
HGB BLD-MCNC: 13.3 G/DL (ref 11.7–15.7)
HOLD SPECIMEN: NORMAL
HOLD SPECIMEN: NORMAL
IMM GRANULOCYTES # BLD: 0 10E3/UL
IMM GRANULOCYTES NFR BLD: 1 %
LDLC SERPL CALC-MCNC: 74 MG/DL
LYMPHOCYTES # BLD AUTO: 1.6 10E3/UL (ref 0.8–5.3)
LYMPHOCYTES NFR BLD AUTO: 24 %
MCH RBC QN AUTO: 30.9 PG (ref 26.5–33)
MCHC RBC AUTO-ENTMCNC: 33.3 G/DL (ref 31.5–36.5)
MCV RBC AUTO: 93 FL (ref 78–100)
MONOCYTES # BLD AUTO: 0.6 10E3/UL (ref 0–1.3)
MONOCYTES NFR BLD AUTO: 9 %
NEUTROPHILS # BLD AUTO: 4.3 10E3/UL (ref 1.6–8.3)
NEUTROPHILS NFR BLD AUTO: 65 %
NONHDLC SERPL-MCNC: 108 MG/DL
NRBC # BLD AUTO: 0 10E3/UL
NRBC BLD AUTO-RTO: 0 /100
PLATELET # BLD AUTO: 247 10E3/UL (ref 150–450)
POTASSIUM SERPL-SCNC: 4.8 MMOL/L (ref 3.4–5.3)
PROT SERPL-MCNC: 7.6 G/DL (ref 6.4–8.3)
RBC # BLD AUTO: 4.31 10E6/UL (ref 3.8–5.2)
SODIUM SERPL-SCNC: 137 MMOL/L (ref 135–145)
TRIGL SERPL-MCNC: 172 MG/DL
WBC # BLD AUTO: 6.7 10E3/UL (ref 4–11)

## 2025-01-29 PROCEDURE — 85025 COMPLETE CBC W/AUTO DIFF WBC: CPT | Performed by: PATHOLOGY

## 2025-01-29 PROCEDURE — 99000 SPECIMEN HANDLING OFFICE-LAB: CPT | Performed by: PATHOLOGY

## 2025-01-29 PROCEDURE — 83036 HEMOGLOBIN GLYCOSYLATED A1C: CPT | Performed by: FAMILY MEDICINE

## 2025-01-29 PROCEDURE — 80061 LIPID PANEL: CPT | Performed by: PATHOLOGY

## 2025-01-29 PROCEDURE — 80053 COMPREHEN METABOLIC PANEL: CPT | Performed by: PATHOLOGY

## 2025-01-29 PROCEDURE — 36415 COLL VENOUS BLD VENIPUNCTURE: CPT | Performed by: PATHOLOGY

## 2025-01-29 NOTE — PROGRESS NOTES
"      Steve Valentine is a 75 year old, presenting for the following health issues:  Follow Up (Diabetes)      1/29/2025    10:48 AM   Additional Questions   Roomed by [facility] EMT     History of Present Illness       Reason for visit:  Eventy shots    She eats 2-3 servings of fruits and vegetables daily.She consumes 1 sweetened beverage(s) daily.She exercises with enough effort to increase her heart rate 30 to 60 minutes per day.  She exercises with enough effort to increase her heart rate 3 or less days per week.   She is taking medications regularly.                     Objective    /78 (BP Location: Right arm, Patient Position: Sitting, Cuff Size: Adult Regular)   Pulse 85   Temp 98.1  F (36.7  C) (Oral)   Resp 16   Ht 1.575 m (5' 2\")   Wt 77.9 kg (171 lb 11.2 oz)   SpO2 97%   BMI 31.40 kg/m    Body mass index is 31.4 kg/m .  Physical Exam               Signed Electronically by: Maria Esther Miranda MD PhD    "

## 2025-01-29 NOTE — PROGRESS NOTES
Meme Butts received the Evenity injection today in clinic at the request of Dr. Miranda. The injection was given under the supervision of Dr. Miranda if assistance was needed. The patient does not report a history of adverse reactions associated with medication/injection administration. The injection sites were cleaned with alcohol prep wipes. The injections were given without incident--see MAR list for administration details. The injection was given in the back of the arms today. The patient was advised to remain in fourth floor lobby of the [facility] for fifteen minutes after the injection in case of an adverse reaction.     Evenity injections are administered every 30 days. The patient's next anticipated Evenity injection is 25.   Clinic Administered Medication Documentation      Injectable Medication Documentation    Is there an active order (written within the past 365 days, with administrations remaining, not ) in the chart? Yes.     Patient was given  Evenity . Prior to medication administration, verified patient's identity using patient s name and date of birth. Please see MAR and medication order for additional information. Patient instructed to remain in clinic for 15 minutes and report any adverse reaction to staff immediately.    Vial/Syringe: Syringe  Was this medication supplied by the patient? No  Is this a medication the patient will need to receive again? Yes. Verified that the patient has refills remaining in their prescription.       Bhavani Felipe, EMT at 10:59 AM on 2025        Answers submitted by the patient for this visit:  General Questionnaire (Submitted on 2025)  Chief Complaint: Chronic problems general questions HPI Form  What is the reason for your visit today? : Eventy shots  How many servings of fruits and vegetables do you eat daily?: 2-3  On average, how many sweetened beverages do you drink each day (Examples: soda, juice, sweet tea, etc.   Do NOT count diet or artificially sweetened beverages)?: 1  How many minutes a day do you exercise enough to make your heart beat faster?: 30 to 60  How many days a week do you exercise enough to make your heart beat faster?: 3 or less  How many days per week do you miss taking your medication?: 0  Questionnaire about: Chronic problems general questions HPI Form (Submitted on 1/27/2025)  Chief Complaint: Chronic problems general questions HPI Form

## 2025-01-29 NOTE — PROGRESS NOTES
Assessment & Plan     Type 2 diabetes mellitus without complication, without long-term current use of insulin (H)  Her A1c today is 6.6.  She has good control of her diabetes, she is not on any medication.  She will be getting an eye exam related to the antibiotic she is on but they can also do a diabetic check.  I did a foot exam which was normal today.  She is not on aspirin.  She should do a urine for microalbumin next visit.  She is on a statin for her cholesterol.  I will see her again in 6 months for a well exam as well as diabetic check  - HEMOGLOBIN A1C  - Basic metabolic panel  (Ca, Cl, CO2, Creat, Gluc, K, Na, BUN)    Hyperlipidemia LDL goal <100  She initially had a reaction to atorvastatin, we changed the medication to lovastatin and she is tolerating this well.  Her LDL was at goal when last checked    Senile osteoporosis  She remains on Evenity which was started in September 2024.  She will continue Evenity through August 2025.  She is not having any side effects.  Her calcium was normal range today.  Will see her again in early September and discussed next steps which will probably be Prolia.    Return in about 4 months (around 5/29/2025) for Routine preventive, with me.    Time note (e4, 30'): The total of my time (on the date of service) for this service was 31 minutes, including discussion/face-to-face, chart review, interpretation not otherwise reported, documentation, and updating of the computerized record.    The longitudinal plan of care for the diagnosis(es)/condition(s) as documented were addressed during this visit. Due to the added complexity in care, I will continue to support Meme in the subsequent management and with ongoing continuity of care.    Subjective   Meme is a 75 year old, presenting for the following health issues:  Osteoporosis - and diabetes     HPI   Last seen in 8/2024  Osteoporosis - she started evenity in 9/2024 - due for her 5th shot today - has had mild soreness  "in the hip after first injection.  DXA 3/2024  overall well tolerated. Calcium today  was 10     Diabetes - when seen in 7/2024  her A1C was 6.4 - prediabetic however her A1C's on 2 previous occasions were 6.5. - not on meds - diet controlled - not monitoring bs - has the glucometer - did  attend diabetic education.  No foot numbness  - eye exam  1/29/25  (for the antibioticsshe is taking)       Hyperlipidemia - was on a statin  - was having leg weakness feeling - like they might buckle   -we  took her off of lipitor and legs were much better.  Now taking rosuvastatin 5mg/d -   6/2024  LDL = 42 (on a statin)      Lung   infection - on antibiotics for about a year - managed by ID  - still on antibiotics till Nov 2025 -  sputum sample was negative Nov 2024 -   - no cough - feeling better  -started EDDA treatment with Rifampin 600 mg once daily - started 7/27; Azithromycin 250 mg once daily - started 8/3 and Ethambutol 1,200 mg (15.6 mg/kg) once daily - started 8/10.       Lung nodules were first identified on CT chest from 12/29/22 which showed upper lobe micronodules and bronchiectasis (bilaterally). Follow up CT chest from 3/3/2023 showed same nodules in addition to at least one new 9 mm nodule in the left upper lobe. The most recent CT chest from 3/28/2024 showed: \"Centrilobular and tree-in-bud nodularity, slightly increased associated with bronchiectasis greatest in the right middle lobe, lingula and lower lobes; findings suggestive of atypical indolent infection such as nonclassic nontuberculous mycobacteria\". Patient underwent PFT study on 3/2024 and, per Dr. Hernandez's interpretation, it was normal. Bronchoscopy performed on 5/21/24. BAL studies showed: cell count 760 with 56# neutrophils, 12% lymphocytes and 32% macrophages/monocytes. 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  Fungal culture is negative to date. Aspergillus galactomannan negative, Nocardia PCR negative, Nocardia culture " "negative. 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 Cytology was negative for malignancy and negative for fungal organisms or pneumocystis on GMS stain. Furthermore, the viral cytopathic effect is absent.  Given presence of EDDA on BAL as well as clinical and radiologic criteria, we started EDDA treatment with Rifampin 600 mg once daily - started 7/27; Azithromycin 250 mg once daily - started 8/3 and Ethambutol 1,200 mg (15.6 mg/kg) once daily - started 8/10. Has a chest CT end of Feb -        Hx colon cancer - colon cancer (adenocarcinoma of hepatic flexure of colon and family history of BRCA1) diagnosed by screening colonoscopy on 1/10/2023 s/p robotic right hemicolectomy and appendectomy on 2/28/2023  - had f/up colonoscopy 4/2024  - negative - due in 3 yrs          Review of Systems  Reports mild constipation, stuffy nose and swollen eyes related to allergies.  The rest of the complete review of systems; constitutional, HEENT, cardiovascular, pulmonary, GI, , musculoskeletal, neuro, skin, endocrine and psych systems are negative, except as otherwise noted.      Objective    /78 (BP Location: Right arm, Patient Position: Sitting, Cuff Size: Adult Regular)   Pulse 85   Temp 98.1  F (36.7  C) (Oral)   Resp 16   Ht 1.575 m (5' 2\")   Wt 77.9 kg (171 lb 11.2 oz)   SpO2 97%   BMI 31.40 kg/m    Body mass index is 31.4 kg/m .  Physical Exam   GENERAL: alert and no distress  NECK: no adenopathy, no asymmetry, masses, or scars  RESP: lungs clear to auscultation - no rales, rhonchi or wheezes  CV: regular rate and rhythm, normal S1 S2, no S3 or S4, no murmur, click or rub, no peripheral edema  MS: no gross musculoskeletal defects noted, trace  edema  SKIN: no suspicious lesions or rashes  NEURO: Normal strength and tone, mentation intact and speech normal  PSYCH: mentation appears normal, affect normal/bright  LYMPH: no cervical, supraclavicular, " adenopathy  Diabetic foot exam: normal DP and PT pulses, no trophic changes or ulcerative lesions, and normal sensory exam            Signed Electronically by: Maria Esther Miranda MD PhD

## 2025-01-29 NOTE — PATIENT INSTRUCTIONS
Continue evenity through August 2025  Get blood work today  Make wellness visit - in May 2025   Colonoscopy in 2027   DXA 3/2026            Evenity     It was a pleasure seeing you in the clinic today. You received an injection of Evenity on 1/29/2025. Evenity injections are administered every 30 days.     Your last Evenity lab work was completed on 11/25/24. Evenity lab work must be completed every 3 months.     Your last Provider visit was on 1/29/25.  Patients need to follow up with their ordering provider every 6 months into therapy.     Next Steps:     Your next Evenity injection is scheduled for 2/28/25.     Your next Evenity lab work must be completed by 2/25/25. Please call [phone], to schedule a lab appointment.    - Lab work needed: Calcium and Creatinine     Your next provider visit must be completed by 7/29/25.

## 2025-01-30 ENCOUNTER — OFFICE VISIT (OUTPATIENT)
Dept: OPHTHALMOLOGY | Facility: CLINIC | Age: 75
End: 2025-01-30
Payer: COMMERCIAL

## 2025-01-30 DIAGNOSIS — H52.4 PRESBYOPIA: ICD-10-CM

## 2025-01-30 DIAGNOSIS — Z79.899 HIGH RISK MEDICATION USE: Primary | ICD-10-CM

## 2025-01-30 DIAGNOSIS — Z96.1 PSEUDOPHAKIA OF BOTH EYES: ICD-10-CM

## 2025-01-30 DIAGNOSIS — H26.493 BILATERAL POSTERIOR CAPSULAR OPACIFICATION: ICD-10-CM

## 2025-01-30 DIAGNOSIS — H52.223 REGULAR ASTIGMATISM OF BOTH EYES: ICD-10-CM

## 2025-01-30 PROCEDURE — 92015 DETERMINE REFRACTIVE STATE: CPT

## 2025-01-30 ASSESSMENT — CONF VISUAL FIELD
OS_SUPERIOR_TEMPORAL_RESTRICTION: 0
OS_INFERIOR_TEMPORAL_RESTRICTION: 0
OD_SUPERIOR_TEMPORAL_RESTRICTION: 0
OS_NORMAL: 1
OD_INFERIOR_TEMPORAL_RESTRICTION: 0
OS_INFERIOR_NASAL_RESTRICTION: 0
OD_SUPERIOR_NASAL_RESTRICTION: 0
OS_SUPERIOR_NASAL_RESTRICTION: 0
OD_NORMAL: 1
METHOD: COUNTING FINGERS
OD_INFERIOR_NASAL_RESTRICTION: 0

## 2025-01-30 ASSESSMENT — REFRACTION_MANIFEST
OD_CYLINDER: +0.75
OS_CYLINDER: +1.00
OD_AXIS: 002
OS_AXIS: 174
OD_ADD: +3.00
OS_SPHERE: PLANO
OD_SPHERE: PLANO
OS_ADD: +3.00

## 2025-01-30 ASSESSMENT — VISUAL ACUITY
METHOD: SNELLEN - LINEAR
OD_SC+: -3
OS_SC: 20/25
OD_SC: 20/25
OS_SC+: -2

## 2025-01-30 ASSESSMENT — EXTERNAL EXAM - LEFT EYE: OS_EXAM: NORMAL

## 2025-01-30 ASSESSMENT — TONOMETRY
IOP_METHOD: TONOPEN
OD_IOP_MMHG: 10
OS_IOP_MMHG: 12

## 2025-01-30 ASSESSMENT — SLIT LAMP EXAM - LIDS
COMMENTS: NORMAL
COMMENTS: NORMAL

## 2025-01-30 ASSESSMENT — CUP TO DISC RATIO
OD_RATIO: 0.2
OS_RATIO: 0.2

## 2025-01-30 ASSESSMENT — EXTERNAL EXAM - RIGHT EYE: OD_EXAM: NORMAL

## 2025-01-30 NOTE — PROGRESS NOTES
History  HPI       Follow Tx Of High Risk Med     Additional comments: 3m follow up - High risk medication use              Comments    Pt is here for follow up for high risk medication use. Pt states vision has been stable, no major changes since last visit. Occasional tender pain on outter corner of eyes. Stable floaters - denies flashes.     Lab Results       Component                Value               Date                       A1C                      6.6                 01/29/2025                 A1C                      6.4                 07/27/2024                 A1C                      6.3                 03/18/2024                 A1C                      6.5                 09/12/2023                 A1C                      6.6                 02/13/2023                 A1C                      6.0                 09/18/2020                 A1C                      6.2                 07/15/2019                 A1C                      6.6                 04/15/2019                 A1C                      6.0                 02/12/2018                 A1C                      6.1                 10/25/2016                 Danelle Rich 1:41 PM  January 30, 2025               Last edited by Danelle Rich on 1/30/2025  1:42 PM.          Assessment/Plan  (Z79.899) High risk medication use  (primary encounter diagnosis)  Plan:   -Patient is using ethambutol  -Requires visual acuity/color discrimination exam every month and fundoscopic exam every 3 months while on treatment.  -DFE WNL each eye  -See Plans Below.     (H26.493) Bilateral posterior capsular opacification  Plan:   -VA 20/25- each eye  -Some visual complaints.   -Discussed consider YAG each eye if struggling with vision. Will Discuss at next visit.  -Monitor.    (Z96.1) Pseudophakia of both eyes  Plan:   -Good Post-operative Appearance  -CE 2023 w/ Dr Adamson in [place]   -Monitor.    (H52.223) Regular astigmatism of both eyes, (H52.4)  Presbyopia  Plan:   -New Glasses Rx Given Today 01/30/2025. Patient interested in bifocal.   -Discussed giving a couple of weeks to get adjusted to new glasses.   -Monitor.    Return to clinic in 3 months for VA, IOP, Color Vision, Dilate    Complete documentation of historical and exam elements from today's encounter can be found in the full encounter summary report (not reduplicated in this progress note). I personally obtained the chief complaint(s) and history of present illness. I confirmed and edited as necessary the review of systems, past medical/surgical history, family history, social history, and examination findings as document by others; and I examined the patient myself. I personally reviewed the relevant tests, images, and reports as documented above. I formulated and edited as necessary the assessment and plan and discussed the findings and management plan with the patient and family.    Mulugeta Srinivasan OD

## 2025-01-30 NOTE — NURSING NOTE
Chief Complaints and History of Present Illnesses   Patient presents with    Follow Tx Of High Risk Med     3m follow up - High risk medication use      Chief Complaint(s) and History of Present Illness(es)       Follow Tx Of High Risk Med              Comments: 3m follow up - High risk medication use               Comments    Pt is here for follow up for high risk medication use. Pt states vision has been stable, no major changes since last visit. Occasional tender pain on outter corner of eyes. Stable floaters - denies flashes.       Lab Results   Component Value Date    A1C 6.6 01/29/2025    A1C 6.4 07/27/2024    A1C 6.3 03/18/2024    A1C 6.5 09/12/2023    A1C 6.6 02/13/2023    A1C 6.0 09/18/2020    A1C 6.2 07/15/2019    A1C 6.6 04/15/2019    A1C 6.0 02/12/2018    A1C 6.1 10/25/2016       Danelle Rich 1:41 PM  January 30, 2025

## 2025-02-18 NOTE — PROGRESS NOTES
AUDIOLOGY REPORT    SUBJECTIVE: Meme Butts is a 75 year old female who was seen on 2/27/25 in the Audiology Clinic at the [facility] for audiologic evaluation, referred by Celia Lala MD.      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    Previous results indicated mild to moderate sensorineural hearing loss in the high frequencies bilaterally.      Today the patient reports that speech seems less clear/more muffled.  She has continued intermittent tinnitus (unsure which ear).  The right ear sometimes feels plugged when she lays on it at night but is clear during day.  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  The patient denies ear pain, drainage from ears, dizziness, falls, or history of ear surgery.      OBJECTIVE:  Otoscopic exam indicated:    RIGHT: Small amount of non-occluding cerumen      LEFT: Small amount of non-occluding cerumen      Pure Tone Thresholds assessed using conventional audiometry with good reliability from 250-8000 Hz bilaterally using insert earphones and circumaural headphones.      RIGHT: Normal hearing sloping to mild to moderate sensorineural hearing loss     LEFT: Normal hearing sloping to mild to moderate sensorineural hearing loss    Stable compared to 10/1/24 and 7/16/24 baseline in both ears.     High Frequency Audiometry was performed from 9,000-16,000 Hz: falls within aged norms bilaterally.  Stable re: 10/1/24 and 7/16/24 baseline bilaterally.     Distortion Product Otoacoustic Emissions (DPOAEs) were performed from 500-10,000 Hz (Titan equipment):    RIGHT: Present from 2321-9195 Hz and  "absent at all other frequencies.  Stable compared to 10/1/24.     LEFT: Present from 8053-4950 Hz and absent at all other frequencies. On 10/1/24 they were present only at 2000 Hz.     Tympanogram:    RIGHT: normal eardrum mobility    LEFT: normal eardrum mobility    Reflexes (reported by stimulus ear):    RIGHT: Ipsilateral: present at normal levels    RIGHT: Contralateral: present at normal levels    LEFT:  Ipsilateral: present at normal levels    LEFT: Contralateral: present at normal levels    Speech Reception Threshold:    RIGHT: 35 dB HL    LEFT: 35 dB HL    Word Recognition Score:     RIGHT: 100% at 75 dB HL using NU-6 recorded word list.    LEFT:  96% at 75 dB HL using NU-6 recorded word list.      ASSESSMENT:     Comparison to Previous Audiogram dated 10/1/24:    Pure Tone Thresholds 250-8000 Hz:    RIGHT: Stable    LEFT: Stable    High Frequency Audiometry 9,000-16,000Hz:     RIGHT: Stable    LEFT: Stable     Word Recognition Score:    RIGHT: Stable     LEFT: Stable    Comparison to Baseline Audiogram dated 7/16/24:    Word Recognition Score:    RIGHT: Stable     LEFT: Stable     High Frequency Audiometry 9,000-16,000Hz:     RIGHT: Stable     LEFT: Stable   *A significant change obtained in high frequency audiometry does not always indicate a shift in the patient's Grade. Please see below for Grading information.       National Cancer [place] Common Terminology Criteria for Adverse Events (CTCAE) Ototoxicity Grades:  *Please note: Grading is based on a 1, 2, 3, 4, 6 and 8 kHz audiogram. No Grade will be assigned for Baseline Audiograms. If criteria is not met for a Grade for subsequent audiograms then the Grade will continue to be considered \"No Grade.\"    GRADE 1: Adults enrolled on a Monitoring  Program: Threshold  shift of 15 - 25 dB averaged at 2  contiguous test frequencies in at  least one ear.  Adults not enrolled in Monitoring  Program: subjective change in  hearing in the absence of  documented " hearing loss.    GRADE 2:Adults enrolled in Monitoring  Program: Threshold  shift of >25 dB averaged at 2  contiguous test frequencies in at  least one ear.  Adults not enrolled in Monitoring  Program: hearing loss but  hearing aid or intervention not  indicated; limiting instrumental  ADL.    GRADE 3:  Adults enrolled in Monitoring  Program: Threshold  shift of >25 dB averaged at 3  contiguous test frequencies in at  least one ear; therapeutic  intervention indicated.  Adults not enrolled in Monitoring  Program: hearing loss with  hearing aid or intervention  indicated; limiting self care ADL.    GRADE 4:  Adults: Decrease in hearing to  profound bilateral loss (absolute  threshold >80 dB HL at 2 kHz  and above); non-servicable  Hearing.    CTCAE4.03 Scale Grade:  *Grading based on comparison to patient's Baseline Audiogram (on a 1, 2, 3, 4, 6 and 8 kHz audiogram) dated 7/16/24    RIGHT: No Grade; Stable   LEFT: No Grade; Stable     PLAN:    Monitor hearing during treatment at intervals advised by Celia Lala MD or sooner if changes.  Previously advised she was a hearing aid candidate if patient interest.     *Post-treatment recommendations: It is recommended that the patient return for a post-treatment evaluation as soon as possible following completion of treatment. Continued monitoring at 6 months and then annually (or sooner should concerns arise) is also recommended.    The patient expressed understanding and agreement with this plan.    Chelsea Arellano, CCC-A, [facility] [place]  Licensed Audiologist  [place] #[id number]      Cc Celia Lala MD

## 2025-02-25 ENCOUNTER — VIRTUAL VISIT (OUTPATIENT)
Dept: PULMONOLOGY | Facility: CLINIC | Age: 75
End: 2025-02-25
Attending: PHYSICIAN ASSISTANT
Payer: COMMERCIAL

## 2025-02-25 DIAGNOSIS — A31.0 MYCOBACTERIUM AVIUM INFECTION (H): Primary | ICD-10-CM

## 2025-02-25 PROCEDURE — 98006 SYNCH AUDIO-VIDEO EST MOD 30: CPT | Performed by: PHYSICIAN ASSISTANT

## 2025-02-25 PROCEDURE — 1125F AMNT PAIN NOTED PAIN PRSNT: CPT | Performed by: PHYSICIAN ASSISTANT

## 2025-02-25 ASSESSMENT — PAIN SCALES - GENERAL: PAINLEVEL_OUTOF10: MODERATE PAIN (6)

## 2025-02-25 NOTE — PROGRESS NOTES
Virtual Visit Details    Type of service:  Video Visit   Video Start Time: 8:30 AM  Video End Time:8:43 AM    Originating Location (pt. Location): Home    Distant Location (provider location):  Off-site  Platform used for Video Visit: Ruben    She had a cold during the time of her CT chest 11/2025, has a repeat CT chest scheduled for tomorrow with ID        LUNG NODULE & INTERVENTIONAL PULMONARY CLINIC  [facility]     Meme Butts MRN# [id number]   Age: 74 year old YOB: 1950     Reason for Consultation: Pulmonary nodules    Requesting Physician: Mari aEsther Miranda MD PhD  [address] [place] [address]       Assessment and Plan:    1. Scattered pulmonary nodules / EDDA / Pseudomonas on BAL  Seen on 3/2023 CT Chest following right  hemicolectomy. Repeat CT Chest 3/2024 showed increase in upper and mid lung predominant centrilobular nodules and bronchiectasis, suggestive of indolent infection such as EDDA. Seen with Dr. Hernandez 3/2024, ordered BAL which was completed 5/21/2024. Cultures + EDDA and Pseudomonas. Was treated with levaquin and referred to ID. Started antibiotics for EDDA ~ June 2024. Cough has since resolved. Negative sputum AFB 11/2024. Slightly increased nodularity in the upper lungs bilaterally, RML and lingula on CT 11/25/2024. She had a URI at the time of this scan. Repeat CT chest scheduled tomorrow, will review.   Addendum- CT chest 2/26/2025 with stable lung nodules  --Continue mgmt per ID    2. Bronchiectasis  Secondary to EDDA as above. Nebulizer with 0.9% saline BID PRN + aerobika and mucinex if recurrent cough.       Since her symptoms are resolved and she is being following closely with ID, she can follow up in nodule clinic PRN    Beth Koroma PA-C  Interventional Pulmonology  Department of Pulmonary, Allergy, Critical Care and Sleep Medicine   [facility]    I spent 30 minutes reviewing patient's records, imaging, and  face-to-face and/or coordinating or discussing care plan on the day of the encounter.           History:     Meme Butts is a 75 year old female with sig h/o for colon cancer s/p hemicolectomy 2/2023 who is here for bronchiectasis and lung nodules    Cough significantly improved after antibiotics for pseudomonas. More recently the cough is resolved on treatment for EDDA.   She had a cold during the time of her CT chest 11/2025, has a repeat CT chest scheduled for tomorrow with ID  No fevers, chills, night sweats.   Can walk go a mile and up a flight of stairs without dyspnea. Goes to the gym 3x per week, does circuit training.  Had pneumonia in her 40s, had to be hospitalized for a week but fully recovered. Lost weight after hemicolectomy but gained some back. Appetite is ok if not coughing. Has fatigue.       - Personal hx of cancer: colon cancer  - FMHx cancer: Dad had colon cancer, mom had breast cancer. Sister had breast cancer and leukemia  - Tobacco hx: Quit smoking 15 years, smoked for 15-20years, 1ppd  - My interpretation of the images relevant for this visit includes: Increased centrilobular nodules   - My interpretation of the PFT's relevant for this visit includes: Normal pulmonary function 3/2024         Allergies:    No Known Allergies       Medications:     Current Outpatient Medications   Medication Sig Dispense Refill    azithromycin (ZITHROMAX) 250 MG tablet Take 1 tablet (250 mg) by mouth daily. Start week 2 30 tablet 11    blood glucose (NO BRAND SPECIFIED) lancets standard Use to test blood sugar 1 time daily or as directed. 50 each 5    blood glucose (NO BRAND SPECIFIED) test strip Use to test blood sugar once per day or as directed. 50 strip 5    blood glucose monitoring (ONE TOUCH ULTRA 2) meter device kit USE TO TEST BLOOD SUGAR ONCE PER DAY OR AS DIRECTED. Use brand compatible with patients insurance and device 1 kit 1    calcium-magnesium-vitamin D 500-250-125 MG-MG-UNIT TABS Take 1  tablet by mouth daily      carboxymethylcellulose (REFRESH PLUS) 0.5 % SOLN ophthalmic solution 1 drop 3 times daily as needed for dry eyes      ethambutol (MYAMBUTOL) 400 MG tablet Take 3 tablets (1,200 mg) by mouth daily. Start week 3 90 tablet 11    ibuprofen (ADVIL/MOTRIN) 400 MG tablet Take 400 mg by mouth every 6 hours as needed for moderate pain      levETIRAcetam (KEPPRA) 750 MG tablet PT REPORTS TAPERING DOWN: TAKE 1 & 1/2 TABS IN THE MORNING, AND 2 TABS IN THE EVENING. (Patient taking differently: Take 1,125 mg by mouth 2 times daily.) 105 tablet 3    loratadine (CLARITIN) 10 MG tablet Take 10 mg by mouth daily      mometasone (NASONEX) 50 MCG/ACT nasal spray Spray 2 sprays into both nostrils daily 17 g 1    Multiple Vitamins-Minerals (CENTRUM SILVER) per tablet Take 1 tablet by mouth every morning      psyllium (METAMUCIL) 58.6 % packet Take 1 packet by mouth daily as needed for constipation      rifampin (RIFADIN) 300 MG capsule Take 2 capsules (600 mg) by mouth daily. Start week 1 60 capsule 11    sodium chloride 0.9 % neb solution Take 3 mLs by nebulization 2 times daily 540 mL 3    diclofenac (VOLTAREN) 1 % topical gel APPLY 2 GM TOPICALLY 4 TIMES DAILY AS NEEDED FOR MODERATE PAIN. (Patient not taking: Reported on 2/25/2025) 100 g 2    rosuvastatin (CRESTOR) 5 MG tablet TAKE 1 TABLET BY MOUTH EVERY DAY 90 tablet 1     Current Facility-Administered Medications   Medication Dose Route Frequency Provider Last Rate Last Admin    romosozumab-aqqg (EVENITY) injection 210 mg  210 mg Subcutaneous Q30 Days    210 mg at 02/28/25 1038            Physical Exam:   There were no vitals taken for this visit.  Wt Readings from Last 4 Encounters:   01/29/25 77.9 kg (171 lb 11.2 oz)   12/04/24 76.7 kg (169 lb)   09/18/24 77.2 kg (170 lb 4 oz)   08/28/24 76.9 kg (169 lb 9.6 oz)     General: Well appearing  Lungs: Nonlabored breathing  Neuro: Answering questions appropriately  Psych: Normal affect     Imaging/Lab Data    All laboratory and imaging data reviewed.

## 2025-02-25 NOTE — NURSING NOTE
Current patient location: [address] [facility] [address]    Is the patient currently in the state of [place]? YES    Visit mode: VIDEO    If the visit is dropped, the patient can be reconnected by:VIDEO VISIT: Text to cell phone:   Telephone Information:   Mobile [phone]       Will anyone else be joining the visit? NO  (If patient encounters technical issues they should call [phone]    Are changes needed to the allergy or medication list? Pt stated no changes to allergies and Pt stated no med changes PT states ins no longer covers Voltaren gel so she wont be using that anymore    Are refills needed on medications prescribed by this physician? Discuss with provider    Rooming Documentation:  Questionnaire(s) completed    Reason for visit: RECHECK    Tyler BOCANEGRA

## 2025-02-25 NOTE — LETTER
2/25/2025      Meme Butts  [address] [facility] [address]      Dear Colleague,    Thank you for referring your patient, Meme Butts, to the [facility] [place]. Please see a copy of my visit note below.    Virtual Visit Details    Type of service:  Video Visit   Video Start Time: 8:30 AM  Video End Time:8:43 AM    Originating Location (pt. Location): Home    Distant Location (provider location):  Off-site  Platform used for Video Visit: AmandaWell    She had a cold during the time of her CT chest 11/2025, has a repeat CT chest scheduled for tomorrow with ID        LUNG NODULE & INTERVENTIONAL PULMONARY CLINIC  [facility]     Meme Butts MRN# [id number]   Age: 74 year old YOB: 1950     Reason for Consultation: Pulmonary nodules    Requesting Physician: Maria Esther Miranda MD PhD  [address] [place] [address]       Assessment and Plan:    1. Scattered pulmonary nodules / EDDA / Pseudomonas on BAL  Seen on 3/2023 CT Chest following right  hemicolectomy. Repeat CT Chest 3/2024 showed increase in upper and mid lung predominant centrilobular nodules and bronchiectasis, suggestive of indolent infection such as EDDA. Seen with Dr. Hernandez 3/2024, ordered BAL which was completed 5/21/2024. Cultures + EDDA and Pseudomonas. Was treated with levaquin and referred to ID. Started antibiotics for EDDA ~ June 2024. Cough has since resolved. Negative sputum AFB 11/2024. Slightly increased nodularity in the upper lungs bilaterally, RML and lingula on CT 11/25/2024. She had a URI at the time of this scan. Repeat CT chest scheduled tomorrow, will review.   Addendum- CT chest 2/26/2025 with stable lung nodules  --Continue mgmt per ID    2. Bronchiectasis  Secondary to EDDA as above. Nebulizer with 0.9% saline BID PRN + aerobika and mucinex if recurrent cough.       Since her symptoms are resolved and she is being following closely with ID, she can  follow up in nodule clinic KUSH Koroma PA-C  Interventional Pulmonology  Department of Pulmonary, Allergy, Critical Care and Sleep Medicine   [facility]    I spent 30 minutes reviewing patient's records, imaging, and face-to-face and/or coordinating or discussing care plan on the day of the encounter.           History:     Meme Butts is a 75 year old female with sig h/o for colon cancer s/p hemicolectomy 2/2023 who is here for bronchiectasis and lung nodules    Cough significantly improved after antibiotics for pseudomonas. More recently the cough is resolved on treatment for EDDA.   She had a cold during the time of her CT chest 11/2025, has a repeat CT chest scheduled for tomorrow with ID  No fevers, chills, night sweats.   Can walk go a mile and up a flight of stairs without dyspnea. Goes to the gym 3x per week, does circuit training.  Had pneumonia in her 40s, had to be hospitalized for a week but fully recovered. Lost weight after hemicolectomy but gained some back. Appetite is ok if not coughing. Has fatigue.       - Personal hx of cancer: colon cancer  - FMHx cancer: Dad had colon cancer, mom had breast cancer. Sister had breast cancer and leukemia  - Tobacco hx: Quit smoking 15 years, smoked for 15-20years, 1ppd  - My interpretation of the images relevant for this visit includes: Increased centrilobular nodules   - My interpretation of the PFT's relevant for this visit includes: Normal pulmonary function 3/2024         Allergies:    No Known Allergies       Medications:     Current Outpatient Medications   Medication Sig Dispense Refill     azithromycin (ZITHROMAX) 250 MG tablet Take 1 tablet (250 mg) by mouth daily. Start week 2 30 tablet 11     blood glucose (NO BRAND SPECIFIED) lancets standard Use to test blood sugar 1 time daily or as directed. 50 each 5     blood glucose (NO BRAND SPECIFIED) test strip Use to test blood sugar once per day or as directed. 50 strip  5     blood glucose monitoring (ONE TOUCH ULTRA 2) meter device kit USE TO TEST BLOOD SUGAR ONCE PER DAY OR AS DIRECTED. Use brand compatible with patients insurance and device 1 kit 1     calcium-magnesium-vitamin D 500-250-125 MG-MG-UNIT TABS Take 1 tablet by mouth daily       carboxymethylcellulose (REFRESH PLUS) 0.5 % SOLN ophthalmic solution 1 drop 3 times daily as needed for dry eyes       ethambutol (MYAMBUTOL) 400 MG tablet Take 3 tablets (1,200 mg) by mouth daily. Start week 3 90 tablet 11     ibuprofen (ADVIL/MOTRIN) 400 MG tablet Take 400 mg by mouth every 6 hours as needed for moderate pain       levETIRAcetam (KEPPRA) 750 MG tablet PT REPORTS TAPERING DOWN: TAKE 1 & 1/2 TABS IN THE MORNING, AND 2 TABS IN THE EVENING. (Patient taking differently: Take 1,125 mg by mouth 2 times daily.) 105 tablet 3     loratadine (CLARITIN) 10 MG tablet Take 10 mg by mouth daily       mometasone (NASONEX) 50 MCG/ACT nasal spray Spray 2 sprays into both nostrils daily 17 g 1     Multiple Vitamins-Minerals (CENTRUM SILVER) per tablet Take 1 tablet by mouth every morning       psyllium (METAMUCIL) 58.6 % packet Take 1 packet by mouth daily as needed for constipation       rifampin (RIFADIN) 300 MG capsule Take 2 capsules (600 mg) by mouth daily. Start week 1 60 capsule 11     sodium chloride 0.9 % neb solution Take 3 mLs by nebulization 2 times daily 540 mL 3     diclofenac (VOLTAREN) 1 % topical gel APPLY 2 GM TOPICALLY 4 TIMES DAILY AS NEEDED FOR MODERATE PAIN. (Patient not taking: Reported on 2/25/2025) 100 g 2     rosuvastatin (CRESTOR) 5 MG tablet TAKE 1 TABLET BY MOUTH EVERY DAY 90 tablet 1     Current Facility-Administered Medications   Medication Dose Route Frequency Provider Last Rate Last Admin     romosozumab-aqqg (EVENITY) injection 210 mg  210 mg Subcutaneous Q30 Days    210 mg at 02/28/25 1038            Physical Exam:   There were no vitals taken for this visit.  Wt Readings from Last 4 Encounters:   01/29/25  77.9 kg (171 lb 11.2 oz)   12/04/24 76.7 kg (169 lb)   09/18/24 77.2 kg (170 lb 4 oz)   08/28/24 76.9 kg (169 lb 9.6 oz)     General: Well appearing  Lungs: Nonlabored breathing  Neuro: Answering questions appropriately  Psych: Normal affect     Imaging/Lab Data   All laboratory and imaging data reviewed.                 Again, thank you for allowing me to participate in the care of your patient.        Sincerely,        Beth Koroma PA-C    Electronically signed

## 2025-02-26 ENCOUNTER — LAB (OUTPATIENT)
Dept: LAB | Facility: CLINIC | Age: 75
End: 2025-02-26
Attending: INTERNAL MEDICINE
Payer: COMMERCIAL

## 2025-02-26 DIAGNOSIS — A31.0 MAI (MYCOBACTERIUM AVIUM-INTRACELLULARE) (H): ICD-10-CM

## 2025-02-26 DIAGNOSIS — E78.5 HYPERLIPIDEMIA LDL GOAL <100: ICD-10-CM

## 2025-02-26 DIAGNOSIS — A31.0 MYCOBACTERIUM AVIUM INFECTION (H): ICD-10-CM

## 2025-02-26 LAB
ALBUMIN SERPL BCG-MCNC: 4.2 G/DL (ref 3.5–5.2)
ALP SERPL-CCNC: 116 U/L (ref 40–150)
ALT SERPL W P-5'-P-CCNC: 38 U/L (ref 0–50)
ANION GAP SERPL CALCULATED.3IONS-SCNC: 12 MMOL/L (ref 7–15)
AST SERPL W P-5'-P-CCNC: 35 U/L (ref 0–45)
BASOPHILS # BLD AUTO: 0 10E3/UL (ref 0–0.2)
BASOPHILS NFR BLD AUTO: 1 %
BILIRUB SERPL-MCNC: 0.6 MG/DL
BUN SERPL-MCNC: 17 MG/DL (ref 8–23)
CALCIUM SERPL-MCNC: 9.4 MG/DL (ref 8.8–10.4)
CHLORIDE SERPL-SCNC: 104 MMOL/L (ref 98–107)
CREAT SERPL-MCNC: 0.66 MG/DL (ref 0.51–0.95)
EGFRCR SERPLBLD CKD-EPI 2021: >90 ML/MIN/1.73M2
EOSINOPHIL # BLD AUTO: 0.1 10E3/UL (ref 0–0.7)
EOSINOPHIL NFR BLD AUTO: 2 %
ERYTHROCYTE [DISTWIDTH] IN BLOOD BY AUTOMATED COUNT: 11.9 % (ref 10–15)
GLUCOSE SERPL-MCNC: 112 MG/DL (ref 70–99)
HCO3 SERPL-SCNC: 25 MMOL/L (ref 22–29)
HCT VFR BLD AUTO: 38.8 % (ref 35–47)
HGB BLD-MCNC: 13 G/DL (ref 11.7–15.7)
IMM GRANULOCYTES # BLD: 0 10E3/UL
IMM GRANULOCYTES NFR BLD: 0 %
LYMPHOCYTES # BLD AUTO: 1.5 10E3/UL (ref 0.8–5.3)
LYMPHOCYTES NFR BLD AUTO: 29 %
MCH RBC QN AUTO: 30.8 PG (ref 26.5–33)
MCHC RBC AUTO-ENTMCNC: 33.5 G/DL (ref 31.5–36.5)
MCV RBC AUTO: 92 FL (ref 78–100)
MONOCYTES # BLD AUTO: 0.6 10E3/UL (ref 0–1.3)
MONOCYTES NFR BLD AUTO: 12 %
NEUTROPHILS # BLD AUTO: 3 10E3/UL (ref 1.6–8.3)
NEUTROPHILS NFR BLD AUTO: 57 %
NRBC # BLD AUTO: 0 10E3/UL
NRBC BLD AUTO-RTO: 0 /100
PLATELET # BLD AUTO: 233 10E3/UL (ref 150–450)
POTASSIUM SERPL-SCNC: 4.5 MMOL/L (ref 3.4–5.3)
PROT SERPL-MCNC: 7.5 G/DL (ref 6.4–8.3)
RBC # BLD AUTO: 4.22 10E6/UL (ref 3.8–5.2)
SODIUM SERPL-SCNC: 141 MMOL/L (ref 135–145)
WBC # BLD AUTO: 5.2 10E3/UL (ref 4–11)

## 2025-02-26 PROCEDURE — 85025 COMPLETE CBC W/AUTO DIFF WBC: CPT | Performed by: PATHOLOGY

## 2025-02-26 PROCEDURE — 80053 COMPREHEN METABOLIC PANEL: CPT | Performed by: PATHOLOGY

## 2025-02-26 PROCEDURE — 36415 COLL VENOUS BLD VENIPUNCTURE: CPT | Performed by: PATHOLOGY

## 2025-02-26 RX ORDER — ROSUVASTATIN CALCIUM 5 MG/1
5 TABLET, COATED ORAL DAILY
Qty: 90 TABLET | Refills: 1 | Status: SHIPPED | OUTPATIENT
Start: 2025-02-26

## 2025-02-27 ENCOUNTER — DOCUMENTATION ONLY (OUTPATIENT)
Dept: INFECTIOUS DISEASES | Facility: CLINIC | Age: 75
End: 2025-02-27

## 2025-02-27 ENCOUNTER — OFFICE VISIT (OUTPATIENT)
Dept: AUDIOLOGY | Facility: CLINIC | Age: 75
End: 2025-02-27
Attending: INTERNAL MEDICINE
Payer: COMMERCIAL

## 2025-02-27 DIAGNOSIS — A31.0 MAI (MYCOBACTERIUM AVIUM-INTRACELLULARE) (H): ICD-10-CM

## 2025-02-27 DIAGNOSIS — H90.3 SENSORINEURAL HEARING LOSS (SNHL) OF BOTH EARS: Primary | ICD-10-CM

## 2025-02-27 NOTE — Clinical Note
Hearing is stable.  Please let us know or the patient know at what interval you would like next testing.   Thanks,  Chelsea Arellano, CCC-A, [facility] Licensed Audiologist [place] #[id number]

## 2025-02-27 NOTE — PROGRESS NOTES
Brief note:    Labs from 2/26/25 showed normal CBC, normal creatinine and normal LFTs.     CT chest (2/26/25):  Continued probable postinflammatory/postinfectious changes right middle lobe and lingula especially with the right upper lobe probable postinflammatory/infectious nodularity also unchanged.      I informed the results above to the patient. Follow up with me scheduled for 3/26/25.

## 2025-03-04 ENCOUNTER — TELEPHONE (OUTPATIENT)
Dept: AUDIOLOGY | Facility: CLINIC | Age: 75
End: 2025-03-04
Payer: COMMERCIAL

## 2025-03-04 NOTE — TELEPHONE ENCOUNTER
Spoke with patient regarding scheduling an OTOTOXIC Audio again 3 months as requested by referring provider. Scheduled patient accordingly and [facility] Per [facility]

## 2025-03-18 ENCOUNTER — MYC MEDICAL ADVICE (OUTPATIENT)
Dept: INFECTIOUS DISEASES | Facility: CLINIC | Age: 75
End: 2025-03-18
Payer: COMMERCIAL

## 2025-03-18 NOTE — TELEPHONE ENCOUNTER
Sent patient a my chart message with the following...    Please have your labs drawn before your appointment with Dr. Robison. She has standing orders for you to do.    Chloe Vega, CMA

## 2025-03-24 NOTE — PROGRESS NOTES
"   GENERAL [facility] CLINIC           Assessment and Recommendations:   Problem List:    # EDDA pulmonary infection     Discussion:     Mmee Butts is a 74 year old female with PMHx of colon cancer (adenocarcinoma of hepatic flexure of colon and family history of BRCA1) diagnosed by screening colonoscopy on 1/10/2023 s/p robotic right hemicolectomy and appendectomy on 2/28/2023 who was referred to our clinic by Dr. Shelby Hernandez (pulmonology) because lung nodule(s) and BAL culture positive for EDDA. She also has PMHx of migraines, seizures, osteoporosis, obesity, prediabetes (Hb A1C 6.3% from 3/18/24), history of GI bleed. Patient states she has persistent cough started 2 years ago. She initially though it was due to sinus disease and post nasal drip. infection. She states that the cough would be persistent and would happen all day. It was mostly dry but she would have some greenish sputum at times. Cough was so often that it would keep her awake. She started claritin with some improvement. She also has SOB started around the same time of the cough. Activities that would lead to SOB included going up and down stairs. She still keeps herself active and cleans the house, gardens and does yard work. She denies weight loss.     Per my review, the nodules were first identified on CT chest from 12/29/22 which showed upper lobe micronodules and bronchiectasis (bilaterally). Follow up CT chest from 3/3/2023 showed same nodules in addition to at least one new 9 mm nodule in the left upper lobe. The most recent CT chest from 3/28/2024 showed: \"Centrilobular and tree-in-bud nodularity, slightly increased associated with bronchiectasis greatest in the right middle lobe, lingula and lower lobes; findings suggestive of atypical indolent infection such as nonclassic nontuberculous mycobacteria\". Patient underwent PFT study on 3/2024 and, per Dr. Hernandez's interpretation, it was normal. Bronchoscopy performed on 5/21/24. BAL studies " "showed: cell count 760 with 56# neutrophils, 12% lymphocytes and 32% macrophages/monocytes. 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  Fungal culture is negative to date. Aspergillus galactomannan negative, Nocardia PCR negative, Nocardia culture negative. 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 Cytology was negative for malignancy and negative for fungal organisms or pneumocystis on GMS stain. Furthermore, the viral cytopathic effect is absent.  Given presence of EDDA on BAL as well as clinical and radiologic criteria, we started EDDA treatment with Rifampin 600 mg once daily - started 7/27; Azithromycin 250 mg once daily - started 8/3 and Ethambutol 1,200 mg (15.6 mg/kg) once daily - started 8/10.     Today's visit: She is tolerating the medications well. CBC and CMP from 8/28, 9/16, 10/23, 11/25 and 2/26 are normal. CT chest (2/26/25) showed \"Continued probable postinflammatory/postinfectious changes right middle lobe and lingula especially with the right upper lobe probable postinflammatory/infectious nodularity also unchanged\". She tells me she that her cough resolved after initiation of EDDA treatment, however around 2 weeks ago she stated to have congestion, frontal sinus pressure and mostly dry cough with eventual white sputum. She also reports that in the beginning she had some right ear discomfort in the morning without drainage. No fever or chills. She is updated with COVID, flu and she also received RSV vaccine in October 2024. Symptoms seem to be slightly better but she still has cough. Symptoms seem to be more upper respiratory. Lung exam normal. I obtained COVID/flu/RVS PCR today and it was negative as well as respiratory panel. CBC obtained today was normal. CMP with normal creatinine. LFTs with minimally elevated ALT (55) but other Lfts were normal. Sputum bacterial culture could not be performed because the sample was more " suggestive of contamination. AFB sputum sent also for completeness, but she has cleared the sputum on 11/25/24.           Recommendations:     Continue azithromycin + ethambutol +  rifampin for EDDA treatment (started on 7/27). Duration will be 1 year from first negative AFB sputum after treatment - end date planned to be at least 11/25/25          - Patient is most like not a good candidate for aminoglycoside given her baseline hearing impairment.       Next audiogram scheduled for 3/28/25     Patient is followed by ophthalmology (Dr. Srinivasan). She will need continued ophthalmology visits for visual acuity and color discrimination exam every month and fundoscopic exam every 3 months       - Lats visit on 1/30/25 and next visit scheduled for 4/29/25     She will need a CT chest 3 months from initiation of EDDA treatment (end of May 2025). Already scheduled for May 28     Our clinic pharmacist will continue to co-manage with me      Continued follow up with pulmonology     She will need monthly labs (CBC and CMP). Placed standing orders.      Patient had an EKG on 9/18 and it showed a normal QTc (431)     AFB sputum stain from 11/25 is negative. For completeness I obtained a new AFB sputum today. Will follow     Next follow up with me in May      PS.: Acute respiratory symptoms seems to be mostly viral and upper respiratory. I obtained COVID/flu/RVS PCR today and it was negative as well as respiratory panel. CBC obtained today was normal. CMP with normal creatinine. LFTs with minimally elevated ALT (55) but other Lfts were normal. Sputum bacterial culture could not be performed because the sample was more suggestive of contamination. AFB sputum sent also for completeness, but she has cleared the sputum on 11/25/24. I instructed the patient to continue to update me on the symptoms. If symptoms do not continue to improve in the next few days or if they get worse I will consider treating for bacterial respiratory  "infection.          Recommendations discussed with the patient.      Celia Robison MD  Date of Service : 03/27/25     On 03/27/25, I spent 40 min with chart review, patient visit, documentation and coordination of care.           History of Present Illness:   Meme Butts is a 74 year old female with PMHx of colon cancer (adenocarcinoma of hepatic flexure of colon and family history of BRCA1) diagnosed by screening colonoscopy on 1/10/2023 s/p robotic right hemicolectomy and appendectomy on 2/28/2023 who was referred to our clinic by Dr. Shelby Hernandez (pulmonology) because lung nodule(s) and BAL culture positive for EDDA. She also has PMHx of migraines, seizures, osteoporosis, obesity, prediabetes (Hb A1C 6.3% from 3/18/24), history of GI bleed.     Patient states she has persistent cough started 2 years ago. She initially though it was due to sinus disease and post nasal drip. infection. She states that the cough would be persistent and would happen all day. It was mostly dry but she would have some greenish sputum at times. Cough was so often that it would keep her awake. She started claritin with some improvement. She also has SOB started around the same time of the cough. Activities that would lead to SOB included going up and down stairs. She still keeps herself active and cleans the house, gardens and does yard work. She denies weight loss.     Per my review, the nodules were first identified on CT chest from 12/29/22 which showed upper lobe micronodules and bronchiectasis (bilaterally). Follow up CT chest from 3/3/2023 showed same nodules in addition to at least one new 9 mm nodule in the left upper lobe. The most recent CT chest from 3/28/2024 showed: \"Centrilobular and tree-in-bud nodularity, slightly increased associated with bronchiectasis greatest in the right middle lobe, lingula and lower lobes; findings suggestive of atypical indolent infection such as nonclassic nontuberculous mycobacteria\". " "Patient underwent PFT study on 3/2024 and, per Dr. Hernandez's interpretation, it was normal. Bronchoscopy performed on 5/21/24. BAL studies showed: cell count 760 with 56# neutrophils, 12% lymphocytes and 32% macrophages/monocytes. 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  Fungal culture is negative to date. Aspergillus galactomannan negative, Nocardia PCR negative, Nocardia culture negative. AFB culture positive for Mycobacterium avium intracellulare pan-susceptible except for being intermediate to linezolid. Cytology was negative for malignancy and negative for fungal organisms or pneumocystis on GMS stain. Furthermore, the viral cytopathic effect is absent. Patient did not have fever or chills but she has persistent cough with intermittent greenish sputum production. She was prescribed 10 days of levofloxacin by her pulmonologist.     Labs from 6/21/24: CBC normal, Kidney and liver function normal, HIV negative, Hep B surface antigen: negative, Hepatitis C serology: negative Quantiferon negative, EKG from 6/18 showed normal QTc (408). Given presence of EDDA on BAL as well as clinical and radiologic criteria, we started EDDA treatment with Rifampin 600 mg once daily - started 7/27; Azithromycin 250 mg once daily - started 8/3 and Ethambutol 1,200 mg (15.6 mg/kg) once daily - started 8/10.    CT chest from 11/25: \"Oval-shaped nodule in the medial right apex slightly increased in short axis thickness an more uniformly dense measuring 11 x 4 mm. Other nodular densities in the right upper lobe appear similar as well as in the left upper lobe including a cavitary subpleural nodule. Right middle lobe focal bronchiectasis and consolidation is grossly unchanged. Other more inferior lingular bronchiectasis and atelectasis/consolidation however is increased. Lower lobes appear spared. Incidental unchanged 1-2 mm left lower lobe nodule (4/243). Major central airways are nicely patent\". Patient states " "that she had a cold at the time she had the CT and she was having productive cough. Now the respiratory symptoms are resolved. She continued to improve clinically.     Today's visit:     She is tolerating the medications well. CBC and CMP from 8/28, 9/16, 10/23, 11/25 and 2/26 are normal. CT chest (2/26/25) showed   \"Continued probable postinflammatory/postinfectious changes right middle lobe and lingula especially with the right upper lobe probable postinflammatory/infectious nodularity also unchanged\". She tells me she that her cough resolved after initiation of EDDA treatment, however around 2 weeks ago she stated to have congestion, frontal sinus pressure and mostly dry cough with eventual white sputum. She also reports that in the beginning she had some right ear discomfort in the morning without drainage. No fever or chills. She is updated with COVID, flu and she also received RSV vaccine in October 2024. Symptoms seem to be slightly better but she still has cough. Symptoms seem to be more upper respiratory. Lung exam normal. Obtain COVID/flu/RVS PCR and it was negative as well as respiratory panel. CBC obtained today was normal. CMP with normal creatinine. LFTs with minimally elevated ALT (55) but other Lfts were normal. Sputum bacterial culture could not be performed because the sample was more suggestive of contamination. AFB sputum sent also for completeness, but she has cleared the sputum on 11/25/24.           Review of Systems:     CONSTITUTIONAL:  No fevers or chills  INTEGUMENTARY/SKIN: NEGATIVE for worrisome rashes, moles or lesions  EYES: Negative for icterus, vision changes or irritation  ENT/MOUTH:  Cough improved  RESPIRATORY: See HPI  CARDIOVASCULAR:  Negative for chest pain, palpitations and  shortness of breath  GASTROINTESTINAL:  Negative for abdominal pain, nausea, vomiting, diarrhea and constipation  GENITOURINARY:  Negative for dysuria, hematuria, frequency and urgency  MUSCULOSKELETAL: " "Negative for joint pain, swelling, motion restriction, negative for musculoskeletal pain  NEURO:  Negative for headache, altered mental status, numbness or weakness  PSYCHIATRIC: Negative for changes in mood or affect  HEMATOLOGIC/LYMPHATIC: negative for lymphadenopathy or bleeding  ALLERGIC/IMMUNOLOGIC: Negative for allergic reaction   ENDOCRINE: Negative for temperature intolerance, skin/hair changes        Past Medical History:     Past Medical History:   Diagnosis Date    Adenomatous colon polyp     tubular adenoma    At high risk for breast cancer 2017    35.4% lifetime risk by Philip model    Epilepsy (H)     Fracture of metatarsal bone of left foot 2014    Fracture, radius     and ulnar styloid fracture    GIB (gastrointestinal bleeding) 2011    Intractable chronic migraine without aura     Kidney stones ,     during pregnancy    Malignant neoplasm of hepatic flexure (H)     Migraine     Osteoporosis     T score -4.1    Pneumonia     Prediabetes         Surgery R wrist after a fracture post fall - has hardware ()  Had cataract surgery in 2023      Allergies:       No Known Allergies          Family History:     Family History   Problem Relation Age of Onset    Breast Cancer Mother 30        lumpectomy and chemo; also \"mass in ovary\"    Colon Polyps Father     Coronary Artery Disease Brother     Colon Polyps Brother     Coronary Artery Disease Early Onset Brother 50    Prostate Cancer Brother     Osteoporosis Sister     Breast Cancer Sister 55    Leukemia Sister 52    Gastrointestinal Disease Son         intestinal transplant    Diabetes Maternal Aunt         multiple mat aunts    Ovarian Cancer Maternal Aunt 50         of ovarian cancer in 50s or 60s, unknown    Breast Cancer Paternal Aunt 36         of breast cancer recurrence in 60s    Bone Cancer Niece 19    Prostate Cancer Cousin 65    Breast Cancer Cousin 30        paternal cousin, BRCA1+ by report    " Breast Cancer Cousin 40        paternal cousin, BRCA1+ by report    Breast Cancer Cousin 35        paternal cousin, BRCA1+ by report    Genetic Disorder Cousin         paternal male cousin, BRCA1+ by report    Ovarian Cancer Cousin         paternal cousin, BRCA1+ by report    Anesthesia Reaction No family hx of     Venous thrombosis No family hx of     Glaucoma No family hx of     Macular Degeneration No family hx of     Eye Surgery No family hx of     Retinal detachment No family hx of              Social History:     Social History     Socioeconomic History    Marital status:      Spouse name: Quentin    Number of children: 3    Years of education: Not on file    Highest education level: Not on file   Occupational History    Occupation:      Employer: RETIRED   Tobacco Use    Smoking status: Former     Current packs/day: 0.00     Average packs/day: 1 pack/day for 15.0 years (15.0 ttl pk-yrs)     Types: Cigarettes     Start date: 8/17/1994     Quit date: 8/17/2009     Years since quitting: 15.6    Smokeless tobacco: Never    Tobacco comments:     On and off for a total of 10 years of smoking    Vaping Use    Vaping status: Never Used   Substance and Sexual Activity    Alcohol use: Yes     Comment: 1x/month    Drug use: No    Sexual activity: Not Currently     Partners: Male     Birth control/protection: Post-menopausal   Other Topics Concern    Parent/sibling w/ CABG, MI or angioplasty before 65F 55M? Not Asked   Social History Narrative    Lives in a house, has  3 children,       lost 1 child, retired      Social Drivers of Health     Financial Resource Strain: Low Risk  (7/25/2024)    Financial Resource Strain     Within the past 12 months, have you or your family members you live with been unable to get utilities (heat, electricity) when it was really needed?: No   Food Insecurity: Low Risk  (7/25/2024)    Food Insecurity     Within the past 12 months, did you worry that  your food would run out before you got money to buy more?: No     Within the past 12 months, did the food you bought just not last and you didn t have money to get more?: No   Transportation Needs: Low Risk  (7/25/2024)    Transportation Needs     Within the past 12 months, has lack of transportation kept you from medical appointments, getting your medicines, non-medical meetings or appointments, work, or from getting things that you need?: No   Physical Activity: Unknown (7/25/2024)    Exercise Vital Sign     Days of Exercise per Week: Patient declined     Minutes of Exercise per Session: 20 min   Stress: No Stress Concern Present (7/25/2024)    [employer] - Occupational Stress Questionnaire     Feeling of Stress : Not at all   Social Connections: Unknown (7/25/2024)    Social Connection and Isolation Panel [NHANES]     Frequency of Communication with Friends and Family: Not on file     Frequency of Social Gatherings with Friends and Family: Three times a week     Attends Alevism Services: Not on file     Active Member of Clubs or Organizations: Not on file     Attends Club or Organization Meetings: Not on file     Marital Status: Not on file   Interpersonal Safety: Low Risk  (8/28/2024)    Interpersonal Safety     Do you feel physically and emotionally safe where you currently live?: Yes     Within the past 12 months, have you been hit, slapped, kicked or otherwise physically hurt by someone?: No     Within the past 12 months, have you been humiliated or emotionally abused in other ways by your partner or ex-partner?: No   Housing Stability: Low Risk  (7/25/2024)    Housing Stability     Do you have housing? : Yes     Are you worried about losing your housing?: No      HOME/ENVIRONMENT:   Lives with her daughter (48 years old)  Has another daughter 51 years old  Son passed - diverticulitis -> had surgery and went back with removal of small intestine - underwent small bowel  "transplant - 1 year after transplant he passed  Patient is      OCCUPATION:   Administration - retired     TRAVEL:   Travel across [place] 15 years ago  Originally from [place]     ANIMALS:   One Dog and one cat     TUBERCULOSIS:   Father and and brother were treated for TB 15 years ago. She states she had a quantiferon performed and it was negative.      TOBACCO/ALCOHOL/RECREATIONAL DRUGS:   Smoked 10 years (1 pack a day) - quit 15 years ago  Glass of wine once a week  No drugs           Physical Exam:   /75   Pulse 95   Temp 98.1  F (36.7  C) (Oral)   Ht 1.575 m (5' 2\")   Wt 76.7 kg (169 lb)   SpO2 96%   BMI 30.91 kg/m       Exam:  GENERAL:  Well-developed, well-nourished, not in acute distress.   HEAD: Normocephalic and atraumatic  ENT:  No hearing impairment, oropharynx clear, oral mucous membranes moist  EYES:  Eyes grossly normal to inspection  LUNGS:  Clear to auscultation - no rales, rhonchi or wheezes  CARDIOVASCULAR:  Regular rate and rhythm, normal S1 S2, no murmur  ABDOMEN:  Soft, non-tender  EXT: Extremities warm and without edema.  MS: No gross musculoskeletal defects noted, no edema  SKIN:  No acute rashes or suspicious lesions  NEUROLOGIC:  Grossly nonfocal. Normal strength and tone, mentation intact and speech normal  PSYCHIATRIC: Mood stable, mentation appears normal, affect normal            "

## 2025-03-25 NOTE — PROGRESS NOTES
Meme is a  75 year old female post menopausal] [GR3, P3] that presents today for osteoporosis follow up patient was last seen 8/2024. She was  on alendronate 4-5 yrs - then started prolia - got a shot in 2022 and not told to return in 6 months so never had another shot  then started evenity 9/2024 and last shots were 2/2025 (6th shots). No side effects.     Referring Physician: Ruben CHARLTON     Have you ever had a bone density test? Yes  Where = [facility]  When = 3/2024  Lumbar spine (L2-L4)  T-score -2.7 , BMD 0.875 g/cm2.      Left Femoral  neck  T-score -2.6 , BMD 0.679g/cm2.  Left Total hip  T-score -2.1 , BMD 0.745 g/cm2.     Right Femoral neck  T-score -2.4, BMD  0.704 g/cm2.  Right Total hip  T-score -1.8 , BMD  0.777 g/cm2.      Interval change  Bone density compared to the previous study (2021) has changed at mean total bilateral hips by +0.1%.     Have you received any x-ray dye or contrast in the last ten days? No  How many servings of dairy products do you consume per day? 2 Type: milk; cheese, cottage cheese   Do you take a multi-vitamin daily? Yes has calcium   Do you take a vitamin D supplement? No  Do you take a calcium supplement daily?  Calcium citrate  - takes 1/day   Do you take a supplement containing strontium? No  Are you exposed to natural sunlight at least 20 minutes three times a week? Yes     Social History   reports that she quit smoking about 15 years ago. Her smoking use included cigarettes. She started smoking about 30 years ago. She has a 15 pack-year smoking history. She has never used smokeless tobacco. She reports current alcohol use. She reports that she does not use drugs.  Do you smoke cigarettes? Reformed smoker   Do you exercise? Yes. Details: walking  3mi every other day  previously at gym  -  elliptical 30 min 3x/wk  - circiut training 3x/wk   Do you drink alcohol? Yes. Details: 1-2 drinks/month     Medication History  Have you used any of the following  medications?   Actonel (Risedronate): No   Aredia (Pamidronate): No   Boniva (Ibindronate): No   Didronil (Etidronate): No   Evista (Raloxifene): No   Fosamax (Alendronate): alendronate 4-5 yrs in the past    Forteo (Parathyroid hormone) injections: No   HCTZ (Thiazide): No   Calcitonin nasal spray: No   Reclast or Zometa (Zolendronate): No   Prolia (Denosumab): 1 shot in 2022   Evenity - started  9/2024  - last shots 2/2025 (6th shots)     Current Outpatient Medications   Medication Sig Dispense Refill    azithromycin (ZITHROMAX) 250 MG tablet Take 1 tablet (250 mg) by mouth daily. Start week 2 30 tablet 11    blood glucose (NO BRAND SPECIFIED) lancets standard Use to test blood sugar 1 time daily or as directed. 50 each 5    blood glucose (NO BRAND SPECIFIED) test strip Use to test blood sugar once per day or as directed. 50 strip 5    blood glucose monitoring (ONE TOUCH ULTRA 2) meter device kit USE TO TEST BLOOD SUGAR ONCE PER DAY OR AS DIRECTED. Use brand compatible with patients insurance and device 1 kit 1    calcium-magnesium-vitamin D 500-250-125 MG-MG-UNIT TABS Take 1 tablet by mouth daily      carboxymethylcellulose (REFRESH PLUS) 0.5 % SOLN ophthalmic solution 1 drop 3 times daily as needed for dry eyes      diclofenac (VOLTAREN) 1 % topical gel APPLY 2 GM TOPICALLY 4 TIMES DAILY AS NEEDED FOR MODERATE PAIN. (Patient not taking: Reported on 2/25/2025) 100 g 2    ethambutol (MYAMBUTOL) 400 MG tablet Take 3 tablets (1,200 mg) by mouth daily. Start week 3 90 tablet 11    ibuprofen (ADVIL/MOTRIN) 400 MG tablet Take 400 mg by mouth every 6 hours as needed for moderate pain      levETIRAcetam (KEPPRA) 750 MG tablet PT REPORTS TAPERING DOWN: TAKE 1 & 1/2 TABS IN THE MORNING, AND 2 TABS IN THE EVENING. (Patient taking differently: Take 1,125 mg by mouth 2 times daily.) 105 tablet 3    loratadine (CLARITIN) 10 MG tablet Take 10 mg by mouth daily      mometasone (NASONEX) 50 MCG/ACT nasal spray Spray 2 sprays into  "both nostrils daily 17 g 1    Multiple Vitamins-Minerals (CENTRUM SILVER) per tablet Take 1 tablet by mouth every morning      psyllium (METAMUCIL) 58.6 % packet Take 1 packet by mouth daily as needed for constipation      rifampin (RIFADIN) 300 MG capsule Take 2 capsules (600 mg) by mouth daily. Start week 1 60 capsule 11    rosuvastatin (CRESTOR) 5 MG tablet TAKE 1 TABLET BY MOUTH EVERY DAY 90 tablet 1    sodium chloride 0.9 % neb solution Take 3 mLs by nebulization 2 times daily 540 mL 3        No Known Allergies    Past Medical History    Family History   Problem Relation Age of Onset    Breast Cancer Mother 30        lumpectomy and chemo; also \"mass in ovary\"    Colon Polyps Father     Coronary Artery Disease Brother     Colon Polyps Brother     Coronary Artery Disease Early Onset Brother 50    Prostate Cancer Brother     Osteoporosis Sister     Breast Cancer Sister 55    Leukemia Sister 52    Gastrointestinal Disease Son         intestinal transplant    Diabetes Maternal Aunt         multiple mat aunts    Ovarian Cancer Maternal Aunt 50         of ovarian cancer in 50s or 60s, unknown    Breast Cancer Paternal Aunt 36         of breast cancer recurrence in 60s    Bone Cancer Niece 19    Prostate Cancer Cousin 65    Breast Cancer Cousin 30        paternal cousin, BRCA1+ by report    Breast Cancer Cousin 40        paternal cousin, BRCA1+ by report    Breast Cancer Cousin 35        paternal cousin, BRCA1+ by report    Genetic Disorder Cousin         paternal male cousin, BRCA1+ by report    Ovarian Cancer Cousin         paternal cousin, BRCA1+ by report    Anesthesia Reaction No family hx of     Venous thrombosis No family hx of     Glaucoma No family hx of     Macular Degeneration No family hx of     Eye Surgery No family hx of     Retinal detachment No family hx of        ROS:  General: none  Head/Eyes: none  Ears/Nose/Throat: none  Cardiovascular: none  Respiratory: none  Gastrointestinal: " "none  Breast: none  Genitourinary: none  Sexual Function: none  Musculoskeletal: none  Skin: none  Neurological: none  Mental Health: none  Endocrine: none    Clinic Measurements  Vitals: /81 (BP Location: Right arm, Patient Position: Sitting, Cuff Size: Adult Regular)   Pulse 84   Temp 97.9  F (36.6  C) (Oral)   Resp 16   Ht 1.575 m (5' 2.01\")   Wt 76.4 kg (168 lb 8 oz)   SpO2 96%   BMI 30.81 kg/m    BMI= Body mass index is 30.81 kg/m .    Physical exam  Constitutional: Well appearing woman in no acute distress.   Psychological: appropriate mood.  Neck: No thyroidmegaly.  no carotid bruits.  Cardiovascular: regular rate and rhythm, normal S1 and S2, no murmurs, rubs or gallops, peripheral pulses full and symmetric   Respiratory: clear to auscultation, no wheezes or crackles, normal breath sounds.  Musculoskeletal: no edema  reasonable ROM   Spine: Straight, not tender, Flexion good, Extension good, Lateral movement good, Rotational movement good  Skin: no concerning lesions, no jaundice.  Neurological: cranial nerves intact, normal strength, reflexes at patella and biceps normal, normal gait, no tremor.     LAB  Vertebra; Fracture Assessment: NA  Dexa Scan: 3/2024    FRAX Assessment Tool: [N/A,   Risk Factors:  T scores, age PM  Keppra     ASSESSMENT:  This is a 75-year-old postmenopausal female who has osteoporosis by T-scores.  She was on alendronate initially, then started Prolia got 1 shot and never had any follow-up.  In reviewing her recent DEXA we decided to put her on Evenity.  She started this in September.2024.   She has not had any significant side effects other than the site of injection is mildly tender after the injection.  She currently is due for her 6 shot today.  Her most recent calcium was normal.  Will continue with the full year of Evenity and her last shots will be in August 2025.  I will see her at that time and we will consider starting Prolia.  We did discuss calcium and " vitamin D.  Her DEXA is due March 2026.    PLAN:  6th evenity shot today  Continue shots through 8/2025 and see me in August to discuss next steps which is prolia    Patient should take 1200mg ( all foods plus supplements) of calcium/day in divided doses and vitamin D3 1000IU/day.    Best source of calcium is food    See me in June for diabetes     DXA is due 3/2026       Thank you for allowing me to participate in the care of your patient.  Please do not hesitate to call with questions or concerns.    Sincerely,    Maria Esther Miranda MD, PhD        Time note (e2, 10'): The total of my time (on the date of service) for this service was 17 minutes, including discussion/face-to-face, chart review, interpretation not otherwise reported, documentation, and updating of the computerized record.    Answers submitted by the patient for this visit:  General Questionnaire (Submitted on 3/24/2025)  Chief Complaint: Chronic problems general questions HPI Form  What is the reason for your visit today? : Follow up and envinty shot  How many days per week do you miss taking your medication?: 0  Questionnaire about: Chronic problems general questions HPI Form (Submitted on 3/24/2025)  Chief Complaint: Chronic problems general questions HPI Form

## 2025-03-26 ENCOUNTER — OFFICE VISIT (OUTPATIENT)
Dept: INFECTIOUS DISEASES | Facility: CLINIC | Age: 75
End: 2025-03-26
Attending: INTERNAL MEDICINE
Payer: COMMERCIAL

## 2025-03-26 ENCOUNTER — OFFICE VISIT (OUTPATIENT)
Dept: FAMILY MEDICINE | Facility: CLINIC | Age: 75
End: 2025-03-26
Payer: COMMERCIAL

## 2025-03-26 ENCOUNTER — LAB (OUTPATIENT)
Dept: LAB | Facility: CLINIC | Age: 75
End: 2025-03-26
Payer: COMMERCIAL

## 2025-03-26 VITALS
OXYGEN SATURATION: 96 % | HEART RATE: 95 BPM | WEIGHT: 169 LBS | SYSTOLIC BLOOD PRESSURE: 111 MMHG | HEIGHT: 62 IN | BODY MASS INDEX: 31.1 KG/M2 | TEMPERATURE: 98.1 F | DIASTOLIC BLOOD PRESSURE: 75 MMHG

## 2025-03-26 VITALS
DIASTOLIC BLOOD PRESSURE: 81 MMHG | BODY MASS INDEX: 31.01 KG/M2 | SYSTOLIC BLOOD PRESSURE: 136 MMHG | WEIGHT: 168.5 LBS | OXYGEN SATURATION: 96 % | TEMPERATURE: 97.9 F | HEART RATE: 84 BPM | HEIGHT: 62 IN | RESPIRATION RATE: 16 BRPM

## 2025-03-26 DIAGNOSIS — B97.29 OTHER CORONAVIRUS AS THE CAUSE OF DISEASES CLASSIFIED ELSEWHERE: ICD-10-CM

## 2025-03-26 DIAGNOSIS — R09.89 RESPIRATORY SYMPTOMS: ICD-10-CM

## 2025-03-26 DIAGNOSIS — Z29.11 NEED FOR VACCINATION AGAINST RESPIRATORY SYNCYTIAL VIRUS: ICD-10-CM

## 2025-03-26 DIAGNOSIS — A31.0 MAI (MYCOBACTERIUM AVIUM-INTRACELLULARE) (H): Primary | ICD-10-CM

## 2025-03-26 DIAGNOSIS — A31.0 MAI (MYCOBACTERIUM AVIUM-INTRACELLULARE) (H): ICD-10-CM

## 2025-03-26 LAB
ALBUMIN SERPL BCG-MCNC: 4.6 G/DL (ref 3.5–5.2)
ALP SERPL-CCNC: 116 U/L (ref 40–150)
ALT SERPL W P-5'-P-CCNC: 55 U/L (ref 0–50)
ANION GAP SERPL CALCULATED.3IONS-SCNC: 12 MMOL/L (ref 7–15)
AST SERPL W P-5'-P-CCNC: 36 U/L (ref 0–45)
BACTERIA SPT CULT: NORMAL
BASOPHILS # BLD AUTO: 0 10E3/UL (ref 0–0.2)
BASOPHILS NFR BLD AUTO: 1 %
BILIRUB SERPL-MCNC: 0.4 MG/DL
BUN SERPL-MCNC: 14.4 MG/DL (ref 8–23)
C PNEUM DNA SPEC QL NAA+PROBE: NOT DETECTED
CALCIUM SERPL-MCNC: 10.3 MG/DL (ref 8.8–10.4)
CHLORIDE SERPL-SCNC: 100 MMOL/L (ref 98–107)
CREAT SERPL-MCNC: 0.63 MG/DL (ref 0.51–0.95)
EGFRCR SERPLBLD CKD-EPI 2021: >90 ML/MIN/1.73M2
EOSINOPHIL # BLD AUTO: 0.2 10E3/UL (ref 0–0.7)
EOSINOPHIL NFR BLD AUTO: 3 %
ERYTHROCYTE [DISTWIDTH] IN BLOOD BY AUTOMATED COUNT: 11.7 % (ref 10–15)
FLUAV H1 2009 PAND RNA SPEC QL NAA+PROBE: NOT DETECTED
FLUAV H1 RNA SPEC QL NAA+PROBE: NOT DETECTED
FLUAV H3 RNA SPEC QL NAA+PROBE: NOT DETECTED
FLUAV RNA SPEC QL NAA+PROBE: NEGATIVE
FLUAV RNA SPEC QL NAA+PROBE: NOT DETECTED
FLUBV RNA RESP QL NAA+PROBE: NEGATIVE
FLUBV RNA SPEC QL NAA+PROBE: NOT DETECTED
GLUCOSE SERPL-MCNC: 107 MG/DL (ref 70–99)
GRAM STAIN RESULT: NORMAL
HADV DNA SPEC QL NAA+PROBE: NOT DETECTED
HCO3 SERPL-SCNC: 26 MMOL/L (ref 22–29)
HCOV PNL SPEC NAA+PROBE: NOT DETECTED
HCT VFR BLD AUTO: 41.3 % (ref 35–47)
HGB BLD-MCNC: 13.4 G/DL (ref 11.7–15.7)
HMPV RNA SPEC QL NAA+PROBE: NOT DETECTED
HPIV1 RNA SPEC QL NAA+PROBE: NOT DETECTED
HPIV2 RNA SPEC QL NAA+PROBE: NOT DETECTED
HPIV3 RNA SPEC QL NAA+PROBE: NOT DETECTED
HPIV4 RNA SPEC QL NAA+PROBE: NOT DETECTED
IMM GRANULOCYTES # BLD: 0 10E3/UL
IMM GRANULOCYTES NFR BLD: 0 %
LYMPHOCYTES # BLD AUTO: 1.4 10E3/UL (ref 0.8–5.3)
LYMPHOCYTES NFR BLD AUTO: 20 %
M PNEUMO DNA SPEC QL NAA+PROBE: NOT DETECTED
MCH RBC QN AUTO: 30.2 PG (ref 26.5–33)
MCHC RBC AUTO-ENTMCNC: 32.4 G/DL (ref 31.5–36.5)
MCV RBC AUTO: 93 FL (ref 78–100)
MONOCYTES # BLD AUTO: 0.8 10E3/UL (ref 0–1.3)
MONOCYTES NFR BLD AUTO: 12 %
NEUTROPHILS # BLD AUTO: 4.5 10E3/UL (ref 1.6–8.3)
NEUTROPHILS NFR BLD AUTO: 65 %
NRBC # BLD AUTO: 0 10E3/UL
NRBC BLD AUTO-RTO: 0 /100
PLATELET # BLD AUTO: 268 10E3/UL (ref 150–450)
POTASSIUM SERPL-SCNC: 4.1 MMOL/L (ref 3.4–5.3)
PROT SERPL-MCNC: 8.4 G/DL (ref 6.4–8.3)
RBC # BLD AUTO: 4.44 10E6/UL (ref 3.8–5.2)
RSV RNA SPEC NAA+PROBE: NEGATIVE
RSV RNA SPEC QL NAA+PROBE: NOT DETECTED
RSV RNA SPEC QL NAA+PROBE: NOT DETECTED
RV+EV RNA SPEC QL NAA+PROBE: NOT DETECTED
SARS-COV-2 RNA RESP QL NAA+PROBE: NEGATIVE
SODIUM SERPL-SCNC: 138 MMOL/L (ref 135–145)
WBC # BLD AUTO: 6.9 10E3/UL (ref 4–11)

## 2025-03-26 PROCEDURE — 99212 OFFICE O/P EST SF 10 MIN: CPT | Mod: 25 | Performed by: FAMILY MEDICINE

## 2025-03-26 PROCEDURE — 87486 CHLMYD PNEUM DNA AMP PROBE: CPT | Performed by: INTERNAL MEDICINE

## 2025-03-26 PROCEDURE — 99000 SPECIMEN HANDLING OFFICE-LAB: CPT | Performed by: PATHOLOGY

## 2025-03-26 PROCEDURE — 87070 CULTURE OTHR SPECIMN AEROBIC: CPT | Performed by: INTERNAL MEDICINE

## 2025-03-26 PROCEDURE — 96372 THER/PROPH/DIAG INJ SC/IM: CPT | Performed by: FAMILY MEDICINE

## 2025-03-26 PROCEDURE — 87581 M.PNEUMON DNA AMP PROBE: CPT | Performed by: INTERNAL MEDICINE

## 2025-03-26 PROCEDURE — 36415 COLL VENOUS BLD VENIPUNCTURE: CPT | Performed by: PATHOLOGY

## 2025-03-26 PROCEDURE — 87637 SARSCOV2&INF A&B&RSV AMP PRB: CPT | Performed by: INTERNAL MEDICINE

## 2025-03-26 PROCEDURE — 3075F SYST BP GE 130 - 139MM HG: CPT | Performed by: FAMILY MEDICINE

## 2025-03-26 PROCEDURE — 3079F DIAST BP 80-89 MM HG: CPT | Performed by: FAMILY MEDICINE

## 2025-03-26 PROCEDURE — 87206 SMEAR FLUORESCENT/ACID STAI: CPT | Mod: 90 | Performed by: PATHOLOGY

## 2025-03-26 PROCEDURE — 87205 SMEAR GRAM STAIN: CPT | Performed by: INTERNAL MEDICINE

## 2025-03-26 PROCEDURE — G0463 HOSPITAL OUTPT CLINIC VISIT: HCPCS | Performed by: INTERNAL MEDICINE

## 2025-03-26 PROCEDURE — 87116 MYCOBACTERIA CULTURE: CPT | Mod: 90 | Performed by: PATHOLOGY

## 2025-03-26 PROCEDURE — 85025 COMPLETE CBC W/AUTO DIFF WBC: CPT | Performed by: PATHOLOGY

## 2025-03-26 PROCEDURE — 80053 COMPREHEN METABOLIC PANEL: CPT | Performed by: PATHOLOGY

## 2025-03-26 ASSESSMENT — PAIN SCALES - GENERAL: PAINLEVEL_OUTOF10: NO PAIN (0)

## 2025-03-26 NOTE — PROGRESS NOTES
"      Steve Valentine is a 75 year old, presenting for the following health issues:  Osteoporosis, Follow Up, Imm/Inj (Evenity #7), and Diabetes (Pt reports blood sugar levels have been elevated. Pt states she has been watching carbs and cutting out sugar. )        3/26/2025     1:34 PM   Additional Questions   Roomed by ava matamoros     Imm/Inj    History of Present Illness       Reason for visit:  Follow up and envinty shot   She is taking medications regularly.                      Objective    /81 (BP Location: Right arm, Patient Position: Sitting, Cuff Size: Adult Regular)   Pulse 84   Temp 97.9  F (36.6  C) (Oral)   Resp 16   Ht 1.575 m (5' 2.01\")   Wt 76.4 kg (168 lb 8 oz)   SpO2 96%   BMI 30.81 kg/m    Body mass index is 30.81 kg/m .  Physical Exam               Signed Electronically by: Maria Esther Miranda MD PhD    "

## 2025-03-26 NOTE — PROGRESS NOTES
Meme Butts received the 7th Evenity injection today in clinic at the request of Dr Miranda. The injection was given under the supervision of Dr Gaming if assistance was needed. The patient does not report a history of adverse reactions associated with medication/injection administration. The injection sites were cleaned with alcohol prep wipes. The injections were given without incident--see MAR list for administration details. The patient was advised to remain in fourth floor lobby of the [facility] for fifteen minutes after the injection in case of an adverse reaction.     Evenity injections are administered every 30 days. The patient's next anticipated Evenity injection is . The patient was provided an injection schedule and was instructed to call the Primary Care Scheduling Number to schedule their next Evenity injection.     Clinic Administered Medication Documentation      Injectable Medication Documentation    Is there an active order (written within the past 365 days, with administrations remaining, not ) in the chart? Yes.     Patient was given  Evenity . Prior to medication administration, verified patient's identity using patient s name and date of birth. Please see MAR and medication order for additional information. Patient instructed to remain in clinic for 15 minutes and report any adverse reaction to staff immediately.    Vial/Syringe: Syringe  Was this medication supplied by the patient? No  Is this a medication the patient will need to receive again? Yes. Verified that the patient has refills remaining in their prescription.         Jose Melo, EMT 2:36 PM on 3/26/2025      Answers submitted by the patient for this visit:  General Questionnaire (Submitted on 3/24/2025)  Chief Complaint: Chronic problems general questions HPI Form  What is the reason for your visit today? : Follow up and envinty shot  How many days per week do you miss taking your medication?:  0  Questionnaire about: Chronic problems general questions HPI Form (Submitted on 3/24/2025)  Chief Complaint: Chronic problems general questions HPI Form

## 2025-03-26 NOTE — NURSING NOTE
"Chief Complaint   Patient presents with    RECHECK     3 month follow up;Cough has returned         /75   Pulse 95   Temp 98.1  F (36.7  C) (Oral)   Ht 1.575 m (5' 2\")   Wt 76.7 kg (169 lb)   SpO2 96%   BMI 30.91 kg/m    Isatu Conde MA on 3/26/2025 at 9:47 AM    " No

## 2025-03-26 NOTE — PATIENT INSTRUCTIONS
7th evenity shot today  Continue shots through 8/2025 and see me in August to discuss next steps which is prolia    Patient should take 1200mg ( all foods plus supplements) of calcium/day in divided doses and vitamin D3 1000IU/day.    Best source of calcium is food    See me in June for diabetes     DXA is due 3/2026

## 2025-03-26 NOTE — LETTER
"3/26/2025       RE: Meme Butts  [address]  [facility] [address]     Dear Colleague,    Thank you for referring your patient, Meme Butts, to the [facility] [place] at [facility]. Please see a copy of my visit note below.       GENERAL ID CLINIC           Assessment and Recommendations:   Problem List:    # EDDA pulmonary infection     Discussion:     Meme Butts is a 74 year old female with PMHx of colon cancer (adenocarcinoma of hepatic flexure of colon and family history of BRCA1) diagnosed by screening colonoscopy on 1/10/2023 s/p robotic right hemicolectomy and appendectomy on 2/28/2023 who was referred to our clinic by Dr. Shelby Hernandez (pulmonology) because lung nodule(s) and BAL culture positive for EDDA. She also has PMHx of migraines, seizures, osteoporosis, obesity, prediabetes (Hb A1C 6.3% from 3/18/24), history of GI bleed. Patient states she has persistent cough started 2 years ago. She initially though it was due to sinus disease and post nasal drip. infection. She states that the cough would be persistent and would happen all day. It was mostly dry but she would have some greenish sputum at times. Cough was so often that it would keep her awake. She started claritin with some improvement. She also has SOB started around the same time of the cough. Activities that would lead to SOB included going up and down stairs. She still keeps herself active and cleans the house, gardens and does yard work. She denies weight loss.     Per my review, the nodules were first identified on CT chest from 12/29/22 which showed upper lobe micronodules and bronchiectasis (bilaterally). Follow up CT chest from 3/3/2023 showed same nodules in addition to at least one new 9 mm nodule in the left upper lobe. The most recent CT chest from 3/28/2024 showed: \"Centrilobular and tree-in-bud nodularity, slightly increased " "associated with bronchiectasis greatest in the right middle lobe, lingula and lower lobes; findings suggestive of atypical indolent infection such as nonclassic nontuberculous mycobacteria\". Patient underwent PFT study on 3/2024 and, per Dr. Hernandez's interpretation, it was normal. Bronchoscopy performed on 5/21/24. BAL studies showed: cell count 760 with 56# neutrophils, 12% lymphocytes and 32% macrophages/monocytes. 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  Fungal culture is negative to date. Aspergillus galactomannan negative, Nocardia PCR negative, Nocardia culture negative. 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 Cytology was negative for malignancy and negative for fungal organisms or pneumocystis on GMS stain. Furthermore, the viral cytopathic effect is absent.  Given presence of EDDA on BAL as well as clinical and radiologic criteria, we started EDDA treatment with Rifampin 600 mg once daily - started 7/27; Azithromycin 250 mg once daily - started 8/3 and Ethambutol 1,200 mg (15.6 mg/kg) once daily - started 8/10.     Today's visit: She is tolerating the medications well. CBC and CMP from 8/28, 9/16, 10/23, 11/25 and 2/26 are normal. CT chest (2/26/25) showed \"Continued probable postinflammatory/postinfectious changes right middle lobe and lingula especially with the right upper lobe probable postinflammatory/infectious nodularity also unchanged\". She tells me she that her cough resolved after initiation of EDDA treatment, however around 2 weeks ago she stated to have congestion, frontal sinus pressure and mostly dry cough with eventual white sputum. She also reports that in the beginning she had some right ear discomfort in the morning without drainage. No fever or chills. She is updated with COVID, flu and she also received RSV vaccine in October 2024. Symptoms seem to be slightly better but she still has cough. Symptoms seem to be " more upper respiratory. Lung exam normal. I obtained COVID/flu/RVS PCR today and it was negative as well as respiratory panel. CBC obtained today was normal. CMP with normal creatinine. LFTs with minimally elevated ALT (55) but other Lfts were normal. Sputum bacterial culture could not be performed because the sample was more suggestive of contamination. AFB sputum sent also for completeness, but she has cleared the sputum on 11/25/24.           Recommendations:     Continue azithromycin + ethambutol +  rifampin for EDDA treatment (started on 7/27). Duration will be 1 year from first negative AFB sputum after treatment - end date planned to be at least 11/25/25          - Patient is most like not a good candidate for aminoglycoside given her baseline hearing impairment.       Next audiogram scheduled for 3/28/25     Patient is followed by ophthalmology (Dr. Srinivasan). She will need continued ophthalmology visits for visual acuity and color discrimination exam every month and fundoscopic exam every 3 months       - Lats visit on 1/30/25 and next visit scheduled for 4/29/25     She will need a CT chest 3 months from initiation of EDDA treatment (end of May 2025). Already scheduled for May 28     Our clinic pharmacist will continue to co-manage with me      Continued follow up with pulmonology     She will need monthly labs (CBC and CMP). Placed standing orders.      Patient had an EKG on 9/18 and it showed a normal QTc (431)     AFB sputum stain from 11/25 is negative. For completeness I obtained a new AFB sputum today. Will follow     Next follow up with me in May      PS.: Acute respiratory symptoms seems to be mostly viral and upper respiratory. I obtained COVID/flu/RVS PCR today and it was negative as well as respiratory panel. CBC obtained today was normal. CMP with normal creatinine. LFTs with minimally elevated ALT (55) but other Lfts were normal. Sputum bacterial culture could not be performed because the sample  was more suggestive of contamination. AFB sputum sent also for completeness, but she has cleared the sputum on 11/25/24. I instructed the patient to continue to update me on the symptoms. If symptoms do not continue to improve in the next few days or if they get worse I will consider treating for bacterial respiratory infection.          Recommendations discussed with the patient.      Celia Robison MD  Date of Service : 03/27/25     On 03/27/25, I spent 40 min with chart review, patient visit, documentation and coordination of care.           History of Present Illness:   Meem Butts is a 74 year old female with PMHx of colon cancer (adenocarcinoma of hepatic flexure of colon and family history of BRCA1) diagnosed by screening colonoscopy on 1/10/2023 s/p robotic right hemicolectomy and appendectomy on 2/28/2023 who was referred to our clinic by Dr. Shelby Hernandez (pulmonology) because lung nodule(s) and BAL culture positive for EDDA. She also has PMHx of migraines, seizures, osteoporosis, obesity, prediabetes (Hb A1C 6.3% from 3/18/24), history of GI bleed.     Patient states she has persistent cough started 2 years ago. She initially though it was due to sinus disease and post nasal drip. infection. She states that the cough would be persistent and would happen all day. It was mostly dry but she would have some greenish sputum at times. Cough was so often that it would keep her awake. She started claritin with some improvement. She also has SOB started around the same time of the cough. Activities that would lead to SOB included going up and down stairs. She still keeps herself active and cleans the house, gardens and does yard work. She denies weight loss.     Per my review, the nodules were first identified on CT chest from 12/29/22 which showed upper lobe micronodules and bronchiectasis (bilaterally). Follow up CT chest from 3/3/2023 showed same nodules in addition to at least one new 9 mm nodule in  "the left upper lobe. The most recent CT chest from 3/28/2024 showed: \"Centrilobular and tree-in-bud nodularity, slightly increased associated with bronchiectasis greatest in the right middle lobe, lingula and lower lobes; findings suggestive of atypical indolent infection such as nonclassic nontuberculous mycobacteria\". Patient underwent PFT study on 3/2024 and, per Dr. Hernandez's interpretation, it was normal. Bronchoscopy performed on 5/21/24. BAL studies showed: cell count 760 with 56# neutrophils, 12% lymphocytes and 32% macrophages/monocytes. 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  Fungal culture is negative to date. Aspergillus galactomannan negative, Nocardia PCR negative, Nocardia culture negative. AFB culture positive for Mycobacterium avium intracellulare pan-susceptible except for being intermediate to linezolid. Cytology was negative for malignancy and negative for fungal organisms or pneumocystis on GMS stain. Furthermore, the viral cytopathic effect is absent. Patient did not have fever or chills but she has persistent cough with intermittent greenish sputum production. She was prescribed 10 days of levofloxacin by her pulmonologist.     Labs from 6/21/24: CBC normal, Kidney and liver function normal, HIV negative, Hep B surface antigen: negative, Hepatitis C serology: negative Quantiferon negative, EKG from 6/18 showed normal QTc (408). Given presence of EDDA on BAL as well as clinical and radiologic criteria, we started EDDA treatment with Rifampin 600 mg once daily - started 7/27; Azithromycin 250 mg once daily - started 8/3 and Ethambutol 1,200 mg (15.6 mg/kg) once daily - started 8/10.    CT chest from 11/25: \"Oval-shaped nodule in the medial right apex slightly increased in short axis thickness an more uniformly dense measuring 11 x 4 mm. Other nodular densities in the right upper lobe appear similar as well as in the left upper lobe including a cavitary subpleural " "nodule. Right middle lobe focal bronchiectasis and consolidation is grossly unchanged. Other more inferior lingular bronchiectasis and atelectasis/consolidation however is increased. Lower lobes appear spared. Incidental unchanged 1-2 mm left lower lobe nodule (4/243). Major central airways are nicely patent\". Patient states that she had a cold at the time she had the CT and she was having productive cough. Now the respiratory symptoms are resolved. She continued to improve clinically.     Today's visit:     She is tolerating the medications well. CBC and CMP from 8/28, 9/16, 10/23, 11/25 and 2/26 are normal. CT chest (2/26/25) showed   \"Continued probable postinflammatory/postinfectious changes right middle lobe and lingula especially with the right upper lobe probable postinflammatory/infectious nodularity also unchanged\". She tells me she that her cough resolved after initiation of EDDA treatment, however around 2 weeks ago she stated to have congestion, frontal sinus pressure and mostly dry cough with eventual white sputum. She also reports that in the beginning she had some right ear discomfort in the morning without drainage. No fever or chills. She is updated with COVID, flu and she also received RSV vaccine in October 2024. Symptoms seem to be slightly better but she still has cough. Symptoms seem to be more upper respiratory. Lung exam normal. Obtain COVID/flu/RVS PCR and it was negative as well as respiratory panel. CBC obtained today was normal. CMP with normal creatinine. LFTs with minimally elevated ALT (55) but other Lfts were normal. Sputum bacterial culture could not be performed because the sample was more suggestive of contamination. AFB sputum sent also for completeness, but she has cleared the sputum on 11/25/24.           Review of Systems:     CONSTITUTIONAL:  No fevers or chills  INTEGUMENTARY/SKIN: NEGATIVE for worrisome rashes, moles or lesions  EYES: Negative for icterus, vision changes or " "irritation  ENT/MOUTH:  Cough improved  RESPIRATORY: See HPI  CARDIOVASCULAR:  Negative for chest pain, palpitations and  shortness of breath  GASTROINTESTINAL:  Negative for abdominal pain, nausea, vomiting, diarrhea and constipation  GENITOURINARY:  Negative for dysuria, hematuria, frequency and urgency  MUSCULOSKELETAL: Negative for joint pain, swelling, motion restriction, negative for musculoskeletal pain  NEURO:  Negative for headache, altered mental status, numbness or weakness  PSYCHIATRIC: Negative for changes in mood or affect  HEMATOLOGIC/LYMPHATIC: negative for lymphadenopathy or bleeding  ALLERGIC/IMMUNOLOGIC: Negative for allergic reaction   ENDOCRINE: Negative for temperature intolerance, skin/hair changes        Past Medical History:     Past Medical History:   Diagnosis Date     Adenomatous colon polyp 2011    tubular adenoma     At high risk for breast cancer 02/16/2017    35.4% lifetime risk by Philip model     Epilepsy (H)      Fracture of metatarsal bone of left foot 07/2014     Fracture, radius 1990    and ulnar styloid fracture     GIB (gastrointestinal bleeding) 04/01/2011     Intractable chronic migraine without aura      Kidney stones 1970, 1975    during pregnancy     Malignant neoplasm of hepatic flexure (H)      Migraine      Osteoporosis 2016    T score -4.1     Pneumonia      Prediabetes         Surgery R wrist after a fracture post fall - has hardware (2023)  Had cataract surgery in October 2023      Allergies:       No Known Allergies          Family History:     Family History   Problem Relation Age of Onset     Breast Cancer Mother 30        lumpectomy and chemo; also \"mass in ovary\"     Colon Polyps Father      Coronary Artery Disease Brother      Colon Polyps Brother      Coronary Artery Disease Early Onset Brother 50     Prostate Cancer Brother      Osteoporosis Sister      Breast Cancer Sister 55     Leukemia Sister 52     Gastrointestinal Disease Son         intestinal " transplant     Diabetes Maternal Aunt         multiple mat aunts     Ovarian Cancer Maternal Aunt 50         of ovarian cancer in 50s or 60s, unknown     Breast Cancer Paternal Aunt 36         of breast cancer recurrence in 60s     Bone Cancer Niece 19     Prostate Cancer Cousin 65     Breast Cancer Cousin 30        paternal cousin, BRCA1+ by report     Breast Cancer Cousin 40        paternal cousin, BRCA1+ by report     Breast Cancer Cousin 35        paternal cousin, BRCA1+ by report     Genetic Disorder Cousin         paternal male cousin, BRCA1+ by report     Ovarian Cancer Cousin         paternal cousin, BRCA1+ by report     Anesthesia Reaction No family hx of      Venous thrombosis No family hx of      Glaucoma No family hx of      Macular Degeneration No family hx of      Eye Surgery No family hx of      Retinal detachment No family hx of              Social History:     Social History     Socioeconomic History     Marital status:      Spouse name: Quentin     Number of children: 3     Years of education: Not on file     Highest education level: Not on file   Occupational History     Occupation:      Employer: RETIRED   Tobacco Use     Smoking status: Former     Current packs/day: 0.00     Average packs/day: 1 pack/day for 15.0 years (15.0 ttl pk-yrs)     Types: Cigarettes     Start date: 1994     Quit date: 2009     Years since quitting: 15.6     Smokeless tobacco: Never     Tobacco comments:     On and off for a total of 10 years of smoking    Vaping Use     Vaping status: Never Used   Substance and Sexual Activity     Alcohol use: Yes     Comment: 1x/month     Drug use: No     Sexual activity: Not Currently     Partners: Male     Birth control/protection: Post-menopausal   Other Topics Concern     Parent/sibling w/ CABG, MI or angioplasty before 65F 55M? Not Asked   Social History Narrative    Lives in a house, has  3 children,       lost 1 child,  retired      Social Drivers of Health     Financial Resource Strain: Low Risk  (7/25/2024)    Financial Resource Strain      Within the past 12 months, have you or your family members you live with been unable to get utilities (heat, electricity) when it was really needed?: No   Food Insecurity: Low Risk  (7/25/2024)    Food Insecurity      Within the past 12 months, did you worry that your food would run out before you got money to buy more?: No      Within the past 12 months, did the food you bought just not last and you didn t have money to get more?: No   Transportation Needs: Low Risk  (7/25/2024)    Transportation Needs      Within the past 12 months, has lack of transportation kept you from medical appointments, getting your medicines, non-medical meetings or appointments, work, or from getting things that you need?: No   Physical Activity: Unknown (7/25/2024)    Exercise Vital Sign      Days of Exercise per Week: Patient declined      Minutes of Exercise per Session: 20 min   Stress: No Stress Concern Present (7/25/2024)    [place] [place] [employer] - Occupational Stress Questionnaire      Feeling of Stress : Not at all   Social Connections: Unknown (7/25/2024)    Social Connection and Isolation Panel [NHANES]      Frequency of Communication with Friends and Family: Not on file      Frequency of Social Gatherings with Friends and Family: Three times a week      Attends Bahai Services: Not on file      Active Member of Clubs or Organizations: Not on file      Attends Club or Organization Meetings: Not on file      Marital Status: Not on file   Interpersonal Safety: Low Risk  (8/28/2024)    Interpersonal Safety      Do you feel physically and emotionally safe where you currently live?: Yes      Within the past 12 months, have you been hit, slapped, kicked or otherwise physically hurt by someone?: No      Within the past 12 months, have you been humiliated or emotionally abused in other ways  "by your partner or ex-partner?: No   Housing Stability: Low Risk  (7/25/2024)    Housing Stability      Do you have housing? : Yes      Are you worried about losing your housing?: No      HOME/ENVIRONMENT:   Lives with her daughter (48 years old)  Has another daughter 51 years old  Son passed - diverticulitis -> had surgery and went back with removal of small intestine - underwent small bowel transplant - 1 year after transplant he passed  Patient is      OCCUPATION:   Administration - retired     TRAVEL:   Travel across [place] 15 years ago  Originally from [place]     ANIMALS:   One Dog and one cat     TUBERCULOSIS:   Father and and brother were treated for TB 15 years ago. She states she had a quantiferon performed and it was negative.      TOBACCO/ALCOHOL/RECREATIONAL DRUGS:   Smoked 10 years (1 pack a day) - quit 15 years ago  Glass of wine once a week  No drugs           Physical Exam:   /75   Pulse 95   Temp 98.1  F (36.7  C) (Oral)   Ht 1.575 m (5' 2\")   Wt 76.7 kg (169 lb)   SpO2 96%   BMI 30.91 kg/m       Exam:  GENERAL:  Well-developed, well-nourished, not in acute distress.   HEAD: Normocephalic and atraumatic  ENT:  No hearing impairment, oropharynx clear, oral mucous membranes moist  EYES:  Eyes grossly normal to inspection  LUNGS:  Clear to auscultation - no rales, rhonchi or wheezes  CARDIOVASCULAR:  Regular rate and rhythm, normal S1 S2, no murmur  ABDOMEN:  Soft, non-tender  EXT: Extremities warm and without edema.  MS: No gross musculoskeletal defects noted, no edema  SKIN:  No acute rashes or suspicious lesions  NEUROLOGIC:  Grossly nonfocal. Normal strength and tone, mentation intact and speech normal  PSYCHIATRIC: Mood stable, mentation appears normal, affect normal                Again, thank you for allowing me to participate in the care of your patient.      Sincerely,    SRINI ABAD MD    "

## 2025-03-27 LAB
ACID FAST STAIN (ARUP): NORMAL
ACID FAST STAIN (ARUP): NORMAL

## 2025-03-28 ENCOUNTER — MYC MEDICAL ADVICE (OUTPATIENT)
Dept: PHARMACY | Facility: CLINIC | Age: 75
End: 2025-03-28
Payer: COMMERCIAL

## 2025-04-28 ENCOUNTER — ALLIED HEALTH/NURSE VISIT (OUTPATIENT)
Dept: INTERNAL MEDICINE | Facility: CLINIC | Age: 75
End: 2025-04-28
Payer: COMMERCIAL

## 2025-04-28 DIAGNOSIS — M81.0 OSTEOPOROSIS, UNSPECIFIED OSTEOPOROSIS TYPE, UNSPECIFIED PATHOLOGICAL FRACTURE PRESENCE: Primary | ICD-10-CM

## 2025-04-28 PROCEDURE — 96372 THER/PROPH/DIAG INJ SC/IM: CPT | Performed by: INTERNAL MEDICINE

## 2025-04-28 PROCEDURE — 99207 PR NO CHARGE NURSE ONLY: CPT

## 2025-04-28 NOTE — PROGRESS NOTES
Meme Butts received the Evenity #8 injection today in clinic at the request of Dr Miranda. The injection was given under the supervision of Dr Brannon if assistance was needed. The patient does not report a history of adverse reactions associated with medication/injection administration. The injection sites were cleaned with alcohol prep wipes. The injections were given without incident--see MAR list for administration details. The patient was advised to remain in fourth floor lobby of the [facility] and [place] for fifteen minutes after the injection in case of an adverse reaction.     Evenity injections are administered every 30 days. The patient's next anticipated Evenity injection is 25. The patient was provided an injection schedule and was instructed to call the Primary Care Scheduling Number to schedule their next Evenity injection.     Clinic Administered Medication Documentation      Injectable Medication Documentation    Is there an active order (written within the past 365 days, with administrations remaining, not ) in the chart? Yes.     Patient was given  Evenity . Prior to medication administration, verified patient's identity using patient s name and date of birth. Please see MAR and medication order for additional information. Patient instructed to remain in clinic for 15 minutes and report any adverse reaction to staff immediately.    Vial/Syringe: Syringe  Was this medication supplied by the patient? No  Is this a medication the patient will need to receive again? Yes. Verified that the patient has refills remaining in their prescription.     Jose Melo, EMT 11:03 AM on 2025

## 2025-04-29 ENCOUNTER — LAB (OUTPATIENT)
Dept: LAB | Facility: CLINIC | Age: 75
End: 2025-04-29
Payer: COMMERCIAL

## 2025-04-29 ENCOUNTER — OFFICE VISIT (OUTPATIENT)
Dept: OPHTHALMOLOGY | Facility: CLINIC | Age: 75
End: 2025-04-29
Payer: COMMERCIAL

## 2025-04-29 DIAGNOSIS — A31.0 MYCOBACTERIUM AVIUM INFECTION (H): ICD-10-CM

## 2025-04-29 DIAGNOSIS — H26.493 BILATERAL POSTERIOR CAPSULAR OPACIFICATION: ICD-10-CM

## 2025-04-29 DIAGNOSIS — A31.0 MAI (MYCOBACTERIUM AVIUM-INTRACELLULARE) (H): ICD-10-CM

## 2025-04-29 DIAGNOSIS — Z79.899 HIGH RISK MEDICATION USE: Primary | ICD-10-CM

## 2025-04-29 DIAGNOSIS — H52.223 REGULAR ASTIGMATISM OF BOTH EYES: ICD-10-CM

## 2025-04-29 DIAGNOSIS — H52.4 PRESBYOPIA: ICD-10-CM

## 2025-04-29 DIAGNOSIS — Z96.1 PSEUDOPHAKIA OF BOTH EYES: ICD-10-CM

## 2025-04-29 LAB
ALBUMIN SERPL BCG-MCNC: 4.3 G/DL (ref 3.5–5.2)
ALP SERPL-CCNC: 106 U/L (ref 40–150)
ALT SERPL W P-5'-P-CCNC: 49 U/L (ref 0–50)
ANION GAP SERPL CALCULATED.3IONS-SCNC: 10 MMOL/L (ref 7–15)
AST SERPL W P-5'-P-CCNC: 38 U/L (ref 0–45)
BASOPHILS # BLD AUTO: 0.1 10E3/UL (ref 0–0.2)
BASOPHILS NFR BLD AUTO: 1 %
BILIRUB SERPL-MCNC: 0.4 MG/DL
BUN SERPL-MCNC: 16 MG/DL (ref 8–23)
CALCIUM SERPL-MCNC: 10.2 MG/DL (ref 8.8–10.4)
CHLORIDE SERPL-SCNC: 104 MMOL/L (ref 98–107)
CREAT SERPL-MCNC: 0.6 MG/DL (ref 0.51–0.95)
EGFRCR SERPLBLD CKD-EPI 2021: >90 ML/MIN/1.73M2
EOSINOPHIL # BLD AUTO: 0.1 10E3/UL (ref 0–0.7)
EOSINOPHIL NFR BLD AUTO: 2 %
ERYTHROCYTE [DISTWIDTH] IN BLOOD BY AUTOMATED COUNT: 11.9 % (ref 10–15)
GLUCOSE SERPL-MCNC: 114 MG/DL (ref 70–99)
HCO3 SERPL-SCNC: 24 MMOL/L (ref 22–29)
HCT VFR BLD AUTO: 38.2 % (ref 35–47)
HGB BLD-MCNC: 12.8 G/DL (ref 11.7–15.7)
IMM GRANULOCYTES # BLD: 0 10E3/UL
IMM GRANULOCYTES NFR BLD: 0 %
LYMPHOCYTES # BLD AUTO: 1.3 10E3/UL (ref 0.8–5.3)
LYMPHOCYTES NFR BLD AUTO: 23 %
MCH RBC QN AUTO: 30.6 PG (ref 26.5–33)
MCHC RBC AUTO-ENTMCNC: 33.5 G/DL (ref 31.5–36.5)
MCV RBC AUTO: 91 FL (ref 78–100)
MONOCYTES # BLD AUTO: 0.6 10E3/UL (ref 0–1.3)
MONOCYTES NFR BLD AUTO: 10 %
NEUTROPHILS # BLD AUTO: 3.7 10E3/UL (ref 1.6–8.3)
NEUTROPHILS NFR BLD AUTO: 64 %
NRBC # BLD AUTO: 0 10E3/UL
NRBC BLD AUTO-RTO: 0 /100
PLATELET # BLD AUTO: 258 10E3/UL (ref 150–450)
POTASSIUM SERPL-SCNC: 4.4 MMOL/L (ref 3.4–5.3)
PROT SERPL-MCNC: 7.6 G/DL (ref 6.4–8.3)
RBC # BLD AUTO: 4.18 10E6/UL (ref 3.8–5.2)
SODIUM SERPL-SCNC: 138 MMOL/L (ref 135–145)
WBC # BLD AUTO: 5.8 10E3/UL (ref 4–11)

## 2025-04-29 PROCEDURE — 36415 COLL VENOUS BLD VENIPUNCTURE: CPT | Performed by: PATHOLOGY

## 2025-04-29 PROCEDURE — 80053 COMPREHEN METABOLIC PANEL: CPT | Performed by: PATHOLOGY

## 2025-04-29 PROCEDURE — G0463 HOSPITAL OUTPT CLINIC VISIT: HCPCS

## 2025-04-29 PROCEDURE — 85025 COMPLETE CBC W/AUTO DIFF WBC: CPT | Performed by: PATHOLOGY

## 2025-04-29 ASSESSMENT — VISUAL ACUITY
OS_SC: 20/20
METHOD: SNELLEN - LINEAR
OD_SC: 20/25

## 2025-04-29 ASSESSMENT — REFRACTION_WEARINGRX
OD_AXIS: 002
OS_ADD: +2.75
OD_CYLINDER: +0.75
OS_AXIS: 174
OD_ADD: +2.75
SPECS_TYPE: BIFOCAL
OD_SPHERE: PLANO
OS_SPHERE: PLANO
OS_CYLINDER: +1.00

## 2025-04-29 ASSESSMENT — CUP TO DISC RATIO
OD_RATIO: 0.2
OS_RATIO: 0.2

## 2025-04-29 ASSESSMENT — TONOMETRY
OD_IOP_MMHG: 11
IOP_METHOD: TONOPEN
OS_IOP_MMHG: 12

## 2025-04-29 ASSESSMENT — CONF VISUAL FIELD
OS_INFERIOR_TEMPORAL_RESTRICTION: 0
OS_SUPERIOR_TEMPORAL_RESTRICTION: 0
METHOD: COUNTING FINGERS
OS_SUPERIOR_NASAL_RESTRICTION: 0
OS_NORMAL: 1
OS_INFERIOR_NASAL_RESTRICTION: 0
OD_SUPERIOR_TEMPORAL_RESTRICTION: 0
OD_NORMAL: 1
OD_SUPERIOR_NASAL_RESTRICTION: 0
OD_INFERIOR_TEMPORAL_RESTRICTION: 0
OD_INFERIOR_NASAL_RESTRICTION: 0

## 2025-04-29 ASSESSMENT — SLIT LAMP EXAM - LIDS
COMMENTS: NORMAL
COMMENTS: NORMAL

## 2025-04-29 ASSESSMENT — EXTERNAL EXAM - RIGHT EYE: OD_EXAM: NORMAL

## 2025-04-29 ASSESSMENT — EXTERNAL EXAM - LEFT EYE: OS_EXAM: NORMAL

## 2025-04-29 NOTE — PROGRESS NOTES
History  HPI       Follow Up     Additional comments: 3 month follow up Ethambutol use             Comments    Pt taking Ethambutol 1200mg Daily.  Pt states vision has improved since having YAG Cap in both eyes about 2 month ago. Pt still seeing floaters in each eye that come and go. No flashes of light.  No redness. Dryness in each eye, relief with drops. No DM.    WILLIAM Calderón April 29, 2025 10:24 AM              Last edited by Miriam Velásquez COMT on 4/29/2025 10:25 AM.          Assessment/Plan  (Z79.899) High risk medication use  (primary encounter diagnosis)  Plan:   -Patient is using ethambutol. Patent has 3 more months left.   -Requires visual acuity/color discrimination exam every month and fundoscopic exam every 3 months while on treatment.  -DFE WNL each eye. Color Vision Stable.   -See Plans Below.     (Z96.1) Pseudophakia of both eyes  Plan:   -Good Post-operative Appearance  -CE 2023 w/ Dr Adamson in [place]. S/p Yag 02/2025 each eye.   -Monitor.    (H52.223) Regular astigmatism of both eyes, (H52.4) Presbyopia  Plan:   -New Glasses Rx Given 01/30/2025. Patient interested in bifocal.   -Discussed giving a couple of weeks to get adjusted to new glasses.   -Monitor.    Return to clinic in 3 months for VA, IOP, Color Vision, Dilate    Complete documentation of historical and exam elements from today's encounter can be found in the full encounter summary report (not reduplicated in this progress note). I personally obtained the chief complaint(s) and history of present illness. I confirmed and edited as necessary the review of systems, past medical/surgical history, family history, social history, and examination findings as document by others; and I examined the patient myself. I personally reviewed the relevant tests, images, and reports as documented above. I formulated and edited as necessary the assessment and plan and discussed the findings and management plan with the patient and  family.    Mulugeta Srinivasan, OD

## 2025-04-29 NOTE — NURSING NOTE
Chief Complaints and History of Present Illnesses   Patient presents with    Follow Up     3 month follow up Ethambutol use     Chief Complaint(s) and History of Present Illness(es)       Follow Up              Comments: 3 month follow up Ethambutol use              Comments    Pt taking Ethambutol 1200mg Daily.  Pt states vision has improved since having YAG Cap in both eyes about 2 month ago. Pt still seeing floaters in each eye that come and go. No flashes of light.  No redness. Dryness in each eye, relief with drops. No DM.    WILLIAM Calderón April 29, 2025 10:24 AM

## 2025-04-29 NOTE — LETTER
4/29/2025       RE: Meme Butts  [address]  [facility] [address]     Dear Colleague,    Thank you for referring your patient, Meme Butts, to the [facility] [place] at [facility]. Please see a copy of my visit note below.    History  HPI       Follow Up     Additional comments: 3 month follow up Ethambutol use             Comments    Pt taking Ethambutol 1200mg Daily.  Pt states vision has improved since having YAG Cap in both eyes about 2 month ago. Pt still seeing floaters in each eye that come and go. No flashes of light.  No redness. Dryness in each eye, relief with drops. No DM.    WILLIAM Calderón April 29, 2025 10:24 AM              Last edited by Miriam Velásquez COMT on 4/29/2025 10:25 AM.          Assessment/Plan  (Z79.899) High risk medication use  (primary encounter diagnosis)  Plan:   -Patient is using ethambutol. Patent has 3 more months left.   -Requires visual acuity/color discrimination exam every month and fundoscopic exam every 3 months while on treatment.  -DFE WNL each eye. Color Vision Stable.   -See Plans Below.     (Z96.1) Pseudophakia of both eyes  Plan:   -Good Post-operative Appearance  -CE 2023 w/ Dr Adamson in [place]. S/p Yag 02/2025 each eye.   -Monitor.    (H52.223) Regular astigmatism of both eyes, (H52.4) Presbyopia  Plan:   -New Glasses Rx Given 01/30/2025. Patient interested in bifocal.   -Discussed giving a couple of weeks to get adjusted to new glasses.   -Monitor.    Return to clinic in 3 months for VA, IOP, Color Vision, Dilate    Complete documentation of historical and exam elements from today's encounter can be found in the full encounter summary report (not reduplicated in this progress note). I personally obtained the chief complaint(s) and history of present illness. I confirmed and edited as necessary the review of systems, past medical/surgical history, family history,  social history, and examination findings as document by others; and I examined the patient myself. I personally reviewed the relevant tests, images, and reports as documented above. I formulated and edited as necessary the assessment and plan and discussed the findings and management plan with the patient and family.    Mulugeta Srinivasan OD        Again, thank you for allowing me to participate in the care of your patient.      Sincerely,    Mulugeta Srinivasan Jr., OD

## 2025-05-21 LAB
ACID FAST STAIN (ARUP): NORMAL

## 2025-05-26 NOTE — PROGRESS NOTES
"   GENERAL ID CLINIC           Assessment and Recommendations:   Problem List:    # EDDA pulmonary infection     Discussion:     Meme Butts is a 74 year old female with PMHx of colon cancer (adenocarcinoma of hepatic flexure of colon and family history of BRCA1) diagnosed by screening colonoscopy on 1/10/2023 s/p robotic right hemicolectomy and appendectomy on 2/28/2023 who was referred to our clinic by Dr. Shelby Hernandez (pulmonology) because lung nodule(s) and BAL culture positive for EDDA. She also has PMHx of migraines, seizures, osteoporosis, obesity, prediabetes (Hb A1C 6.3% from 3/18/24), history of GI bleed. Patient states she has persistent cough started 2 years ago. She initially though it was due to sinus disease and post nasal drip. infection. She states that the cough would be persistent and would happen all day. It was mostly dry but she would have some greenish sputum at times. Cough was so often that it would keep her awake. She started claritin with some improvement. She also has SOB started around the same time of the cough. Activities that would lead to SOB included going up and down stairs. She still keeps herself active and cleans the house, gardens and does yard work. She denies weight loss.     Per my review, the nodules were first identified on CT chest from 12/29/22 which showed upper lobe micronodules and bronchiectasis (bilaterally). Follow up CT chest from 3/3/2023 showed same nodules in addition to at least one new 9 mm nodule in the left upper lobe. The most recent CT chest from 3/28/2024 showed: \"Centrilobular and tree-in-bud nodularity, slightly increased associated with bronchiectasis greatest in the right middle lobe, lingula and lower lobes; findings suggestive of atypical indolent infection such as nonclassic nontuberculous mycobacteria\". Patient underwent PFT study on 3/2024 and, per Dr. Hernandez's interpretation, it was normal. Bronchoscopy performed on 5/21/24. BAL studies " "showed: cell count 760 with 56# neutrophils, 12% lymphocytes and 32% macrophages/monocytes. 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  Fungal culture is negative to date. Aspergillus galactomannan negative, Nocardia PCR negative, Nocardia culture negative. 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 Cytology was negative for malignancy and negative for fungal organisms or pneumocystis on GMS stain. Furthermore, the viral cytopathic effect is absent.  Given presence of EDDA on BAL as well as clinical and radiologic criteria, we started EDDA treatment with Rifampin 600 mg once daily - started 7/27; Azithromycin 250 mg once daily - started 8/3 and Ethambutol 1,200 mg (15.6 mg/kg) once daily - started 8/10. Patient cleared the sputum on 11/25/25. Patient's symptoms have resolved after medication initiation. Most recent CT chest from 5/28/25 showed: \"No significant change from 2/26/2025. Scattered inflammatory/infectious nodularity common bronchiectasis and bronchial wall thickening with other areas of atelectasis especially in the right middle lobe and lingula of the left lung. Atherosclerosis\". Today's visit:   She is tolerating the medications well. Symptoms resolved and AFB cleared on 11/25/25. Labs from 4/29/25 are normal. Please see the CT result above.            Recommendations:     Continue azithromycin + ethambutol +  rifampin for EDDA treatment (started on 7/27). Duration will be 1 year from first negative AFB sputum after treatment - end date planned to be at least 11/25/25          - Patient was not a good candidate for aminoglycoside given her baseline hearing impairment.       Audiogram from today shows stable findings. She needs q 3 months tests     Ophthalmology visit from 4/29/25 shows stable findings     She will need a CT chest 3 months. Next one in August     Our clinic pharmacist will continue to co-manage with me    "   Continued follow up with pulmonology     She will need monthly labs (CBC and CMP). Placed standing orders.      Patient had an EKG on 9/18 and it showed a normal QTc (431)     AFB sputum stain from 11/25 and 3/26/25 negative      Next follow up with me in August       Recommendations discussed with the patient.      Celia Robison MD  Date of Service: 05/29/25    On 05/29/25, I spent 30 min with chart review, patient visit, documentation and coordination of care.           History of Present Illness:   Meme Butts is a 74 year old female with PMHx of colon cancer (adenocarcinoma of hepatic flexure of colon and family history of BRCA1) diagnosed by screening colonoscopy on 1/10/2023 s/p robotic right hemicolectomy and appendectomy on 2/28/2023 who was referred to our clinic by Dr. Shelby Hernandez (pulmonology) because lung nodule(s) and BAL culture positive for EDDA. She also has PMHx of migraines, seizures, osteoporosis, obesity, prediabetes (Hb A1C 6.3% from 3/18/24), history of GI bleed.     Patient states she has persistent cough started 2 years ago. She initially though it was due to sinus disease and post nasal drip. infection. She states that the cough would be persistent and would happen all day. It was mostly dry but she would have some greenish sputum at times. Cough was so often that it would keep her awake. She started claritin with some improvement. She also has SOB started around the same time of the cough. Activities that would lead to SOB included going up and down stairs. She still keeps herself active and cleans the house, gardens and does yard work. She denies weight loss.     Per my review, the nodules were first identified on CT chest from 12/29/22 which showed upper lobe micronodules and bronchiectasis (bilaterally). Follow up CT chest from 3/3/2023 showed same nodules in addition to at least one new 9 mm nodule in the left upper lobe. The most recent CT chest from 3/28/2024 showed:  "\"Centrilobular and tree-in-bud nodularity, slightly increased associated with bronchiectasis greatest in the right middle lobe, lingula and lower lobes; findings suggestive of atypical indolent infection such as nonclassic nontuberculous mycobacteria\". Patient underwent PFT study on 3/2024 and, per Dr. Hernandez's interpretation, it was normal. Bronchoscopy performed on 5/21/24. BAL studies showed: cell count 760 with 56# neutrophils, 12% lymphocytes and 32% macrophages/monocytes. 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  Fungal culture is negative to date. Aspergillus galactomannan negative, Nocardia PCR negative, Nocardia culture negative. AFB culture positive for Mycobacterium avium intracellulare pan-susceptible except for being intermediate to linezolid. Cytology was negative for malignancy and negative for fungal organisms or pneumocystis on GMS stain. Furthermore, the viral cytopathic effect is absent. Patient did not have fever or chills but she has persistent cough with intermittent greenish sputum production. She was prescribed 10 days of levofloxacin by her pulmonologist.     Given presence of EDDA on BAL as well as clinical and radiologic criteria, we started EDDA treatment with Rifampin 600 mg once daily - started 7/27; Azithromycin 250 mg once daily - started 8/3 and Ethambutol 1,200 mg (15.6 mg/kg) once daily - started 8/10. Patient cleared the sputum on 11/25/25. Patient's symptoms have resolved after medication initiation. Most recent CT chest from 5/28/25 showed: \"No significant change from 2/26/2025. Scattered inflammatory/infectious nodularity common bronchiectasis and bronchial wall thickening with other areas of atelectasis especially in the right middle lobe and lingula of the left lung. Atherosclerosis\".      Today's visit:   She is tolerating the medications well. Symptoms resolved and AFB cleared on 11/25/25. Labs from 4/29/25 are normal. Please see the CT result " "above.          Review of Systems:     CONSTITUTIONAL:  No fevers or chills  INTEGUMENTARY/SKIN: NEGATIVE for worrisome rashes, moles or lesions  EYES: Negative for icterus, vision changes or irritation  ENT/MOUTH:  Cough improved  RESPIRATORY: See HPI  CARDIOVASCULAR:  Negative for chest pain, palpitations and  shortness of breath  GASTROINTESTINAL:  Negative for abdominal pain, nausea, vomiting, diarrhea and constipation  GENITOURINARY:  Negative for dysuria, hematuria, frequency and urgency  MUSCULOSKELETAL: Negative for joint pain, swelling, motion restriction, negative for musculoskeletal pain  NEURO:  Negative for headache, altered mental status, numbness or weakness  PSYCHIATRIC: Negative for changes in mood or affect  HEMATOLOGIC/LYMPHATIC: negative for lymphadenopathy or bleeding  ALLERGIC/IMMUNOLOGIC: Negative for allergic reaction   ENDOCRINE: Negative for temperature intolerance, skin/hair changes       Past Medical History:     Past Medical History:   Diagnosis Date    Adenomatous colon polyp 2011    tubular adenoma    At high risk for breast cancer 02/16/2017    35.4% lifetime risk by Philip model    Epilepsy (H)     Fracture of metatarsal bone of left foot 07/2014    Fracture, radius 1990    and ulnar styloid fracture    GIB (gastrointestinal bleeding) 04/01/2011    Intractable chronic migraine without aura     Kidney stones 1970, 1975    during pregnancy    Malignant neoplasm of hepatic flexure (H)     Migraine     Osteoporosis 2016    T score -4.1    Pneumonia     Prediabetes         Surgery R wrist after a fracture post fall - has hardware (2023)  Had cataract surgery in October 2023    Allergies:       No Known Allergies          Family History:     Family History   Problem Relation Age of Onset    Breast Cancer Mother 30        lumpectomy and chemo; also \"mass in ovary\"    Colon Polyps Father     Coronary Artery Disease Brother     Colon Polyps Brother     Coronary Artery Disease Early Onset " Brother 50    Prostate Cancer Brother     Osteoporosis Sister     Breast Cancer Sister 55    Leukemia Sister 52    Gastrointestinal Disease Son         intestinal transplant    Diabetes Maternal Aunt         multiple mat aunts    Ovarian Cancer Maternal Aunt 50         of ovarian cancer in 50s or 60s, unknown    Breast Cancer Paternal Aunt 36         of breast cancer recurrence in 60s    Bone Cancer Niece 19    Prostate Cancer Cousin 65    Breast Cancer Cousin 30        paternal cousin, BRCA1+ by report    Breast Cancer Cousin 40        paternal cousin, BRCA1+ by report    Breast Cancer Cousin 35        paternal cousin, BRCA1+ by report    Genetic Disorder Cousin         paternal male cousin, BRCA1+ by report    Ovarian Cancer Cousin         paternal cousin, BRCA1+ by report    Anesthesia Reaction No family hx of     Venous thrombosis No family hx of     Glaucoma No family hx of     Macular Degeneration No family hx of     Eye Surgery No family hx of     Retinal detachment No family hx of              Social History:     Social History     Socioeconomic History    Marital status:      Spouse name: Quentin    Number of children: 3    Years of education: Not on file    Highest education level: Not on file   Occupational History    Occupation:      Employer: RETIRED   Tobacco Use    Smoking status: Former     Current packs/day: 0.00     Average packs/day: 1 pack/day for 15.0 years (15.0 ttl pk-yrs)     Types: Cigarettes     Start date: 1994     Quit date: 2009     Years since quitting: 15.7    Smokeless tobacco: Never    Tobacco comments:     On and off for a total of 10 years of smoking    Vaping Use    Vaping status: Never Used   Substance and Sexual Activity    Alcohol use: Yes     Comment: 1x/month    Drug use: No    Sexual activity: Not Currently     Partners: Male     Birth control/protection: Post-menopausal   Other Topics Concern    Parent/sibling w/ CABG, MI or  angioplasty before 65F 55M? Not Asked   Social History Narrative    Lives in a house, has  3 children,       lost 1 child, retired      Social Drivers of Health     Financial Resource Strain: Low Risk  (7/25/2024)    Financial Resource Strain     Within the past 12 months, have you or your family members you live with been unable to get utilities (heat, electricity) when it was really needed?: No   Food Insecurity: Low Risk  (7/25/2024)    Food Insecurity     Within the past 12 months, did you worry that your food would run out before you got money to buy more?: No     Within the past 12 months, did the food you bought just not last and you didn t have money to get more?: No   Transportation Needs: Low Risk  (7/25/2024)    Transportation Needs     Within the past 12 months, has lack of transportation kept you from medical appointments, getting your medicines, non-medical meetings or appointments, work, or from getting things that you need?: No   Physical Activity: Unknown (7/25/2024)    Exercise Vital Sign     Days of Exercise per Week: Patient declined     Minutes of Exercise per Session: 20 min   Stress: No Stress Concern Present (7/25/2024)    [employer] - Occupational Stress Questionnaire     Feeling of Stress : Not at all   Social Connections: Unknown (7/25/2024)    Social Connection and Isolation Panel [NHANES]     Frequency of Communication with Friends and Family: Not on file     Frequency of Social Gatherings with Friends and Family: Three times a week     Attends Congregation Services: Not on file     Active Member of Clubs or Organizations: Not on file     Attends Club or Organization Meetings: Not on file     Marital Status: Not on file   Interpersonal Safety: Low Risk  (3/26/2025)    Interpersonal Safety     Do you feel physically and emotionally safe where you currently live?: Yes     Within the past 12 months, have you been hit, slapped, kicked or otherwise  "physically hurt by someone?: No     Within the past 12 months, have you been humiliated or emotionally abused in other ways by your partner or ex-partner?: No   Housing Stability: Low Risk  (7/25/2024)    Housing Stability     Do you have housing? : Yes     Are you worried about losing your housing?: No      HOME/ENVIRONMENT:   Lives with her daughter (48 years old)  Has another daughter 51 years old  Son passed - diverticulitis -> had surgery and went back with removal of small intestine - underwent small bowel transplant - 1 year after transplant he passed  Patient is      OCCUPATION:   Administration - retired     TRAVEL:   Travel across [place] 15 years ago  Originally from [place]     ANIMALS:   One Dog and one cat     TUBERCULOSIS:   Father and and brother were treated for TB 15 years ago. She states she had a quantiferon performed and it was negative.      TOBACCO/ALCOHOL/RECREATIONAL DRUGS:   Smoked 10 years (1 pack a day) - quit 15 years ago  Glass of wine once a week  No drugs         Physical Exam:   /72   Pulse 91   Temp 98.3  F (36.8  C) (Oral)   Ht 1.575 m (5' 2\")   Wt 75.8 kg (167 lb)   BMI 30.54 kg/m       Exam:  GENERAL:  Well-developed, well-nourished, not in acute distress.   HEAD: Normocephalic and atraumatic  ENT:  No hearing impairment, oropharynx clear, oral mucous membranes moist  EYES:  Eyes grossly normal to inspection  LUNGS:  Clear to auscultation - no rales, rhonchi or wheezes  CARDIOVASCULAR:  Regular rate and rhythm, normal S1 S2, no murmur  ABDOMEN:  Soft, non-tender  EXT: Extremities warm and without edema.  MS: No gross musculoskeletal defects noted, no edema  SKIN:  No acute rashes or suspicious lesions  NEUROLOGIC:  Grossly nonfocal. Normal strength and tone, mentation intact and speech normal  PSYCHIATRIC: Mood stable, mentation appears normal, affect normal       "

## 2025-05-27 NOTE — PROGRESS NOTES
AUDIOLOGY REPORT    SUBJECTIVE: Meme Butts is a 75 year old female who was seen on 5/28/25 in the Audiology Clinic at the [facility] for audiologic evaluation, referred by Celia Lala MD.      The patient [facility] for baseline assessment and 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    Previous results indicated normal sloping to mild to moderate sensorineural hearing loss in the high frequencies bilaterally.      Today the patient reports no changes to her hearing since last hearing evaluation. Patient mentions she just returned from a flight from [place] earlier today and while on her trip she developed seasonal allergies. Patient states her left sided tinnitus worsened. Patient notes left sided pressure.  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  The patient denies ear pain, drainage from ears, dizziness, falls, or history of ear surgery.      OBJECTIVE:  Otoscopic exam indicated clear ear canals bilaterally.    Pure Tone Thresholds assessed using conventional audiometry with good reliability from 250-8000 Hz bilaterally using insert earphones and circumaural headphones.      RIGHT: Normal hearing sloping to moderate sensorineural hearing loss     LEFT: Normal hearing sloping to moderate sensorineural hearing loss      High Frequency Audiometry was performed from 9,000-16,000 Hz: falls within aged norms at 9 kHz in the right and 9-11.2 kHz in the left ear, outside of aged norms from 10-11.2 kHz in the right, and no response from 12.5-16 kHz in the right and 12.5-16 kHz in the left.  Worsened thresholds re: 10/1/24 and 7/16/24 baseline bilaterally.     Distortion Product  "Otoacoustic Emissions (DPOAEs) were performed from 500-10,000 Hz (Titan equipment):    RIGHT: Absent    LEFT: Absent    Tympanogram:    RIGHT: normal eardrum mobility    LEFT: normal eardrum mobility    Reflexes (reported by stimulus ear):    RIGHT: Ipsilateral: present at normal levels    RIGHT: Contralateral: present at normal levels    LEFT:  Ipsilateral: present at normal levels    LEFT: Contralateral: elevated at presented levels    Speech Reception Threshold:    RIGHT: 35 dB HL    LEFT: 40 dB HL    Word Recognition Score:     RIGHT: 96% at 75 dB HL using NU-6 recorded word list.    LEFT:  96% at 80 dB HL using NU-6 recorded word list.      ASSESSMENT:     Comparison to Previous Audiogram dated 10/1/24:    Pure Tone Thresholds 250-8000 Hz:    RIGHT: Stable    LEFT: Stable    High Frequency Audiometry 9,000-16,000Hz:     RIGHT: 10 dB decreased threshold at 10 kHz    LEFT: Stable     Word Recognition Score:    RIGHT: Stable     LEFT: Stable    Comparison to Baseline Audiogram dated 7/16/24:    Word Recognition Score:    RIGHT: Stable     LEFT: Stable     High Frequency Audiometry 9,000-16,000Hz:     RIGHT: 10 dB decreased thresholds from 9-10 kHz    LEFT: Stable   *A significant change obtained in high frequency audiometry does not always indicate a shift in the patient's Grade. Please see below for Grading information.       National Cancer [employer] Common Terminology Criteria for Adverse Events (CTCAE) Ototoxicity Grades:  *Please note: Grading is based on a 1, 2, 3, 4, 6 and 8 kHz audiogram. No Grade will be assigned for Baseline Audiograms. If criteria is not met for a Grade for subsequent audiograms then the Grade will continue to be considered \"No Grade.\"    GRADE 1: Adults enrolled on a Monitoring  Program: Threshold  shift of 15 - 25 dB averaged at 2  contiguous test frequencies in at  least one ear.  Adults not enrolled in Monitoring  Program: subjective change in  hearing in the absence of  documented " hearing loss.    GRADE 2:Adults enrolled in Monitoring  Program: Threshold  shift of >25 dB averaged at 2  contiguous test frequencies in at  least one ear.  Adults not enrolled in Monitoring  Program: hearing loss but  hearing aid or intervention not  indicated; limiting instrumental  ADL.    GRADE 3:  Adults enrolled in Monitoring  Program: Threshold  shift of >25 dB averaged at 3  contiguous test frequencies in at  least one ear; therapeutic  intervention indicated.  Adults not enrolled in Monitoring  Program: hearing loss with  hearing aid or intervention  indicated; limiting self care ADL.    GRADE 4:  Adults: Decrease in hearing to  profound bilateral loss (absolute  threshold >80 dB HL at 2 kHz  and above); non-servicable  Hearing.    CTCAE4.03 Scale Grade:  *Grading based on comparison to patient's Baseline Audiogram (on a 1, 2, 3, 4, 6 and 8 kHz audiogram) dated 7/16/24    RIGHT: No Grade; Stable   LEFT: No Grade; Stable     PLAN:    Monitor hearing during treatment at intervals advised by Celia Lala MD or sooner if changes.  Patient is a hearing aid candidate. However, patient would like to pursue amplification when treatment is completed.    *Post-treatment recommendations: It is recommended that the patient return for a post-treatment evaluation as soon as possible following completion of treatment. Continued monitoring at 6 months and then annually (or sooner should concerns arise) is also recommended.    The patient expressed understanding and agreement with this plan.    Gail Noriega M.A  Audiology Doctoral Extern  [place] #[id number]    I was present with the patient for the entire Audiology appointment, including all procedures/testing performed by the [place] student, and agree with the student s assessment and plan as documented.     Annie Berger, [facility]  Licensed Audiologist  [place] License #[id number]        Cc Celia Lala MD

## 2025-05-27 NOTE — PROGRESS NOTES
"Medication Therapy Management (MTM) Encounter    ASSESSMENT:                            Medication Adherence/Access: No issues identified.      Mycobacterium avium intracellulare complex infection:  Stable, so can continue monitoring culture as first negative AFB was 11/2024. If it finalizes as negative, continue treatment for 12 months until 11/2025.      Hyperlipidemia:  Stable - improved lipid panel.        Bone Health   /Osteoporosis:  Stable        Supplements:  Stable         Epilepsy:  Stable        Allergy  /Postnasal Drip:  Symptoms have been worse lately since her trip to [place], but typically well managed. No change.        Arthritis:  Stable           PLAN:                            If sputum cultures from 11/2024 remain negative, continue antibiotics until 11/2025       Follow-up: MTM Pharmacist in 3 months    SUBJECTIVE/OBJECTIVE:                          Meme Butts is a 75 year old female seen for a follow-up visit, but is a new patient to me.    Reason for visit: NTM follow up.    Allergies/ADRs: Reviewed in chart  Past Medical History: Reviewed in chart  Tobacco: She reports that she quit smoking about 15 years ago. Her smoking use included cigarettes. She started smoking about 30 years ago. She has a 15 pack-year smoking history. She has never used smokeless tobacco.  Alcohol: 1-3 beverages / week    Medication Adherence/Access: no issues reported.  Patient uses pill box(es).  Per patient, misses medication 0 time(s) per week.       Mycobacterium avium intracellulare complex infection:  Rifampin 600 mg once daily - started 7/27  Azithromycin 250 mg once daily - started 8/3  Ethambutol 1,200 mg (15.6 mg/kg) once daily (has been taking 1 tablet 3 times daily) - started 8/10  Sodium chloride 0.9% nebs - twice daily      Tolerating well other than occasional \"whooshing\" noise which can be worse when she goes swimming. Denies any hearing or vision changes. No fevers or chills. Has been experiencing " allergy-related symptoms which include watery eyes, rhinitis, and post-nasal drip resulting in cough.        AFB sputum: negative since November 2024  Eye exam: hx cataract surgery fall 2023. Unremarkable after January 2025 appointment   Audiology: baseline hearing loss, unremarkable after February 2025 appointment   EKG: Qtc 431 on 9/18/24  CT chest: some scattered cavitary nodules (8/28/24)  CBC: within normal limits (04/29/25)     5/21/24 AFB culture:              Mycobacterium avium intracellulare complex           AFB VESNA ([facility])       Amikacin 8 Susceptible       Clarithromycin 1 Susceptible       Comment:   See below 1       Linezolid 16 Intermediate       Moxifloxacin 1 Susceptible           Hyperlipidemia   Rosuvastatin 5 mg once daily   Lipid panel were improved when rechecked 1/29/25 when compared to 10/23/24, but triglycerides were still slightly elevated at 172. LDL was 74 and HDL was 71. Reports prior myalgia and muscle weakness while taking atorvastatin 10 mg dose.  The 10-year ASCVD risk score (Pepe HILL, et al., 2019) is: 21%    Values used to calculate the score:      Age: 75 years      Sex: Female      Is Non- : No      Diabetic: Yes      Tobacco smoker: No      Systolic Blood Pressure: 114 mmHg      Is BP treated: No      HDL Cholesterol: 71 mg/dL      Total Cholesterol: 179 mg/dL           Bone Health   Osteoporosis:   Calcium carbonate/magnesium//Vitamin D - take 1 tablet once daily (contains 500 mg calcium carbonate)  Evenity 210 mg every 30 days - to be completed in August    Patient is not experiencing side effects. Eats yogurt and various cheeses. Drinks milk and other vegetable shakes.  DEXA History: 3/29/24 - repeat in 2 years  History of taking alendronate for 4-5 years and tolerated well. Stopped for drug holiday 8/2021. Started Prolia 8/24/22 but no repeat doses per chart review.        Supplements   Multivitamin once daily   Metamucil as needed   Has been using  less Metamucil since yogurt improved her constipation. No reported issues at this time.        Epilepsy:  Levetiracetam 1,125 mg 2 times daily   First episode occurred while she was in high school. Recalls last seizure episode while they were trying to transition to other antiseizure medications. Last seizure >20 years. Following with outside neurology at [facility]. No reported issues at this time.     Allergy   /Postnasal Drip  Loratadine 10 mg once daily as needed  Refresh eye drops as needed   Vicks Dayquil/Nyquil as needed    She does not tolerate Nyquil well. Claritin causes insomnia. Nasonex caused headaches and nose bleed. Primarily just uses saline nasal spray.  Patient feels that current therapy is effective.        Arthritis:   Diclofenac gel 1% as needed  Ibuprofen 400 mg as needed    Will only take ibuprofen whenever she is having severe soreness and estimates 1x dose per month. Uses diclofenac gel on her feet only when needed. No reported issues at this time, denies any blood in urine or black tarry stools       Today's Vitals: There were no vitals taken for this visit.  ----------------      I spent 27 minutes with this patient today. All changes were made via collaborative practice agreement with Dr. Robison.     A summary of these recommendations was sent via [employer].      Lv Hunter, PharmD, BCGP  Medication Therapy Management Pharmacist  [facility]          Medication Therapy Recommendations  No medication therapy recommendations to display

## 2025-05-28 ENCOUNTER — OFFICE VISIT (OUTPATIENT)
Dept: INFECTIOUS DISEASES | Facility: CLINIC | Age: 75
End: 2025-05-28
Attending: INTERNAL MEDICINE
Payer: COMMERCIAL

## 2025-05-28 ENCOUNTER — OFFICE VISIT (OUTPATIENT)
Dept: AUDIOLOGY | Facility: CLINIC | Age: 75
End: 2025-05-28
Payer: COMMERCIAL

## 2025-05-28 ENCOUNTER — OFFICE VISIT (OUTPATIENT)
Dept: PHARMACY | Facility: CLINIC | Age: 75
End: 2025-05-28

## 2025-05-28 VITALS
BODY MASS INDEX: 30.73 KG/M2 | HEART RATE: 91 BPM | TEMPERATURE: 98.3 F | HEIGHT: 62 IN | DIASTOLIC BLOOD PRESSURE: 72 MMHG | WEIGHT: 167 LBS | SYSTOLIC BLOOD PRESSURE: 114 MMHG

## 2025-05-28 DIAGNOSIS — H90.3 SENSORINEURAL HEARING LOSS (SNHL) OF BOTH EARS: Primary | ICD-10-CM

## 2025-05-28 DIAGNOSIS — A31.0 MAI (MYCOBACTERIUM AVIUM-INTRACELLULARE) (H): Primary | ICD-10-CM

## 2025-05-28 DIAGNOSIS — R09.82 POST-NASAL DRIP: ICD-10-CM

## 2025-05-28 DIAGNOSIS — R56.9 SEIZURES (H): ICD-10-CM

## 2025-05-28 DIAGNOSIS — A31.0 MYCOBACTERIUM AVIUM INFECTION (H): Primary | ICD-10-CM

## 2025-05-28 DIAGNOSIS — M19.90 ARTHRITIS: ICD-10-CM

## 2025-05-28 DIAGNOSIS — E78.5 HYPERLIPIDEMIA LDL GOAL <100: ICD-10-CM

## 2025-05-28 DIAGNOSIS — Z78.9 TAKES DIETARY SUPPLEMENTS: ICD-10-CM

## 2025-05-28 DIAGNOSIS — K59.00 CONSTIPATION, UNSPECIFIED CONSTIPATION TYPE: ICD-10-CM

## 2025-05-28 DIAGNOSIS — M81.0 OSTEOPOROSIS, UNSPECIFIED OSTEOPOROSIS TYPE, UNSPECIFIED PATHOLOGICAL FRACTURE PRESENCE: ICD-10-CM

## 2025-05-28 PROCEDURE — G0463 HOSPITAL OUTPT CLINIC VISIT: HCPCS | Performed by: INTERNAL MEDICINE

## 2025-05-28 ASSESSMENT — PAIN SCALES - GENERAL: PAINLEVEL_OUTOF10: NO PAIN (0)

## 2025-05-28 NOTE — NURSING NOTE
"Chief Complaint   Patient presents with    RECHECK     2 month follow up     /72   Pulse 91   Temp 98.3  F (36.8  C) (Oral)   Ht 1.575 m (5' 2\")   Wt 75.8 kg (167 lb)   BMI 30.54 kg/m    Isatu Conde MA on 5/28/2025 at 12:52 PM    "

## 2025-05-28 NOTE — PATIENT INSTRUCTIONS
"Recommendations from today's MTM visit:                                                    MTM (medication therapy management) is a service provided by a clinical pharmacist designed to help you get the most of out of your medicines.   Today we reviewed what your medicines are for, how to know if they are working, that your medicines are safe and how to make your medicine regimen as easy as possible.      If sputum cultures from 11/2024 remain negative, continue antibiotics until 11/2025     Follow-up: MTM Pharmacist in 3 months    It was great speaking with you today.  I value your experience and would be very thankful for your time in providing feedback in our clinic survey. In the next few days, you may receive an email or text message from [facility] with a link to a survey related to your  clinical pharmacist.\"     To schedule another MTM appointment, please call the clinic directly or you may call the MTM scheduling line at [phone] or toll-free at [phone].     My Clinical Pharmacist's contact information:                                                      Please feel free to contact me with any questions or concerns you have.      Lv Hunter, PharmD, BCGP  Medication Therapy Management Pharmacist  [facility]    "

## 2025-05-28 NOTE — Clinical Note
Hi Dr. Robison,  Patient is tolerating current MAC regimen well and we will continue to monitor labs/culture. I am meeting with patient again in August when her treatment is nearing an end. Thanks!  Lv Hunter, PharmD, BCGP Medication Therapy Management Pharmacist [facility]

## 2025-05-28 NOTE — LETTER
"5/28/2025       RE: Meme Butts  [address]  [place] [address]     Dear Colleague,    Thank you for referring your patient, Meme Butts, to the [facility] [place] [facility]. Please see a copy of my visit note below.       GENERAL ID CLINIC           Assessment and Recommendations:   Problem List:    # EDDA pulmonary infection     Discussion:     Meme Butts is a 74 year old female with PMHx of colon cancer (adenocarcinoma of hepatic flexure of colon and family history of BRCA1) diagnosed by screening colonoscopy on 1/10/2023 s/p robotic right hemicolectomy and appendectomy on 2/28/2023 who was referred to our clinic by Dr. Shelby Hernandez (pulmonology) because lung nodule(s) and BAL culture positive for EDDA. She also has PMHx of migraines, seizures, osteoporosis, obesity, prediabetes (Hb A1C 6.3% from 3/18/24), history of GI bleed. Patient states she has persistent cough started 2 years ago. She initially though it was due to sinus disease and post nasal drip. infection. She states that the cough would be persistent and would happen all day. It was mostly dry but she would have some greenish sputum at times. Cough was so often that it would keep her awake. She started claritin with some improvement. She also has SOB started around the same time of the cough. Activities that would lead to SOB included going up and down stairs. She still keeps herself active and cleans the house, gardens and does yard work. She denies weight loss.     Per my review, the nodules were first identified on CT chest from 12/29/22 which showed upper lobe micronodules and bronchiectasis (bilaterally). Follow up CT chest from 3/3/2023 showed same nodules in addition to at least one new 9 mm nodule in the left upper lobe. The most recent CT chest from 3/28/2024 showed: \"Centrilobular and tree-in-bud nodularity, slightly increased " "associated with bronchiectasis greatest in the right middle lobe, lingula and lower lobes; findings suggestive of atypical indolent infection such as nonclassic nontuberculous mycobacteria\". Patient underwent PFT study on 3/2024 and, per Dr. Hernandez's interpretation, it was normal. Bronchoscopy performed on 5/21/24. BAL studies showed: cell count 760 with 56# neutrophils, 12% lymphocytes and 32% macrophages/monocytes. 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  Fungal culture is negative to date. Aspergillus galactomannan negative, Nocardia PCR negative, Nocardia culture negative. 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 Cytology was negative for malignancy and negative for fungal organisms or pneumocystis on GMS stain. Furthermore, the viral cytopathic effect is absent.  Given presence of EDDA on BAL as well as clinical and radiologic criteria, we started EDDA treatment with Rifampin 600 mg once daily - started 7/27; Azithromycin 250 mg once daily - started 8/3 and Ethambutol 1,200 mg (15.6 mg/kg) once daily - started 8/10. Patient cleared the sputum on 11/25/25. Patient's symptoms have resolved after medication initiation. Most recent CT chest from 5/28/25 showed: \"No significant change from 2/26/2025. Scattered inflammatory/infectious nodularity common bronchiectasis and bronchial wall thickening with other areas of atelectasis especially in the right middle lobe and lingula of the left lung. Atherosclerosis\". Today's visit:   She is tolerating the medications well. Symptoms resolved and AFB cleared on 11/25/25. Labs from 4/29/25 are normal. Please see the CT result above.            Recommendations:     Continue azithromycin + ethambutol +  rifampin for EDDA treatment (started on 7/27). Duration will be 1 year from first negative AFB sputum after treatment - end date planned to be at least 11/25/25          - Patient was not a good candidate " for aminoglycoside given her baseline hearing impairment.       Audiogram from today shows stable findings. She needs q 3 months tests     Ophthalmology visit from 4/29/25 shows stable findings     She will need a CT chest 3 months. Next one in August     Our clinic pharmacist will continue to co-manage with me      Continued follow up with pulmonology     She will need monthly labs (CBC and CMP). Placed standing orders.      Patient had an EKG on 9/18 and it showed a normal QTc (431)     AFB sputum stain from 11/25 and 3/26/25 negative      Next follow up with me in August       Recommendations discussed with the patient.      Celia Robison MD  Date of Service: 05/29/25    On 05/29/25, I spent 30 min with chart review, patient visit, documentation and coordination of care.           History of Present Illness:   Meme Butts is a 74 year old female with PMHx of colon cancer (adenocarcinoma of hepatic flexure of colon and family history of BRCA1) diagnosed by screening colonoscopy on 1/10/2023 s/p robotic right hemicolectomy and appendectomy on 2/28/2023 who was referred to our clinic by Dr. Shelby Hernandez (pulmonology) because lung nodule(s) and BAL culture positive for EDDA. She also has PMHx of migraines, seizures, osteoporosis, obesity, prediabetes (Hb A1C 6.3% from 3/18/24), history of GI bleed.     Patient states she has persistent cough started 2 years ago. She initially though it was due to sinus disease and post nasal drip. infection. She states that the cough would be persistent and would happen all day. It was mostly dry but she would have some greenish sputum at times. Cough was so often that it would keep her awake. She started claritin with some improvement. She also has SOB started around the same time of the cough. Activities that would lead to SOB included going up and down stairs. She still keeps herself active and cleans the house, gardens and does yard work. She denies weight loss.     Per  "my review, the nodules were first identified on CT chest from 12/29/22 which showed upper lobe micronodules and bronchiectasis (bilaterally). Follow up CT chest from 3/3/2023 showed same nodules in addition to at least one new 9 mm nodule in the left upper lobe. The most recent CT chest from 3/28/2024 showed: \"Centrilobular and tree-in-bud nodularity, slightly increased associated with bronchiectasis greatest in the right middle lobe, lingula and lower lobes; findings suggestive of atypical indolent infection such as nonclassic nontuberculous mycobacteria\". Patient underwent PFT study on 3/2024 and, per Dr. Hernandez's interpretation, it was normal. Bronchoscopy performed on 5/21/24. BAL studies showed: cell count 760 with 56# neutrophils, 12% lymphocytes and 32% macrophages/monocytes. 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  Fungal culture is negative to date. Aspergillus galactomannan negative, Nocardia PCR negative, Nocardia culture negative. AFB culture positive for Mycobacterium avium intracellulare pan-susceptible except for being intermediate to linezolid. Cytology was negative for malignancy and negative for fungal organisms or pneumocystis on GMS stain. Furthermore, the viral cytopathic effect is absent. Patient did not have fever or chills but she has persistent cough with intermittent greenish sputum production. She was prescribed 10 days of levofloxacin by her pulmonologist.     Given presence of EDDA on BAL as well as clinical and radiologic criteria, we started EDDA treatment with Rifampin 600 mg once daily - started 7/27; Azithromycin 250 mg once daily - started 8/3 and Ethambutol 1,200 mg (15.6 mg/kg) once daily - started 8/10. Patient cleared the sputum on 11/25/25. Patient's symptoms have resolved after medication initiation. Most recent CT chest from 5/28/25 showed: \"No significant change from 2/26/2025. Scattered inflammatory/infectious nodularity common bronchiectasis and " "bronchial wall thickening with other areas of atelectasis especially in the right middle lobe and lingula of the left lung. Atherosclerosis\".      Today's visit:   She is tolerating the medications well. Symptoms resolved and AFB cleared on 11/25/25. Labs from 4/29/25 are normal. Please see the CT result above.          Review of Systems:     CONSTITUTIONAL:  No fevers or chills  INTEGUMENTARY/SKIN: NEGATIVE for worrisome rashes, moles or lesions  EYES: Negative for icterus, vision changes or irritation  ENT/MOUTH:  Cough improved  RESPIRATORY: See HPI  CARDIOVASCULAR:  Negative for chest pain, palpitations and  shortness of breath  GASTROINTESTINAL:  Negative for abdominal pain, nausea, vomiting, diarrhea and constipation  GENITOURINARY:  Negative for dysuria, hematuria, frequency and urgency  MUSCULOSKELETAL: Negative for joint pain, swelling, motion restriction, negative for musculoskeletal pain  NEURO:  Negative for headache, altered mental status, numbness or weakness  PSYCHIATRIC: Negative for changes in mood or affect  HEMATOLOGIC/LYMPHATIC: negative for lymphadenopathy or bleeding  ALLERGIC/IMMUNOLOGIC: Negative for allergic reaction   ENDOCRINE: Negative for temperature intolerance, skin/hair changes       Past Medical History:     Past Medical History:   Diagnosis Date     Adenomatous colon polyp 2011    tubular adenoma     At high risk for breast cancer 02/16/2017    35.4% lifetime risk by Philip model     Epilepsy (H)      Fracture of metatarsal bone of left foot 07/2014     Fracture, radius 1990    and ulnar styloid fracture     GIB (gastrointestinal bleeding) 04/01/2011     Intractable chronic migraine without aura      Kidney stones 1970, 1975    during pregnancy     Malignant neoplasm of hepatic flexure (H)      Migraine      Osteoporosis 2016    T score -4.1     Pneumonia      Prediabetes         Surgery R wrist after a fracture post fall - has hardware (2023)  Had cataract surgery in October 2023    " "Allergies:       No Known Allergies          Family History:     Family History   Problem Relation Age of Onset     Breast Cancer Mother 30        lumpectomy and chemo; also \"mass in ovary\"     Colon Polyps Father      Coronary Artery Disease Brother      Colon Polyps Brother      Coronary Artery Disease Early Onset Brother 50     Prostate Cancer Brother      Osteoporosis Sister      Breast Cancer Sister 55     Leukemia Sister 52     Gastrointestinal Disease Son         intestinal transplant     Diabetes Maternal Aunt         multiple mat aunts     Ovarian Cancer Maternal Aunt 50         of ovarian cancer in 50s or 60s, unknown     Breast Cancer Paternal Aunt 36         of breast cancer recurrence in 60s     Bone Cancer Niece 19     Prostate Cancer Cousin 65     Breast Cancer Cousin 30        paternal cousin, BRCA1+ by report     Breast Cancer Cousin 40        paternal cousin, BRCA1+ by report     Breast Cancer Cousin 35        paternal cousin, BRCA1+ by report     Genetic Disorder Cousin         paternal male cousin, BRCA1+ by report     Ovarian Cancer Cousin         paternal cousin, BRCA1+ by report     Anesthesia Reaction No family hx of      Venous thrombosis No family hx of      Glaucoma No family hx of      Macular Degeneration No family hx of      Eye Surgery No family hx of      Retinal detachment No family hx of              Social History:     Social History     Socioeconomic History     Marital status:      Spouse name: Quentin     Number of children: 3     Years of education: Not on file     Highest education level: Not on file   Occupational History     Occupation:      Employer: RETIRED   Tobacco Use     Smoking status: Former     Current packs/day: 0.00     Average packs/day: 1 pack/day for 15.0 years (15.0 ttl pk-yrs)     Types: Cigarettes     Start date: 1994     Quit date: 2009     Years since quitting: 15.7     Smokeless tobacco: Never     Tobacco " comments:     On and off for a total of 10 years of smoking    Vaping Use     Vaping status: Never Used   Substance and Sexual Activity     Alcohol use: Yes     Comment: 1x/month     Drug use: No     Sexual activity: Not Currently     Partners: Male     Birth control/protection: Post-menopausal   Other Topics Concern     Parent/sibling w/ CABG, MI or angioplasty before 65F 55M? Not Asked   Social History Narrative    Lives in a house, has  3 children,       lost 1 child, retired      Social Drivers of Health     Financial Resource Strain: Low Risk  (7/25/2024)    Financial Resource Strain      Within the past 12 months, have you or your family members you live with been unable to get utilities (heat, electricity) when it was really needed?: No   Food Insecurity: Low Risk  (7/25/2024)    Food Insecurity      Within the past 12 months, did you worry that your food would run out before you got money to buy more?: No      Within the past 12 months, did the food you bought just not last and you didn t have money to get more?: No   Transportation Needs: Low Risk  (7/25/2024)    Transportation Needs      Within the past 12 months, has lack of transportation kept you from medical appointments, getting your medicines, non-medical meetings or appointments, work, or from getting things that you need?: No   Physical Activity: Unknown (7/25/2024)    Exercise Vital Sign      Days of Exercise per Week: Patient declined      Minutes of Exercise per Session: 20 min   Stress: No Stress Concern Present (7/25/2024)    [place] [employer] - Occupational Stress Questionnaire      Feeling of Stress : Not at all   Social Connections: Unknown (7/25/2024)    Social Connection and Isolation Panel [NHANES]      Frequency of Communication with Friends and Family: Not on file      Frequency of Social Gatherings with Friends and Family: Three times a week      Attends Hindu Services: Not on file      Active Member  "of Clubs or Organizations: Not on file      Attends Club or Organization Meetings: Not on file      Marital Status: Not on file   Interpersonal Safety: Low Risk  (3/26/2025)    Interpersonal Safety      Do you feel physically and emotionally safe where you currently live?: Yes      Within the past 12 months, have you been hit, slapped, kicked or otherwise physically hurt by someone?: No      Within the past 12 months, have you been humiliated or emotionally abused in other ways by your partner or ex-partner?: No   Housing Stability: Low Risk  (7/25/2024)    Housing Stability      Do you have housing? : Yes      Are you worried about losing your housing?: No      HOME/ENVIRONMENT:   Lives with her daughter (48 years old)  Has another daughter 51 years old  Son passed - diverticulitis -> had surgery and went back with removal of small intestine - underwent small bowel transplant - 1 year after transplant he passed  Patient is      OCCUPATION:   Administration - retired     TRAVEL:   Travel across [place] 15 years ago  Originally from [place]     ANIMALS:   One Dog and one cat     TUBERCULOSIS:   Father and and brother were treated for TB 15 years ago. She states she had a quantiferon performed and it was negative.      TOBACCO/ALCOHOL/RECREATIONAL DRUGS:   Smoked 10 years (1 pack a day) - quit 15 years ago  Glass of wine once a week  No drugs         Physical Exam:   /72   Pulse 91   Temp 98.3  F (36.8  C) (Oral)   Ht 1.575 m (5' 2\")   Wt 75.8 kg (167 lb)   BMI 30.54 kg/m       Exam:  GENERAL:  Well-developed, well-nourished, not in acute distress.   HEAD: Normocephalic and atraumatic  ENT:  No hearing impairment, oropharynx clear, oral mucous membranes moist  EYES:  Eyes grossly normal to inspection  LUNGS:  Clear to auscultation - no rales, rhonchi or wheezes  CARDIOVASCULAR:  Regular rate and rhythm, normal S1 S2, no murmur  ABDOMEN:  Soft, non-tender  EXT: Extremities warm and without " edema.  MS: No gross musculoskeletal defects noted, no edema  SKIN:  No acute rashes or suspicious lesions  NEUROLOGIC:  Grossly nonfocal. Normal strength and tone, mentation intact and speech normal  PSYCHIATRIC: Mood stable, mentation appears normal, affect normal         Again, thank you for allowing me to participate in the care of your patient.      Sincerely,    SRINI ABAD MD

## 2025-06-03 ENCOUNTER — APPOINTMENT (OUTPATIENT)
Dept: GENERAL RADIOLOGY | Facility: CLINIC | Age: 75
End: 2025-06-03
Attending: EMERGENCY MEDICINE
Payer: COMMERCIAL

## 2025-06-03 ENCOUNTER — HOSPITAL ENCOUNTER (EMERGENCY)
Facility: CLINIC | Age: 75
Discharge: HOME | End: 2025-06-03
Attending: EMERGENCY MEDICINE | Admitting: EMERGENCY MEDICINE
Payer: COMMERCIAL

## 2025-06-03 ENCOUNTER — APPOINTMENT (OUTPATIENT)
Dept: CT IMAGING | Facility: CLINIC | Age: 75
End: 2025-06-03
Attending: EMERGENCY MEDICINE
Payer: COMMERCIAL

## 2025-06-03 VITALS
DIASTOLIC BLOOD PRESSURE: 73 MMHG | OXYGEN SATURATION: 95 % | HEART RATE: 89 BPM | BODY MASS INDEX: 31.53 KG/M2 | WEIGHT: 167 LBS | RESPIRATION RATE: 16 BRPM | SYSTOLIC BLOOD PRESSURE: 115 MMHG | HEIGHT: 61 IN | TEMPERATURE: 98.5 F

## 2025-06-03 DIAGNOSIS — R07.9 NONSPECIFIC CHEST PAIN: ICD-10-CM

## 2025-06-03 DIAGNOSIS — S80.11XA CONTUSION OF RIGHT LOWER LEG, INITIAL ENCOUNTER: ICD-10-CM

## 2025-06-03 DIAGNOSIS — R10.9 NONSPECIFIC ABDOMINAL PAIN: ICD-10-CM
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
## 2025-06-03 DIAGNOSIS — G44.319 ACUTE POST-TRAUMATIC HEADACHE, NOT INTRACTABLE: ICD-10-CM

## 2025-06-03 LAB
ALBUMIN SERPL BCG-MCNC: 4.1 G/DL (ref 3.5–5.2)
ALP SERPL-CCNC: 100 U/L (ref 40–150)
ALT SERPL W P-5'-P-CCNC: 33 U/L (ref 0–50)
ANION GAP SERPL CALCULATED.3IONS-SCNC: 11 MMOL/L (ref 7–15)
AST SERPL W P-5'-P-CCNC: 29 U/L (ref 0–45)
ATRIAL RATE - MUSE: 84 BPM
BASOPHILS # BLD AUTO: 0 10E3/UL (ref 0–0.2)
BASOPHILS NFR BLD AUTO: 1 %
BILIRUB SERPL-MCNC: 0.2 MG/DL
BUN SERPL-MCNC: 17.8 MG/DL (ref 8–23)
CALCIUM SERPL-MCNC: 9.4 MG/DL (ref 8.8–10.4)
CHLORIDE SERPL-SCNC: 102 MMOL/L (ref 98–107)
CREAT SERPL-MCNC: 0.59 MG/DL (ref 0.51–0.95)
DIASTOLIC BLOOD PRESSURE - MUSE: NORMAL MMHG
EGFRCR SERPLBLD CKD-EPI 2021: >90 ML/MIN/1.73M2
EOSINOPHIL # BLD AUTO: 0.1 10E3/UL (ref 0–0.7)
EOSINOPHIL NFR BLD AUTO: 1 %
ERYTHROCYTE [DISTWIDTH] IN BLOOD BY AUTOMATED COUNT: 11.9 % (ref 10–15)
GLUCOSE SERPL-MCNC: 111 MG/DL (ref 70–99)
HCO3 SERPL-SCNC: 23 MMOL/L (ref 22–29)
HCT VFR BLD AUTO: 37.4 % (ref 35–47)
HGB BLD-MCNC: 12.5 G/DL (ref 11.7–15.7)
IMM GRANULOCYTES # BLD: 0 10E3/UL
IMM GRANULOCYTES NFR BLD: 1 %
INTERPRETATION ECG - MUSE: NORMAL
LYMPHOCYTES # BLD AUTO: 1.2 10E3/UL (ref 0.8–5.3)
LYMPHOCYTES NFR BLD AUTO: 20 %
MCH RBC QN AUTO: 30.6 PG (ref 26.5–33)
MCHC RBC AUTO-ENTMCNC: 33.4 G/DL (ref 31.5–36.5)
MCV RBC AUTO: 92 FL (ref 78–100)
MONOCYTES # BLD AUTO: 0.6 10E3/UL (ref 0–1.3)
MONOCYTES NFR BLD AUTO: 10 %
NEUTROPHILS # BLD AUTO: 4.1 10E3/UL (ref 1.6–8.3)
NEUTROPHILS NFR BLD AUTO: 68 %
NRBC # BLD AUTO: 0 10E3/UL
NRBC BLD AUTO-RTO: 0 /100
P AXIS - MUSE: 65 DEGREES
PLATELET # BLD AUTO: 224 10E3/UL (ref 150–450)
POTASSIUM SERPL-SCNC: 4.3 MMOL/L (ref 3.4–5.3)
PR INTERVAL - MUSE: 152 MS
PROT SERPL-MCNC: 7.3 G/DL (ref 6.4–8.3)
QRS DURATION - MUSE: 68 MS
QT - MUSE: 366 MS
QTC - MUSE: 432 MS
R AXIS - MUSE: 56 DEGREES
RBC # BLD AUTO: 4.08 10E6/UL (ref 3.8–5.2)
SODIUM SERPL-SCNC: 136 MMOL/L (ref 135–145)
SYSTOLIC BLOOD PRESSURE - MUSE: NORMAL MMHG
T AXIS - MUSE: 47 DEGREES
VENTRICULAR RATE- MUSE: 84 BPM
WBC # BLD AUTO: 6.1 10E3/UL (ref 4–11)

## 2025-06-03 PROCEDURE — 73590 X-RAY EXAM OF LOWER LEG: CPT | Mod: RT

## 2025-06-03 PROCEDURE — 250N000009 HC RX 250: Performed by: EMERGENCY MEDICINE

## 2025-06-03 PROCEDURE — 80053 COMPREHEN METABOLIC PANEL: CPT | Performed by: EMERGENCY MEDICINE

## 2025-06-03 PROCEDURE — 99285 EMERGENCY DEPT VISIT HI MDM: CPT | Mod: 25

## 2025-06-03 PROCEDURE — 70450 CT HEAD/BRAIN W/O DYE: CPT

## 2025-06-03 PROCEDURE — 71046 X-RAY EXAM CHEST 2 VIEWS: CPT

## 2025-06-03 PROCEDURE — 250N000011 HC RX IP 250 OP 636: Performed by: EMERGENCY MEDICINE

## 2025-06-03 PROCEDURE — 74177 CT ABD & PELVIS W/CONTRAST: CPT

## 2025-06-03 PROCEDURE — 36415 COLL VENOUS BLD VENIPUNCTURE: CPT | Performed by: EMERGENCY MEDICINE

## 2025-06-03 PROCEDURE — 85025 COMPLETE CBC W/AUTO DIFF WBC: CPT | Performed by: EMERGENCY MEDICINE

## 2025-06-03 PROCEDURE — 93005 ELECTROCARDIOGRAM TRACING: CPT

## 2025-06-03 RX ORDER — IOPAMIDOL 755 MG/ML
84 INJECTION, SOLUTION INTRAVASCULAR ONCE
Status: COMPLETED | OUTPATIENT
Start: 2025-06-03 | End: 2025-06-03

## 2025-06-03 RX ADMIN — IOPAMIDOL 84 ML: 755 INJECTION, SOLUTION INTRAVENOUS at 17:59

## 2025-06-03 RX ADMIN — SODIUM CHLORIDE 64 ML: 9 INJECTION, SOLUTION INTRAVENOUS at 18:00

## 2025-06-03 ASSESSMENT — ACTIVITIES OF DAILY LIVING (ADL)
ADLS_ACUITY_SCORE: 54

## 2025-06-03 ASSESSMENT — COLUMBIA-SUICIDE SEVERITY RATING SCALE - C-SSRS
6. HAVE YOU EVER DONE ANYTHING, STARTED TO DO ANYTHING, OR PREPARED TO DO ANYTHING TO END YOUR LIFE?: NO
1. IN THE PAST MONTH, HAVE YOU WISHED YOU WERE DEAD OR WISHED YOU COULD GO TO SLEEP AND NOT WAKE UP?: NO
2. HAVE YOU ACTUALLY HAD ANY THOUGHTS OF KILLING YOURSELF IN THE PAST MONTH?: NO
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
## 2025-06-25 ENCOUNTER — OFFICE VISIT (OUTPATIENT)
Dept: FAMILY MEDICINE | Facility: CLINIC | Age: 75
End: 2025-06-25
Payer: COMMERCIAL

## 2025-06-25 ENCOUNTER — LAB (OUTPATIENT)
Dept: LAB | Facility: CLINIC | Age: 75
End: 2025-06-25
Payer: COMMERCIAL

## 2025-06-25 VITALS
OXYGEN SATURATION: 97 % | DIASTOLIC BLOOD PRESSURE: 73 MMHG | TEMPERATURE: 98 F | SYSTOLIC BLOOD PRESSURE: 117 MMHG | BODY MASS INDEX: 31.28 KG/M2 | HEIGHT: 61 IN | WEIGHT: 165.7 LBS | HEART RATE: 90 BPM | RESPIRATION RATE: 16 BRPM

## 2025-06-25 DIAGNOSIS — M81.0 SENILE OSTEOPOROSIS: ICD-10-CM

## 2025-06-25 DIAGNOSIS — E11.9 TYPE 2 DIABETES MELLITUS WITHOUT COMPLICATION, WITHOUT LONG-TERM CURRENT USE OF INSULIN (H): ICD-10-CM

## 2025-06-25 DIAGNOSIS — A31.0 MAI (MYCOBACTERIUM AVIUM-INTRACELLULARE) (H): ICD-10-CM

## 2025-06-25 DIAGNOSIS — E78.5 HYPERLIPIDEMIA LDL GOAL <100: ICD-10-CM

## 2025-06-25 DIAGNOSIS — Z23 ENCOUNTER FOR IMMUNIZATION: ICD-10-CM

## 2025-06-25 DIAGNOSIS — M54.2 CERVICALGIA: ICD-10-CM

## 2025-06-25 DIAGNOSIS — A31.0 MYCOBACTERIUM AVIUM INFECTION (H): ICD-10-CM

## 2025-06-25 DIAGNOSIS — E11.9 TYPE 2 DIABETES MELLITUS WITHOUT COMPLICATION, WITHOUT LONG-TERM CURRENT USE OF INSULIN (H): Primary | ICD-10-CM

## 2025-06-25 LAB
ALBUMIN SERPL BCG-MCNC: 4.3 G/DL (ref 3.5–5.2)
ALP SERPL-CCNC: 125 U/L (ref 40–150)
ALT SERPL W P-5'-P-CCNC: 45 U/L (ref 0–50)
ANION GAP SERPL CALCULATED.3IONS-SCNC: 11 MMOL/L (ref 7–15)
AST SERPL W P-5'-P-CCNC: 36 U/L (ref 0–45)
BASOPHILS # BLD AUTO: 0.1 10E3/UL (ref 0–0.2)
BASOPHILS NFR BLD AUTO: 1 %
BILIRUB SERPL-MCNC: 0.3 MG/DL
BUN SERPL-MCNC: 14.3 MG/DL (ref 8–23)
CALCIUM SERPL-MCNC: 10 MG/DL (ref 8.8–10.4)
CHLORIDE SERPL-SCNC: 103 MMOL/L (ref 98–107)
CREAT SERPL-MCNC: 0.65 MG/DL (ref 0.51–0.95)
CREAT UR-MCNC: 81.5 MG/DL
EGFRCR SERPLBLD CKD-EPI 2021: >90 ML/MIN/1.73M2
EOSINOPHIL # BLD AUTO: 0.1 10E3/UL (ref 0–0.7)
EOSINOPHIL NFR BLD AUTO: 2 %
ERYTHROCYTE [DISTWIDTH] IN BLOOD BY AUTOMATED COUNT: 12.5 % (ref 10–15)
EST. AVERAGE GLUCOSE BLD GHB EST-MCNC: 137 MG/DL
GLUCOSE SERPL-MCNC: 96 MG/DL (ref 70–99)
HBA1C MFR BLD: 6.4 %
HCO3 SERPL-SCNC: 25 MMOL/L (ref 22–29)
HCT VFR BLD AUTO: 39.6 % (ref 35–47)
HGB BLD-MCNC: 13.1 G/DL (ref 11.7–15.7)
IMM GRANULOCYTES # BLD: 0 10E3/UL
IMM GRANULOCYTES NFR BLD: 1 %
LYMPHOCYTES # BLD AUTO: 1.4 10E3/UL (ref 0.8–5.3)
LYMPHOCYTES NFR BLD AUTO: 25 %
MCH RBC QN AUTO: 30.6 PG (ref 26.5–33)
MCHC RBC AUTO-ENTMCNC: 33.1 G/DL (ref 31.5–36.5)
MCV RBC AUTO: 93 FL (ref 78–100)
MICROALBUMIN UR-MCNC: <12 MG/L
MICROALBUMIN/CREAT UR: NORMAL MG/G{CREAT}
MONOCYTES # BLD AUTO: 0.7 10E3/UL (ref 0–1.3)
MONOCYTES NFR BLD AUTO: 12 %
NEUTROPHILS # BLD AUTO: 3.3 10E3/UL (ref 1.6–8.3)
NEUTROPHILS NFR BLD AUTO: 59 %
NRBC # BLD AUTO: 0 10E3/UL
NRBC BLD AUTO-RTO: 0 /100
PLATELET # BLD AUTO: 250 10E3/UL (ref 150–450)
POTASSIUM SERPL-SCNC: 4.6 MMOL/L (ref 3.4–5.3)
PROT SERPL-MCNC: 7.3 G/DL (ref 6.4–8.3)
RBC # BLD AUTO: 4.28 10E6/UL (ref 3.8–5.2)
SODIUM SERPL-SCNC: 139 MMOL/L (ref 135–145)
WBC # BLD AUTO: 5.6 10E3/UL (ref 4–11)

## 2025-06-25 PROCEDURE — 36415 COLL VENOUS BLD VENIPUNCTURE: CPT | Performed by: PATHOLOGY

## 2025-06-25 PROCEDURE — 82570 ASSAY OF URINE CREATININE: CPT | Performed by: FAMILY MEDICINE

## 2025-06-25 PROCEDURE — 3044F HG A1C LEVEL LT 7.0%: CPT | Performed by: FAMILY MEDICINE

## 2025-06-25 PROCEDURE — 3074F SYST BP LT 130 MM HG: CPT | Performed by: FAMILY MEDICINE

## 2025-06-25 PROCEDURE — 99214 OFFICE O/P EST MOD 30 MIN: CPT | Mod: 25 | Performed by: FAMILY MEDICINE

## 2025-06-25 PROCEDURE — 90480 ADMN SARSCOV2 VAC 1/ONLY CMP: CPT | Performed by: FAMILY MEDICINE

## 2025-06-25 PROCEDURE — 83036 HEMOGLOBIN GLYCOSYLATED A1C: CPT | Performed by: FAMILY MEDICINE

## 2025-06-25 PROCEDURE — 80053 COMPREHEN METABOLIC PANEL: CPT | Performed by: PATHOLOGY

## 2025-06-25 PROCEDURE — 99000 SPECIMEN HANDLING OFFICE-LAB: CPT | Performed by: PATHOLOGY

## 2025-06-25 PROCEDURE — 85025 COMPLETE CBC W/AUTO DIFF WBC: CPT | Performed by: PATHOLOGY

## 2025-06-25 PROCEDURE — 91320 SARSCV2 VAC 30MCG TRS-SUC IM: CPT | Performed by: FAMILY MEDICINE

## 2025-06-25 PROCEDURE — 3078F DIAST BP <80 MM HG: CPT | Performed by: FAMILY MEDICINE

## 2025-06-25 RX ORDER — ROSUVASTATIN CALCIUM 5 MG/1
5 TABLET, COATED ORAL DAILY
Qty: 90 TABLET | Refills: 1 | Status: SHIPPED | OUTPATIENT
Start: 2025-06-25

## 2025-06-25 RX ORDER — SODIUM CHLORIDE FOR INHALATION 0.9 %
3 VIAL, NEBULIZER (ML) INHALATION 2 TIMES DAILY
Qty: 540 ML | Refills: 3 | Status: SHIPPED | OUTPATIENT
Start: 2025-06-25

## 2025-06-25 NOTE — PATIENT INSTRUCTIONS
Urine test today     See me end of Aug - Sept to  discuss next steps after evenity   - consider prolia or IV reclast     A1C was done today     COVID shot today
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
## 2025-06-26 ENCOUNTER — RESULTS FOLLOW-UP (OUTPATIENT)
Dept: FAMILY MEDICINE | Facility: CLINIC | Age: 75
End: 2025-06-26

## 2025-06-30 ENCOUNTER — ALLIED HEALTH/NURSE VISIT (OUTPATIENT)
Dept: INTERNAL MEDICINE | Facility: CLINIC | Age: 75
End: 2025-06-30
Payer: COMMERCIAL

## 2025-06-30 DIAGNOSIS — M81.0 OSTEOPOROSIS, UNSPECIFIED OSTEOPOROSIS TYPE, UNSPECIFIED PATHOLOGICAL FRACTURE PRESENCE: Primary | ICD-10-CM

## 2025-06-30 PROCEDURE — 96372 THER/PROPH/DIAG INJ SC/IM: CPT | Performed by: INTERNAL MEDICINE

## 2025-06-30 PROCEDURE — 99207 PR NO CHARGE NURSE ONLY: CPT

## 2025-06-30 NOTE — PROGRESS NOTES
Meme Butts received the Evenity injection today in clinic at the request of Evenity. The injection was given under the supervision of Dr Gaming if assistance was needed. The patient does not report a history of adverse reactions associated with medication/injection administration. The injection sites were cleaned with alcohol prep wipes. The injections were given without incident--see MAR list for administration details. The patient was advised to remain in fourth floor lobby of the [facility] for fifteen minutes after the injection in case of an adverse reaction.     Evenity injections are administered every 30 days. The patient's next anticipated Evenity injection is 25. The patient was provided an injection schedule and was instructed to call the Primary Care Scheduling Number to schedule their next Evenity injection.     Clinic Administered Medication Documentation      Injectable Medication Documentation    Is there an active order (written within the past 365 days, with administrations remaining, not ) in the chart? Yes.     Patient was given Evenity. Prior to medication administration, verified patient's identity using patient s name and date of birth. Please see MAR and medication order for additional information. Patient instructed to remain in clinic for 15 minutes and report any adverse reaction to staff immediately.    Vial/Syringe: Syringe  Was this medication supplied by the patient? No  Is this a medication the patient will need to receive again? Yes. Verified that the patient has refills remaining in their prescription.       Jose Melo, EMT 11:12 AM on 2025

## 2025-07-02 ENCOUNTER — RESULTS FOLLOW-UP (OUTPATIENT)
Dept: MULTI SPECIALTY CLINIC | Facility: CLINIC | Age: 75
End: 2025-07-02

## 2025-07-02 ENCOUNTER — DOCUMENTATION ONLY (OUTPATIENT)
Dept: MULTI SPECIALTY CLINIC | Facility: CLINIC | Age: 75
End: 2025-07-02
Payer: COMMERCIAL

## 2025-07-02 NOTE — PROGRESS NOTES
Brief note:    Labs from 6/25/25:    - CBC normal  - CMP with normal creatinine and LFTs    Results notified to the patient by the patient's PCP together with the Hb A1C and urine albumin tests obtained by PCP.

## 2025-07-24 DIAGNOSIS — A31.0 MYCOBACTERIUM AVIUM COMPLEX (H): Primary | ICD-10-CM

## 2025-07-28 ENCOUNTER — LAB (OUTPATIENT)
Dept: LAB | Facility: CLINIC | Age: 75
End: 2025-07-28
Payer: COMMERCIAL

## 2025-07-28 ENCOUNTER — ALLIED HEALTH/NURSE VISIT (OUTPATIENT)
Dept: INTERNAL MEDICINE | Facility: CLINIC | Age: 75
End: 2025-07-28
Payer: COMMERCIAL

## 2025-07-28 DIAGNOSIS — A31.0 MAI (MYCOBACTERIUM AVIUM-INTRACELLULARE) (H): ICD-10-CM

## 2025-07-28 DIAGNOSIS — M81.0 OSTEOPOROSIS, UNSPECIFIED OSTEOPOROSIS TYPE, UNSPECIFIED PATHOLOGICAL FRACTURE PRESENCE: Primary | ICD-10-CM

## 2025-07-28 LAB
ALBUMIN SERPL BCG-MCNC: 4.3 G/DL (ref 3.5–5.2)
ALP SERPL-CCNC: 120 U/L (ref 40–150)
ALT SERPL W P-5'-P-CCNC: 40 U/L (ref 0–50)
ANION GAP SERPL CALCULATED.3IONS-SCNC: 13 MMOL/L (ref 7–15)
AST SERPL W P-5'-P-CCNC: 33 U/L (ref 0–45)
BASOPHILS # BLD AUTO: 0.1 10E3/UL (ref 0–0.2)
BASOPHILS NFR BLD AUTO: 1 %
BILIRUB SERPL-MCNC: 0.4 MG/DL
BUN SERPL-MCNC: 17.9 MG/DL (ref 8–23)
CALCIUM SERPL-MCNC: 9.9 MG/DL (ref 8.8–10.4)
CHLORIDE SERPL-SCNC: 105 MMOL/L (ref 98–107)
CREAT SERPL-MCNC: 0.58 MG/DL (ref 0.51–0.95)
EGFRCR SERPLBLD CKD-EPI 2021: >90 ML/MIN/1.73M2
EOSINOPHIL # BLD AUTO: 0.1 10E3/UL (ref 0–0.7)
EOSINOPHIL NFR BLD AUTO: 2 %
ERYTHROCYTE [DISTWIDTH] IN BLOOD BY AUTOMATED COUNT: 12.3 % (ref 10–15)
GLUCOSE SERPL-MCNC: 114 MG/DL (ref 70–99)
HCO3 SERPL-SCNC: 23 MMOL/L (ref 22–29)
HCT VFR BLD AUTO: 38.5 % (ref 35–47)
HGB BLD-MCNC: 12.9 G/DL (ref 11.7–15.7)
IMM GRANULOCYTES # BLD: 0 10E3/UL
IMM GRANULOCYTES NFR BLD: 1 %
LYMPHOCYTES # BLD AUTO: 1.6 10E3/UL (ref 0.8–5.3)
LYMPHOCYTES NFR BLD AUTO: 26 %
MCH RBC QN AUTO: 30.6 PG (ref 26.5–33)
MCHC RBC AUTO-ENTMCNC: 33.5 G/DL (ref 31.5–36.5)
MCV RBC AUTO: 91 FL (ref 78–100)
MONOCYTES # BLD AUTO: 0.6 10E3/UL (ref 0–1.3)
MONOCYTES NFR BLD AUTO: 10 %
NEUTROPHILS # BLD AUTO: 3.7 10E3/UL (ref 1.6–8.3)
NEUTROPHILS NFR BLD AUTO: 61 %
NRBC # BLD AUTO: 0 10E3/UL
NRBC BLD AUTO-RTO: 0 /100
PLATELET # BLD AUTO: 258 10E3/UL (ref 150–450)
POTASSIUM SERPL-SCNC: 4.3 MMOL/L (ref 3.4–5.3)
PROT SERPL-MCNC: 7.6 G/DL (ref 6.4–8.3)
RBC # BLD AUTO: 4.22 10E6/UL (ref 3.8–5.2)
SODIUM SERPL-SCNC: 141 MMOL/L (ref 135–145)
WBC # BLD AUTO: 6 10E3/UL (ref 4–11)

## 2025-07-28 PROCEDURE — 80053 COMPREHEN METABOLIC PANEL: CPT | Performed by: PATHOLOGY

## 2025-07-28 PROCEDURE — 36415 COLL VENOUS BLD VENIPUNCTURE: CPT | Performed by: PATHOLOGY

## 2025-07-28 PROCEDURE — 85025 COMPLETE CBC W/AUTO DIFF WBC: CPT | Performed by: PATHOLOGY

## 2025-07-28 PROCEDURE — 99207 PR NO CHARGE NURSE ONLY: CPT

## 2025-07-28 PROCEDURE — 96372 THER/PROPH/DIAG INJ SC/IM: CPT | Performed by: INTERNAL MEDICINE

## 2025-07-28 NOTE — PROGRESS NOTES
Meme Butts received the Evenity injection today in clinic at the request of Dr Maria Esther Miranda. The injection was given under the supervision of Dr. Sukhdeep Gaming if assistance was needed. The patient does not report a history of adverse reactions associated with medication/injection administration. The injection sites were cleaned with alcohol prep wipes. The injections were given without incident--see MAR list for administration details. The patient was advised to remain in fourth floor lobby of the [facility] for fifteen minutes after the injection in case of an adverse reaction.     Evenity injections are administered every 30 days. The patient's next anticipated Evenity injection is 2025 at her visit with Dr. Miranda. The patient was provided an injection schedule and was instructed to call the Primary Care Scheduling Number to schedule their next Evenity injection.     Clinic Administered Medication Documentation    Injectable Medication Documentation    Is there an active order (written within the past 365 days, with administrations remaining, not ) in the chart? Yes.     Patient was given EVINITY. Prior to medication administration, verified patient's identity using patient s name and date of birth. Please see MAR and medication order for additional information. Patient instructed to remain in clinic for 15 minutes and report any adverse reaction to staff immediately.    Vial/Syringe: Syringe  Was this medication supplied by the patient? No  Is this a medication the patient will need to receive again? Yes. Verified that the patient has refills remaining in their prescription.     Fazal Oro, EMT at 11:58 AM on 2025

## 2025-08-04 ENCOUNTER — NURSE TRIAGE (OUTPATIENT)
Dept: NURSING | Facility: CLINIC | Age: 75
End: 2025-08-04
Payer: COMMERCIAL

## 2025-08-04 ENCOUNTER — TELEPHONE (OUTPATIENT)
Dept: FAMILY MEDICINE | Facility: CLINIC | Age: 75
End: 2025-08-04
Payer: COMMERCIAL

## 2025-08-05 ENCOUNTER — OFFICE VISIT (OUTPATIENT)
Dept: INTERNAL MEDICINE | Facility: CLINIC | Age: 75
End: 2025-08-05
Payer: COMMERCIAL

## 2025-08-05 ENCOUNTER — LAB (OUTPATIENT)
Dept: LAB | Facility: CLINIC | Age: 75
End: 2025-08-05
Payer: COMMERCIAL

## 2025-08-05 ENCOUNTER — ANCILLARY PROCEDURE (OUTPATIENT)
Dept: CT IMAGING | Facility: CLINIC | Age: 75
End: 2025-08-05
Attending: INTERNAL MEDICINE
Payer: COMMERCIAL

## 2025-08-05 VITALS
TEMPERATURE: 98 F | OXYGEN SATURATION: 96 % | HEART RATE: 87 BPM | DIASTOLIC BLOOD PRESSURE: 77 MMHG | RESPIRATION RATE: 16 BRPM | HEIGHT: 61 IN | BODY MASS INDEX: 31.3 KG/M2 | WEIGHT: 165.8 LBS | SYSTOLIC BLOOD PRESSURE: 121 MMHG

## 2025-08-05 DIAGNOSIS — A31.0 MYCOBACTERIUM AVIUM INFECTION (H): ICD-10-CM

## 2025-08-05 DIAGNOSIS — A31.0 MYCOBACTERIUM AVIUM COMPLEX (H): Primary | ICD-10-CM

## 2025-08-05 DIAGNOSIS — R04.2 HEMOPTYSIS: ICD-10-CM

## 2025-08-05 DIAGNOSIS — R04.2 HEMOPTYSIS: Primary | ICD-10-CM

## 2025-08-05 LAB
ALBUMIN SERPL BCG-MCNC: 4.1 G/DL (ref 3.5–5.2)
ALP SERPL-CCNC: 108 U/L (ref 40–150)
ALT SERPL W P-5'-P-CCNC: 38 U/L (ref 0–50)
ANION GAP SERPL CALCULATED.3IONS-SCNC: 13 MMOL/L (ref 7–15)
AST SERPL W P-5'-P-CCNC: 33 U/L (ref 0–45)
BASOPHILS # BLD AUTO: 0 10E3/UL (ref 0–0.2)
BASOPHILS NFR BLD AUTO: 1 %
BILIRUB SERPL-MCNC: 0.3 MG/DL
BUN SERPL-MCNC: 13.1 MG/DL (ref 8–23)
CALCIUM SERPL-MCNC: 9.4 MG/DL (ref 8.8–10.4)
CHLORIDE SERPL-SCNC: 100 MMOL/L (ref 98–107)
CREAT SERPL-MCNC: 0.61 MG/DL (ref 0.51–0.95)
EGFRCR SERPLBLD CKD-EPI 2021: >90 ML/MIN/1.73M2
EOSINOPHIL # BLD AUTO: 0.1 10E3/UL (ref 0–0.7)
EOSINOPHIL NFR BLD AUTO: 1 %
ERYTHROCYTE [DISTWIDTH] IN BLOOD BY AUTOMATED COUNT: 12.4 % (ref 10–15)
GLUCOSE SERPL-MCNC: 106 MG/DL (ref 70–99)
HCO3 SERPL-SCNC: 24 MMOL/L (ref 22–29)
HCT VFR BLD AUTO: 39.5 % (ref 35–47)
HGB BLD-MCNC: 13.3 G/DL (ref 11.7–15.7)
IMM GRANULOCYTES # BLD: 0 10E3/UL
IMM GRANULOCYTES NFR BLD: 0 %
LYMPHOCYTES # BLD AUTO: 1.3 10E3/UL (ref 0.8–5.3)
LYMPHOCYTES NFR BLD AUTO: 20 %
MCH RBC QN AUTO: 31.1 PG (ref 26.5–33)
MCHC RBC AUTO-ENTMCNC: 33.7 G/DL (ref 31.5–36.5)
MCV RBC AUTO: 92 FL (ref 78–100)
MONOCYTES # BLD AUTO: 0.6 10E3/UL (ref 0–1.3)
MONOCYTES NFR BLD AUTO: 9 %
NEUTROPHILS # BLD AUTO: 4.5 10E3/UL (ref 1.6–8.3)
NEUTROPHILS NFR BLD AUTO: 70 %
NRBC # BLD AUTO: 0 10E3/UL
NRBC BLD AUTO-RTO: 0 /100
PLATELET # BLD AUTO: 237 10E3/UL (ref 150–450)
POTASSIUM SERPL-SCNC: 4.2 MMOL/L (ref 3.4–5.3)
PROT SERPL-MCNC: 7.3 G/DL (ref 6.4–8.3)
RBC # BLD AUTO: 4.28 10E6/UL (ref 3.8–5.2)
SODIUM SERPL-SCNC: 137 MMOL/L (ref 135–145)
WBC # BLD AUTO: 6.5 10E3/UL (ref 4–11)

## 2025-08-05 PROCEDURE — 3078F DIAST BP <80 MM HG: CPT

## 2025-08-05 PROCEDURE — 99213 OFFICE O/P EST LOW 20 MIN: CPT | Mod: GE

## 2025-08-05 PROCEDURE — 85025 COMPLETE CBC W/AUTO DIFF WBC: CPT | Performed by: PATHOLOGY

## 2025-08-05 PROCEDURE — 36415 COLL VENOUS BLD VENIPUNCTURE: CPT | Performed by: PATHOLOGY

## 2025-08-05 PROCEDURE — 3074F SYST BP LT 130 MM HG: CPT

## 2025-08-05 PROCEDURE — 80053 COMPREHEN METABOLIC PANEL: CPT | Performed by: PATHOLOGY

## 2025-08-05 PROCEDURE — 71260 CT THORAX DX C+: CPT | Performed by: RADIOLOGY

## 2025-08-05 RX ORDER — IOPAMIDOL 755 MG/ML
81 INJECTION, SOLUTION INTRAVASCULAR ONCE
Status: COMPLETED | OUTPATIENT
Start: 2025-08-05 | End: 2025-08-05

## 2025-08-05 RX ADMIN — IOPAMIDOL 81 ML: 755 INJECTION, SOLUTION INTRAVASCULAR at 11:02

## 2025-08-06 ENCOUNTER — TELEPHONE (OUTPATIENT)
Dept: PULMONOLOGY | Facility: CLINIC | Age: 75
End: 2025-08-06
Payer: COMMERCIAL

## 2025-08-12 ENCOUNTER — MYC MEDICAL ADVICE (OUTPATIENT)
Dept: FAMILY MEDICINE | Facility: CLINIC | Age: 75
End: 2025-08-12

## 2025-08-12 ENCOUNTER — PRE VISIT (OUTPATIENT)
Dept: PULMONOLOGY | Facility: CLINIC | Age: 75
End: 2025-08-12

## 2025-08-12 ENCOUNTER — OFFICE VISIT (OUTPATIENT)
Dept: PULMONOLOGY | Facility: CLINIC | Age: 75
End: 2025-08-12
Attending: INTERNAL MEDICINE
Payer: COMMERCIAL

## 2025-08-12 VITALS
SYSTOLIC BLOOD PRESSURE: 122 MMHG | HEART RATE: 81 BPM | DIASTOLIC BLOOD PRESSURE: 77 MMHG | WEIGHT: 165 LBS | BODY MASS INDEX: 31.19 KG/M2 | OXYGEN SATURATION: 97 %

## 2025-08-12 DIAGNOSIS — A31.0 MYCOBACTERIUM AVIUM INFECTION (H): ICD-10-CM

## 2025-08-12 DIAGNOSIS — R04.2 HEMOPTYSIS: ICD-10-CM

## 2025-08-12 PROCEDURE — G0463 HOSPITAL OUTPT CLINIC VISIT: HCPCS | Performed by: INTERNAL MEDICINE

## 2025-08-12 ASSESSMENT — PAIN SCALES - GENERAL: PAINLEVEL_OUTOF10: NO PAIN (0)

## 2025-08-12 ASSESSMENT — ENCOUNTER SYMPTOMS
COUGH: 1
SHORTNESS OF BREATH: 0
HEMOPTYSIS: 1
CHILLS: 0
HEARTBURN: 0
NAUSEA: 0
FEVER: 0
ABDOMINAL PAIN: 0
SINUS PAIN: 0
SPUTUM PRODUCTION: 1
VOMITING: 0

## 2025-08-26 ENCOUNTER — LAB (OUTPATIENT)
Dept: LAB | Facility: CLINIC | Age: 75
End: 2025-08-26
Payer: COMMERCIAL

## 2025-08-26 DIAGNOSIS — A31.0 MYCOBACTERIUM AVIUM COMPLEX (H): ICD-10-CM

## 2025-08-26 PROCEDURE — 99000 SPECIMEN HANDLING OFFICE-LAB: CPT | Performed by: PATHOLOGY

## 2025-08-26 PROCEDURE — 87116 MYCOBACTERIA CULTURE: CPT | Mod: 90 | Performed by: PATHOLOGY

## 2025-08-26 PROCEDURE — 87206 SMEAR FLUORESCENT/ACID STAI: CPT | Mod: 90 | Performed by: PATHOLOGY

## 2025-08-28 ENCOUNTER — VIRTUAL VISIT (OUTPATIENT)
Dept: PHARMACY | Facility: CLINIC | Age: 75
End: 2025-08-28
Payer: COMMERCIAL

## 2025-08-28 DIAGNOSIS — A31.0 MYCOBACTERIUM AVIUM INFECTION (H): Primary | ICD-10-CM

## 2025-08-28 LAB
ACID FAST STAIN (ARUP): NORMAL
ACID FAST STAIN (ARUP): NORMAL

## 2025-09-02 DIAGNOSIS — M81.0 SENILE OSTEOPOROSIS: ICD-10-CM

## 2025-09-02 DIAGNOSIS — Z92.29 HX DRUG THERAPY: Primary | ICD-10-CM

## 2025-09-03 ENCOUNTER — OFFICE VISIT (OUTPATIENT)
Dept: FAMILY MEDICINE | Facility: CLINIC | Age: 75
End: 2025-09-03
Payer: COMMERCIAL

## 2025-09-03 ENCOUNTER — LAB (OUTPATIENT)
Dept: LAB | Facility: CLINIC | Age: 75
End: 2025-09-03
Payer: COMMERCIAL

## 2025-09-03 VITALS
WEIGHT: 166.2 LBS | HEIGHT: 62 IN | OXYGEN SATURATION: 94 % | TEMPERATURE: 98.2 F | BODY MASS INDEX: 30.59 KG/M2 | RESPIRATION RATE: 16 BRPM | SYSTOLIC BLOOD PRESSURE: 106 MMHG | DIASTOLIC BLOOD PRESSURE: 69 MMHG | HEART RATE: 82 BPM

## 2025-09-03 DIAGNOSIS — A31.0 MYCOBACTERIUM AVIUM COMPLEX (H): ICD-10-CM

## 2025-09-03 DIAGNOSIS — M81.0 AGE-RELATED OSTEOPOROSIS WITHOUT CURRENT PATHOLOGICAL FRACTURE: Primary | ICD-10-CM

## 2025-09-03 DIAGNOSIS — E78.5 HYPERLIPIDEMIA LDL GOAL <100: ICD-10-CM

## 2025-09-03 DIAGNOSIS — Z92.29 PERSONAL HISTORY OF OTHER DRUG THERAPY: ICD-10-CM

## 2025-09-03 DIAGNOSIS — Z92.29 HISTORY OF BISPHOSPHONATE THERAPY: ICD-10-CM

## 2025-09-03 LAB
ALBUMIN SERPL BCG-MCNC: 4.2 G/DL (ref 3.5–5.2)
ALP SERPL-CCNC: 107 U/L (ref 40–150)
ALT SERPL W P-5'-P-CCNC: 38 U/L (ref 0–50)
ANION GAP SERPL CALCULATED.3IONS-SCNC: 15 MMOL/L (ref 7–15)
AST SERPL W P-5'-P-CCNC: 32 U/L (ref 0–45)
BASOPHILS # BLD AUTO: 0.03 10E3/UL (ref 0–0.2)
BASOPHILS NFR BLD AUTO: 0.6 %
BILIRUB SERPL-MCNC: 0.4 MG/DL
BUN SERPL-MCNC: 12.7 MG/DL (ref 8–23)
CALCIUM SERPL-MCNC: 10 MG/DL (ref 8.8–10.4)
CHLORIDE SERPL-SCNC: 102 MMOL/L (ref 98–107)
CREAT SERPL-MCNC: 0.6 MG/DL (ref 0.51–0.95)
EGFRCR SERPLBLD CKD-EPI 2021: >90 ML/MIN/1.73M2
EOSINOPHIL # BLD AUTO: 0.06 10E3/UL (ref 0–0.7)
EOSINOPHIL NFR BLD AUTO: 1.1 %
ERYTHROCYTE [DISTWIDTH] IN BLOOD BY AUTOMATED COUNT: 12.1 % (ref 10–15)
GLUCOSE SERPL-MCNC: 101 MG/DL (ref 70–99)
HCO3 SERPL-SCNC: 23 MMOL/L (ref 22–29)
HCT VFR BLD AUTO: 38.7 % (ref 35–47)
HGB BLD-MCNC: 12.9 G/DL (ref 11.7–15.7)
IMM GRANULOCYTES # BLD: <0.03 10E3/UL
IMM GRANULOCYTES NFR BLD: 0.4 %
LYMPHOCYTES # BLD AUTO: 1.44 10E3/UL (ref 0.8–5.3)
LYMPHOCYTES NFR BLD AUTO: 27.1 %
MCH RBC QN AUTO: 30.6 PG (ref 26.5–33)
MCHC RBC AUTO-ENTMCNC: 33.3 G/DL (ref 31.5–36.5)
MCV RBC AUTO: 91.9 FL (ref 78–100)
MONOCYTES # BLD AUTO: 0.55 10E3/UL (ref 0–1.3)
MONOCYTES NFR BLD AUTO: 10.4 %
NEUTROPHILS # BLD AUTO: 3.21 10E3/UL (ref 1.6–8.3)
NEUTROPHILS NFR BLD AUTO: 60.4 %
NRBC # BLD AUTO: <0.03 10E3/UL
NRBC BLD AUTO-RTO: 0 /100
PLATELET # BLD AUTO: 237 10E3/UL (ref 150–450)
POTASSIUM SERPL-SCNC: 4.4 MMOL/L (ref 3.4–5.3)
PROT SERPL-MCNC: 7.4 G/DL (ref 6.4–8.3)
RBC # BLD AUTO: 4.21 10E6/UL (ref 3.8–5.2)
SODIUM SERPL-SCNC: 140 MMOL/L (ref 135–145)
WBC # BLD AUTO: 5.31 10E3/UL (ref 4–11)

## 2025-09-03 PROCEDURE — 80053 COMPREHEN METABOLIC PANEL: CPT | Performed by: PATHOLOGY

## 2025-09-03 PROCEDURE — 85025 COMPLETE CBC W/AUTO DIFF WBC: CPT | Performed by: PATHOLOGY

## 2025-09-03 PROCEDURE — 1126F AMNT PAIN NOTED NONE PRSNT: CPT | Performed by: FAMILY MEDICINE

## 2025-09-03 PROCEDURE — 99214 OFFICE O/P EST MOD 30 MIN: CPT | Mod: 25 | Performed by: FAMILY MEDICINE

## 2025-09-03 PROCEDURE — 3078F DIAST BP <80 MM HG: CPT | Performed by: FAMILY MEDICINE

## 2025-09-03 PROCEDURE — 36415 COLL VENOUS BLD VENIPUNCTURE: CPT | Performed by: PATHOLOGY

## 2025-09-03 PROCEDURE — 96372 THER/PROPH/DIAG INJ SC/IM: CPT | Performed by: FAMILY MEDICINE

## 2025-09-03 PROCEDURE — 3074F SYST BP LT 130 MM HG: CPT | Performed by: FAMILY MEDICINE

## 2025-09-03 RX ORDER — ROSUVASTATIN CALCIUM 5 MG/1
5 TABLET, COATED ORAL DAILY
Qty: 90 TABLET | Refills: 1 | Status: SHIPPED | OUTPATIENT
Start: 2025-09-03

## 2025-09-03 ASSESSMENT — PAIN SCALES - GENERAL: PAINLEVEL_OUTOF10: NO PAIN (0)

## (undated) DEVICE — DRAPE SHEET REV FOLD 3/4 9349

## (undated) DEVICE — TUBING SUCTION 12"X1/4" N612

## (undated) DEVICE — DAVINCI SEAL CANNULA AND STPL 12MM 470380

## (undated) DEVICE — SUCTION MANIFOLD NEPTUNE 2 SYS 1 PORT 702-025-000

## (undated) DEVICE — SU MONOCRYL 4-0 PS-2 27" UND Y426H

## (undated) DEVICE — STPL DAVINCI SUREFORM 60MM RELOAD BLUE 48360B

## (undated) DEVICE — WIPE PREMOIST CLEANSING WASHCLOTHS 7988

## (undated) DEVICE — BLADE CLIPPER SGL USE 9680

## (undated) DEVICE — BLADE KNIFE SURG 10 371110

## (undated) DEVICE — DAVINCI XI DRAPE ARM 470015

## (undated) DEVICE — SOL WATER IRRIG 1000ML BOTTLE 2F7114

## (undated) DEVICE — DAVINCI XI HANDPIECE ESU VESSEL SEALER 8MM EXT 480422

## (undated) DEVICE — SU VICRYL 0 TIE 54" J608H

## (undated) DEVICE — DAVINCI XI FCP BIPOLAR FENESTRATED 470205

## (undated) DEVICE — TUBING SUCTION MEDI-VAC 1/4"X20' N620A

## (undated) DEVICE — LINEN TOWEL PACK X6 WHITE 5487

## (undated) DEVICE — KIT ENDO FIRST STEP DISINFECTANT 200ML W/POUCH EP-4

## (undated) DEVICE — GOWN IMPERVIOUS 2XL BLUE

## (undated) DEVICE — ENDO ACCESS PLATFORM GELPOINT MINI CNGL3

## (undated) DEVICE — DAVINCI XI GRASPER PROGRASP 8MM EXT 471093

## (undated) DEVICE — ESU GROUND PAD ADULT W/CORD E7507

## (undated) DEVICE — DAVINCI XI MONOPOLAR SCISSORS HOT SHEARS 8MM 470179

## (undated) DEVICE — SUCTION MANIFOLD NEPTUNE 2 SYS 4 PORT 0702-020-000

## (undated) DEVICE — LINEN TOWEL PACK X30 5481

## (undated) DEVICE — Device

## (undated) DEVICE — DRAPE SPLIT SHEET 77X108 REINFORCED 29436

## (undated) DEVICE — ENDO TROCAR CONMED AIRSEAL BLADELESS 08X120MM IAS8-120LP

## (undated) DEVICE — SUCTION IRR STRYKERFLOW II W/TIP 250-070-520

## (undated) DEVICE — PACK DAVINCI UROL

## (undated) DEVICE — KIT ENDO TURNOVER/PROCEDURE CARRY-ON 101822

## (undated) DEVICE — DAVINCI XI REDUCER 8-12MM 470381

## (undated) DEVICE — KIT PATIENT POSITIONING PIGAZZI LATEX FREE 40580

## (undated) DEVICE — SU PDS II 1 CTX 36" Z371T

## (undated) DEVICE — SYSTEM LAPAROVUE VISIBILITY LAPVUE10

## (undated) DEVICE — TUBING FILTER TRI-LUMEN AIRSEAL ASC-EVAC1

## (undated) DEVICE — SOL WATER IRRIG 500ML BOTTLE 2F7113

## (undated) DEVICE — ENDO POUCH UNIVERSAL RETRIEVAL SYSTEM INZII 12/15MM CD004

## (undated) DEVICE — DAVINCI XI NDL DRIVER MEGA SUTURE CUT 8MM 470309

## (undated) DEVICE — STPL DAVINCI SUREFORM 60MM STR 480460

## (undated) DEVICE — PAD CHUX UNDERPAD 30X36" P3036C

## (undated) DEVICE — BNDG ABDOMINAL BINDER 9X45-62" 79-89071

## (undated) DEVICE — ENDO SNARE EXACTO COLD 9MM LOOP 2.4MMX230CM 00711115

## (undated) DEVICE — SPECIMEN CONTAINER 3OZ W/FORMALIN 59901

## (undated) DEVICE — WIPES FOLEY CARE SURESTEP PROVON DFC100

## (undated) DEVICE — SNARE CAPIVATOR ROUND COLD SNR BX10 M00561101

## (undated) DEVICE — DAVINCI HOT SHEARS TIP COVER  400180

## (undated) DEVICE — SU VICRYL 3-0 SH 27" UND J416H

## (undated) DEVICE — SU VICRYL 0 UR-6 27" J603H

## (undated) DEVICE — DEVICE SUTURE PASSER 14GA WECK EFX EFXSP2

## (undated) DEVICE — DRSG GAUZE 4X4" TRAY 6939

## (undated) DEVICE — DAVINCI XI SEAL UNIVERSAL 5-8MM 470361

## (undated) DEVICE — SOL WATER IRRIG 3000ML BAG 2B7117

## (undated) DEVICE — DAVINCI XI GRASPER FENESTRATED TIP UP 8MM 470347

## (undated) DEVICE — SU VICRYL 3-0 SH 27" J316H

## (undated) DEVICE — DAVINCI XI DRAPE COLUMN 470341

## (undated) DEVICE — SU WND CLOSURE VLOC 90 ABS 2-0 VIOLET 6" GS-22 VLOCM2105

## (undated) DEVICE — PROTECTOR ARM ONE-STEP TRENDELENBURG 40418

## (undated) DEVICE — STPL DAVINCI SUREFORM 60MM RELOAD WHITE 48360W

## (undated) DEVICE — DRAPE IOBAN INCISE 23X17" 6650EZ

## (undated) DEVICE — DAVINCI XI DRIVER NDL LARGE 8MM EXT 471006

## (undated) RX ORDER — HYDROMORPHONE HYDROCHLORIDE 1 MG/ML
INJECTION, SOLUTION INTRAMUSCULAR; INTRAVENOUS; SUBCUTANEOUS
Status: DISPENSED
Start: 2023-02-28

## (undated) RX ORDER — FENTANYL CITRATE 50 UG/ML
INJECTION, SOLUTION INTRAMUSCULAR; INTRAVENOUS
Status: DISPENSED
Start: 2024-05-21

## (undated) RX ORDER — FENTANYL CITRATE 50 UG/ML
INJECTION, SOLUTION INTRAMUSCULAR; INTRAVENOUS
Status: DISPENSED
Start: 2023-02-28

## (undated) RX ORDER — ESMOLOL HYDROCHLORIDE 10 MG/ML
INJECTION INTRAVENOUS
Status: DISPENSED
Start: 2023-02-28

## (undated) RX ORDER — BUPIVACAINE HYDROCHLORIDE 5 MG/ML
INJECTION, SOLUTION EPIDURAL; INTRACAUDAL
Status: DISPENSED
Start: 2019-04-30

## (undated) RX ORDER — ENOXAPARIN SODIUM 100 MG/ML
INJECTION SUBCUTANEOUS
Status: DISPENSED
Start: 2023-02-28

## (undated) RX ORDER — SODIUM CHLORIDE FOR INHALATION 10 %
VIAL, NEBULIZER (ML) INHALATION
Status: DISPENSED
Start: 2024-09-25

## (undated) RX ORDER — LIDOCAINE HYDROCHLORIDE 10 MG/ML
INJECTION, SOLUTION EPIDURAL; INFILTRATION; INTRACAUDAL; PERINEURAL
Status: DISPENSED
Start: 2024-05-21

## (undated) RX ORDER — FUROSEMIDE 10 MG/ML
INJECTION INTRAMUSCULAR; INTRAVENOUS
Status: DISPENSED
Start: 2023-02-28

## (undated) RX ORDER — DEXAMETHASONE SODIUM PHOSPHATE 4 MG/ML
INJECTION, SOLUTION INTRA-ARTICULAR; INTRALESIONAL; INTRAMUSCULAR; INTRAVENOUS; SOFT TISSUE
Status: DISPENSED
Start: 2023-02-28

## (undated) RX ORDER — CEFAZOLIN SODIUM 1 G/3ML
INJECTION, POWDER, FOR SOLUTION INTRAMUSCULAR; INTRAVENOUS
Status: DISPENSED
Start: 2023-02-28

## (undated) RX ORDER — ALBUTEROL SULFATE 0.83 MG/ML
SOLUTION RESPIRATORY (INHALATION)
Status: DISPENSED
Start: 2024-09-25

## (undated) RX ORDER — ONDANSETRON 2 MG/ML
INJECTION INTRAMUSCULAR; INTRAVENOUS
Status: DISPENSED
Start: 2023-02-28

## (undated) RX ORDER — INDOCYANINE GREEN AND WATER 25 MG
KIT INJECTION
Status: DISPENSED
Start: 2023-02-28

## (undated) RX ORDER — TRIAMCINOLONE ACETONIDE 40 MG/ML
INJECTION, SUSPENSION INTRA-ARTICULAR; INTRAMUSCULAR
Status: DISPENSED
Start: 2019-04-30

## (undated) RX ORDER — DOBUTAMINE HYDROCHLORIDE 200 MG/100ML
INJECTION INTRAVENOUS
Status: DISPENSED
Start: 2022-09-12

## (undated) RX ORDER — METOPROLOL TARTRATE 1 MG/ML
INJECTION, SOLUTION INTRAVENOUS
Status: DISPENSED
Start: 2022-09-12

## (undated) RX ORDER — HYDROMORPHONE HCL IN WATER/PF 6 MG/30 ML
PATIENT CONTROLLED ANALGESIA SYRINGE INTRAVENOUS
Status: DISPENSED
Start: 2023-02-28

## (undated) RX ORDER — CEFAZOLIN SODIUM/WATER 2 G/20 ML
SYRINGE (ML) INTRAVENOUS
Status: DISPENSED
Start: 2023-02-28

## (undated) RX ORDER — ACETAMINOPHEN 325 MG/1
TABLET ORAL
Status: DISPENSED
Start: 2023-02-28

## (undated) RX ORDER — SCOLOPAMINE TRANSDERMAL SYSTEM 1 MG/1
PATCH, EXTENDED RELEASE TRANSDERMAL
Status: DISPENSED
Start: 2023-02-28

## (undated) RX ORDER — OXYMETAZOLINE HYDROCHLORIDE 0.05 G/100ML
SPRAY NASAL
Status: DISPENSED
Start: 2023-02-28

## (undated) RX ORDER — METRONIDAZOLE 500 MG/100ML
INJECTION, SOLUTION INTRAVENOUS
Status: DISPENSED
Start: 2023-02-28

## (undated) RX ORDER — ATROPINE SULFATE 0.4 MG/ML
AMPUL (ML) INJECTION
Status: DISPENSED
Start: 2022-09-12

## (undated) RX ORDER — LIDOCAINE HYDROCHLORIDE 10 MG/ML
INJECTION, SOLUTION EPIDURAL; INFILTRATION; INTRACAUDAL; PERINEURAL
Status: DISPENSED
Start: 2019-04-30

## (undated) RX ORDER — BUPIVACAINE HYDROCHLORIDE 2.5 MG/ML
INJECTION, SOLUTION EPIDURAL; INFILTRATION; INTRACAUDAL
Status: DISPENSED
Start: 2023-02-28

## (undated) RX ORDER — GLYCOPYRROLATE 0.2 MG/ML
INJECTION, SOLUTION INTRAMUSCULAR; INTRAVENOUS
Status: DISPENSED
Start: 2023-02-28

## (undated) RX ORDER — SODIUM CHLORIDE, SODIUM LACTATE, POTASSIUM CHLORIDE, CALCIUM CHLORIDE 600; 310; 30; 20 MG/100ML; MG/100ML; MG/100ML; MG/100ML
INJECTION, SOLUTION INTRAVENOUS
Status: DISPENSED
Start: 2023-02-28